# Patient Record
Sex: FEMALE | Race: WHITE | Employment: OTHER | URBAN - METROPOLITAN AREA
[De-identification: names, ages, dates, MRNs, and addresses within clinical notes are randomized per-mention and may not be internally consistent; named-entity substitution may affect disease eponyms.]

---

## 2017-01-16 ENCOUNTER — TELEPHONE (OUTPATIENT)
Dept: FAMILY MEDICINE CLINIC | Age: 67
End: 2017-01-16

## 2017-01-16 ENCOUNTER — OFFICE VISIT (OUTPATIENT)
Dept: FAMILY MEDICINE CLINIC | Age: 67
End: 2017-01-16

## 2017-01-16 ENCOUNTER — HOSPITAL ENCOUNTER (OUTPATIENT)
Dept: LAB | Age: 67
Discharge: HOME | End: 2017-01-16
Payer: MEDICARE

## 2017-01-16 VITALS
SYSTOLIC BLOOD PRESSURE: 126 MMHG | DIASTOLIC BLOOD PRESSURE: 88 MMHG | BODY MASS INDEX: 35.06 KG/M2 | TEMPERATURE: 98.6 F | HEART RATE: 110 BPM | RESPIRATION RATE: 18 BRPM | OXYGEN SATURATION: 98 % | HEIGHT: 65 IN | WEIGHT: 210.4 LBS

## 2017-01-16 DIAGNOSIS — F43.23 ADJUSTMENT DISORDER WITH MIXED ANXIETY AND DEPRESSED MOOD: ICD-10-CM

## 2017-01-16 DIAGNOSIS — K92.1 MELENA: Primary | ICD-10-CM

## 2017-01-16 DIAGNOSIS — R63.5 WEIGHT GAIN, ABNORMAL: ICD-10-CM

## 2017-01-16 DIAGNOSIS — M54.16 LUMBAR NERVE ROOT IMPINGEMENT: ICD-10-CM

## 2017-01-16 DIAGNOSIS — R53.82 CHRONIC FATIGUE: ICD-10-CM

## 2017-01-16 PROCEDURE — 85027 COMPLETE CBC AUTOMATED: CPT

## 2017-01-16 PROCEDURE — 80053 COMPREHEN METABOLIC PANEL: CPT

## 2017-01-16 PROCEDURE — 84439 ASSAY OF FREE THYROXINE: CPT

## 2017-01-16 PROCEDURE — 84443 ASSAY THYROID STIM HORMONE: CPT

## 2017-01-16 NOTE — PROGRESS NOTES
1. Have you been to the ER, urgent care clinic since your last visit? Hospitalized since your last visit? No    2. Have you seen or consulted any other health care providers outside of the [facility] since your last visit? Include any pap smears or colon screening.  No     Chief Complaint   Patient presents with    Stool Color Change     pt c/o black stool and stomach aching since wednesday       Learning Assessment 7/10/2013   PRIMARY LEARNER Patient   PRIMARY LANGUAGE ENGLISH   LEARNER PREFERENCE PRIMARY DEMONSTRATION   ANSWERED BY patient   RELATIONSHIP SELF

## 2017-01-16 NOTE — LETTER
1/24/2017 10:54 AM 
 
Ms. Rishi Bradley [facility] Dear Rishi Bradley: Please find your most recent results below. Resulted Orders CBC W/O DIFF Result Value Ref Range WBC 8.6 3.4 - 10.8 x10E3/uL  
 RBC 5.18 3.77 - 5.28 x10E6/uL HGB 16.1 (H) 11.1 - 15.9 g/dL HCT 47.8 (H) 34.0 - 46.6 % MCV 92 79 - 97 fL  
 MCH 31.1 26.6 - 33.0 pg  
 MCHC 33.7 31.5 - 35.7 g/dL  
 RDW 13.3 12.3 - 15.4 % PLATELET 254 802 - 269 x10E3/uL Narrative Performed at:  [address]  [id number] : Silvana Pritchard MD, Phone:  [phone] T4, FREE Result Value Ref Range T4, Free 1.23 0.82 - 1.77 ng/dL Narrative Performed at:  [address]  [id number] : Silvana Pritchard MD, Phone:  [phone] TSH 3RD GENERATION Result Value Ref Range TSH 0.783 0.450 - 4.500 uIU/mL Narrative Performed at:  [address]  [id number] : Silvana Pritchard MD, Phone:  [phone] METABOLIC PANEL, COMPREHENSIVE Result Value Ref Range Glucose 101 (H) 65 - 99 mg/dL BUN 15 8 - 27 mg/dL Creatinine 0.52 (L) 0.57 - 1.00 mg/dL GFR est non-AA 98 >59 mL/min/1.73 GFR est  >59 mL/min/1.73  
 BUN/Creatinine ratio 29 (H) 11 - 26 Sodium 142 134 - 144 mmol/L Potassium 4.0 3.5 - 5.2 mmol/L Chloride 104 96 - 106 mmol/L  
 CO2 20 18 - 29 mmol/L Calcium 9.2 8.7 - 10.3 mg/dL Protein, total 6.9 6.0 - 8.5 g/dL Albumin 3.9 3.6 - 4.8 g/dL GLOBULIN, TOTAL 3.0 1.5 - 4.5 g/dL A-G Ratio 1.3 1.1 - 2.5 Bilirubin, total 0.3 0.0 - 1.2 mg/dL Alk. phosphatase 109 39 - 117 IU/L  
 AST 8 0 - 40 IU/L  
 ALT 17 0 - 32 IU/L Narrative Performed at:  [address]  [id number] : Silvana Pritchard MD, Phone:  [phone] AMB POC FECAL BLOOD, OCCULT, QL 3 CARDS Result Value Ref Range VALID INTERNAL CONTROL POC Yes Hemoccult (POC) Negative Negative Occult Blood-2 (POC) Negative Occult blood-3 (POC) RECOMMENDATIONS: 
All the tests are normal; no signs of anemia or low thyroid; normal potassium and sodium. As you return to normal GI tract , you can resume the low carb diet. Return the stool check to look for signs of active blood in the GI tract. Please call me if you have any questions: [phone] Sincerely, 
 
 
Kenia Bustamante MD

## 2017-01-16 NOTE — MR AVS SNAPSHOT
Visit Information Date & Time Provider Department Dept. Phone Encounter #  
 1/16/2017  1:45 PM Lottie Baptiste  [place] [phone] [id number] Upcoming Health Maintenance Date Due FOBT Q 1 YEAR AGE 50-75 1/5/2016 Pneumococcal 65+ Low/Medium Risk (2 of 2 - PPSV23) 8/18/2017 MEDICARE YEARLY EXAM 8/18/2017 BREAST CANCER SCRN MAMMOGRAM 10/1/2017 GLAUCOMA SCREENING Q2Y 8/17/2018 DTaP/Tdap/Td series (2 - Td) 8/3/2026 Allergies as of 1/16/2017  Review Complete On: 4/27/7443 By: Gogo Lamar LPN Severity Noted Reaction Type Reactions Codeine High 03/25/2010   Systemic Rash Keflex [Cephalexin]  03/25/2010    Nausea and Vomiting Tramadol  03/25/2010    Other (comments) Drowsy Wellbutrin [Bupropion Hcl]  03/25/2010    Other (comments)  
 fatigue Current Immunizations  Never Reviewed Name Date Td, Adsorbed PF 4/24/2013  
 dT Vaccine 1/5/1999 Not reviewed this visit You Were Diagnosed With   
  
 Codes Comments Melena    -  Primary ICD-10-CM: K92.1 ICD-9-CM: 578.1 Weight gain, abnormal     ICD-10-CM: R63.5 ICD-9-CM: 783.1 Vitals BP Pulse Temp Resp Height(growth percentile) Weight(growth percentile) 126/88 (BP 1 Location: Left arm, BP Patient Position: Sitting) (!) 110 98.6 °F (37 °C) (Oral) 18 5' 5\" (1.651 m) 210 lb 6.4 oz (95.4 kg) SpO2 BMI OB Status Smoking Status 98% 35.01 kg/m2 Hysterectomy Never Smoker Vitals History BMI and BSA Data Body Mass Index Body Surface Area 35.01 kg/m 2 2.09 m 2 Preferred Pharmacy Pharmacy Name Phone [facility] [address]. [address] [phone] Your Updated Medication List  
  
   
This list is accurate as of: 1/16/17  2:52 PM.  Always use your most recent med list.  
  
  
  
  
 amoxicillin 500 mg Tab Take  by mouth. Indications: prn for procedures  
  
 citalopram 20 mg tablet Commonly known as:  CELEXA  
TAKE ONE TABLET BY MOUTH DAILY  
  
 dextroamphetamine-amphetamine 10 mg tablet Commonly known as:  ADDERALL Take 1 Tab by mouth daily. Max Daily Amount: 10 mg.  
  
 furosemide 20 mg tablet Commonly known as:  LASIX TAKE ONE TABLET BY MOUTH TWICE A DAY We Performed the Following CBC W/O DIFF [80693 CPT(R)] METABOLIC PANEL, COMPREHENSIVE [00343 CPT(R)] T4, FREE [id number] CPT(R)] TSH 3RD GENERATION [90179 CPT(R)] Introducing [facility] & [facility] SERVICES! Dear Jeremiah Mclean: 
Thank you for requesting a [employer] account. Our records indicate that you have previously registered for a [employer] account but its currently inactive. Please call our [employer] support line at [phone]. Additional Information If you have questions, please visit the Frequently Asked Questions section of the [employer] website at [email or web address] Remember, [employer] is NOT to be used for urgent needs. For medical emergencies, dial 911. Now available from your iPhone and Android! Please provide this summary of care documentation to your next provider. Your primary care clinician is listed as [place] [address], [facility]/s . If you have any questions after today's visit, please call [phone].

## 2017-01-16 NOTE — PROGRESS NOTES
HISTORY OF PRESENT ILLNESS  HPI  Jovana Woo is a 74 y.o. Female with a history of hyperlipidemia and adjustment disorder with mixed anxiety and depressed mood who presents to the office today for black stool. Pt notes she has been nauseas with abdominal pain for the past 6 days and then had a BM that was black 3 days ago. She treated this with Pepto Bismol. Pt is concerned about weight gain since her knee surgery 1 year ago. Past Medical History   Diagnosis Date    Actinic keratosis      Left arm    Adjustment disorder with mixed anxiety and depressed mood     Adverse effect of anesthesia      HARD TO WAKE UP SLOW BREATHING     Kidney stones 95,96,98     left    Left knee DJD     Lumbar nerve root impingement      RLQ pain    Other and unspecified hyperlipidemia     Postmenopausal HRT (hormone replacement therapy)      Past Surgical History   Procedure Laterality Date    Hx meniscectomy       left knee    Hx appendectomy      Hx tubal ligation       BSPO adhesion conization abn pap    Hx  section       X2    Hx hysterectomy      Hx orthopaedic  12     left shoulder repair; 14 screws and 2 plates    Hx orthopaedic       BROKEN ARM SURGERY X2    Hx orthopaedic Bilateral      SCOPE of knnees bilateral    Hx orthopaedic        REPAIR OF Left MENISCUS    Colonoscopy N/A 2016     COLONOSCOPY performed by Marce Sifuentes MD at [facility] ENDOSCOPY     Current Outpatient Prescriptions on File Prior to Visit   Medication Sig Dispense Refill    furosemide (LASIX) 20 mg tablet TAKE ONE TABLET BY MOUTH TWICE A DAY 60 Tab 2    citalopram (CELEXA) 20 mg tablet TAKE ONE TABLET BY MOUTH DAILY 30 Tab 11     No current facility-administered medications on file prior to visit.       Allergies   Allergen Reactions    Codeine Rash    Keflex [Cephalexin] Nausea and Vomiting    Tramadol Other (comments)     Drowsy      Wellbutrin [Bupropion Hcl] Other (comments)     fatigue Family History   Problem Relation Age of Onset    Cancer Mother      colon    Arthritis-osteo Mother     Cancer Maternal Grandmother      stomach    Heart Disease Father      Social History     Social History    Marital status:      Spouse name: N/A    Number of children: N/A    Years of education: N/A     Social History Main Topics    Smoking status: Never Smoker    Smokeless tobacco: Never Used    Alcohol use 0.5 oz/week     1 Standard drinks or equivalent per week      Comment: RARELY    Drug use: No    Sexual activity: Not Asked     Other Topics Concern    None     Social History Narrative             Review of Systems   Constitutional: Negative for chills, diaphoresis, fever, malaise/fatigue and weight loss. Eyes: Negative for blurred vision, double vision, pain and redness. Respiratory: Negative for cough, shortness of breath and wheezing. Cardiovascular: Negative for chest pain, palpitations, orthopnea, claudication, leg swelling and PND. Gastrointestinal: Positive for abdominal pain and nausea. Black stool   Skin: Negative for itching and rash. Neurological: Negative for dizziness, tingling, tremors, sensory change, speech change, focal weakness, seizures, loss of consciousness, weakness and headaches. Results for orders placed or performed in visit on 71/25/20   METABOLIC PANEL, COMPREHENSIVE   Result Value Ref Range    Glucose 82 65 - 99 mg/dL    BUN 18 8 - 27 mg/dL    Creatinine 0.61 0.57 - 1.00 mg/dL    GFR est non-AA 93 >59 mL/min/1.73    GFR est  >59 mL/min/1.73    BUN/Creatinine ratio 30 (H) 11 - 26    Sodium 141 134 - 144 mmol/L    Potassium 4.6 3.5 - 5.2 mmol/L    Chloride 97 97 - 108 mmol/L    CO2 24 18 - 29 mmol/L    Calcium 9.6 8.7 - 10.3 mg/dL    Protein, total 7.6 6.0 - 8.5 g/dL    Albumin 4.5 3.6 - 4.8 g/dL    GLOBULIN, TOTAL 3.1 1.5 - 4.5 g/dL    A-G Ratio 1.5 1.1 - 2.5    Bilirubin, total 0.5 0.0 - 1.2 mg/dL    Alk.  phosphatase 95 39 - 117 IU/L    AST 8 0 - 40 IU/L    ALT 20 0 - 32 IU/L   T4, FREE   Result Value Ref Range    T4, Free 1.15 0.82 - 1.77 ng/dL   TSH 3RD GENERATION   Result Value Ref Range    TSH 1.430 0.450 - 4.500 uIU/mL   CBC W/O DIFF   Result Value Ref Range    WBC 9.2 3.4 - 10.8 x10E3/uL    RBC 5.32 (H) 3.77 - 5.28 x10E6/uL    HGB 16.6 (H) 11.1 - 15.9 g/dL    HCT 49.4 (H) 34.0 - 46.6 %    MCV 93 79 - 97 fL    MCH 31.2 26.6 - 33.0 pg    MCHC 33.6 31.5 - 35.7 g/dL    RDW 13.8 12.3 - 15.4 %    PLATELET 540 242 - 096 x10E3/uL           Physical Exam  Visit Vitals    /88 (BP 1 Location: Left arm, BP Patient Position: Sitting)    Pulse (!) 110    Temp 98.6 °F (37 °C) (Oral)    Resp 18    Ht 5' 5\" (1.651 m)    Wt 210 lb 6.4 oz (95.4 kg)    SpO2 98%    BMI 35.01 kg/m2       Head: Normocephalic, without obvious abnormality, atraumatic  Eyes: conjunctivae/corneas clear. PERRL, EOM's intact. Neck: supple, symmetrical, trachea midline, no adenopathy, thyroid: not enlarged, symmetric, no tenderness/mass/nodules, no carotid bruit and no JVD  Lungs: clear to auscultation bilaterally  Heart: regular rate and rhythm, S1, S2 normal, no murmur, click, rub or gallop  Abdomen: soft, non-tender. Bowel sounds normal. No masses,  no organomegaly  Extremities: extremities normal, atraumatic, no cyanosis or edema  Pulses: 2+ and symmetric  Lymph nodes: Cervical, supraclavicular, and axillary nodes normal.  Neurologic: Grossly normal           ASSESSMENT and PLAN    ICD-10-CM ICD-9-CM    1. Melena K92.1 578.1 CBC W/O DIFF      METABOLIC PANEL, COMPREHENSIVE   2. Weight gain, abnormal R63.5 783.1 T4, FREE      TSH 3RD GENERATION      METABOLIC PANEL, COMPREHENSIVE   3. Chronic fatigue R53.82 780.79    4. Adjustment disorder with mixed anxiety and depressed mood F43.23 309.28    5. lumbar nerve root impingement-2010- RLQ/ inguinal pain M54.16 724.4      Tiffanie Abebe was seen today for stool color change.     Diagnoses and all orders for this visit:    Melena  -     CBC W/O DIFF  -     METABOLIC PANEL, COMPREHENSIVE    Weight gain, abnormal  -     T4, FREE  -     TSH 3RD GENERATION  -     METABOLIC PANEL, COMPREHENSIVE    Chronic fatigue    Adjustment disorder with mixed anxiety and depressed mood    lumbar nerve root impingement-2010- RLQ/ inguinal pain      Follow-up Disposition:  Return if symptoms worsen or fail to improve.     lab results and schedule of future lab studies reviewed with patient  reviewed diet, exercise and weight control  cardiovascular risk and specific lipid/LDL goals reviewed  reviewed medications and side effects in detail  Please call my office if there are any questions- [phone]. I will arrange for follow up after the lab tests done from today return  Recommended a weekly \"heart check. \" I went into detail how to do this. Call for refills on medications as needed. Discussed expected course/resolution/complications of diagnosis in detail with patient. Medication risks/benefits/costs/interactions/alternatives discussed with patient. Pt was given an after visit summary which includes diagnoses, current medications & vitals. Pt expressed understanding with the diagnosis and plan. Total 25 minutes,60 % counseling re:   Because she is not losing weight, we will check her thyroid. She was on a high protein, low carb diet, but has been eating saltines and soda due to her stomach upset and this likely caused increased fluid weight. IG that is the case, then her weight should drop quickly once she is back on her diet. Heme test stool to be done, one now and one in 4-5 days to make sure she isn't having any active bleeding. Most likely, her symptoms came from a virus and her dark stool came from Pepto-Bismol since her stools have been infrequent since then.      Also, discussed symptoms of concern that were noted today in the note above, treatment options( including doing nothing), when to follow up before recommended time frame. Also, answered all questions. Reviewed symptoms, or lack thereof, of elevated cholesterol. This document was written by Annia Zamora, as dictated by Camden Larson MD.   I have reviewed and agree with the above note and have made corrections where appropriate Jim Lee M.D.

## 2017-01-16 NOTE — LETTER
1/18/2017 9:47 AM 
 
Ms. Nick Serrato [address] [place] [address] Dear Nick Serrato: Please find your most recent results below. Resulted Orders CBC W/O DIFF Result Value Ref Range WBC 8.6 3.4 - 10.8 x10E3/uL  
 RBC 5.18 3.77 - 5.28 x10E6/uL HGB 16.1 (H) 11.1 - 15.9 g/dL HCT 47.8 (H) 34.0 - 46.6 % MCV 92 79 - 97 fL  
 MCH 31.1 26.6 - 33.0 pg  
 MCHC 33.7 31.5 - 35.7 g/dL  
 RDW 13.3 12.3 - 15.4 % PLATELET 910 005 - 900 x10E3/uL Narrative Performed at:  [address]  [id number] : Karma Linn MD, Phone:  [phone] T4, FREE Result Value Ref Range T4, Free 1.23 0.82 - 1.77 ng/dL Narrative Performed at:  [address]  [id number] : Karma Linn MD, Phone:  [phone] TSH 3RD GENERATION Result Value Ref Range TSH 0.783 0.450 - 4.500 uIU/mL Narrative Performed at:  [address]  [id number] : Karma Linn MD, Phone:  [phone] METABOLIC PANEL, COMPREHENSIVE Result Value Ref Range Glucose 101 (H) 65 - 99 mg/dL BUN 15 8 - 27 mg/dL Creatinine 0.52 (L) 0.57 - 1.00 mg/dL GFR est non-AA 98 >59 mL/min/1.73 GFR est  >59 mL/min/1.73  
 BUN/Creatinine ratio 29 (H) 11 - 26 Sodium 142 134 - 144 mmol/L Potassium 4.0 3.5 - 5.2 mmol/L Chloride 104 96 - 106 mmol/L  
 CO2 20 18 - 29 mmol/L Calcium 9.2 8.7 - 10.3 mg/dL Protein, total 6.9 6.0 - 8.5 g/dL Albumin 3.9 3.6 - 4.8 g/dL GLOBULIN, TOTAL 3.0 1.5 - 4.5 g/dL A-G Ratio 1.3 1.1 - 2.5 Bilirubin, total 0.3 0.0 - 1.2 mg/dL Alk. phosphatase 109 39 - 117 IU/L  
 AST 8 0 - 40 IU/L  
 ALT 17 0 - 32 IU/L Narrative Performed at:  [address]  [id number] : Karma Linn MD, Phone:  [phone] RECOMMENDATIONS: 
All the tests are normal; no signs of anemia or low thyroid; normal potassium and sodium. As you return to normal GI tract , you can resume the low carb diet. Return the stool check to look for signs of active blood in the GI tract. Please call me if you have any questions: [phone] Sincerely, 
 
 
Linda Velazquez MD

## 2017-01-16 NOTE — TELEPHONE ENCOUNTER
Kayla Him  [phone]  [phone] , [place]    Patient has been sick since last Wednesday -  Over the weekend she noticed that her  bowel movements were black. She is asking for a work in appointment with Dr. Rose Mattson.

## 2017-01-17 LAB
ALBUMIN SERPL-MCNC: 3.9 G/DL (ref 3.6–4.8)
ALBUMIN/GLOB SERPL: 1.3 {RATIO} (ref 1.1–2.5)
ALP SERPL-CCNC: 109 IU/L (ref 39–117)
ALT SERPL-CCNC: 17 IU/L (ref 0–32)
AST SERPL-CCNC: 8 IU/L (ref 0–40)
BILIRUB SERPL-MCNC: 0.3 MG/DL (ref 0–1.2)
BUN SERPL-MCNC: 15 MG/DL (ref 8–27)
BUN/CREAT SERPL: 29 (ref 11–26)
CALCIUM SERPL-MCNC: 9.2 MG/DL (ref 8.7–10.3)
CHLORIDE SERPL-SCNC: 104 MMOL/L (ref 96–106)
CO2 SERPL-SCNC: 20 MMOL/L (ref 18–29)
CREAT SERPL-MCNC: 0.52 MG/DL (ref 0.57–1)
ERYTHROCYTE [DISTWIDTH] IN BLOOD BY AUTOMATED COUNT: 13.3 % (ref 12.3–15.4)
GLOBULIN SER CALC-MCNC: 3 G/DL (ref 1.5–4.5)
GLUCOSE SERPL-MCNC: 101 MG/DL (ref 65–99)
HCT VFR BLD AUTO: 47.8 % (ref 34–46.6)
HGB BLD-MCNC: 16.1 G/DL (ref 11.1–15.9)
MCH RBC QN AUTO: 31.1 PG (ref 26.6–33)
MCHC RBC AUTO-ENTMCNC: 33.7 G/DL (ref 31.5–35.7)
MCV RBC AUTO: 92 FL (ref 79–97)
PLATELET # BLD AUTO: 365 X10E3/UL (ref 150–379)
POTASSIUM SERPL-SCNC: 4 MMOL/L (ref 3.5–5.2)
PROT SERPL-MCNC: 6.9 G/DL (ref 6–8.5)
RBC # BLD AUTO: 5.18 X10E6/UL (ref 3.77–5.28)
SODIUM SERPL-SCNC: 142 MMOL/L (ref 134–144)
T4 FREE SERPL-MCNC: 1.23 NG/DL (ref 0.82–1.77)
TSH SERPL DL<=0.005 MIU/L-ACNC: 0.78 UIU/ML (ref 0.45–4.5)
WBC # BLD AUTO: 8.6 X10E3/UL (ref 3.4–10.8)

## 2017-01-18 NOTE — PROGRESS NOTES
All the tests are normal; no signs of anemia or low thyroid; normal potassium and sodium. As you return to normal GI tract , you can resume the low carb diet. Return the stool check to look for signs of active blood in the GI tract.

## 2017-01-19 LAB
HEMOCCULT STL QL: NEGATIVE
HEMOCCULT STL QL: NEGATIVE
HEMOCCULT STL QL: NORMAL
VALID INTERNAL CONTROL?: YES

## 2017-01-23 RX ORDER — FUROSEMIDE 20 MG/1
TABLET ORAL
Qty: 60 TAB | Refills: 5 | Status: SHIPPED | OUTPATIENT
Start: 2017-01-23 | End: 2017-07-19 | Stop reason: SDUPTHER

## 2017-01-23 RX ORDER — CITALOPRAM 20 MG/1
TABLET, FILM COATED ORAL
Qty: 30 TAB | Refills: 10 | Status: SHIPPED | OUTPATIENT
Start: 2017-01-23 | End: 2017-09-15 | Stop reason: SDUPTHER

## 2017-03-07 ENCOUNTER — OFFICE VISIT (OUTPATIENT)
Dept: FAMILY MEDICINE CLINIC | Age: 67
End: 2017-03-07

## 2017-03-07 VITALS
RESPIRATION RATE: 18 BRPM | SYSTOLIC BLOOD PRESSURE: 118 MMHG | BODY MASS INDEX: 35.32 KG/M2 | DIASTOLIC BLOOD PRESSURE: 70 MMHG | TEMPERATURE: 98.2 F | HEART RATE: 96 BPM | OXYGEN SATURATION: 98 % | WEIGHT: 212 LBS | HEIGHT: 65 IN

## 2017-03-07 DIAGNOSIS — Z79.890 POSTMENOPAUSAL HRT (HORMONE REPLACEMENT THERAPY): ICD-10-CM

## 2017-03-07 DIAGNOSIS — R00.0 TACHYCARDIA: ICD-10-CM

## 2017-03-07 DIAGNOSIS — M54.16 LUMBAR NERVE ROOT IMPINGEMENT: ICD-10-CM

## 2017-03-07 DIAGNOSIS — F43.23 ADJUSTMENT DISORDER WITH MIXED ANXIETY AND DEPRESSED MOOD: ICD-10-CM

## 2017-03-07 DIAGNOSIS — R00.2 INTERMITTENT PALPITATIONS: Primary | ICD-10-CM

## 2017-03-07 RX ORDER — ESTRADIOL 0.1 MG/D
1 PATCH TRANSDERMAL
COMMUNITY
End: 2018-04-09 | Stop reason: ALTCHOICE

## 2017-03-07 NOTE — PROGRESS NOTES
Patient presents in office today with c/o tightness in chest tightness and elevated heart rate x 1.5 month  Patient c/o insomnia x 1 year    Chief Complaint   Patient presents with    Irregular Heart Beat     noticed elevated heart rate x 1.5 months    Insomnia     x 1 year     1. Have you been to the ER, urgent care clinic since your last visit? Hospitalized since your last visit? No    2. Have you seen or consulted any other health care providers outside of the [facility] since your last visit? Include any pap smears or colon screening. No     Fall Risk Assessment, last 12 mths 3/7/2017   Able to walk? Yes   Fall in past 12 months?  No

## 2017-03-07 NOTE — MR AVS SNAPSHOT
Visit Information Date & Time Provider Department Dept. Phone Encounter #  
 3/7/2017  3:30 PM Cresencio Foster  [facility] [phone] [id number] Upcoming Health Maintenance Date Due Pneumococcal 65+ Low/Medium Risk (2 of 2 - PPSV23) 8/18/2017 MEDICARE YEARLY EXAM 8/18/2017 BREAST CANCER SCRN MAMMOGRAM 10/1/2017 FOBT Q 1 YEAR AGE 50-75 1/19/2018 GLAUCOMA SCREENING Q2Y 8/17/2018 DTaP/Tdap/Td series (2 - Td) 8/3/2026 Allergies as of 3/7/2017  Review Complete On: 3/7/2017 By: Tolu Angelo LPN Severity Noted Reaction Type Reactions Codeine High 03/25/2010   Systemic Rash Keflex [Cephalexin]  03/25/2010    Nausea and Vomiting Tramadol  03/25/2010    Other (comments) Drowsy Wellbutrin [Bupropion Hcl]  03/25/2010    Other (comments)  
 fatigue Current Immunizations  Never Reviewed Name Date Td, Adsorbed PF 4/24/2013  
 dT Vaccine 1/5/1999 Not reviewed this visit You Were Diagnosed With   
  
 Codes Comments Tachycardia    -  Primary ICD-10-CM: R00.0 ICD-9-CM: 454. 0 Vitals BP Pulse Temp Resp Height(growth percentile) Weight(growth percentile) 118/70 (BP 1 Location: Left arm, BP Patient Position: Sitting) 96 98.2 °F (36.8 °C) (Oral) 18 5' 5\" (1.651 m) 212 lb (96.2 kg) SpO2 BMI OB Status Smoking Status 98% 35.28 kg/m2 Hysterectomy Never Smoker Vitals History BMI and BSA Data Body Mass Index Body Surface Area  
 35.28 kg/m 2 2.1 m 2 Preferred Pharmacy Pharmacy Name Phone [facility] Godoy [address] [facility], . [address] [facility] [phone] Your Updated Medication List  
  
   
This list is accurate as of: 3/7/17  4:56 PM.  Always use your most recent med list.  
  
  
  
  
 citalopram 20 mg tablet Commonly known as:  CELEXA  
TAKE ONE TABLET BY MOUTH DAILY CLIMARA 0.1 mg/24 hr  
Generic drug:  estradiol 1 Patch by TransDERmal route Every Saturday. furosemide 20 mg tablet Commonly known as:  LASIX TAKE ONE TABLET BY MOUTH TWICE A DAY We Performed the Following AMB POC EKG ROUTINE W/ 12 LEADS, INTER & REP [23121 CPT(R)] Introducing [facility] & [facility] SERVICES! Dear Monica Delgado: 
Thank you for requesting a [employer] account. Our records indicate that you have previously registered for a [employer] account but its currently inactive. Please call our [employer] support line at [phone]. Additional Information If you have questions, please visit the Frequently Asked Questions section of the [employer] website at [email or web address] Remember, [employer] is NOT to be used for urgent needs. For medical emergencies, dial 911. Now available from your iPhone and Android! Please provide this summary of care documentation to your next provider. Your primary care clinician is listed as [place] [address], [facility]/s . If you have any questions after today's visit, please call [phone].

## 2017-03-07 NOTE — PROGRESS NOTES
HISTORY OF PRESENT ILLNESS  HPI  Michaela Gonsalez is a 69 y.o. Female with history of hyperlipidemia and adjustment disorder with anxiety and depressed mood who presents to office today with her  for irregular heart beats. Pt complains of tachycardia for the past 1.5 months, noting an extra beat after every fourth beat; pt notes that her heart rate has been spiking after eating, and that sometimes it has reached as high as 194 bpm. Pt's EKG today revealed PVCs. Pt complains of problems staying asleep but denies any problem falling asleep. She has taken Tylenol PM without relief. Pt states that she sleeps from around 11 PM to 2 - 2:30 AM, when she wakes up for about 30 minutes before falling back asleep. Pt notes that she uses her tablet before bed and when she is awake right after 2 - 2:30 AM. Pt denies personal history of loud snoring and family history of sleep apnea.               Past Medical History:   Diagnosis Date    Actinic keratosis     Left arm    Adjustment disorder with mixed anxiety and depressed mood     Adverse effect of anesthesia     HARD TO WAKE UP SLOW BREATHING     Kidney stones 95,96,98    left    Left knee DJD     Lumbar nerve root impingement     RLQ pain    Other and unspecified hyperlipidemia     Postmenopausal HRT (hormone replacement therapy)      Past Surgical History:   Procedure Laterality Date    COLONOSCOPY N/A 2016    COLONOSCOPY performed by Clarice Barnes MD at [address] HX APPENDECTOMY      HX  SECTION      X2    HX HYSTERECTOMY       [address]    left knee    HX ORTHOPAEDIC  12    left shoulder repair; 14 screws and 2 plates    HX ORTHOPAEDIC      BROKEN ARM SURGERY X2    HX ORTHOPAEDIC Bilateral     SCOPE of knnees bilateral    HX ORTHOPAEDIC       REPAIR OF Left MENISCUS    HX TUBAL LIGATION      BSPO adhesion conization abn pap     Current Outpatient Prescriptions on File Prior to Visit   Medication Sig Dispense Refill    citalopram (CELEXA) 20 mg tablet TAKE ONE TABLET BY MOUTH DAILY 30 Tab 10    furosemide (LASIX) 20 mg tablet TAKE ONE TABLET BY MOUTH TWICE A DAY 60 Tab 5     No current facility-administered medications on file prior to visit. Allergies   Allergen Reactions    Codeine Rash    Keflex [Cephalexin] Nausea and Vomiting    Tramadol Other (comments)     Drowsy      Wellbutrin [Bupropion Hcl] Other (comments)     fatigue     Family History   Problem Relation Age of Onset    Cancer Mother      colon    Arthritis-osteo Mother     Cancer Maternal Grandmother      stomach    Heart Disease Father      Social History     Social History    Marital status:      Spouse name: N/A    Number of children: N/A    Years of education: N/A     Social History Main Topics    Smoking status: Never Smoker    Smokeless tobacco: Never Used    Alcohol use 0.5 oz/week     1 Standard drinks or equivalent per week      Comment: RARELY    Drug use: No    Sexual activity: Not Asked     Other Topics Concern    None     Social History Narrative             Review of Systems   Constitutional: Negative for chills, diaphoresis, fever, malaise/fatigue and weight loss. Eyes: Negative for blurred vision, double vision, pain and redness. Respiratory: Negative for cough, shortness of breath and wheezing. Cardiovascular: Negative for chest pain, palpitations, orthopnea, claudication, leg swelling and PND. Tachycardia, PVCs   Skin: Negative for itching and rash. Neurological: Negative for dizziness, tingling, tremors, sensory change, speech change, focal weakness, seizures, loss of consciousness, weakness and headaches. Psychiatric/Behavioral: The patient has insomnia (staying asleep).       Results for orders placed or performed in visit on 01/16/17   CBC W/O DIFF   Result Value Ref Range    WBC 8.6 3.4 - 10.8 x10E3/uL    RBC 5.18 3.77 - 5.28 x10E6/uL    HGB 16.1 (H) 11.1 - 15.9 g/dL    HCT 47.8 (H) 34.0 - 46.6 %    MCV 92 79 - 97 fL    MCH 31.1 26.6 - 33.0 pg    MCHC 33.7 31.5 - 35.7 g/dL    RDW 13.3 12.3 - 15.4 %    PLATELET 718 315 - 753 x10E3/uL   T4, FREE   Result Value Ref Range    T4, Free 1.23 0.82 - 1.77 ng/dL   TSH 3RD GENERATION   Result Value Ref Range    TSH 0.783 0.450 - 1.114 uIU/mL   METABOLIC PANEL, COMPREHENSIVE   Result Value Ref Range    Glucose 101 (H) 65 - 99 mg/dL    BUN 15 8 - 27 mg/dL    Creatinine 0.52 (L) 0.57 - 1.00 mg/dL    GFR est non-AA 98 >59 mL/min/1.73    GFR est  >59 mL/min/1.73    BUN/Creatinine ratio 29 (H) 11 - 26    Sodium 142 134 - 144 mmol/L    Potassium 4.0 3.5 - 5.2 mmol/L    Chloride 104 96 - 106 mmol/L    CO2 20 18 - 29 mmol/L    Calcium 9.2 8.7 - 10.3 mg/dL    Protein, total 6.9 6.0 - 8.5 g/dL    Albumin 3.9 3.6 - 4.8 g/dL    GLOBULIN, TOTAL 3.0 1.5 - 4.5 g/dL    A-G Ratio 1.3 1.1 - 2.5    Bilirubin, total 0.3 0.0 - 1.2 mg/dL    Alk. phosphatase 109 39 - 117 IU/L    AST (SGOT) 8 0 - 40 IU/L    ALT (SGPT) 17 0 - 32 IU/L   AMB POC FECAL BLOOD, OCCULT, QL 3 CARDS   Result Value Ref Range    VALID INTERNAL CONTROL POC Yes     Hemoccult (POC) Negative Negative    Occult Blood-2 (POC) Negative     Occult blood-3 (POC)               Physical Exam  Visit Vitals    /70 (BP 1 Location: Left arm, BP Patient Position: Sitting)    Pulse 96    Temp 98.2 °F (36.8 °C) (Oral)    Resp 18    Ht 5' 5\" (1.651 m)    Wt 212 lb (96.2 kg)    SpO2 98%    BMI 35.28 kg/m2       Head: Normocephalic, without obvious abnormality, atraumatic  Eyes: conjunctivae/corneas clear. PERRL, EOM's intact. Neck: supple, symmetrical, trachea midline, no adenopathy, thyroid: not enlarged, symmetric, no tenderness/mass/nodules, no carotid bruit and no JVD  Lungs: clear to auscultation bilaterally  Heart: S1, S2 normal, no murmur, click, rub or gallop.  Occasional ectopic beat every 5-15 beats  Extremities: extremities normal, atraumatic, no cyanosis or edema  Pulses: 2+ and symmetric  Lymph nodes: Cervical, supraclavicular, and axillary nodes normal.  Neurologic: Grossly normal          ASSESSMENT and PLAN    ICD-10-CM ICD-9-CM    1. Intermittent palpitations R00.2 785.1 AMB POC EKG ROUTINE W/ 12 LEADS, INTER & REP   2. Tachycardia R00.0 785.0 estradiol (CLIMARA) 0.1 mg/24 hr      AMB POC EKG ROUTINE W/ 12 LEADS, INTER & REP   3. Adjustment disorder with mixed anxiety and depressed mood F43.23 309.28    4. Postmenopausal HRT (hormone replacement therapy) Z79.890 V07.4 estradiol (CLIMARA) 0.1 mg/24 hr   5. lumbar nerve root impingement-2010- RLQ/ inguinal pain M54.16 724.4      Darlene Mcdonald was seen today for irregular heart beat and insomnia. Diagnoses and all orders for this visit:    Intermittent palpitations  -     AMB POC EKG ROUTINE W/ 12 LEADS, INTER & REP    Tachycardia  -     AMB POC EKG ROUTINE W/ 12 LEADS, INTER & REP    Adjustment disorder with mixed anxiety and depressed mood    Postmenopausal HRT (hormone replacement therapy)    lumbar nerve root impingement-2010- RLQ/ inguinal pain      Follow-up Disposition:  Return in about 1 year (around 3/7/2018), or if palpitations/symptoms worsen or fail to improve, for physical.     reviewed medications and side effects in detail  Please call my office if there are any questions- [phone]. Discussed expected course/resolution/complications of diagnosis in detail with patient. Medication risks/benefits/costs/interactions/alternatives discussed with patient. Pt was given an after visit summary which includes diagnoses, current medications & vitals. Pt expressed understanding with the diagnosis and plan. Patient to call if no better in 3 -4 days and prn new problems. Total 25 minutes,60 % counseling re: I talked about having PVCs and that they are usually harmless; she will let me know if they become worse as far as frequency and if they come on more during exercise.  We talked about her heart in general and how to detect heart problems with the \"heart check\", and I discussed how to do that. For her insomnia, I suggested she try some blue blocker sunglasses when she is using a computer, tablet, etc. in the evening. Because she is so tired in the afternoon, I suggested she try taking a nap less than an hour in length and try to set the same time to go to sleep, and that if this is unsuccessful in getting her sleep pattern back to normal, she should call in a few weeks and we'll get her on medication for that. Also, discussed symptoms of concern that were noted today in the note above, treatment options( including doing nothing), when to follow up before recommended time frame. Also, answered all questions. This document was written by Jessica Oates, as dictated by Cammie Gonzalez MD.  I have reviewed and agree with the above note and have made corrections where appropriate Jim Marsh M.D.

## 2017-04-14 ENCOUNTER — OFFICE VISIT (OUTPATIENT)
Dept: FAMILY MEDICINE CLINIC | Age: 67
End: 2017-04-14

## 2017-04-14 VITALS
BODY MASS INDEX: 34.49 KG/M2 | RESPIRATION RATE: 18 BRPM | TEMPERATURE: 98.4 F | HEART RATE: 72 BPM | WEIGHT: 207 LBS | DIASTOLIC BLOOD PRESSURE: 74 MMHG | OXYGEN SATURATION: 98 % | SYSTOLIC BLOOD PRESSURE: 116 MMHG | HEIGHT: 65 IN

## 2017-04-14 DIAGNOSIS — G47.00 INSOMNIA, UNSPECIFIED TYPE: ICD-10-CM

## 2017-04-14 DIAGNOSIS — M54.50 CHRONIC MIDLINE LOW BACK PAIN WITHOUT SCIATICA: Primary | ICD-10-CM

## 2017-04-14 DIAGNOSIS — N15.9 KIDNEY INFECTION: ICD-10-CM

## 2017-04-14 DIAGNOSIS — G89.29 CHRONIC MIDLINE LOW BACK PAIN WITHOUT SCIATICA: Primary | ICD-10-CM

## 2017-04-14 LAB
BILIRUB UR QL STRIP: NEGATIVE
GLUCOSE UR-MCNC: NEGATIVE MG/DL
KETONES P FAST UR STRIP-MCNC: NEGATIVE MG/DL
PH UR STRIP: 5.5 [PH] (ref 4.6–8)
PROT UR QL STRIP: NEGATIVE MG/DL
SP GR UR STRIP: 1.03 (ref 1–1.03)
UA UROBILINOGEN AMB POC: NORMAL (ref 0.2–1)
URINALYSIS CLARITY POC: CLEAR
URINALYSIS COLOR POC: YELLOW
URINE BLOOD POC: NORMAL
URINE LEUKOCYTES POC: NEGATIVE
URINE NITRITES POC: NEGATIVE

## 2017-04-14 NOTE — PROGRESS NOTES
Chief Complaint   Patient presents with    LOW BACK PAIN     Patient states she is prone to kidney stones, and thinks she may have a kidney infection. No urinary frequency or urge to go.  Sleep Problem     Having a hard time sleeping. Will fall asleep but wake up in the middle of the night and cant go back to sleep. 1. Have you been to the ER, urgent care clinic since your last visit? Hospitalized since your last visit? No    2. Have you seen or consulted any other health care providers outside of the [address] [place] [address] since your last visit? Include any pap smears or colon screening.  No

## 2017-04-14 NOTE — PATIENT INSTRUCTIONS

## 2017-04-14 NOTE — PROGRESS NOTES
HISTORY OF PRESENT ILLNESS  Renetta Seip is a 69 y.o. female. HPI Patient presents to office today for mid back pain. She states this how her body reacts to an pending kidney infection. She denies dysuria, hematuria, increased frequency, nocturia or odor. She has not attempted any OTC agents. No fever or chills. ROS   see above     Visit Vitals    /74 (BP 1 Location: Right arm, BP Patient Position: Sitting)    Pulse 72    Temp 98.4 °F (36.9 °C) (Oral)    Resp 18    Ht 5' 5\" (1.651 m)    Wt 207 lb (93.9 kg)    SpO2 98%    BMI 34.45 kg/m2     Current Outpatient Prescriptions on File Prior to Visit   Medication Sig Dispense Refill    estradiol (CLIMARA) 0.1 mg/24 hr 1 Patch by TransDERmal route Every Saturday.  citalopram (CELEXA) 20 mg tablet TAKE ONE TABLET BY MOUTH DAILY 30 Tab 10    furosemide (LASIX) 20 mg tablet TAKE ONE TABLET BY MOUTH TWICE A DAY 60 Tab 5     No current facility-administered medications on file prior to visit. Past Medical History:   Diagnosis Date    Actinic keratosis     Left arm    Adjustment disorder with mixed anxiety and depressed mood     Adverse effect of anesthesia     HARD TO WAKE UP SLOW BREATHING     Kidney stones 95,96,98    left    Left knee DJD     Lumbar nerve root impingement     RLQ pain    Other and unspecified hyperlipidemia     Postmenopausal HRT (hormone replacement therapy)        Physical Exam   Constitutional: She is oriented to person, place, and time. She appears well-developed and well-nourished. No distress. Eyes: Conjunctivae are normal. No scleral icterus. Neck: Normal range of motion. Neck supple. Cardiovascular: Normal rate, regular rhythm and normal heart sounds. No murmur heard. Pulmonary/Chest: Effort normal and breath sounds normal. She has no wheezes. She has no rales. Abdominal: Soft. Bowel sounds are normal. There is no hepatosplenomegaly. There is no tenderness.    Musculoskeletal: Normal range of motion. She exhibits no edema. Arms:  Neurological: She is alert and oriented to person, place, and time. Skin: Skin is warm and dry. Psychiatric: She has a normal mood and affect. Her behavior is normal.     Recent Results (from the past 12 hour(s))   AMB POC URINALYSIS DIP STICK AUTO W/O MICRO    Collection Time: 04/14/17  3:20 PM   Result Value Ref Range    Color (UA POC) Yellow     Clarity (UA POC) Clear     Glucose (UA POC) Negative Negative    Bilirubin (UA POC) Negative Negative    Ketones (UA POC) Negative Negative    Specific gravity (UA POC) 1.030 1.001 - 1.035    Blood (UA POC) Trace Negative    pH (UA POC) 5.5 4.6 - 8.0    Protein (UA POC) Negative Negative mg/dL    Urobilinogen (UA POC) 0.2 mg/dL 0.2 - 1    Nitrites (UA POC) Negative Negative    Leukocyte esterase (UA POC) Negative Negative       ASSESSMENT and PLAN  Ekta Rodriguez was seen today for low back pain and sleep problem.     Diagnoses and all orders for this visit:    Chronic midline transverse mid back pain without sciatica- musculoskeletal - tylenol / ibuprofen for pain management     Kidney infection  -     AMB POC URINALYSIS DIP STICK AUTO W/O MICRO   (neg urine)    Insomnia, unspecified type- melatonin 3 mg qHS     Discussed expected course/resolution/complications of diagnosis in detail with patient.    Medication risks/benefits/interacticons/alternatives discussed with patient.    Pt was given an after visit summary which includes diagnoses, current medications & vitals.    Pt expressed understanding with the diagnosis and plan    IZA Hogan-BC  Electronic Signature

## 2017-05-04 ENCOUNTER — PATIENT OUTREACH (OUTPATIENT)
Dept: FAMILY MEDICINE CLINIC | Age: 67
End: 2017-05-04

## 2017-05-04 NOTE — PROGRESS NOTES
[facility]-ED    Patient listed on discharge ([facility]) report dated 5/4/17. Patient discharged from [facility] for foot injury/fracture. Attempted to contact patient to perform post ED/UC discharge assessment. Unable to reach patient. This writer left message on voicemail with direct contact information. Will attempt to contact again. Need to complete post-discharge assessment. Patient has the following appointment(s) scheduled. No future appointments.

## 2017-05-05 ENCOUNTER — PATIENT OUTREACH (OUTPATIENT)
Dept: FAMILY MEDICINE CLINIC | Age: 67
End: 2017-05-05

## 2017-05-05 NOTE — PROGRESS NOTES
[facility]-ED    Patient returned call to this writer on 5/4 and left voicemail message. This writer attempted to contact patient to perform post ED/UC discharge assessment. Unable to reach patient. This writer left message /on voicemail with direct contact information. Will attempt to contact again. Need to complete post-discharge assessment. Patient has the following appointment(s) scheduled. No future appointments. If no return call or appointment made by 6/5/17, this writer will resolve episode.

## 2017-05-31 ENCOUNTER — HOSPITAL ENCOUNTER (OUTPATIENT)
Dept: MAMMOGRAPHY | Age: 67
Discharge: HOME | End: 2017-05-31
Attending: FAMILY MEDICINE
Payer: MEDICARE

## 2017-05-31 DIAGNOSIS — Z12.31 VISIT FOR SCREENING MAMMOGRAM: ICD-10-CM

## 2017-05-31 PROCEDURE — 77067 SCR MAMMO BI INCL CAD: CPT

## 2017-06-06 ENCOUNTER — PATIENT OUTREACH (OUTPATIENT)
Dept: FAMILY MEDICINE CLINIC | Age: 67
End: 2017-06-06

## 2017-06-06 NOTE — PROGRESS NOTES
Resolving current episode. Transitions of care complete. Per EMR review, there have been no further ED/UC or hospital admissions within 30 days post discharge. Patient has not attended or scheduled any follow-up appointments as directed. There has been no outreach from patient to this writer.

## 2017-07-19 RX ORDER — FUROSEMIDE 20 MG/1
TABLET ORAL
Qty: 60 TAB | Refills: 4 | Status: SHIPPED | OUTPATIENT
Start: 2017-07-19 | End: 2017-09-15 | Stop reason: SDUPTHER

## 2017-09-13 ENCOUNTER — OFFICE VISIT (OUTPATIENT)
Dept: FAMILY MEDICINE CLINIC | Age: 67
End: 2017-09-13

## 2017-09-13 VITALS
OXYGEN SATURATION: 96 % | SYSTOLIC BLOOD PRESSURE: 137 MMHG | HEIGHT: 65 IN | HEART RATE: 84 BPM | BODY MASS INDEX: 33.55 KG/M2 | TEMPERATURE: 98.1 F | WEIGHT: 201.4 LBS | DIASTOLIC BLOOD PRESSURE: 90 MMHG | RESPIRATION RATE: 18 BRPM

## 2017-09-13 DIAGNOSIS — Z87.442 HISTORY OF KIDNEY STONES: ICD-10-CM

## 2017-09-13 DIAGNOSIS — Z79.890 POSTMENOPAUSAL HRT (HORMONE REPLACEMENT THERAPY): ICD-10-CM

## 2017-09-13 DIAGNOSIS — F40.9 INSOMNIA DUE TO ANXIETY AND FEAR: Primary | ICD-10-CM

## 2017-09-13 DIAGNOSIS — F51.05 INSOMNIA DUE TO ANXIETY AND FEAR: Primary | ICD-10-CM

## 2017-09-13 DIAGNOSIS — F43.23 ADJUSTMENT DISORDER WITH MIXED ANXIETY AND DEPRESSED MOOD: ICD-10-CM

## 2017-09-13 DIAGNOSIS — M54.16 LUMBAR NERVE ROOT IMPINGEMENT: ICD-10-CM

## 2017-09-13 DIAGNOSIS — Z71.89 ACP (ADVANCE CARE PLANNING): ICD-10-CM

## 2017-09-13 RX ORDER — ZOLPIDEM TARTRATE 5 MG/1
5 TABLET ORAL
Qty: 15 TAB | Refills: 0 | Status: SHIPPED | OUTPATIENT
Start: 2017-09-13 | End: 2018-01-22 | Stop reason: SDUPTHER

## 2017-09-13 NOTE — PATIENT INSTRUCTIONS
Learning About Sleeping Well  What does sleeping well mean? Sleeping well means getting enough sleep. How much sleep is enough varies among people. The number of hours you sleep is not as important as how you feel when you wake up. If you do not feel refreshed, you probably need more sleep. Another sign of not getting enough sleep is feeling tired during the day. The average total nightly sleep time is 7½ to 8 hours. Healthy adults may need a little more or a little less than this. Why is getting enough sleep important? Getting enough quality sleep is a basic part of good health. When your sleep suffers, your mood and your thoughts can suffer too. You may find yourself feeling more grumpy or stressed. Not getting enough sleep also can lead to serious problems, including injury, accidents, anxiety, and depression. What might cause poor sleeping? Many things can cause sleep problems, including:  · Stress. Stress can be caused by fear about a single event, such as giving a speech. Or you may have ongoing stress, such as worry about work or school. · Depression, anxiety, and other mental or emotional conditions. · Changes in your sleep habits or surroundings. This includes changes that happen where you sleep, such as noise, light, or sleeping in a different bed. It also includes changes in your sleep pattern, such as having jet lag or working a late shift. · Health problems, such as pain, breathing problems, and restless legs syndrome. · Lack of regular exercise. How can you help yourself? Here are some tips that may help you sleep more soundly and wake up feeling more refreshed. Your sleeping area  · Use your bedroom only for sleeping and sex. A bit of light reading may help you fall asleep. But if it doesn't, do your reading elsewhere in the house. Don't watch TV in bed. · Be sure your bed is big enough to stretch out comfortably, especially if you have a sleep partner.   · Keep your bedroom quiet, dark, and cool. Use curtains, blinds, or a sleep mask to block out light. To block out noise, use earplugs, soothing music, or a \"white noise\" machine. Your evening and bedtime routine  · Create a relaxing bedtime routine. You might want to take a warm shower or bath, listen to soothing music, or drink a cup of noncaffeinated tea. · Go to bed at the same time every night. And get up at the same time every morning, even if you feel tired. What to avoid  · Limit caffeine (coffee, tea, caffeinated sodas) during the day, and don't have any for at least 4 to 6 hours before bedtime. · Don't drink alcohol before bedtime. Alcohol can cause you to wake up more often during the night. · Don't smoke or use tobacco, especially in the evening. Nicotine can keep you awake. · Don't take naps during the day, especially close to bedtime. · Don't lie in bed awake for too long. If you can't fall asleep, or if you wake up in the middle of the night and can't get back to sleep within 15 minutes or so, get out of bed and go to another room until you feel sleepy. · Don't take medicine right before bed that may keep you awake or make you feel hyper or energized. Your doctor can tell you if your medicine may do this and if you can take it earlier in the day. If you can't sleep  · Imagine yourself in a peaceful, pleasant scene. Focus on the details and feelings of being in a place that is relaxing. · Get up and do a quiet or boring activity until you feel sleepy. · Don't drink any liquids after 6 p.m. if you wake up often because you have to go to the bathroom. Where can you learn more? Go to http://mary-dion.info/. Enter B370 in the search box to learn more about \"Learning About Sleeping Well. \"  Current as of: July 26, 2016  Content Version: 11.3  © 9185-0438 [employer].  Care instructions adapted under license by [employer] (which disclaims liability or warranty for this information). If you have questions about a medical condition or this instruction, always ask your healthcare professional. [address] any warranty or liability for your use of this information. Sleep Studies: About This Test  What is it? Sleep studies are tests that watch what happens to your body during sleep. These studies usually are done in a sleep lab. Sleep labs are often located in hospitals. But sleep studies also can be done with portable equipment that you use at home. Why is this test done? Sleep studies are done to find sleep problems, including:  · Sleep apnea or excessive snoring. · Narcolepsy. · Nocturnal seizures. · REM behavior disorder (RBD). · Repeated muscle twitching of the feet, arms, or legs while you sleep. How can you prepare for the test?  · You may be asked to keep a sleep diary for 1 to 2 weeks before your sleep study. · Don't take any naps for 2 to 3 days before your test.  · You may be asked to avoid food or drinks with caffeine for a day or two before your test.  · Take a shower or bath before your test, but don't use sprays, oils, or gels on your hair. Don't wear makeup, fingernail polish, or fake nails. · Pack and take along a small overnight bag with personal items, such as a toothbrush, a comb, favorite pillows or blankets, and a book. You can wear your own nightclothes. What happens during the test?  · In the sleep lab, you will be in a private room, much like a hotel room. · Small pads or patches called electrodes will be placed on your head and body with a small amount of glue and tape. These will record things like brain activity, eye movement, oxygen levels, and snoring. · Soft elastic belts will be placed around your chest and belly to measure your breathing. · Your blood oxygen levels will be checked by a small clip (oximeter) placed either on the tip of your index finger or on your earlobe.   · If you have sleep apnea, you may wear a mask that is connected to a continuous positive airway pressure (CPAP) machine. · Depending on the type of test, you will be allowed to sleep through the night or you will be awakened periodically and asked to stay awake for a while. What else should you know about the test?  · It may feel odd to be hooked to the sleep study equipment. The sleep lab technician understands that your sleep may not be the same as it is at home because of the equipment. Try to relax and make yourself as comfortable as possible. · After your sleep problem has been identified, you may need a second study if your doctor orders treatment such as CPAP. · Portable sleep study equipment is available for a person to do sleep studies at home. This may be a choice for people who have problems sleeping in a sleep lab. But home sleep studies may not give the same results as a sleep lab. How long does the test take? · You will stay in the sleep lab overnight. For some tests, you will also stay part of the next day. What happens after the test?  · You will be able to go home right away. · You can go back to your usual activities right away. Follow-up care is a key part of your treatment and safety. Be sure to make and go to all appointments, and call your doctor if you are having problems. It's also a good idea to keep a list of the medicines you take. Ask your doctor when you can expect to have your test results. Where can you learn more? Go to http://mary-dion.info/. Enter G224 in the search box to learn more about \"Sleep Studies: About This Test.\"  Current as of: March 25, 2017  Content Version: 11.3  © 4655-6976 [employer]. Care instructions adapted under license by [employer] (which disclaims liability or warranty for this information).  If you have questions about a medical condition or this instruction, always ask your healthcare professional. [address]  any warranty or liability for your use of this information.

## 2017-09-13 NOTE — PROGRESS NOTES
HISTORY OF PRESENT ILLNESS  HPI  Antonio Jim is a 69 y.o. Female with history of hyperlipidemia and adjustment disorder with anxiety and depressed mood who presents to office today for insomnia. Pt complains of difficulty staying asleep since her knee replacement around one year ago. She was given oxycodone after her surgery, which she took once around every 4 hours for one month and got into the habit of waking up to take the medication; though she no longer takes the medication, she states she is still waking up every 2-4 hours. She has tried melatonin and Tylenol PM with minimal relief. She has also tried  with relief but with side effects of daytime drowsiness. She does note infrequent snoring but denies a family history of sleep apnea. Pt states that she often coughs when laying down at night, noting that sitting up relieves the cough. She denies heartburn and burping.       Past Medical History:   Diagnosis Date    Actinic keratosis     Left arm    Adjustment disorder with mixed anxiety and depressed mood     Adverse effect of anesthesia     HARD TO WAKE UP SLOW BREATHING     Kidney stones 95,96,98    left    Left knee DJD     Lumbar nerve root impingement     RLQ pain    Menopause     LMP-53years old    Other and unspecified hyperlipidemia     Postmenopausal HRT (hormone replacement therapy)      Past Surgical History:   Procedure Laterality Date    COLONOSCOPY N/A 2016    COLONOSCOPY performed by Jacki [facility] at [facility] ENDOSCOPY    HX APPENDECTOMY      HX  SECTION      X2    HX HYSTERECTOMY      LMP-53years old   Laure Nelson  [address]    left knee    HX ORTHOPAEDIC  12    left shoulder repair; 14 screws and 2 plates    HX ORTHOPAEDIC      BROKEN ARM SURGERY X2    HX ORTHOPAEDIC Bilateral     SCOPE of diana bilateral    HX ORTHOPAEDIC       REPAIR OF Left MENISCUS    HX TUBAL LIGATION      BSPO adhesion conization abn pap     Current Outpatient Prescriptions on File Prior to Visit   Medication Sig Dispense Refill    furosemide (LASIX) 20 mg tablet TAKE ONE TABLET BY MOUTH TWICE A DAY 60 Tab 4    estradiol (CLIMARA) 0.1 mg/24 hr 1 Patch by TransDERmal route Every Saturday.  citalopram (CELEXA) 20 mg tablet TAKE ONE TABLET BY MOUTH DAILY 30 Tab 10     No current facility-administered medications on file prior to visit. Allergies   Allergen Reactions    Codeine Rash    Keflex [Cephalexin] Nausea and Vomiting    Tramadol Other (comments)     Drowsy      Wellbutrin [Bupropion Hcl] Other (comments)     fatigue     Family History   Problem Relation Age of Onset    Cancer Mother      colon    Arthritis-osteo Mother     Cancer Maternal Grandmother      stomach    Heart Disease Father      Social History     Social History    Marital status:      Spouse name: N/A    Number of children: N/A    Years of education: N/A     Social History Main Topics    Smoking status: Never Smoker    Smokeless tobacco: Never Used    Alcohol use 0.5 oz/week     1 Standard drinks or equivalent per week      Comment: RARELY    Drug use: No    Sexual activity: Not Asked     Other Topics Concern    None     Social History Narrative               Review of Systems   Constitutional: Negative for chills, diaphoresis, fever, malaise/fatigue and weight loss. Eyes: Negative for blurred vision, double vision, pain and redness. Respiratory: Negative for cough, shortness of breath and wheezing. Cardiovascular: Negative for chest pain, palpitations, orthopnea, claudication, leg swelling and PND. Skin: Negative for itching and rash. Neurological: Negative for dizziness, tingling, tremors, sensory change, speech change, focal weakness, seizures, loss of consciousness, weakness and headaches. Psychiatric/Behavioral: The patient has insomnia (staying asleep).       Results for orders placed or performed in visit on 04/14/17   AMB POC URINALYSIS DIP STICK AUTO W/O MICRO   Result Value Ref Range    Color (UA POC) Yellow     Clarity (UA POC) Clear     Glucose (UA POC) Negative Negative    Bilirubin (UA POC) Negative Negative    Ketones (UA POC) Negative Negative    Specific gravity (UA POC) 1.030 1.001 - 1.035    Blood (UA POC) Trace Negative    pH (UA POC) 5.5 4.6 - 8.0    Protein (UA POC) Negative Negative mg/dL    Urobilinogen (UA POC) 0.2 mg/dL 0.2 - 1    Nitrites (UA POC) Negative Negative    Leukocyte esterase (UA POC) Negative Negative             Physical Exam  Visit Vitals    /90 (BP 1 Location: Right arm, BP Patient Position: Sitting)    Pulse 84    Temp 98.1 °F (36.7 °C) (Oral)    Resp 18    Ht 5' 5\" (1.651 m)    Wt 201 lb 6.4 oz (91.4 kg)    LMP  (Approximate)  Comment: LMP-53years old    SpO2 96%    BMI 33.51 kg/m2     Lungs: clear to auscultation bilaterally  Heart: regular rate and rhythm, S1, S2 normal, no murmur, click, rub or gallop  Neurologic: Grossly normal          ASSESSMENT and PLAN    ICD-10-CM ICD-9-CM    1. Insomnia due to anxiety and fear F51.05 300.20 ACETAMINOPHEN/DIPHENHYDRAMINE (TYLENOL PM PO)    F40.9 327.02 zolpidem (AMBIEN) 5 mg tablet   2. Adjustment disorder with mixed anxiety and depressed mood F43.23 309.28    3. lumbar nerve root impingement-2010- RLQ/ inguinal pain M54.16 724.4    4. Postmenopausal HRT (hormone replacement therapy) Z79.890 V07.4    5. ACP (advance care planning) Z71.89 V65.49    6. History of kidney stones Z87.442 V13.01      Diagnoses and all orders for this visit:    1. Insomnia due to anxiety and fear  -     zolpidem (AMBIEN) 5 mg tablet; Take 1 Tab by mouth nightly as needed for Sleep. Max Daily Amount: 5 mg.    2. Adjustment disorder with mixed anxiety and depressed mood    3. lumbar nerve root impingement-2010- RLQ/ inguinal pain    4. Postmenopausal HRT (hormone replacement therapy)    5. ACP (advance care planning)    6.  History of kidney stones      Follow-up Disposition:  Return in about 6 months (around 3/13/2018), or if symptoms worsen or fail to improve, for F/U cholesterol. reviewed medications and side effects in detail  Please call my office if there are any questions- [phone]. Discussed expected course/resolution/complications of diagnosis in detail with patient. Medication risks/benefits/costs/interactions/alternatives discussed with patient. Pt was given an after visit summary which includes diagnoses, current medications & vitals. Pt expressed understanding with the diagnosis and plan. Total 25 minutes,60 % counseling re:   Patient to call if no better in 3 -4 days and prn new problems. I suggested that she start Pepcid 20mg at suppertime and see if that helps her nighttime cough. Another option for her nighttime cough is to elevate the head of her bed 6-12 inches. She may try other measures to reduce reflux, as far as changing the type of food and when she eats her last meal before bedtime. For the sleep, we decided to try Ambien 5mg, a half tablet; if it doesn't work, she can gradually increase to a maximum of 10mg. She'll call back and let us know which dose works for her. The benefits and risks of the medicine were discussed. I talked about sleep-walking and to be aware that, though that's a rare side effect, it does happen. I talked about other medications, and if that doesn't work we will probably not use Sonata, as she had problems with AM drowsiness from that. Also, discussed symptoms of concern that were noted today in the note above, treatment options( including doing nothing), when to follow up before recommended time frame. Also, answered all questions. NURSE NAVIATOR CONTACT #S GIVEN; Advanced directive info given  This document was written by Ralph Staton, as dictated by Arely Terrazas MD.  I have reviewed and agree with the above note and have made corrections where appropriate Jim Shay M.D.

## 2017-09-13 NOTE — MR AVS SNAPSHOT
Visit Information Date & Time Provider Department Dept. Phone Encounter #  
 9/13/2017  8:00 AM Donna Burr MD [address] [place] [address] Avenue [phone] [id number] Upcoming Health Maintenance Date Due INFLUENZA AGE 9 TO ADULT 8/1/2017 Pneumococcal 65+ Low/Medium Risk (2 of 2 - PPSV23) 8/18/2017 MEDICARE YEARLY EXAM 8/18/2017 FOBT Q 1 YEAR AGE 50-75 1/19/2018 GLAUCOMA SCREENING Q2Y 8/17/2018 BREAST CANCER SCRN MAMMOGRAM 5/31/2019 DTaP/Tdap/Td series (2 - Td) 8/3/2026 Allergies as of 9/13/2017  Review Complete On: 9/13/2017 By: Noemi Camacho LPN Severity Noted Reaction Type Reactions Codeine High 03/25/2010   Systemic Rash Keflex [Cephalexin]  03/25/2010    Nausea and Vomiting Tramadol  03/25/2010    Other (comments) Drowsy Wellbutrin [Bupropion Hcl]  03/25/2010    Other (comments)  
 fatigue Current Immunizations  Never Reviewed Name Date Td, Adsorbed PF 4/24/2013  
 dT Vaccine 1/5/1999 Not reviewed this visit Vitals BP Pulse Temp Resp Height(growth percentile) Weight(growth percentile) 137/90 (BP 1 Location: Right arm, BP Patient Position: Sitting) 84 98.1 °F (36.7 °C) (Oral) 18 5' 5\" (1.651 m) 201 lb 6.4 oz (91.4 kg) LMP SpO2 BMI OB Status Smoking Status (Approximate) 96% 33.51 kg/m2 Hysterectomy Never Smoker Vitals History BMI and BSA Data Body Mass Index Body Surface Area  
 33.51 kg/m 2 2.05 m 2 Preferred Pharmacy Pharmacy Name Phone [facility] [address] [phone] Your Updated Medication List  
  
   
This list is accurate as of: 9/13/17  8:47 AM.  Always use your most recent med list.  
  
  
  
  
 citalopram 20 mg tablet Commonly known as:  CELEXA  
TAKE ONE TABLET BY MOUTH DAILY CLIMARA 0.1 mg/24 hr  
Generic drug:  estradiol 1 Patch by TransDERmal route Every Saturday. furosemide 20 mg tablet Commonly known as:  LASIX TAKE ONE TABLET BY MOUTH TWICE A DAY TYLENOL PM PO Take  by mouth.  
  
 zolpidem 5 mg tablet Commonly known as:  AMBIEN Take 1 Tab by mouth nightly as needed for Sleep. Max Daily Amount: 5 mg. Prescriptions Printed Refills  
 zolpidem (AMBIEN) 5 mg tablet 0 Sig: Take 1 Tab by mouth nightly as needed for Sleep. Max Daily Amount: 5 mg. Class: Print Route: Oral  
  
Patient Instructions Learning About Sleeping Well What does sleeping well mean? Sleeping well means getting enough sleep. How much sleep is enough varies among people. The number of hours you sleep is not as important as how you feel when you wake up. If you do not feel refreshed, you probably need more sleep. Another sign of not getting enough sleep is feeling tired during the day. The average total nightly sleep time is 7½ to 8 hours. Healthy adults may need a little more or a little less than this. Why is getting enough sleep important? Getting enough quality sleep is a basic part of good health. When your sleep suffers, your mood and your thoughts can suffer too. You may find yourself feeling more grumpy or stressed. Not getting enough sleep also can lead to serious problems, including injury, accidents, anxiety, and depression. What might cause poor sleeping? Many things can cause sleep problems, including: · Stress. Stress can be caused by fear about a single event, such as giving a speech. Or you may have ongoing stress, such as worry about work or school. · Depression, anxiety, and other mental or emotional conditions. · Changes in your sleep habits or surroundings. This includes changes that happen where you sleep, such as noise, light, or sleeping in a different bed. It also includes changes in your sleep pattern, such as having jet lag or working a late shift. · Health problems, such as pain, breathing problems, and restless legs syndrome. · Lack of regular exercise. How can you help yourself? Here are some tips that may help you sleep more soundly and wake up feeling more refreshed. Your sleeping area · Use your bedroom only for sleeping and sex. A bit of light reading may help you fall asleep. But if it doesn't, do your reading elsewhere in the house. Don't watch TV in bed. · Be sure your bed is big enough to stretch out comfortably, especially if you have a sleep partner. · Keep your bedroom quiet, dark, and cool. Use curtains, blinds, or a sleep mask to block out light. To block out noise, use earplugs, soothing music, or a \"white noise\" machine. Your evening and bedtime routine · Create a relaxing bedtime routine. You might want to take a warm shower or bath, listen to soothing music, or drink a cup of noncaffeinated tea. · Go to bed at the same time every night. And get up at the same time every morning, even if you feel tired. What to avoid · Limit caffeine (coffee, tea, caffeinated sodas) during the day, and don't have any for at least 4 to 6 hours before bedtime. · Don't drink alcohol before bedtime. Alcohol can cause you to wake up more often during the night. · Don't smoke or use tobacco, especially in the evening. Nicotine can keep you awake. · Don't take naps during the day, especially close to bedtime. · Don't lie in bed awake for too long. If you can't fall asleep, or if you wake up in the middle of the night and can't get back to sleep within 15 minutes or so, get out of bed and go to another room until you feel sleepy. · Don't take medicine right before bed that may keep you awake or make you feel hyper or energized. Your doctor can tell you if your medicine may do this and if you can take it earlier in the day. If you can't sleep · Imagine yourself in a peaceful, pleasant scene. Focus on the details and feelings of being in a place that is relaxing. · Get up and do a quiet or boring activity until you feel sleepy. · Don't drink any liquids after 6 p.m. if you wake up often because you have to go to the bathroom. Where can you learn more? Go to http://mary-dion.info/. Enter C629 in the search box to learn more about \"Learning About Sleeping Well. \" Current as of: July 26, 2016 Content Version: 11.3 © 2146-7037 [employer]. Care instructions adapted under license by [employer] (which disclaims liability or warranty for this information). If you have questions about a medical condition or this instruction, always ask your healthcare professional. [place] [address] any warranty or liability for your use of this information. Sleep Studies: About This Test 
What is it? Sleep studies are tests that watch what happens to your body during sleep. These studies usually are done in a sleep lab. Sleep labs are often located in hospitals. But sleep studies also can be done with portable equipment that you use at home. Why is this test done? Sleep studies are done to find sleep problems, including: · Sleep apnea or excessive snoring. · Narcolepsy. · Nocturnal seizures. · REM behavior disorder (RBD). · Repeated muscle twitching of the feet, arms, or legs while you sleep. How can you prepare for the test? 
· You may be asked to keep a sleep diary for 1 to 2 weeks before your sleep study. · Don't take any naps for 2 to 3 days before your test. 
· You may be asked to avoid food or drinks with caffeine for a day or two before your test. 
· Take a shower or bath before your test, but don't use sprays, oils, or gels on your hair. Don't wear makeup, fingernail polish, or fake nails. · Pack and take along a small overnight bag with personal items, such as a toothbrush, a comb, favorite pillows or blankets, and a book. You can wear your own nightclothes. What happens during the test? 
· In the sleep lab, you will be in a private room, much like a hotel room. · Small pads or patches called electrodes will be placed on your head and body with a small amount of glue and tape. These will record things like brain activity, eye movement, oxygen levels, and snoring. · Soft elastic belts will be placed around your chest and belly to measure your breathing. · Your blood oxygen levels will be checked by a small clip (oximeter) placed either on the tip of your index finger or on your earlobe. · If you have sleep apnea, you may wear a mask that is connected to a continuous positive airway pressure (CPAP) machine. · Depending on the type of test, you will be allowed to sleep through the night or you will be awakened periodically and asked to stay awake for a while. What else should you know about the test? 
· It may feel odd to be hooked to the sleep study equipment. The sleep lab technician understands that your sleep may not be the same as it is at home because of the equipment. Try to relax and make yourself as comfortable as possible. · After your sleep problem has been identified, you may need a second study if your doctor orders treatment such as CPAP. · Portable sleep study equipment is available for a person to do sleep studies at home. This may be a choice for people who have problems sleeping in a sleep lab. But home sleep studies may not give the same results as a sleep lab. How long does the test take? · You will stay in the sleep lab overnight. For some tests, you will also stay part of the next day. What happens after the test? 
· You will be able to go home right away. · You can go back to your usual activities right away. Follow-up care is a key part of your treatment and safety. Be sure to make and go to all appointments, and call your doctor if you are having problems. It's also a good idea to keep a list of the medicines you take. Ask your doctor when you can expect to have your test results. Where can you learn more? Go to http://mary-dion.info/. Enter Q589 in the search box to learn more about \"Sleep Studies: About This Test.\" Current as of: March 25, 2017 Content Version: 11.3 © 5653-0260 [employer]. Care instructions adapted under license by [employer] (which disclaims liability or warranty for this information). If you have questions about a medical condition or this instruction, always ask your healthcare professional. [address] any warranty or liability for your use of this information. Introducing [facility]! Leeanne Ramos introduces [employer] patient portal. Now you can access parts of your medical record, email your doctor's office, and request medication refills online. 1. In your internet browser, go to [email or web address] 2. Click on the First Time User? Click Here link in the Sign In box. You will see the New Member Sign Up page. 3. Enter your [employer] Access Code exactly as it appears below. You will not need to use this code after youve completed the sign-up process. If you do not sign up before the expiration date, you must request a new code. · [employer] Access Code: [email or web address] Expires: 12/12/2017  8:47 AM 
 
4. Enter the last four digits of your Social Security Number (xxxx) and Date of Birth (mm/dd/yyyy) as indicated and click Submit. You will be taken to the next sign-up page. 5. Create a [employer] ID. This will be your [employer] login ID and cannot be changed, so think of one that is secure and easy to remember. 6. Create a [employer] password. You can change your password at any time. 7. Enter your Password Reset Question and Answer. This can be used at a later time if you forget your password. 8. Enter your e-mail address. You will receive e-mail notification when new information is available in [address] 9. Click Sign Up. You can now view and download portions of your medical record. 10. Click the Download Summary menu link to download a portable copy of your medical information. If you have questions, please visit the Frequently Asked Questions section of the [employer] website. Remember, [employer] is NOT to be used for urgent needs. For medical emergencies, dial 911. Now available from your iPhone and Android! Please provide this summary of care documentation to your next provider. Your primary care clinician is listed as [place] [address], [facility]/s . If you have any questions after today's visit, please call [phone].

## 2017-09-14 NOTE — PROGRESS NOTES
Olman Galarza is a 69 y.o. female and presents for annual Medicare Wellness Visit. Problem List: Reviewed with patient and discussed risk factors.     Patient Active Problem List   Diagnosis Code    lumbar nerve root impingement-2010- RLQ/ inguinal pain M54.16    Left knee DJD M17.12    Adjustment disorder with mixed anxiety and depressed mood F43.23    Hyperlipidemia LDL goal < 130 E78.5    Postmenopausal HRT (hormone replacement therapy) Z79.890    History of complications due to general anesthesia- slow to wake up from anesthesia Z91.89    History of kidney stones Z87.442    ACP (advance care planning) Z71.89       Current medical providers:  Patient Care Team:  Barry Hernandez MD as PCP - General    PSH: Reviewed with patient  Past Surgical History:   Procedure Laterality Date    COLONOSCOPY N/A 9/8/2016    COLONOSCOPY performed by Gustavo Xiong MD at [address] HX APPENDECTOMY       [facility]      X2    HX [address]      LMP-53years old   [facility] [address] [place]    left knee    HX ORTHOPAEDIC  12/24/12    left shoulder repair; 14 screws and 2 plates    HX ORTHOPAEDIC      BROKEN ARM SURGERY X2    HX ORTHOPAEDIC Bilateral     SCOPE of knnees bilateral    HX ORTHOPAEDIC       REPAIR OF Left MENISCUS    HX TUBAL LIGATION      BSPO adhesion conization abn pap        SH: Reviewed with patient  Social History   Substance Use Topics    Smoking status: Never Smoker    Smokeless tobacco: Never Used    Alcohol use 0.5 oz/week     1 Standard drinks or equivalent per week      Comment: RARELY       FH: Reviewed with patient  Family History   Problem Relation Age of Onset    Cancer Mother      colon    Arthritis-osteo Mother     Cancer Maternal Grandmother      stomach    Heart Disease Father        Medications/Allergies: Reviewed with patient  Current Outpatient Prescriptions on File Prior to Visit   Medication Sig Dispense Refill    furosemide (LASIX) 20 mg tablet TAKE ONE TABLET BY MOUTH TWICE A DAY 60 Tab 4    estradiol (CLIMARA) 0.1 mg/24 hr 1 Patch by TransDERmal route Every Saturday.  citalopram (CELEXA) 20 mg tablet TAKE ONE TABLET BY MOUTH DAILY 30 Tab 10     No current facility-administered medications on file prior to visit. Allergies   Allergen Reactions    Codeine Rash    Keflex [Cephalexin] Nausea and Vomiting    Tramadol Other (comments)     Drowsy      Wellbutrin [Bupropion Hcl] Other (comments)     fatigue       Objective:  Visit Vitals    /90 (BP 1 Location: Right arm, BP Patient Position: Sitting)    Pulse 84    Temp 98.1 °F (36.7 °C) (Oral)    Resp 18    Ht 5' 5\" (1.651 m)    Wt 201 lb 6.4 oz (91.4 kg)    LMP  (Approximate)  Comment: LMP-53years old    SpO2 96%    BMI 33.51 kg/m2    Body mass index is 33.51 kg/(m^2). Assessment of cognitive impairment: Alert and oriented x 3    Depression Screen:   PHQ over the last two weeks 3/7/2017   Little interest or pleasure in doing things Several days   Feeling down, depressed or hopeless Several days   Total Score PHQ 2 2       Fall Risk Assessment:    Fall Risk Assessment, last 12 mths 3/7/2017   Able to walk? Yes   Fall in past 12 months? No       Functional Ability:   Does the patient exhibit a steady gait? yes   How long did it take the patient to get up and walk from a sitting position? 5-6 seconds   Is the patient self reliant?  (ie can do own laundry, meals, household chores)  yes     Does the patient handle his/her own medications? yes     Does the patient handle his/her own money? yes     Is the patients home safe (ie good lighting, handrails on stairs and bath, etc.)? yes     Did you notice or did patient express any hearing difficulties? no     Did you notice or did patient express any vision difficulties?   no     Were distance and reading eye charts used? Yes.  Patient's  full eye exam by an ophthalmologist every 2 years is unremarkable: no glaucoma or macular degeneration. Advance Care Planning:   Patient was offered the opportunity to discuss advance care planning:  yes     Does patient have an Advance Directive:  yes   If no, did you provide information on Caring Connections? yes       Plan:    Advance Care Planning (ACP) Provider Note - Comprehensive     Date of ACP Conversation: 09/14/17  Persons included in Conversation:  patient  Length of ACP Conversation in minutes:  <16 minutes (Non-Billable)    Authorized Decision Maker (if patient is incapable of making informed decisions): This person is:  Healthcare Agent/Medical Power of  under Advance Directive          General ACP for ALL Patients with Decision Making Capacity:   Importance of advance care planning, including choosing a healthcare agent to communicate patient's healthcare decisions if patient lost the ability to make decisions, such as after a sudden illness or accident  Understanding of the healthcare agent role was assessed and information provided    Review of Existing Advance Directive:       For Serious or Chronic Illness:  No known illness    Interventions Provided:  Recommended completion of Advance Directive form after review of ACP materials and conversation with prospective healthcare agent   Recommended communicating the plan and making copies for the healthcare agent, personal physician, and others as appropriate (e.g., health system)  Recommended review of completed ACP document annually or upon change in health status    Orders Placed This Encounter    ACETAMINOPHEN/DIPHENHYDRAMINE (TYLENOL PM PO)    zolpidem (AMBIEN) 5 mg tablet       Health Maintenance   Topic Date Due    INFLUENZA AGE 9 TO ADULT  08/01/2017    MEDICARE YEARLY EXAM  08/18/2017    FOBT Q 1 YEAR AGE 50-75  01/19/2018    GLAUCOMA SCREENING Q2Y  08/17/2018    BREAST CANCER SCRN MAMMOGRAM  05/31/2019    DTaP/Tdap/Td series (2 - Td) 08/03/2026    Hepatitis C Screening  Addressed    OSTEOPOROSIS SCREENING (DEXA)  Completed    ZOSTER VACCINE AGE 60>  Addressed    Pneumococcal 65+ Low/Medium Risk  Addressed       *Patient verbalized understanding and agreement with the plan. A copy of the After Visit Summary with personalized health plan was given to the patient today.

## 2017-09-15 RX ORDER — FUROSEMIDE 20 MG/1
TABLET ORAL
Qty: 180 TAB | Refills: 1 | Status: SHIPPED | OUTPATIENT
Start: 2017-09-15 | End: 2018-12-17 | Stop reason: SDUPTHER

## 2017-09-15 RX ORDER — CITALOPRAM 20 MG/1
TABLET, FILM COATED ORAL
Qty: 30 TAB | Refills: 10 | Status: SHIPPED | OUTPATIENT
Start: 2017-09-15 | End: 2017-09-15 | Stop reason: SDUPTHER

## 2017-09-15 RX ORDER — CITALOPRAM 20 MG/1
TABLET, FILM COATED ORAL
Qty: 90 TAB | Refills: 1 | OUTPATIENT
Start: 2017-09-15

## 2017-09-15 RX ORDER — FUROSEMIDE 20 MG/1
TABLET ORAL
Qty: 60 TAB | Refills: 5 | Status: SHIPPED | OUTPATIENT
Start: 2017-09-15 | End: 2017-09-15 | Stop reason: SDUPTHER

## 2017-09-15 RX ORDER — CITALOPRAM 20 MG/1
TABLET, FILM COATED ORAL
Qty: 90 TAB | Refills: 1 | Status: SHIPPED | OUTPATIENT
Start: 2017-09-15 | End: 2018-01-01 | Stop reason: ALTCHOICE

## 2017-09-15 RX ORDER — FUROSEMIDE 20 MG/1
TABLET ORAL
Qty: 180 TAB | Refills: 1 | OUTPATIENT
Start: 2017-09-15

## 2017-09-15 NOTE — TELEPHONE ENCOUNTER
Phone#[phone]    Pharmacy on file is correct    Requesting a 90 day supply.     citalopram (CELEXA) 20 mg tablet  TAKE ONE TABLET BY MOUTH DAILY, Normal, Disp-30 Tab, R-10    furosemide (LASIX) 20 mg tablet  TAKE ONE TABLET BY MOUTH TWICE A DAY, Normal, Disp-60 Tab, R-5

## 2017-09-15 NOTE — TELEPHONE ENCOUNTER
Received faxed refill request for this medication from the pharmacy that is on file. Requesting a 90 day supply.     citalopram (CELEXA) 20 mg tablet  Normal, Disp-30 Tab, R-10    furosemide (LASIX) 20 mg tablet  Normal, Disp-60 Tab, R-4

## 2017-09-18 ENCOUNTER — TELEPHONE (OUTPATIENT)
Dept: FAMILY MEDICINE CLINIC | Age: 67
End: 2017-09-18

## 2018-01-22 DIAGNOSIS — F51.05 INSOMNIA DUE TO ANXIETY AND FEAR: ICD-10-CM

## 2018-01-22 DIAGNOSIS — F40.9 INSOMNIA DUE TO ANXIETY AND FEAR: ICD-10-CM

## 2018-01-22 RX ORDER — ZOLPIDEM TARTRATE 5 MG/1
TABLET ORAL
Qty: 15 TAB | Refills: 0 | Status: SHIPPED | OUTPATIENT
Start: 2018-01-22 | End: 2018-02-02

## 2018-02-02 ENCOUNTER — OFFICE VISIT (OUTPATIENT)
Dept: FAMILY MEDICINE CLINIC | Age: 68
End: 2018-02-02

## 2018-02-02 VITALS
TEMPERATURE: 98 F | BODY MASS INDEX: 33.32 KG/M2 | RESPIRATION RATE: 18 BRPM | WEIGHT: 200 LBS | HEART RATE: 94 BPM | HEIGHT: 65 IN | OXYGEN SATURATION: 99 % | DIASTOLIC BLOOD PRESSURE: 85 MMHG | SYSTOLIC BLOOD PRESSURE: 120 MMHG

## 2018-02-02 DIAGNOSIS — R31.9 HEMATURIA, UNSPECIFIED TYPE: Primary | ICD-10-CM

## 2018-02-02 DIAGNOSIS — Z87.442 HISTORY OF KIDNEY STONES: ICD-10-CM

## 2018-02-02 LAB
BILIRUB UR QL STRIP: NEGATIVE
GLUCOSE UR-MCNC: NEGATIVE MG/DL
KETONES P FAST UR STRIP-MCNC: NEGATIVE MG/DL
PH UR STRIP: 5 [PH] (ref 4.6–8)
PROT UR QL STRIP: NEGATIVE
SP GR UR STRIP: 1.01 (ref 1–1.03)
UA UROBILINOGEN AMB POC: NORMAL (ref 0.2–1)
URINALYSIS CLARITY POC: NORMAL
URINALYSIS COLOR POC: YELLOW
URINE BLOOD POC: NORMAL
URINE LEUKOCYTES POC: NEGATIVE
URINE NITRITES POC: NEGATIVE

## 2018-02-02 RX ORDER — CIPROFLOXACIN 250 MG/1
250 TABLET, FILM COATED ORAL EVERY 12 HOURS
Qty: 14 TAB | Refills: 0 | Status: SHIPPED | OUTPATIENT
Start: 2018-02-02 | End: 2018-02-09

## 2018-02-02 NOTE — PROGRESS NOTES
HISTORY OF PRESENT ILLNESS  Cosmo Infante is a 77 y.o. female. HPI: Patient reports bilateral flank pain with frequency for 2-3 days. She has history of kidney stones and has been drinking plenty of water. She is taking tylenol with some relief. Past Medical History:   Diagnosis Date    Actinic keratosis     Left arm    Adjustment disorder with mixed anxiety and depressed mood     Adverse effect of anesthesia     HARD TO WAKE UP SLOW BREATHING     Kidney stones 95,96,98    left    Left knee DJD     Lumbar nerve root impingement     RLQ pain    Menopause     LMP-53years old    Other and unspecified hyperlipidemia     Postmenopausal HRT (hormone replacement therapy)      Past Surgical History:   Procedure Laterality Date    COLONOSCOPY N/A 2016    COLONOSCOPY performed by Nick Henao MD at [address] [place] [address] ENDOSCOPY    HX APPENDECTOMY      HX  SECTION      X2    HX HYSTERECTOMY      LMP-53years old   [facility]  [address]    left knee    HX ORTHOPAEDIC  12    left shoulder repair; 14 screws and 2 plates    HX ORTHOPAEDIC      BROKEN ARM SURGERY X2    HX ORTHOPAEDIC Bilateral     SCOPE of knnees bilateral    HX ORTHOPAEDIC       REPAIR OF Left MENISCUS    HX TUBAL LIGATION      BSPO adhesion conization abn pap     Allergies   Allergen Reactions    Codeine Rash    Keflex [Cephalexin] Nausea and Vomiting    Tramadol Other (comments)     Drowsy      Wellbutrin [Bupropion Hcl] Other (comments)     fatigue     Current Outpatient Prescriptions:     ciprofloxacin HCl (CIPRO) 250 mg tablet, Take 1 Tab by mouth every twelve (12) hours for 7 days. , Disp: 14 Tab, Rfl: 0    furosemide (LASIX) 20 mg tablet, TAKE ONE TABLET BY MOUTH TWICE A DAY, Disp: 180 Tab, Rfl: 1    citalopram (CELEXA) 20 mg tablet, TAKE ONE TABLET BY MOUTH DAILY, Disp: 90 Tab, Rfl: 1    ACETAMINOPHEN/DIPHENHYDRAMINE (TYLENOL PM PO), Take  by mouth., Disp: , Rfl:     estradiol (CLIMARA) 0.1 mg/24 hr, 1 Patch by TransDERmal route Every Saturday. , Disp: , Rfl:   Review of Systems   Constitutional: Negative. Respiratory: Negative. Cardiovascular: Negative. Gastrointestinal: Negative. Genitourinary: Positive for flank pain, frequency and hematuria. Negative for dysuria and urgency. Blood pressure 120/85, pulse 94, temperature 98 °F (36.7 °C), temperature source Oral, resp. rate 18, height 5' 5\" (1.651 m), weight 200 lb (90.7 kg), SpO2 99 %. Physical Exam   Constitutional: No distress. HENT:   Mouth/Throat: Oropharynx is clear and moist.   Neck: Normal range of motion. Neck supple. Cardiovascular: Normal rate and regular rhythm. No murmur heard. Pulmonary/Chest: Effort normal and breath sounds normal.   Abdominal: Soft. Bowel sounds are normal.   Genitourinary: No vaginal discharge found. Genitourinary Comments: UA is positive for blood   Musculoskeletal: She exhibits no edema, tenderness or deformity. Nursing note and vitals reviewed. ASSESSMENT and PLAN  Diagnoses and all orders for this visit:    1. Hematuria, unspecified type  -     ciprofloxacin HCl (CIPRO) 250 mg tablet; Take 1 Tab by mouth every twelve (12) hours for 7 days.     2. History of kidney stones  -     AMB POC URINALYSIS DIP STICK AUTO W/O MICRO  Follow up with urologist if symptoms persists  Pt was given an after visit summary which includes diagnosis, current medicines and vital and voiced understanding of treatment plan

## 2018-02-02 NOTE — PROGRESS NOTES
Chief Complaint   Patient presents with    Kidney Stone     xfew months    Thoracic Back Pain     xfew months       1. Have you been to the ER, urgent care clinic since your last visit? Hospitalized since your last visit? No    2. Have you seen or consulted any other health care providers outside of the [address] [place] [address] since your last visit? Include any pap smears or colon screening.  No

## 2018-02-13 DIAGNOSIS — F51.01 PRIMARY INSOMNIA: Primary | ICD-10-CM

## 2018-02-13 RX ORDER — ZOLPIDEM TARTRATE 5 MG/1
TABLET ORAL
Qty: 15 TAB | Refills: 0 | Status: SHIPPED | OUTPATIENT
Start: 2018-02-13 | End: 2018-03-12 | Stop reason: SDUPTHER

## 2018-02-26 ENCOUNTER — OFFICE VISIT (OUTPATIENT)
Dept: FAMILY MEDICINE CLINIC | Age: 68
End: 2018-02-26

## 2018-02-26 ENCOUNTER — HOSPITAL ENCOUNTER (OUTPATIENT)
Dept: LAB | Age: 68
Discharge: HOME | End: 2018-02-26
Payer: MEDICARE

## 2018-02-26 VITALS
DIASTOLIC BLOOD PRESSURE: 74 MMHG | HEIGHT: 65 IN | WEIGHT: 197 LBS | SYSTOLIC BLOOD PRESSURE: 135 MMHG | HEART RATE: 92 BPM | TEMPERATURE: 99.1 F | BODY MASS INDEX: 32.82 KG/M2 | RESPIRATION RATE: 16 BRPM | OXYGEN SATURATION: 95 %

## 2018-02-26 DIAGNOSIS — R31.9 HEMATURIA, UNSPECIFIED TYPE: ICD-10-CM

## 2018-02-26 DIAGNOSIS — Z79.890 POSTMENOPAUSAL HRT (HORMONE REPLACEMENT THERAPY): ICD-10-CM

## 2018-02-26 DIAGNOSIS — M54.9 MID BACK PAIN: ICD-10-CM

## 2018-02-26 DIAGNOSIS — J02.9 ACUTE PHARYNGITIS, UNSPECIFIED ETIOLOGY: ICD-10-CM

## 2018-02-26 DIAGNOSIS — R60.0 PEDAL EDEMA: ICD-10-CM

## 2018-02-26 DIAGNOSIS — M17.12 PRIMARY OSTEOARTHRITIS OF LEFT KNEE: Chronic | ICD-10-CM

## 2018-02-26 DIAGNOSIS — Z00.00 MEDICARE ANNUAL WELLNESS VISIT, SUBSEQUENT: ICD-10-CM

## 2018-02-26 DIAGNOSIS — Z12.11 COLON CANCER SCREENING: ICD-10-CM

## 2018-02-26 DIAGNOSIS — Z86.39 HISTORY OF ELEVATED GLUCOSE: ICD-10-CM

## 2018-02-26 DIAGNOSIS — E78.5 HYPERLIPIDEMIA WITH TARGET LOW DENSITY LIPOPROTEIN (LDL) CHOLESTEROL LESS THAN 130 MG/DL: ICD-10-CM

## 2018-02-26 DIAGNOSIS — F43.23 ADJUSTMENT DISORDER WITH MIXED ANXIETY AND DEPRESSED MOOD: ICD-10-CM

## 2018-02-26 DIAGNOSIS — M41.25 OTHER IDIOPATHIC SCOLIOSIS, THORACOLUMBAR REGION: Primary | Chronic | ICD-10-CM

## 2018-02-26 PROCEDURE — 81003 URINALYSIS AUTO W/O SCOPE: CPT

## 2018-02-26 NOTE — MR AVS SNAPSHOT
[address] [facility] [address] 
 
 
 [address] [place] [address] 
[phone] Patient: Darling Thayer MRN: [id number] DDB:[id number] Visit Information Date & Time Provider Department Dept. Phone Encounter #  
 2/26/2018 12:00 PM Brandie Holly  [facility] [phone] [id number] Your Appointments 8/10/2018  9:00 AM  
Complete Physical with Brandie Holly MD  
[facility]) Appt Note: annual physical 0cp 0pb mbg 2/2/18  
 [address] [address]  
[phone]  
  
   
 [address] [address] Upcoming Health Maintenance Date Due  
 MEDICARE YEARLY EXAM 8/18/2017 FOBT Q 1 YEAR AGE 50-75 1/19/2018 GLAUCOMA SCREENING Q2Y 8/17/2018 BREAST CANCER SCRN MAMMOGRAM 5/31/2019 DTaP/Tdap/Td series (2 - Td) 8/3/2026 Allergies as of 2/26/2018  Review Complete On: 2/26/2018 By: Laurent Simon LPN Severity Noted Reaction Type Reactions Codeine High 03/25/2010   Systemic Rash Keflex [Cephalexin]  03/25/2010    Nausea and Vomiting Tramadol  03/25/2010    Other (comments) Drowsy Wellbutrin [Bupropion Hcl]  03/25/2010    Other (comments)  
 fatigue Current Immunizations  Never Reviewed Name Date Influenza Vaccine 11/15/2017 Td, Adsorbed PF 4/24/2013  
 dT Vaccine 1/5/1999 Not reviewed this visit You Were Diagnosed With   
  
 Codes Comments Hyperlipidemia with target low density lipoprotein (LDL) cholesterol less than 130 mg/dL    -  Primary ICD-10-CM: E78.5 ICD-9-CM: 272.4 Colon cancer screening     ICD-10-CM: Z12.11 ICD-9-CM: V76.51 Medicare annual wellness visit, subsequent     ICD-10-CM: Z00.00 ICD-9-CM: V70.0 Adjustment disorder with mixed anxiety and depressed mood     ICD-10-CM: F43.23 
ICD-9-CM: 309.28  Primary osteoarthritis of left knee     ICD-10-CM: M17.12 
ICD-9-CM: 715.16   
 Hematuria, unspecified type     ICD-10-CM: R31.9 ICD-9-CM: 599.70 Vitals BP Pulse Temp Resp Height(growth percentile) Weight(growth percentile) 135/74 (BP 1 Location: Left arm, BP Patient Position: Sitting) 92 99.1 °F (37.3 °C) (Oral) 16 5' 5\" (1.651 m) 197 lb (89.4 kg) SpO2 BMI OB Status Smoking Status 95% 32.78 kg/m2 Hysterectomy Never Smoker Vitals History BMI and BSA Data Body Mass Index Body Surface Area 32.78 kg/m 2 2.02 m 2 Preferred Pharmacy Pharmacy Name Phone [facility] [address] [facility] [phone] Your Updated Medication List  
  
   
This list is accurate as of 2/26/18  2:57 PM.  Always use your most recent med list.  
  
  
  
  
 citalopram 20 mg tablet Commonly known as:  CELEXA  
TAKE ONE TABLET BY MOUTH DAILY CLIMARA 0.1 mg/24 hr  
Generic drug:  estradiol 1 Patch by TransDERmal route Every Saturday. furosemide 20 mg tablet Commonly known as:  LASIX TAKE ONE TABLET BY MOUTH TWICE A DAY TYLENOL PM PO Take  by mouth.  
  
 zolpidem 5 mg tablet Commonly known as:  AMBIEN  
TAKE ONE TABLET BY MOUTH AT BEDTIME AS NEEDED FOR INSOMNIA We Performed the Following URINALYSIS W/ RFLX MICROSCOPIC [62900 CPT(R)] Introducing [facility]! [facility] introduces [email or web address] patient portal. Now you can access parts of your medical record, email your doctor's office, and request medication refills online. 1. In your internet browser, go to [email or web address] 2. Click on the First Time User? Click Here link in the Sign In box. You will see the New Member Sign Up page. 3. Enter your [email or web address] Access Code exactly as it appears below. You will not need to use this code after youve completed the sign-up process. If you do not sign up before the expiration date, you must request a new code. · [email or web address] Access Code: [email or web address] Expires: 5/27/2018  2:57 PM 
 
4. Enter the last four digits of your Social Security Number (xxxx) and Date of Birth (mm/dd/yyyy) as indicated and click Submit. You will be taken to the next sign-up page. 5. Create a [employer] ID. This will be your [employer] login ID and cannot be changed, so think of one that is secure and easy to remember. 6. Create a [employer] password. You can change your password at any time. 7. Enter your Password Reset Question and Answer. This can be used at a later time if you forget your password. 8. Enter your e-mail address. You will receive e-mail notification when new information is available in [address] 9. Click Sign Up. You can now view and download portions of your medical record. 10. Click the Download Summary menu link to download a portable copy of your medical information. If you have questions, please visit the Frequently Asked Questions section of the [employer] website. Remember, [employer] is NOT to be used for urgent needs. For medical emergencies, dial 911. Now available from your iPhone and Android! Please provide this summary of care documentation to your next provider. Your primary care clinician is listed as [address] [address], [facility]/s . If you have any questions after today's visit, please call [phone].

## 2018-02-26 NOTE — PROGRESS NOTES
HISTORY OF PRESENT ILLNESS  HPI  Elizabeth Vidal is a 77 y.o. female with history of DJD, hyperlipidemia, and adjustment disorder with anxiety and depressed mood who presents to office today for follow-up. Pt complains of bilateral mid-back pain, left worse than right, x2 months. She came into office on , where a urine test showed hematuria. She followed up with urologist Dr. Kaitlynn Ambrosio, and a urine test then was negative for hematuria. CT showed arthritis and a kidney stone, both unchanged since her last CT around one year ago. She is not currently taking any medication for her pain. She denies aggravation of the pain by anything, except she notes intermittent aggravation by consuming certain foods, such as raw vegetables. Pt notes a low-grade fever, fatigue, sore throat, and ear discomfort since around . She states her symptoms have improved slightly since then. She denies cough.             Past Medical History:   Diagnosis Date    Actinic keratosis     Left arm    Adjustment disorder with mixed anxiety and depressed mood     Adverse effect of anesthesia     HARD TO WAKE UP SLOW BREATHING     Kidney stones 95,96,98    left    Left knee DJD     Lumbar nerve root impingement     RLQ pain    Menopause     LMP-53years old    Other and unspecified hyperlipidemia     Other idiopathic scoliosis, thoracolumbar region 2018    Postmenopausal HRT (hormone replacement therapy)      Past Surgical History:   Procedure Laterality Date    COLONOSCOPY N/A 2016    COLONOSCOPY performed by Jasmyn Rubalcava [facility] at [facility] ENDOSCOPY    HX APPENDECTOMY      HX  SECTION      X2    HX HYSTERECTOMY      LMP-53years old   [facility] [address]    left knee    HX ORTHOPAEDIC  12    left shoulder repair; 14 screws and 2 plates    HX ORTHOPAEDIC      BROKEN ARM SURGERY X2    HX ORTHOPAEDIC Bilateral     SCOPE of diana bilateral    HX ORTHOPAEDIC       REPAIR OF Left MENISCUS    HX TUBAL LIGATION      BSPO adhesion conization abn pap     Current Outpatient Prescriptions on File Prior to Visit   Medication Sig Dispense Refill    zolpidem (AMBIEN) 5 mg tablet TAKE ONE TABLET BY MOUTH AT BEDTIME AS NEEDED FOR INSOMNIA 15 Tab 0    furosemide (LASIX) 20 mg tablet TAKE ONE TABLET BY MOUTH TWICE A  Tab 1    citalopram (CELEXA) 20 mg tablet TAKE ONE TABLET BY MOUTH DAILY 90 Tab 1    ACETAMINOPHEN/DIPHENHYDRAMINE (TYLENOL PM PO) Take  by mouth.  estradiol (CLIMARA) 0.1 mg/24 hr 1 Patch by TransDERmal route Every Saturday. No current facility-administered medications on file prior to visit. Allergies   Allergen Reactions    Codeine Rash    Keflex [Cephalexin] Nausea and Vomiting    Tramadol Other (comments)     Drowsy      Wellbutrin [Bupropion Hcl] Other (comments)     fatigue     Family History   Problem Relation Age of Onset    Cancer Mother      colon    Arthritis-osteo Mother     Cancer Maternal Grandmother      stomach    Heart Disease Father 80    Heart Attack Maternal Grandfather [de-identified]    Heart Attack Paternal Grandmother 80     Social History     Social History    Marital status:      Spouse name: N/A    Number of children: N/A    Years of education: N/A     Social History Main Topics    Smoking status: Never Smoker    Smokeless tobacco: Never Used    Alcohol use 0.5 oz/week     1 Standard drinks or equivalent per week      Comment: RARELY    Drug use: No    Sexual activity: Not Asked     Other Topics Concern    None     Social History Narrative               Review of Systems   Constitutional: Positive for fever (low-grade, intermittent) and malaise/fatigue. Negative for chills, diaphoresis and weight loss. HENT: Positive for sore throat. Ear discomfort   Eyes: Negative for blurred vision, double vision, pain and redness. Respiratory: Negative for cough, shortness of breath and wheezing.     Cardiovascular: Negative for chest pain, palpitations, orthopnea, claudication, leg swelling and PND. Musculoskeletal: Positive for back pain (bilateral mid L>R). Skin: Negative for itching and rash. Neurological: Negative for dizziness, tingling, tremors, sensory change, speech change, focal weakness, seizures, loss of consciousness, weakness and headaches. Results for orders placed or performed in visit on 02/26/18   URINALYSIS W/ RFLX MICROSCOPIC   Result Value Ref Range    Specific Gravity 1.021 1.005 - 1.030    pH (UA) 5.0 5.0 - 7.5    Color Yellow Yellow    Appearance Clear Clear    Leukocyte Esterase Negative Negative    Protein Negative Negative/Trace    Glucose Negative Negative    Ketone 1+ (A) Negative    Blood Negative Negative    Bilirubin Negative Negative    Urobilinogen 0.2 0.2 - 1.0 mg/dL    Nitrites Negative Negative    Microscopic Examination Comment                  Physical Exam  Visit Vitals    /74 (BP 1 Location: Left arm, BP Patient Position: Sitting)    Pulse 92    Temp 99.1 °F (37.3 °C) (Oral)    Resp 16    Ht 5' 5\" (1.651 m)    Wt 197 lb (89.4 kg)    SpO2 95%    BMI 32.78 kg/m2     Throat: mild erythema of the posterior pharynx and tonsillar pillars but no exudate  Neck: supple, symmetrical, trachea midline, no lymphadenopathy, thyroid: not enlarged, symmetric, no tenderness/mass/nodules, no carotid bruit and no JVD. no tenderness to palpation  Back: No CVA tenderness.  she has obvious scoliosis on exam with the left side of her mid-back being more prominent than the right, she's slightly tender to palpation in the left paravertebral muscles at the T9 level, no erythema, no warmth, no rash; she has mild discomfort with extremes of rotation of her spine, after we finished the exam with twisting and so forth she did notice some increased discomfort over the next few minutes  Lungs: clear to auscultation bilaterally  Heart: regular rate and rhythm, S1, S2 normal, no murmur, click, rub or [facility]                      ASSESSMENT and PLAN    ICD-10-CM ICD-9-CM    1. Other idiopathic scoliosis, thoracolumbar region M41.25 737.30    2. Colon cancer screening Z12.11 V76.51 CANCELED: OCCULT BLOOD, IMMUNOASSAY (FIT)   3. Hematuria, unspecified type R31.9 599.70 URINALYSIS W/ RFLX MICROSCOPIC   4. Medicare annual wellness visit, subsequent Z00.00 V70.0    5. Acute pharyngitis, unspecified etiology J02.9 462    6. Hyperlipidemia with target low density lipoprotein (LDL) cholesterol less than 130 mg/dL E78.5 272.4    7. Adjustment disorder with mixed anxiety and depressed mood F43.23 309.28    8. Primary osteoarthritis of left knee M17.12 715.16    9. Mid back pain M54.9 724.5     aggravated by movement   10. Postmenopausal HRT (hormone replacement therapy) Z79.890 V07.4    11. Pedal edema D19.9 778.1 METABOLIC PANEL, COMPREHENSIVE    uses lasix daily   12. History of elevated glucose Y09.40 T06.29 METABOLIC PANEL, COMPREHENSIVE    149 in 2016     Diagnoses and all orders for this visit:    1. Other idiopathic scoliosis, thoracolumbar region    2. Colon cancer screening    3. Hematuria, unspecified type  -     URINALYSIS W/ RFLX MICROSCOPIC    4. Medicare annual wellness visit, subsequent    5. Acute pharyngitis, unspecified etiology    6. Hyperlipidemia with target low density lipoprotein (LDL) cholesterol less than 130 mg/dL    7. Adjustment disorder with mixed anxiety and depressed mood    8. Primary osteoarthritis of left knee    9. Mid back pain  Comments:  aggravated by movement    10. Postmenopausal HRT (hormone replacement therapy)    11. Pedal edema  Comments:  uses lasix daily  Orders:  -     METABOLIC PANEL, COMPREHENSIVE    12. History of elevated glucose  Comments:  149 in 2016  Orders:  -     METABOLIC PANEL, COMPREHENSIVE      Follow-up Disposition:  Return in about 6 months (around 8/26/2018), or if symptoms worsen or fail to improve, for physical has scheduled.      reviewed medications and side effects in detail  Please call my office if there are any questions- [phone]. Discussed expected course/resolution/complications of diagnosis in detail with patient. Medication risks/benefits/costs/interactions/alternatives discussed with patient. Pt was given an after visit summary which includes diagnoses, current medications & vitals. Pt expressed understanding with the diagnosis and plan. Patient to call if no better in 3 -4 days and prn new problems. Total 25 minutes,60 % counseling re: BMI is significantly elevated- in the obese range. I reviewed diet, exercise and weight control. Discussed weight control in detail, the importance of mainly decreased carbs, and for weight maintenance, exercise; discussed different diets and that it isn't as important to watch the type of foods as it is to decrease calorie intake no matter what type of diet you do, etc.      Because she had blood in her urine earlier this month, I checked a urine test again. The one at Dr. Devon Oneil office was negative. If this one is negative, then she doesn't need to worry about other causes of the hematuria. I recommended that she do lower back and mid-back exercises. I suggested that she avoid high impact activities, and in addition to the exercise sheet given I recommended a few stretching exercises of the back while she's standing. She's in the habit of walking almost daily for 30-45 minutes and it doesn't seem to bother her back, so she should continue that. She should avoid heavy lifting and rapid ROM of her back. If she wants something for pain then she can use extra strength tylenol, or she could add 1-2 Aleve BID. Warm salt water gargling or chloraseptic recommended for her sore throat, and she should follow up either problem if it's not improving each week.       No lipid profile for 2 yrs; no electrolytes for 1 year( on Lasix daily)  Also, discussed symptoms of concern that were noted today in the note above, treatment options( including doing nothing), when to follow up before recommended time frame. Also, answered all questions. Will have er return for electrolytes and due to elevated glucose in 2016 of 149 will do 2 hr PP. This document was written by Bala Aggarwal, as dictated by Nieves Clark MD.  I have reviewed and agree with the above note and have made corrections where appropriate Jim Brink M.D.

## 2018-02-26 NOTE — LETTER
4/20/2018 9:38 AM 
 
Ms. Raysa Isaac [address] Dear Raysa Isaac: Please find your most recent results below. Resulted Orders URINALYSIS W/ RFLX MICROSCOPIC Result Value Ref Range Specific Gravity 1.021 1.005 - 1.030  
 pH (UA) 5.0 5.0 - 7.5 Color Yellow Yellow Appearance Clear Clear Leukocyte Esterase Negative Negative Protein Negative Negative/Trace Glucose Negative Negative Ketone 1+ (A) Negative Blood Negative Negative Bilirubin Negative Negative Urobilinogen 0.2 0.2 - 1.0 mg/dL Nitrites Negative Negative Microscopic Examination Comment Comment:  
   Microscopic not indicated and not performed. Narrative Performed at:  [address]  [id number] : Nadia Iglesias MD, Phone:  [phone] METABOLIC PANEL, COMPREHENSIVE Result Value Ref Range Glucose 95 65 - 99 mg/dL BUN 16 8 - 27 mg/dL Creatinine 0.41 (L) 0.57 - 1.00 mg/dL GFR est non- >59 mL/min/1.73 GFR est  >59 mL/min/1.73  
 BUN/Creatinine ratio 39 (H) 12 - 28 Sodium 142 134 - 144 mmol/L Potassium 4.2 3.5 - 5.2 mmol/L Chloride 101 96 - 106 mmol/L  
 CO2 23 18 - 29 mmol/L Calcium 9.2 8.7 - 10.3 mg/dL Protein, total 6.9 6.0 - 8.5 g/dL Albumin 4.0 3.5 - 4.8 g/dL GLOBULIN, TOTAL 2.9 1.5 - 4.5 g/dL A-G Ratio 1.4 1.2 - 2.2 Bilirubin, total 0.4 0.0 - 1.2 mg/dL Alk. phosphatase 100 39 - 117 IU/L  
 AST (SGOT) 9 0 - 40 IU/L  
 ALT (SGPT) 16 0 - 32 IU/L Narrative Performed at:  [address]  [id number] : Nadia Iglesias MD, Phone:  [phone] RECOMMENDATIONS: 
  GREAT NEWS!!  All of your recent lab tests are normal or at goal.  No diabetes, normal liver and kidney tests.  I would continue everything the same and schedule your next fasting office visit in 6 months and we can check the cholesterol then also. . In addition, a physical is recommended every year after the age of 61. Please call me if you have any questions: [phone] Sincerely, 
 
 
Doroteo David MD

## 2018-02-26 NOTE — PROGRESS NOTES
Chief Complaint   Patient presents with   [facility] Annual Wellness Visit         Insomnia    Anxiety     1. Have you been to the ER, urgent care clinic since your last visit? Hospitalized since your last visit? No    2. Have you seen or consulted any other health care providers outside of the [address] [place] [address] since your last visit? Include any pap smears or colon screening. Urologist, 2 weeks ago, Dr. Juan Pablo Sher, for kidney stones     Patient states that she would like a colonoscopy rather than the FIT test due to her mother having colon cancer.

## 2018-02-27 ENCOUNTER — TELEPHONE (OUTPATIENT)
Dept: FAMILY MEDICINE CLINIC | Age: 68
End: 2018-02-27

## 2018-02-27 PROBLEM — M41.25 OTHER IDIOPATHIC SCOLIOSIS, THORACOLUMBAR REGION: Chronic | Status: ACTIVE | Noted: 2018-02-27

## 2018-02-27 LAB
APPEARANCE UR: CLEAR
BILIRUB UR QL STRIP: NEGATIVE
COLOR UR: YELLOW
GLUCOSE UR QL: NEGATIVE
HGB UR QL STRIP: NEGATIVE
KETONES UR QL STRIP: ABNORMAL
LEUKOCYTE ESTERASE UR QL STRIP: NEGATIVE
MICRO URNS: ABNORMAL
NITRITE UR QL STRIP: NEGATIVE
PH UR STRIP: 5 [PH] (ref 5–7.5)
PROT UR QL STRIP: NEGATIVE
SP GR UR: 1.02 (ref 1–1.03)
UROBILINOGEN UR STRIP-MCNC: 0.2 MG/DL (ref 0.2–1)

## 2018-03-12 DIAGNOSIS — F51.01 PRIMARY INSOMNIA: ICD-10-CM

## 2018-03-12 RX ORDER — ZOLPIDEM TARTRATE 5 MG/1
TABLET ORAL
Qty: 30 TAB | Refills: 0 | OUTPATIENT
Start: 2018-03-12 | End: 2018-04-11 | Stop reason: SDUPTHER

## 2018-03-12 NOTE — TELEPHONE ENCOUNTER
Patient called to say that medication Zolpidem is working well. She is requesting a refill.   Pharmacy on file verified

## 2018-03-26 ENCOUNTER — TELEPHONE (OUTPATIENT)
Dept: FAMILY MEDICINE CLINIC | Age: 68
End: 2018-03-26

## 2018-03-26 NOTE — TELEPHONE ENCOUNTER
----- Message from Blank Bey sent at 3/26/2018 10:13 AM EDT -----  Regarding: Dr. Crystal Lopes  The patient is requesting a call back from the doctor or nurse in regards to still experiencing a sore throat and right ear pain.  (c)[phone]

## 2018-03-26 NOTE — TELEPHONE ENCOUNTER
Right ear to jaw has tender area. Talked to you about this at last visit.  Wants to know what is next step

## 2018-03-27 NOTE — TELEPHONE ENCOUNTER
Mortimer Beal, MD [place], POLA        Caller: Unspecified (Yesterday, 11:08 AM)                     It has been 1 month- she was seen for multiple things last visit- let's have her come back for this only if it is still bothering her ( let her know we are working her in for this only, other concerns to be done at another visit)       appt scheduled

## 2018-03-28 ENCOUNTER — HOSPITAL ENCOUNTER (OUTPATIENT)
Dept: LAB | Age: 68
Discharge: HOME | End: 2018-03-28
Payer: MEDICARE

## 2018-03-28 PROCEDURE — 80053 COMPREHEN METABOLIC PANEL: CPT

## 2018-03-29 LAB
ALBUMIN SERPL-MCNC: 4 G/DL (ref 3.5–4.8)
ALBUMIN/GLOB SERPL: 1.4 {RATIO} (ref 1.2–2.2)
ALP SERPL-CCNC: 100 IU/L (ref 39–117)
ALT SERPL-CCNC: 16 IU/L (ref 0–32)
AST SERPL-CCNC: 9 IU/L (ref 0–40)
BILIRUB SERPL-MCNC: 0.4 MG/DL (ref 0–1.2)
BUN SERPL-MCNC: 16 MG/DL (ref 8–27)
BUN/CREAT SERPL: 39 (ref 12–28)
CALCIUM SERPL-MCNC: 9.2 MG/DL (ref 8.7–10.3)
CHLORIDE SERPL-SCNC: 101 MMOL/L (ref 96–106)
CO2 SERPL-SCNC: 23 MMOL/L (ref 18–29)
CREAT SERPL-MCNC: 0.41 MG/DL (ref 0.57–1)
GFR SERPLBLD CREATININE-BSD FMLA CKD-EPI: 105 ML/MIN/1.73
GFR SERPLBLD CREATININE-BSD FMLA CKD-EPI: 121 ML/MIN/1.73
GLOBULIN SER CALC-MCNC: 2.9 G/DL (ref 1.5–4.5)
GLUCOSE SERPL-MCNC: 95 MG/DL (ref 65–99)
POTASSIUM SERPL-SCNC: 4.2 MMOL/L (ref 3.5–5.2)
PROT SERPL-MCNC: 6.9 G/DL (ref 6–8.5)
SODIUM SERPL-SCNC: 142 MMOL/L (ref 134–144)

## 2018-04-09 ENCOUNTER — OFFICE VISIT (OUTPATIENT)
Dept: FAMILY MEDICINE CLINIC | Age: 68
End: 2018-04-09

## 2018-04-09 VITALS
HEIGHT: 65 IN | OXYGEN SATURATION: 94 % | BODY MASS INDEX: 32.82 KG/M2 | RESPIRATION RATE: 16 BRPM | DIASTOLIC BLOOD PRESSURE: 83 MMHG | SYSTOLIC BLOOD PRESSURE: 128 MMHG | TEMPERATURE: 98.6 F | HEART RATE: 94 BPM | WEIGHT: 197 LBS

## 2018-04-09 DIAGNOSIS — R09.89 TENDER LYMPH NODE: Primary | ICD-10-CM

## 2018-04-09 RX ORDER — CEPHALEXIN 500 MG/1
500 CAPSULE ORAL 3 TIMES DAILY
Qty: 30 CAP | Refills: 0 | Status: SHIPPED | OUTPATIENT
Start: 2018-04-09 | End: 2018-04-19

## 2018-04-09 NOTE — MR AVS SNAPSHOT
[address] [facility] [address] 
 
 
 [address] 
[phone] Patient: Micheal Yoon MRN: [id number] Visit Information Date & Time Provider Department Dept. Phone Encounter #  
 4/9/2018  4:00 PM Candance Scotland,  [facility] [phone] [id number] Your Appointments 8/10/2018  9:00 AM  
Complete Physical with Candance Scotland, MD  
[facility]) Appt Note: annual physical 0cp 0pb mbg 2/2/18  
 [address] [address]  
[phone]  
  
   
 [address] [address] Upcoming Health Maintenance Date Due FOBT Q 1 YEAR AGE 50-75 1/19/2018 MEDICARE YEARLY EXAM 3/14/2018 GLAUCOMA SCREENING Q2Y 8/17/2018 BREAST CANCER SCRN MAMMOGRAM 5/31/2019 DTaP/Tdap/Td series (2 - Td) 8/3/2026 Allergies as of 4/9/2018  Review Complete On: 4/9/2018 By: Cecy Magana LPN Severity Noted Reaction Type Reactions Codeine High 03/25/2010   Systemic Rash Keflex [Cephalexin]  03/25/2010    Nausea and Vomiting Tramadol  03/25/2010    Other (comments) Drowsy Wellbutrin [Bupropion Hcl]  03/25/2010    Other (comments)  
 fatigue Current Immunizations  Never Reviewed Name Date Influenza Vaccine 11/15/2017 Td, Adsorbed PF 4/24/2013  
 dT Vaccine 1/5/1999 Not reviewed this visit You Were Diagnosed With   
  
 Codes Comments Cervical lymphadenopathy    -  Primary ICD-10-CM: R59.0 ICD-9-CM: 509. 6 Vitals BP Pulse Temp Resp Height(growth percentile) Weight(growth percentile) 128/83 (BP 1 Location: Left arm, BP Patient Position: Sitting) 94 98.6 °F (37 °C) (Oral) 16 5' 5\" (1.651 m) 197 lb (89.4 kg) SpO2 BMI OB Status Smoking Status 94% 32.78 kg/m2 Hysterectomy Never Smoker Vitals History BMI and BSA Data Body Mass Index Body Surface Area 32.78 kg/m 2 2.02 m 2 Preferred Pharmacy Pharmacy Name Phone [facility] [address] [phone] Your Updated Medication List  
  
   
This list is accurate as of 4/9/18  4:39 PM.  Always use your most recent med list.  
  
  
  
  
 cephALEXin 500 mg capsule Commonly known as:  Jorgito Kleine Take 1 Cap by mouth three (3) times daily for 10 days. citalopram 20 mg tablet Commonly known as:  CELEXA  
TAKE ONE TABLET BY MOUTH DAILY CLIMARA 0.1 mg/24 hr  
Generic drug:  estradiol 1 Patch by TransDERmal route Every Saturday. furosemide 20 mg tablet Commonly known as:  LASIX TAKE ONE TABLET BY MOUTH TWICE A DAY TYLENOL PM PO Take  by mouth.  
  
 zolpidem 5 mg tablet Commonly known as:  AMBIEN  
TAKE ONE TABLET BY MOUTH AT BEDTIME AS NEEDED FOR INSOMNIA Prescriptions Sent to Pharmacy Refills  
 cephALEXin (KEFLEX) 500 mg capsule 0 Sig: Take 1 Cap by mouth three (3) times daily for 10 days. Class: Normal  
 Pharmacy: [facility] [address]  #: [phone] Route: Oral  
  
Introducing [facility]! Mika Zelaya introduces eÃ‡ift patient portal. Now you can access parts of your medical record, email your doctor's office, and request medication refills online. 1. In your internet browser, go to [email or web address] 2. Click on the First Time User? Click Here link in the Sign In box. You will see the New Member Sign Up page. 3. Enter your eÃ‡ift Access Code exactly as it appears below. You will not need to use this code after youve completed the sign-up process. If you do not sign up before the expiration date, you must request a new code. · eÃ‡ift Access Code: [email or web address] Expires: 5/27/2018  3:57 PM 
 
4.  Enter the last four digits of your Social Security Number (xxxx) and Date of Birth (mm/dd/yyyy) as indicated and click Submit. You will be taken to the next sign-up page. 5. Create a Vook ID. This will be your Vook login ID and cannot be changed, so think of one that is secure and easy to remember. 6. Create a Vook password. You can change your password at any time. 7. Enter your Password Reset Question and Answer. This can be used at a later time if you forget your password. 8. Enter your e-mail address. You will receive e-mail notification when new information is available in [address] 9. Click Sign Up. You can now view and download portions of your medical record. 10. Click the Download Summary menu link to download a portable copy of your medical information. If you have questions, please visit the Frequently Asked Questions section of the Vook website. Remember, Vook is NOT to be used for urgent needs. For medical emergencies, dial 911. Now available from your iPhone and Android! Please provide this summary of care documentation to your next provider. Your primary care clinician is listed as [address] [address], [facility]/s . If you have any questions after today's visit, please call [phone].

## 2018-04-09 NOTE — PROGRESS NOTES
Chief Complaint   Patient presents with    Ear Pain     Right ear pain x 2 months      1. Have you been to the ER, urgent care clinic since your last visit? Hospitalized since your last visit? No    2. Have you seen or consulted any other health care providers outside of the [address] [place] [address] since your last visit? Include any pap smears or colon screening.  No

## 2018-04-09 NOTE — PROGRESS NOTES
HISTORY OF PRESENT ILLNESS  HPI  Rodrigo Peter is a 77 y.o. Female with a history of DJD, adjustment disorder with anxiety and depressed mood, hyperlipidemia with LDL goal <130 and lumbar nerve root impingement resulting in RLQ/inguinal pain, who presents at the office today for ear pain. Pt is having pain in her right ear down to her jaw. Pt states that the pain is daily mostly in the afternoon when she is tired. Pt states that there is no pain upon waking.  Pt has no pain in left ear and notes no pain when           Past Medical History:   Diagnosis Date    Actinic keratosis     Left arm    Adjustment disorder with mixed anxiety and depressed mood     Adverse effect of anesthesia     HARD TO WAKE UP SLOW BREATHING     Kidney stones 95,96,98    left    Left knee DJD     Lumbar nerve root impingement     RLQ pain    Menopause     LMP-53years old    Other and unspecified hyperlipidemia     Other idiopathic scoliosis, thoracolumbar region 2018    Postmenopausal HRT (hormone replacement therapy)      Past Surgical History:   Procedure Laterality Date    COLONOSCOPY N/A 2016    COLONOSCOPY performed by [facility]ui [facility]ag[facility] at [facility] ENDOSCOPY    HX APPENDECTOMY      HX  SECTION      X2    HX HYSTERECTOMY      LMP-53years old   [address]    left knee    HX ORTHOPAEDIC  10    left shoulder repair; 14 screws and 2 plates    HX ORTHOPAEDIC      BROKEN ARM SURGERY X2    HX ORTHOPAEDIC Bilateral     SCOPE of knnees bilateral    HX ORTHOPAEDIC       REPAIR OF Left MENISCUS    HX TUBAL LIGATION      BSPO adhesion conization abn pap     Current Outpatient Prescriptions on File Prior to Visit   Medication Sig Dispense Refill    zolpidem (AMBIEN) 5 mg tablet TAKE ONE TABLET BY MOUTH AT BEDTIME AS NEEDED FOR INSOMNIA 30 Tab 0    furosemide (LASIX) 20 mg tablet TAKE ONE TABLET BY MOUTH TWICE A  Tab 1    citalopram (CELEXA) 20 mg tablet TAKE ONE TABLET BY MOUTH DAILY 90 Tab 1    ACETAMINOPHEN/DIPHENHYDRAMINE (TYLENOL PM PO) Take  by mouth. No current facility-administered medications on file prior to visit. Allergies   Allergen Reactions    Codeine Rash    Keflex [Cephalexin] Nausea and Vomiting    Tramadol Other (comments)     Drowsy      Wellbutrin [Bupropion Hcl] Other (comments)     fatigue     Family History   Problem Relation Age of Onset    Cancer Mother      colon    Arthritis-osteo Mother     Cancer Maternal Grandmother      stomach    Heart Disease Father 80    Heart Attack Maternal Grandfather [de-identified]    Heart Attack Paternal Grandmother 80     Social History     Social History    Marital status:      Spouse name: N/A    Number of children: N/A    Years of education: N/A     Social History Main Topics    Smoking status: Never Smoker    Smokeless tobacco: Never Used    Alcohol use 0.5 oz/week     1 Standard drinks or equivalent per week      Comment: RARELY    Drug use: No    Sexual activity: Not Asked     Other Topics Concern    None     Social History Narrative             Review of Systems   Constitutional: Negative for chills, diaphoresis, fever, malaise/fatigue and weight loss. HENT: Positive for ear pain (right ear). Pain from right ear to throat below jaw   Eyes: Negative for blurred vision, double vision, pain and redness. Respiratory: Negative for cough, shortness of breath and wheezing. Cardiovascular: Negative for chest pain, palpitations, orthopnea, claudication, leg swelling and PND. Skin: Negative for itching and rash. Neurological: Negative for dizziness, tingling, tremors, sensory change, speech change, focal weakness, seizures, loss of consciousness, weakness and headaches.      Results for orders placed or performed in visit on 02/26/18   URINALYSIS W/ RFLX MICROSCOPIC   Result Value Ref Range    Specific Gravity 1.021 1.005 - 1.030    pH (UA) 5.0 5.0 - 7.5    Color Yellow Yellow    Appearance Clear Clear    Leukocyte Esterase Negative Negative    Protein Negative Negative/Trace    Glucose Negative Negative    Ketone 1+ (A) Negative    Blood Negative Negative    Bilirubin Negative Negative    Urobilinogen 0.2 0.2 - 1.0 mg/dL    Nitrites Negative Negative    Microscopic Examination Comment    METABOLIC PANEL, COMPREHENSIVE   Result Value Ref Range    Glucose 95 65 - 99 mg/dL    BUN 16 8 - 27 mg/dL    Creatinine 0.41 (L) 0.57 - 1.00 mg/dL    GFR est non- >59 mL/min/1.73    GFR est  >59 mL/min/1.73    BUN/Creatinine ratio 39 (H) 12 - 28    Sodium 142 134 - 144 mmol/L    Potassium 4.2 3.5 - 5.2 mmol/L    Chloride 101 96 - 106 mmol/L    CO2 23 18 - 29 mmol/L    Calcium 9.2 8.7 - 10.3 mg/dL    Protein, total 6.9 6.0 - 8.5 g/dL    Albumin 4.0 3.5 - 4.8 g/dL    GLOBULIN, TOTAL 2.9 1.5 - 4.5 g/dL    A-G Ratio 1.4 1.2 - 2.2    Bilirubin, total 0.4 0.0 - 1.2 mg/dL    Alk. phosphatase 100 39 - 117 IU/L    AST (SGOT) 9 0 - 40 IU/L    ALT (SGPT) 16 0 - 32 IU/L           Physical Exam  Visit Vitals    /83 (BP 1 Location: Left arm, BP Patient Position: Sitting)    Pulse 94    Temp 98.6 °F (37 °C) (Oral)    Resp 16    Ht 5' 5\" (1.651 m)    Wt 197 lb (89.4 kg)    SpO2 94%    BMI 32.78 kg/m2     Ears: normal TM's and external ear canals AU  Throat: Lips, mucosa, and tongue normal. Teeth and gums normal. Slightly tender 1-1.5 cm node in right lateral portion of neck about 3-4 cm below the mandible; not hard or irregular and is freely movable. Neck: supple, symmetrical, trachea midline, no adenopathy, thyroid: not enlarged, symmetric, no tenderness/mass/nodules, no carotid bruit and no JVD        ASSESSMENT and PLAN    ICD-10-CM ICD-9-CM    1. Tender lymph node R59.1 785.6 cephALEXin (KEFLEX) 500 mg capsule    right cervical area     Diagnoses and all orders for this visit:    1. Tender lymph node  Comments:  right cervical area  Orders:  -     cephALEXin (KEFLEX) 500 mg capsule;  Take 1 Cap by mouth three (3) times daily for 10 days. Follow-up Disposition:  Return if symptoms worsen or fail to improve. reviewed medications and side effects in detail  Please call my office if there are any questions- [phone]. Discussed expected course/resolution/complications of diagnosis in detail with patient. Medication risks/benefits/costs/interactions/alternatives discussed with patient. Pt was given an after visit summary which includes diagnoses, current medications & vitals. Pt expressed understanding with the diagnosis and plan. Patient to call if no better in 3 -4 days and prn new problems. Told her that her lymph node is causing her discomfort and I will treat with Keflex and if does not resolve will have her see an ENT specialist who can biopsy the lymph node if necessary. This document was written by Slick Jacome, as dictated by Prachi Shaw MD.  I have reviewed and agree with the above note and have made corrections where appropriate Jim Sandy M.D.

## 2018-04-11 DIAGNOSIS — F51.01 PRIMARY INSOMNIA: ICD-10-CM

## 2018-04-11 RX ORDER — ZOLPIDEM TARTRATE 5 MG/1
TABLET ORAL
Qty: 30 TAB | Refills: 5 | Status: SHIPPED | OUTPATIENT
Start: 2018-04-11 | End: 2018-09-20 | Stop reason: SDUPTHER

## 2018-04-20 NOTE — PROGRESS NOTES
GREAT NEWS!! All of your recent lab tests are normal or at goal.  No diabetes, normal liver and kidney tests. I would continue everything the same and schedule your next fasting office visit in 6 months and we can check the cholesterol then also. . In addition, a physical is recommended every year after the age of 61.

## 2018-04-23 ENCOUNTER — TELEPHONE (OUTPATIENT)
Dept: FAMILY MEDICINE CLINIC | Age: 68
End: 2018-04-23

## 2018-04-23 NOTE — TELEPHONE ENCOUNTER
Patient is calling, she states at her most recent appointment she was prescribed an antibiotics for her symptoms she was having. She took the medication and advises she is not feeling any better (SX: ear and throat pain), she would like a call back on a recommendation of a ENT.     Best call back # for patient: [phone]

## 2018-04-23 NOTE — TELEPHONE ENCOUNTER
MD Keven Lawson, POLA        Caller: Unspecified (Today,  9:57 AM)                     Dr. Iyer Precise       Given name and number to call

## 2018-04-30 ENCOUNTER — HOSPITAL ENCOUNTER (OUTPATIENT)
Dept: ULTRASOUND IMAGING | Age: 68
Discharge: HOME | End: 2018-04-30
Attending: OTOLARYNGOLOGY
Payer: MEDICARE

## 2018-04-30 DIAGNOSIS — R22.1 NECK MASS: ICD-10-CM

## 2018-04-30 DIAGNOSIS — H92.09 OTALGIA: ICD-10-CM

## 2018-04-30 PROCEDURE — 76536 US EXAM OF HEAD AND NECK: CPT

## 2018-05-29 ENCOUNTER — HOSPITAL ENCOUNTER (OUTPATIENT)
Dept: ULTRASOUND IMAGING | Age: 68
Discharge: HOME | End: 2018-05-29
Attending: OTOLARYNGOLOGY
Payer: MEDICARE

## 2018-05-29 DIAGNOSIS — E04.1 THYROID NODULE: ICD-10-CM

## 2018-05-29 DIAGNOSIS — R59.1 LYMPHADENOPATHY: ICD-10-CM

## 2018-05-29 PROCEDURE — 10022 US GUIDE FINE NDL ASP W IMAGE: CPT

## 2018-05-29 PROCEDURE — 74011000250 HC RX REV CODE- 250: Performed by: RADIOLOGY

## 2018-05-29 PROCEDURE — 88185 FLOWCYTOMETRY/TC ADD-ON: CPT | Performed by: OTOLARYNGOLOGY

## 2018-05-29 PROCEDURE — 88173 CYTOPATH EVAL FNA REPORT: CPT | Performed by: OTOLARYNGOLOGY

## 2018-05-29 PROCEDURE — 88184 FLOWCYTOMETRY/ TC 1 MARKER: CPT | Performed by: OTOLARYNGOLOGY

## 2018-05-29 PROCEDURE — 88172 CYTP DX EVAL FNA 1ST EA SITE: CPT | Performed by: OTOLARYNGOLOGY

## 2018-05-29 RX ORDER — LIDOCAINE HYDROCHLORIDE 20 MG/ML
10 INJECTION, SOLUTION EPIDURAL; INFILTRATION; INTRACAUDAL; PERINEURAL
Status: COMPLETED | OUTPATIENT
Start: 2018-05-29 | End: 2018-05-29

## 2018-05-29 RX ADMIN — LIDOCAINE HYDROCHLORIDE 5 ML: 20 INJECTION, SOLUTION EPIDURAL; INFILTRATION; INTRACAUDAL; PERINEURAL at 09:00

## 2018-07-09 ENCOUNTER — HOSPITAL ENCOUNTER (OUTPATIENT)
Dept: MAMMOGRAPHY | Age: 68
Discharge: HOME | End: 2018-07-09
Attending: OBSTETRICS & GYNECOLOGY
Payer: MEDICARE

## 2018-07-09 DIAGNOSIS — Z12.31 VISIT FOR SCREENING MAMMOGRAM: ICD-10-CM

## 2018-07-09 PROCEDURE — 77063 BREAST TOMOSYNTHESIS BI: CPT

## 2018-08-10 ENCOUNTER — HOSPITAL ENCOUNTER (OUTPATIENT)
Dept: LAB | Age: 68
Discharge: HOME | End: 2018-08-10
Payer: MEDICARE

## 2018-08-10 ENCOUNTER — OFFICE VISIT (OUTPATIENT)
Dept: FAMILY MEDICINE CLINIC | Age: 68
End: 2018-08-10

## 2018-08-10 VITALS
DIASTOLIC BLOOD PRESSURE: 82 MMHG | TEMPERATURE: 97.8 F | HEIGHT: 65 IN | RESPIRATION RATE: 16 BRPM | WEIGHT: 190 LBS | BODY MASS INDEX: 31.65 KG/M2 | SYSTOLIC BLOOD PRESSURE: 110 MMHG | HEART RATE: 98 BPM | OXYGEN SATURATION: 98 %

## 2018-08-10 DIAGNOSIS — Z79.890 POSTMENOPAUSAL HRT (HORMONE REPLACEMENT THERAPY): ICD-10-CM

## 2018-08-10 DIAGNOSIS — M17.12 PRIMARY OSTEOARTHRITIS OF LEFT KNEE: Chronic | ICD-10-CM

## 2018-08-10 DIAGNOSIS — Z00.00 PHYSICAL EXAM, ROUTINE: ICD-10-CM

## 2018-08-10 DIAGNOSIS — Z11.59 NEED FOR HEPATITIS C SCREENING TEST: ICD-10-CM

## 2018-08-10 DIAGNOSIS — Z71.89 ACP (ADVANCE CARE PLANNING): ICD-10-CM

## 2018-08-10 DIAGNOSIS — Z98.890 HISTORY OF COLONOSCOPY: ICD-10-CM

## 2018-08-10 DIAGNOSIS — E78.5 HYPERLIPIDEMIA WITH TARGET LOW DENSITY LIPOPROTEIN (LDL) CHOLESTEROL LESS THAN 130 MG/DL: Primary | ICD-10-CM

## 2018-08-10 DIAGNOSIS — M54.16 LUMBAR NERVE ROOT IMPINGEMENT: ICD-10-CM

## 2018-08-10 DIAGNOSIS — M41.25 OTHER IDIOPATHIC SCOLIOSIS, THORACOLUMBAR REGION: Chronic | ICD-10-CM

## 2018-08-10 DIAGNOSIS — Z00.00 MEDICARE ANNUAL WELLNESS VISIT, SUBSEQUENT: ICD-10-CM

## 2018-08-10 PROCEDURE — 85027 COMPLETE CBC AUTOMATED: CPT

## 2018-08-10 PROCEDURE — 81003 URINALYSIS AUTO W/O SCOPE: CPT

## 2018-08-10 PROCEDURE — 80053 COMPREHEN METABOLIC PANEL: CPT

## 2018-08-10 PROCEDURE — 80061 LIPID PANEL: CPT

## 2018-08-10 PROCEDURE — 36415 COLL VENOUS BLD VENIPUNCTURE: CPT

## 2018-08-10 PROCEDURE — 86803 HEPATITIS C AB TEST: CPT

## 2018-08-10 RX ORDER — ESTRADIOL 1 MG/1
1 TABLET ORAL DAILY
COMMUNITY
End: 2019-06-18 | Stop reason: SDUPTHER

## 2018-08-10 NOTE — PROGRESS NOTES
Chief Complaint   Patient presents with    Physical     fasting visit     \"REVIEWED RECORD IN PREPARATION FOR VISIT AND HAVE OBTAINED THE NECESSARY DOCUMENTATION\"  1. Have you been to the ER, urgent care clinic since your last visit? Hospitalized since your last visit? No    2. Have you seen or consulted any other health care providers outside of the [address] [place] [address] since your last visit? Include any pap smears or colon screening. No  UNABLE TO COMPLETE EKG. Problems with both machines today. Dr. Yaniv Braxton aware.

## 2018-08-10 NOTE — ACP (ADVANCE CARE PLANNING)
Advance Care Planning (ACP) Provider Note - Comprehensive     Date of ACP Conversation: 08/10/18  Persons included in Conversation:  patient  Length of ACP Conversation in minutes:  <16 minutes (Non-Billable)    Authorized Decision Maker (if patient is incapable of making informed decisions): This person is:  Healthcare Agent/Medical Power of  under Advance Directive          General ACP for ALL Patients with Decision Making Capacity:   Importance of advance care planning, including choosing a healthcare agent to communicate patient's healthcare decisions if patient lost the ability to make decisions, such as after a sudden illness or accident  Understanding of the healthcare agent role was assessed and information provided    Review of Existing Advance Directive:       For Serious or Chronic Illness:  No known illness    Interventions Provided:  Recommended completion of Advance Directive form after review of ACP materials and conversation with prospective healthcare agent   Recommended communicating the plan and making copies for the healthcare agent, personal physician, and others as appropriate (e.g., health system)  Recommended review of completed ACP document annually or upon change in health status

## 2018-08-10 NOTE — MR AVS SNAPSHOT
[facility] 
 
 
 [address] 
[phone] Patient: Yasmin Thompson MRN: [id number]:[id number] Visit Information Date & Time Provider Department Dept. Phone Encounter #  
 8/10/2018  9:00 AM Aniceto Habermann, MD [address] [place] [address] [phone] [id number] Follow-up Instructions Return in about 6 months (around 2/10/2019), or exertional SOB or CP, for F/U cholesterol, edema, , F/U of obesity. Follow-up and Disposition History Upcoming Health Maintenance Date Due FOBT Q 1 YEAR AGE 50-75 1/19/2018 MEDICARE YEARLY EXAM 3/14/2018 GLAUCOMA SCREENING Q2Y 8/17/2018 Influenza Age 5 to Adult 9/28/2018* BREAST CANCER SCRN MAMMOGRAM 7/9/2020 DTaP/Tdap/Td series (2 - Td) 8/3/2026 *Topic was postponed. The date shown is not the original due date. Allergies as of 8/10/2018  Review Complete On: 8/10/2018 By: Aniceto Habermann, MD  
  
 Severity Noted Reaction Type Reactions Codeine High 03/25/2010   Systemic Rash Keflex [Cephalexin]  03/25/2010    Nausea and Vomiting Tramadol  03/25/2010    Other (comments) Drowsy Wellbutrin [Bupropion Hcl]  03/25/2010    Other (comments)  
 fatigue Current Immunizations  Never Reviewed Name Date Influenza Vaccine 11/15/2017 Td, Adsorbed PF 4/24/2013  
 dT Vaccine 1/5/1999 Not reviewed this visit You Were Diagnosed With   
  
 Codes Comments Hyperlipidemia with target low density lipoprotein (LDL) cholesterol less than 130 mg/dL    -  Primary ICD-10-CM: E78.5 ICD-9-CM: 272.4 Physical exam, routine     ICD-10-CM: Z00.00 ICD-9-CM: V70.0 Need for hepatitis C screening test     ICD-10-CM: Z11.59 
ICD-9-CM: V73.89   
 ACP (advance care planning)     ICD-10-CM: Z71.89 ICD-9-CM: V65.49 History of colonoscopy     ICD-10-CM: Z98.890 ICD-9-CM: V45.89 9/8/2016- Dr. Brandyn Mack; done every 5 yrs due to + Fam Hx Medicare annual wellness visit, subsequent     ICD-10-CM: Z00.00 ICD-9-CM: V70.0 Postmenopausal HRT (hormone replacement therapy)     ICD-10-CM: C29.218 ICD-9-CM: V07.4 Lumbar nerve root impingement     ICD-10-CM: M54.16 
ICD-9-CM: 724.4 Other idiopathic scoliosis, thoracolumbar region     ICD-10-CM: M41.25 ICD-9-CM: 737.30 Primary osteoarthritis of left knee     ICD-10-CM: M17.12 
ICD-9-CM: 715.16 Vitals BP Pulse Temp Resp Height(growth percentile) Weight(growth percentile) 110/82 (BP 1 Location: Left arm, BP Patient Position: Sitting) 98 97.8 °F (36.6 °C) (Oral) 16 5' 5\" (1.651 m) 190 lb (86.2 kg) SpO2 BMI OB Status Smoking Status 98% 31.62 kg/m2 Hysterectomy Never Smoker BMI and BSA Data Body Mass Index Body Surface Area  
 31.62 kg/m 2 1.99 m 2 Preferred Pharmacy Pharmacy Name Phone [facility] [address] [facility] [phone] Your Updated Medication List  
  
   
This list is accurate as of 8/10/18 11:42 AM.  Always use your most recent med list.  
  
  
  
  
 estradiol 1 mg tablet Commonly known as:  ESTRACE Take 1 mg by mouth daily. furosemide 20 mg tablet Commonly known as:  LASIX TAKE ONE TABLET BY MOUTH TWICE A DAY TYLENOL PM PO Take  by mouth.  
  
 zolpidem 5 mg tablet Commonly known as:  AMBIEN  
TAKE ONE TABLET BY MOUTH AT BEDTIME AS NEEDED FOR INSOMNIA We Performed the Following AMB POC EKG ROUTINE W/ 12 LEADS, INTER & REP [54363 CPT(R)] CBC W/O DIFF [31717 CPT(R)] HEPATITIS C AB [15824 CPT(R)] LIPID PANEL [94238 CPT(R)] METABOLIC PANEL, COMPREHENSIVE [70511 CPT(R)] URINALYSIS W/ RFLX MICROSCOPIC [91381 CPT(R)] Follow-up Instructions Return in about 6 months (around 2/10/2019), or exertional SOB or CP, for F/U cholesterol, edema, , F/U of obesity.   
  
To-Do List   
 11/12/2018 9:00 AM  
 Appointment with [address] at [facility] ([phone]) GENERAL INSTRUCTIONS  1. Bring outside films ([employer]) pertaining to the affected area with you on the day of appointment. 2. A written order with a valid diagnosis and Physicians signature is required for all scheduled tests. 3. Check in at registration 30 minutes before your appointment time unless you were instructed to do otherwise. Introducing \[facility] [place] [facility]\"" & HEALTH SERVICES! Dear Monica Delgado: 
Thank you for requesting a [employer] account. Our records indicate that you already have an active [employer] account. You can access your account anytime at [email or web address] Did you know that you can access your hospital and ER discharge instructions at any time in [employer]? You can also review all of your test results from your hospital stay or ER visit. Additional Information If you have questions, please visit the Frequently Asked Questions section of the [employer] website at [email or web address] Remember, [employer] is NOT to be used for urgent needs. For medical emergencies, dial 911. Now available from your iPhone and Android! Please provide this summary of care documentation to your next provider. Your primary care clinician is listed as [address] [address], [facility]/s . If you have any questions after today's visit, please call [phone].

## 2018-08-10 NOTE — PROGRESS NOTES
HISTORY OF PRESENT ILLNESS  HPI  Chani Hernandez is a 77 y.o. Female with a history of adjustment disorder with anxiety and depressed mood, DJD and hyperlipidemia with LDL goal <130, who presents at the office today for a complete physical. Pt gets a regular colonoscopy every 5 years and believes that her last one was 3 years ago. 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 Pt has a follow up with Dr. Fauzia Landis in 6 months. Pt has a spot around her left eye that she wants examined.           Past Medical History:   Diagnosis Date    Actinic keratosis     Left arm    Adjustment disorder with mixed anxiety and depressed mood     Adverse effect of anesthesia     HARD TO WAKE UP SLOW BREATHING     Kidney stones 95,96,98    left    Left knee DJD     Lumbar nerve root impingement     RLQ pain    Menopause     LMP-53years old    Other and unspecified hyperlipidemia     Other idiopathic scoliosis, thoracolumbar region 2018    Postmenopausal HRT (hormone replacement therapy)      Past Surgical History:   Procedure Laterality Date    COLONOSCOPY N/A 2016    [facility] ENDOSCOPY    HX APPENDECTOMY      HX  SECTION      X2    HX HYSTERECTOMY      LMP-53years old   [address]  [facility]    left knee    HX ORTHOPAEDIC  12    left shoulder repair; 14 screws and 2 plates    HX ORTHOPAEDIC      BROKEN ARM SURGERY X2    HX ORTHOPAEDIC Bilateral     SCOPE of knnees bilateral    HX ORTHOPAEDIC       REPAIR OF Left MENISCUS    HX TUBAL LIGATION      BSPO adhesion conization abn pap     Current Outpatient Prescriptions on File Prior to Visit   Medication Sig Dispense Refill    zolpidem (AMBIEN) 5 mg tablet TAKE ONE TABLET BY MOUTH AT BEDTIME AS NEEDED FOR INSOMNIA 30 Tab 5    furosemide (LASIX) 20 mg tablet TAKE ONE TABLET BY MOUTH TWICE A  Tab 1    ACETAMINOPHEN/DIPHENHYDRAMINE (TYLENOL PM PO) Take  by mouth. No current facility-administered medications on file prior to visit. Allergies   Allergen Reactions    Codeine Rash    Keflex [Cephalexin] Nausea and Vomiting    Tramadol Other (comments)     Drowsy      Wellbutrin [Bupropion Hcl] Other (comments)     fatigue     Family History   Problem Relation Age of Onset    Cancer Mother      colon    Arthritis-osteo Mother     Cancer Maternal Grandmother      stomach    Heart Disease Father 80    Heart Attack Maternal Grandfather [de-identified]    Heart Attack Paternal Grandmother 80     Social History     Social History    Marital status:      Spouse name: N/A    Number of children: N/A    Years of education: N/A     Social History Main Topics    Smoking status: Never Smoker    Smokeless tobacco: Never Used    Alcohol use 0.5 oz/week     1 Standard drinks or equivalent per week      Comment: RARELY    Drug use: No    Sexual activity: Not Asked     Other Topics Concern    None     Social History Narrative             Review of Systems   Constitutional: Negative for chills, diaphoresis, fever, malaise/fatigue and weight loss. HENT: Negative for congestion, ear discharge, ear pain, hearing loss, nosebleeds, sore throat and tinnitus. Eyes: Negative for blurred vision, double vision, photophobia, pain, discharge and redness. Respiratory: Negative for cough, hemoptysis, sputum production, shortness of breath, wheezing and stridor. Cardiovascular: Negative for chest pain, palpitations, orthopnea, claudication, leg swelling and PND. Gastrointestinal: Negative for abdominal pain, blood in stool, constipation, diarrhea, heartburn, melena, nausea and vomiting. Genitourinary: Negative for dysuria, flank pain, frequency, hematuria and urgency. Musculoskeletal: Negative for back pain, falls, joint pain, myalgias and neck pain. Skin: Negative for itching and rash.         Spot around left eye   Neurological: Negative for dizziness, tingling, tremors, sensory change, speech change, focal weakness, seizures, loss of consciousness, weakness and headaches. Endo/Heme/Allergies: Negative for environmental allergies and polydipsia. Does not bruise/bleed easily. Psychiatric/Behavioral: Negative for depression, hallucinations, memory loss, substance abuse and suicidal ideas. The patient is not nervous/anxious and does not have insomnia. Results for orders placed or performed in visit on 02/26/18   URINALYSIS W/ RFLX MICROSCOPIC   Result Value Ref Range    Specific Gravity 1.021 1.005 - 1.030    pH (UA) 5.0 5.0 - 7.5    Color Yellow Yellow    Appearance Clear Clear    Leukocyte Esterase Negative Negative    Protein Negative Negative/Trace    Glucose Negative Negative    Ketone 1+ (A) Negative    Blood Negative Negative    Bilirubin Negative Negative    Urobilinogen 0.2 0.2 - 1.0 mg/dL    Nitrites Negative Negative    Microscopic Examination Comment    METABOLIC PANEL, COMPREHENSIVE   Result Value Ref Range    Glucose 95 65 - 99 mg/dL    BUN 16 8 - 27 mg/dL    Creatinine 0.41 (L) 0.57 - 1.00 mg/dL    GFR est non- >59 mL/min/1.73    GFR est  >59 mL/min/1.73    BUN/Creatinine ratio 39 (H) 12 - 28    Sodium 142 134 - 144 mmol/L    Potassium 4.2 3.5 - 5.2 mmol/L    Chloride 101 96 - 106 mmol/L    CO2 23 18 - 29 mmol/L    Calcium 9.2 8.7 - 10.3 mg/dL    Protein, total 6.9 6.0 - 8.5 g/dL    Albumin 4.0 3.5 - 4.8 g/dL    GLOBULIN, TOTAL 2.9 1.5 - 4.5 g/dL    A-G Ratio 1.4 1.2 - 2.2    Bilirubin, total 0.4 0.0 - 1.2 mg/dL    Alk. phosphatase 100 39 - 117 IU/L    AST (SGOT) 9 0 - 40 IU/L    ALT (SGPT) 16 0 - 32 IU/L         Physical Exam  Visit Vitals    /82 (BP 1 Location: Left arm, BP Patient Position: Sitting)    Pulse 98    Temp 97.8 °F (36.6 °C) (Oral)    Resp 16    Ht 5' 5\" (1.651 m)    Wt 190 lb (86.2 kg)    SpO2 98%    BMI 31.62 kg/m2     General:  Alert, cooperative, no distress, appears stated age. Head:  Normocephalic, without obvious abnormality, atraumatic. Eyes:  Conjunctivae/corneas clear. PERRL, EOMs intact. Fundi benign. Her left lower lid has a less than 1 mm yellow papule toward the inner aspect of the lid. There is no erythema around this. Ears:  Normal TMs and external ear canals both ears. Nose: Nares normal. Septum midline. Mucosa normal. No drainage or sinus tenderness. Throat: Lips, mucosa, and tongue normal. Teeth and gums normal.   Neck: Supple, symmetrical, trachea midline, no adenopathy, thyroid: no enlargement/tenderness/nodules, no carotid bruit and no JVD. Back:   Symmetric, no curvature. ROM normal. No CVA tenderness. Lungs:   Clear to auscultation bilaterally. Chest wall:  No tenderness or deformity. Heart:  Regular rate and rhythm, S1, S2 normal, no murmur, click, rub or gallop. Abdomen:   Soft, non-tender. Bowel sounds normal. No masses,  No organomegaly. Extremities: Extremities normal, atraumatic, no cyanosis or edema. Pulses: 2+ and symmetric all extremities. Skin: Skin color, texture, turgor normal. No rashes or lesions. Lymph nodes: Cervical, supraclavicular, and axillary nodes normal.   Neurologic: CNII-XII intact. Normal strength, sensation and reflexes throughout. ASSESSMENT and PLAN    ICD-10-CM ICD-9-CM    1. Physical exam, routine Z00.00 V70.0 AMB POC EKG ROUTINE W/ 12 LEADS, INTER & REP      LIPID PANEL      METABOLIC PANEL, COMPREHENSIVE      CBC W/O DIFF      URINALYSIS W/ RFLX MICROSCOPIC      HEPATITIS C AB   2. Need for hepatitis C screening test Z11.59 V73.89 HEPATITIS C AB   3. ACP (advance care planning) Z71.89 V65.49    4. History of colonoscopy Z98.890 V45.89     9/8/2016- Dr. Brandyn Mack; done every 5 yrs due to + Fam Hx     Diagnoses and all orders for this visit:    1.  Physical exam, routine  -     AMB POC EKG ROUTINE W/ 12 LEADS, INTER & REP  -     LIPID PANEL  -     METABOLIC PANEL, COMPREHENSIVE  -     CBC W/O DIFF  - URINALYSIS W/ RFLX MICROSCOPIC  -     HEPATITIS C AB    2. Need for hepatitis C screening test  -     HEPATITIS C AB    3. ACP (advance care planning)    4. History of colonoscopy  Comments:  9/8/2016- Dr. Alba Lowery; done every 5 yrs due to + Fam Hx      Follow-up Disposition:  Return in about 6 months (around 2/10/2019), or exertional SOB or CP, for F/U cholesterol, edema, , F/U of obesity. lab results and schedule of future lab studies reviewed with patient  reviewed diet, exercise and weight control  cardiovascular risk and specific lipid/LDL goals reviewed  reviewed medications and side effects in detail  Please call my office if there are any questions- [phone]. I will arrange for follow up after the lab tests done from today return  Recommended a weekly \"heart check. \" I went into detail how to do this. Call for refills on medications as needed. Discussed expected course/resolution/complications of diagnosis in detail with patient. Medication risks/benefits/costs/interactions/alternatives discussed with patient. Pt was given an after visit summary which includes diagnoses, current medications & vitals. Pt expressed understanding with the diagnosis and plan. Total 25 minutes,60 % counseling re:  Regular exercise is very important to your health; it helps mentally, physically, socially; it prevents injuries if done properly. Exercise, even as simple as walking 20-30 minutes daily has major benefits to your health even though your \"numbers\" are the same in the lab. See if you can add this into your daily regimen and after a few months it will become a regular habit-\"just something you do,\" like brushing your teeth.      A combination of aerobic exercise and strengthening and stretching is felt to be the best for you, so this should be your ultimate goal.   This can be done in the privacy of your home or in a group setting as at the gym  Some prefer having a , others prefer to do exercise in groups or individually. Do what \"works\" for you. You need to make it simple and \"fun,\" or you most likely you will not continue it. BMI is significantly elevated- in the obese range. I reviewed diet, exercise and weight control. Discussed weight control in detail, the importance of mainly decreased carbs, and for weight maintenance, exercise; discussed different diets and that it isn't as important to watch the type of foods as it is to decrease calorie intake no matter what type of diet you do, etc.       Reviewed symptoms, or lack thereof, of elevated cholesterol. Congratulated her on her weight loss. This is her lowest weight in 10 years. She is also exercising more frequently. I encouraged her to continue doing a \"heart check\" each week. Advanced Directive information discussed and NN contact information given. Her colonoscopy was done by Dr. Sam José on 9/8/2016, to be repeated in 2021. For her eye, suggested no treatment and to leave the area alone. Encouraged her to talk to her eye doctor the next time she sees him. This will likely not resolve on its own. Also, discussed symptoms of concern that were noted today in the note above, treatment options( including doing nothing), when to follow up before recommended time frame. Also, answered all questions. This document was written by Urban Gama, as dictated by Flex Gilliam MD.  I have reviewed and agree with the above note and have made corrections where appropriate Jim Nguyen M.D.

## 2018-08-10 NOTE — PROGRESS NOTES
Dulce Fernandes is a 77 y.o. female and presents for annual Medicare Wellness Visit. Problem List: Reviewed with patient and discussed risk factors.     Patient Active Problem List   Diagnosis Code    lumbar nerve root impingement-2010- RLQ/ inguinal pain M54.16    Left knee DJD M17.12    Adjustment disorder with mixed anxiety and depressed mood F43.23    Hyperlipidemia with target low density lipoprotein (LDL) cholesterol less than 130 mg/dL E78.5    Postmenopausal HRT (hormone replacement therapy) Z79.890    History of complications due to general anesthesia- slow to wake up from anesthesia Z91.89    History of kidney stones Z87.442    ACP (advance care planning) Z71.89    Other idiopathic scoliosis, thoracolumbar region M41.25       Current medical providers:  Patient Care Team:  Adis Clements MD as PCP - General    PSH: Reviewed with patient  Past Surgical History:   Procedure Laterality Date    COLONOSCOPY N/A 9/8/2016    COLONOS[facility] performed by Jeaneth Randle [facility] at [facility] ENDOSCOPY    HX APPENDECTOMY       [facility]      X2    HX HYSTERECTOMY      LMP-53years old   [address]    left knee    HX ORTHOPAEDIC  12/24/12    left shoulder repair; 14 screws and 2 plates    HX ORTHOPAEDIC      BROKEN ARM SURGERY X2    HX ORTHOPAEDIC Bilateral     SCOPE of knnees bilateral    HX ORTHOPAEDIC       REPAIR OF Left MENISCUS    HX TUBAL LIGATION      BSPO adhesion conization abn pap        SH: Reviewed with patient  Social History   Substance Use Topics    Smoking status: Never Smoker    Smokeless tobacco: Never Used    Alcohol use 0.5 oz/week     1 Standard drinks or equivalent per week      Comment: RARELY       FH: Reviewed with patient  Family History   Problem Relation Age of Onset    Cancer Mother      colon    Arthritis-osteo Mother     Cancer Maternal Grandmother      stomach    Heart Disease Father 80    Heart Attack Maternal Grandfather [de-identified]    Heart Attack Paternal Grandmother 80       Medications/Allergies: Reviewed with patient  Current Outpatient Prescriptions on File Prior to Visit   Medication Sig Dispense Refill    zolpidem (AMBIEN) 5 mg tablet TAKE ONE TABLET BY MOUTH AT BEDTIME AS NEEDED FOR INSOMNIA 30 Tab 5    furosemide (LASIX) 20 mg tablet TAKE ONE TABLET BY MOUTH TWICE A  Tab 1    ACETAMINOPHEN/DIPHENHYDRAMINE (TYLENOL PM PO) Take  by mouth. No current facility-administered medications on file prior to visit. Allergies   Allergen Reactions    Codeine Rash    Keflex [Cephalexin] Nausea and Vomiting    Tramadol Other (comments)     Drowsy      Wellbutrin [Bupropion Hcl] Other (comments)     fatigue       Objective:  Visit Vitals    /82 (BP 1 Location: Left arm, BP Patient Position: Sitting)    Pulse 98    Temp 97.8 °F (36.6 °C) (Oral)    Resp 16    Ht 5' 5\" (1.651 m)    Wt 190 lb (86.2 kg)    SpO2 98%    BMI 31.62 kg/m2    Body mass index is 31.62 kg/(m^2). Assessment of cognitive impairment: Alert and oriented x 3    Depression Screen:   PHQ over the last two weeks 3/7/2017   Little interest or pleasure in doing things Several days   Feeling down, depressed, irritable, or hopeless Several days   Total Score PHQ 2 2       Fall Risk Assessment:    Fall Risk Assessment, last 12 mths 2/2/2018   Able to walk? Yes   Fall in past 12 months? No       Functional Ability:   Does the patient exhibit a steady gait? yes   How long did it take the patient to get up and walk from a sitting position? 4-5 seconds     Is the patient self reliant?  (ie can do own laundry, meals, household chores)  yes     Does the patient handle his/her own medications? yes     Does the patient handle his/her own money? yes     Is the patients home safe (ie good lighting, handrails on stairs and bath, etc.)? yes     Did you notice or did patient express any hearing difficulties?    no     Did you notice or did patient express any vision difficulties?   no     Were distance and reading eye charts used? Yes. Your testosterone level was normal; I would recommend a trial of an erectile dysfunction medication ; please check your formulary to see if any of these are covered and bring the formulary to the office visit so we can chose one that is less costly; also, I sometimes have discount cards or samples that you can try before getting a Rx. Advance Care Planning:   Patient was offered the opportunity to discuss advance care planning:  yes     Does patient have an Advance Directive:  no   If no, did you provide information on [employer]? Yes. I reviewed advanced directive information and written information given to patient; patient to make follow up appointment with a NN to review choices for health care, agent, and anatomical gifts. Plan:      Orders Placed This Encounter    LIPID PANEL    METABOLIC PANEL, COMPREHENSIVE    CBC W/O DIFF    URINALYSIS W/ RFLX MICROSCOPIC    HEPATITIS C AB    AMB POC EKG ROUTINE W/ 12 LEADS, INTER & REP    estradiol (ESTRACE) 1 mg tablet       Health Maintenance   Topic Date Due    FOBT Q 1 YEAR AGE 50-75  01/19/2018    MEDICARE YEARLY EXAM  03/14/2018    GLAUCOMA SCREENING Q2Y  08/17/2018    Influenza Age 5 to Adult  09/28/2018 (Originally 8/1/2018)    BREAST CANCER SCRN MAMMOGRAM  07/09/2020    DTaP/Tdap/Td series (2 - Td) 08/03/2026    Hepatitis C Screening  Addressed    Bone Densitometry (Dexa) Screening  Completed    ZOSTER VACCINE AGE 60>  Addressed    Pneumococcal 65+ Low/Medium Risk  Addressed       *Patient verbalized understanding and agreement with the plan. A copy of the After Visit Summary with personalized health plan was given to the patient today.

## 2018-08-11 LAB
ALBUMIN SERPL-MCNC: 4.4 G/DL (ref 3.5–4.8)
ALBUMIN/GLOB SERPL: 1.5 {RATIO} (ref 1.2–2.2)
ALP SERPL-CCNC: 86 IU/L (ref 39–117)
ALT SERPL-CCNC: 17 IU/L (ref 0–32)
APPEARANCE UR: CLEAR
AST SERPL-CCNC: 10 IU/L (ref 0–40)
BILIRUB SERPL-MCNC: 0.7 MG/DL (ref 0–1.2)
BILIRUB UR QL STRIP: NEGATIVE
BUN SERPL-MCNC: 17 MG/DL (ref 8–27)
BUN/CREAT SERPL: 27 (ref 12–28)
CALCIUM SERPL-MCNC: 9.5 MG/DL (ref 8.7–10.3)
CHLORIDE SERPL-SCNC: 100 MMOL/L (ref 96–106)
CHOLEST SERPL-MCNC: 216 MG/DL (ref 100–199)
CO2 SERPL-SCNC: 25 MMOL/L (ref 20–29)
COLOR UR: YELLOW
CREAT SERPL-MCNC: 0.64 MG/DL (ref 0.57–1)
ERYTHROCYTE [DISTWIDTH] IN BLOOD BY AUTOMATED COUNT: 14 % (ref 12.3–15.4)
GLOBULIN SER CALC-MCNC: 2.9 G/DL (ref 1.5–4.5)
GLUCOSE SERPL-MCNC: 100 MG/DL (ref 65–99)
GLUCOSE UR QL: NEGATIVE
HCT VFR BLD AUTO: 45.8 % (ref 34–46.6)
HCV AB S/CO SERPL IA: <0.1 S/CO RATIO (ref 0–0.9)
HDLC SERPL-MCNC: 71 MG/DL
HGB BLD-MCNC: 15.4 G/DL (ref 11.1–15.9)
HGB UR QL STRIP: NEGATIVE
INTERPRETATION, 910389: NORMAL
KETONES UR QL STRIP: NEGATIVE
LDLC SERPL CALC-MCNC: 128 MG/DL (ref 0–99)
LEUKOCYTE ESTERASE UR QL STRIP: NEGATIVE
MCH RBC QN AUTO: 31 PG (ref 26.6–33)
MCHC RBC AUTO-ENTMCNC: 33.6 G/DL (ref 31.5–35.7)
MCV RBC AUTO: 92 FL (ref 79–97)
MICRO URNS: NORMAL
NITRITE UR QL STRIP: NEGATIVE
PH UR STRIP: 7 [PH] (ref 5–7.5)
PLATELET # BLD AUTO: 377 X10E3/UL (ref 150–379)
POTASSIUM SERPL-SCNC: 4 MMOL/L (ref 3.5–5.2)
PROT SERPL-MCNC: 7.3 G/DL (ref 6–8.5)
PROT UR QL STRIP: NEGATIVE
RBC # BLD AUTO: 4.97 X10E6/UL (ref 3.77–5.28)
SODIUM SERPL-SCNC: 141 MMOL/L (ref 134–144)
SP GR UR: 1.01 (ref 1–1.03)
TRIGL SERPL-MCNC: 87 MG/DL (ref 0–149)
UROBILINOGEN UR STRIP-MCNC: 0.2 MG/DL (ref 0.2–1)
VLDLC SERPL CALC-MCNC: 17 MG/DL (ref 5–40)
WBC # BLD AUTO: 8.4 X10E3/UL (ref 3.4–10.8)

## 2018-08-27 RX ORDER — FUROSEMIDE 20 MG/1
TABLET ORAL
Qty: 60 TAB | Refills: 4 | Status: SHIPPED | OUTPATIENT
Start: 2018-08-27 | End: 2019-02-04 | Stop reason: SDUPTHER

## 2018-09-20 ENCOUNTER — TELEPHONE (OUTPATIENT)
Dept: FAMILY MEDICINE CLINIC | Age: 68
End: 2018-09-20

## 2018-09-20 DIAGNOSIS — F51.01 PRIMARY INSOMNIA: ICD-10-CM

## 2018-09-20 RX ORDER — ZOLPIDEM TARTRATE 5 MG/1
TABLET ORAL
Qty: 30 TAB | Refills: 4 | Status: SHIPPED | OUTPATIENT
Start: 2018-09-20 | End: 2018-09-21 | Stop reason: SDUPTHER

## 2018-09-20 NOTE — TELEPHONE ENCOUNTER
Sean Faulkner is calling on behalf of pt form [facility] in regards to being able to receive a verbal order to refill patients medication zolpidem (AMBIEN) 5 mg tablet. Medication as noted was signed today by Dr. Garcia Hinton and they are wanting a verbal ok to refill early. She states patient is to leave town early tomorrow morning and notes that patient is wanting to come in an  medication later today.      Best call back [phone]

## 2018-09-20 NOTE — TELEPHONE ENCOUNTER
LVM on pharmacy line (for Pito) that it is OK to fill Ambien early as written for today anyway per Dr. Devin Avila written approval.

## 2018-09-21 RX ORDER — ZOLPIDEM TARTRATE 5 MG/1
TABLET ORAL
Qty: 30 TAB | Refills: 4 | Status: SHIPPED | OUTPATIENT
Start: 2018-09-21 | End: 2019-04-12 | Stop reason: SDUPTHER

## 2018-09-21 NOTE — TELEPHONE ENCOUNTER
Patient called this morning requesting a hard script for zolpidem refill request that was done on yesterday. Medication was called in to pharmacy on 09/20/2018. Pharmacy was called this morning, and pharmacist verified that patient has not picked up the medication from the pharmacy. Writer notified pharmacy to cancel refill and hard script will be printed instead for patient to  at the office.

## 2018-09-21 NOTE — TELEPHONE ENCOUNTER
Patient is requesting a paper script for the medication, as insurance is not going to pay for the early refill. She states she will take the script with her to [facility]. zolpidem (AMBIEN) 5 mg tablet.   She will like a call back before 3 pm to  the scripts  Best call back : [phone]

## 2018-10-09 ENCOUNTER — TELEPHONE (OUTPATIENT)
Dept: FAMILY MEDICINE CLINIC | Age: 68
End: 2018-10-09

## 2018-10-09 NOTE — TELEPHONE ENCOUNTER
----- Message from Aimee Vizcaino sent at 10/9/2018  4:28 PM EDT -----  Regarding: Dr. Genet Sexton / Rx refill  Contact: [phone]  Pt requested a Rx refill on Ambien, dosage on file sent to [facility] in [place] @ [phone]. Pt stated she is in [place] and did not  the Rx from the office.

## 2018-10-11 NOTE — TELEPHONE ENCOUNTER
----- Message from Jr Carson sent at 10/11/2018 12:01 PM EDT -----  Regarding: Dr. Devon Whittaker: [phone]  Pt called back in regards to refill request done on 10/09. Pt stated she is out of it and really wants to make sure it gets there. I did inform pt of the usual turnaround time. Previous message below sent 10/09/18    Pt requested a Rx refill on Ambien, dosage on file sent to [facility] in [place] @ [phone]. Pt stated she is in [place] and did not  the Rx from the office.

## 2018-10-12 NOTE — TELEPHONE ENCOUNTER
----- Message from Bell Hernandez sent at 10/12/2018  9:58 AM EDT -----  Regarding: Efrain Ascencio MD/ refill  Pt is in [place] and would need her prescription for Ambien. Pt is using [facility] [employer] [phone]. Pt contact  [phone].

## 2018-11-12 ENCOUNTER — HOSPITAL ENCOUNTER (OUTPATIENT)
Dept: ULTRASOUND IMAGING | Age: 68
Discharge: HOME | End: 2018-11-12
Attending: OTOLARYNGOLOGY
Payer: MEDICARE

## 2018-11-12 DIAGNOSIS — Z78.9 NON-SMOKER: ICD-10-CM

## 2018-11-12 DIAGNOSIS — R22.1 NECK MASS: ICD-10-CM

## 2018-11-12 DIAGNOSIS — E04.1 THYROID NODULE: ICD-10-CM

## 2018-11-12 PROCEDURE — 76536 US EXAM OF HEAD AND NECK: CPT

## 2018-12-17 ENCOUNTER — OFFICE VISIT (OUTPATIENT)
Dept: FAMILY MEDICINE CLINIC | Age: 68
End: 2018-12-17

## 2018-12-17 VITALS
HEIGHT: 65 IN | BODY MASS INDEX: 32.96 KG/M2 | DIASTOLIC BLOOD PRESSURE: 90 MMHG | WEIGHT: 197.8 LBS | HEART RATE: 90 BPM | SYSTOLIC BLOOD PRESSURE: 124 MMHG | TEMPERATURE: 98.6 F | RESPIRATION RATE: 15 BRPM | OXYGEN SATURATION: 98 %

## 2018-12-17 DIAGNOSIS — Z23 ENCOUNTER FOR IMMUNIZATION: ICD-10-CM

## 2018-12-17 DIAGNOSIS — M54.16 LUMBAR NERVE ROOT IMPINGEMENT: ICD-10-CM

## 2018-12-17 DIAGNOSIS — M41.25 OTHER IDIOPATHIC SCOLIOSIS, THORACOLUMBAR REGION: Chronic | ICD-10-CM

## 2018-12-17 DIAGNOSIS — M17.12 PRIMARY OSTEOARTHRITIS OF LEFT KNEE: Chronic | ICD-10-CM

## 2018-12-17 DIAGNOSIS — Z79.890 POSTMENOPAUSAL HRT (HORMONE REPLACEMENT THERAPY): ICD-10-CM

## 2018-12-17 DIAGNOSIS — E78.5 HYPERLIPIDEMIA WITH TARGET LOW DENSITY LIPOPROTEIN (LDL) CHOLESTEROL LESS THAN 130 MG/DL: Primary | ICD-10-CM

## 2018-12-17 DIAGNOSIS — M79.641 RIGHT HAND PAIN: ICD-10-CM

## 2018-12-17 DIAGNOSIS — Z87.442 HISTORY OF KIDNEY STONES: ICD-10-CM

## 2018-12-17 DIAGNOSIS — F43.23 ADJUSTMENT DISORDER WITH MIXED ANXIETY AND DEPRESSED MOOD: ICD-10-CM

## 2018-12-17 NOTE — PROGRESS NOTES
HISTORY OF PRESENT ILLNESS  HPI  Geovanni Valdez is a 77 y.o. Female with a history of adjustment disorder with anxiety and depression, lumbar nerve root impingement resulting in RLQ and inguinal pain, left knee DJD and hyperlipidemia with LDL goal <130, who presents at the office today for a pre-op exam. Pt has left cataract surgery scheduled for 2019 with Dr. Baldo Menezes. This will be the pt's first cataract surgery. Her right cataract surgery will be on . Pt has pain in her right 4th-5th fingers. Pt indicates that the pain is aggravated by holding her hand in a certain position.            Past Medical History:   Diagnosis Date    Actinic keratosis     Left arm    Adjustment disorder with mixed anxiety and depressed mood     Adverse effect of anesthesia     HARD TO WAKE UP SLOW BREATHING     Kidney stones 95,96,98    left    Left knee DJD     Lumbar nerve root impingement     RLQ pain    Menopause     LMP-53years old    Other and unspecified hyperlipidemia     Other idiopathic scoliosis, thoracolumbar region 2018    Postmenopausal HRT (hormone replacement therapy)      Past Surgical History:   Procedure Laterality Date    COLONOSCOPY N/A 2016    COLONOSCOPY performed by Madison Ochoa MD at [facility] ENDOSCOPY    HX APPENDECTOMY      HX  SECTION      X2    HX HYSTERECTOMY      LMP-53years old   [address]    left knee    HX ORTHOPAEDIC  10    left shoulder repair; 14 screws and 2 plates    HX ORTHOPAEDIC      BROKEN ARM SURGERY X2    HX ORTHOPAEDIC Bilateral     SCOPE of diana bilateral    HX ORTHOPAEDIC       REPAIR OF Left MENISCUS    HX TUBAL LIGATION      BSPO adhesion conization abn pap     Current Outpatient Medications on File Prior to Visit   Medication Sig Dispense Refill    zolpidem (AMBIEN) 5 mg tablet TAKE ONE TABLET BY MOUTH AT BEDTIME AS NEEDED FOR INSOMNIA 30 Tab 4    furosemide (LASIX) 20 mg tablet TAKE ONE TABLET BY MOUTH TWICE A DAY 60 Tab 4    estradiol (ESTRACE) 1 mg tablet Take 1 mg by mouth daily. No current facility-administered medications on file prior to visit. Allergies   Allergen Reactions    Codeine Rash    Keflex [Cephalexin] Nausea and Vomiting    Tramadol Other (comments)     Drowsy      Wellbutrin [Bupropion Hcl] Other (comments)     fatigue     Family History   Problem Relation Age of Onset    Cancer Mother         colon    Arthritis-osteo Mother     Cancer Maternal Grandmother         stomach    Heart Disease Father 80    Heart Attack Maternal Grandfather [de-identified]    Heart Attack Paternal Grandmother 80     Social History     Socioeconomic History    Marital status:      Spouse name: Not on file    Number of children: Not on file    Years of education: Not on file    Highest education level: Not on file   Tobacco Use    Smoking status: Never Smoker    Smokeless tobacco: Never Used   Substance and Sexual Activity    Alcohol use: Yes     Alcohol/week: 0.5 oz     Types: 1 Standard drinks or equivalent per week     Comment: RARELY    Drug use: No           Review of Systems   Constitutional: Negative for chills, diaphoresis, fever, malaise/fatigue and weight loss. HENT: Negative for congestion, ear discharge, ear pain, hearing loss, nosebleeds, sinus pain, sore throat and tinnitus. Eyes: Negative for blurred vision, double vision, photophobia, pain, discharge and redness. Respiratory: Negative for cough, hemoptysis, sputum production, shortness of breath, wheezing and stridor. Cardiovascular: Negative for chest pain, palpitations, orthopnea, claudication, leg swelling and PND. Gastrointestinal: Negative for abdominal pain, blood in stool, constipation, diarrhea, heartburn, melena, nausea and vomiting. Genitourinary: Negative for dysuria, flank pain, frequency, hematuria and urgency. Musculoskeletal: Negative for back pain, falls, joint pain, myalgias and neck pain.         Pain in right 4th-5th fingers   Skin: Negative for itching and rash. Neurological: Negative for dizziness, tingling, tremors, sensory change, speech change, focal weakness, seizures, loss of consciousness, weakness and headaches. Endo/Heme/Allergies: Negative for environmental allergies and polydipsia. Does not bruise/bleed easily. Psychiatric/Behavioral: Negative for depression, hallucinations, memory loss, substance abuse and suicidal ideas. The patient is not nervous/anxious and does not have insomnia. Results for orders placed or performed in visit on 08/10/18   LIPID PANEL   Result Value Ref Range    Cholesterol, total 216 (H) 100 - 199 mg/dL    Triglyceride 87 0 - 149 mg/dL    HDL Cholesterol 71 >39 mg/dL    VLDL, calculated 17 5 - 40 mg/dL    LDL, calculated 128 (H) 0 - 99 mg/dL   METABOLIC PANEL, COMPREHENSIVE   Result Value Ref Range    Glucose 100 (H) 65 - 99 mg/dL    BUN 17 8 - 27 mg/dL    Creatinine 0.64 0.57 - 1.00 mg/dL    GFR est non-AA 91 >59 mL/min/1.73    GFR est  >59 mL/min/1.73    BUN/Creatinine ratio 27 12 - 28    Sodium 141 134 - 144 mmol/L    Potassium 4.0 3.5 - 5.2 mmol/L    Chloride 100 96 - 106 mmol/L    CO2 25 20 - 29 mmol/L    Calcium 9.5 8.7 - 10.3 mg/dL    Protein, total 7.3 6.0 - 8.5 g/dL    Albumin 4.4 3.5 - 4.8 g/dL    GLOBULIN, TOTAL 2.9 1.5 - 4.5 g/dL    A-G Ratio 1.5 1.2 - 2.2    Bilirubin, total 0.7 0.0 - 1.2 mg/dL    Alk.  phosphatase 86 39 - 117 IU/L    AST (SGOT) 10 0 - 40 IU/L    ALT (SGPT) 17 0 - 32 IU/L   CBC W/O DIFF   Result Value Ref Range    WBC 8.4 3.4 - 10.8 x10E3/uL    RBC 4.97 3.77 - 5.28 x10E6/uL    HGB 15.4 11.1 - 15.9 g/dL    HCT 45.8 34.0 - 46.6 %    MCV 92 79 - 97 fL    MCH 31.0 26.6 - 33.0 pg    MCHC 33.6 31.5 - 35.7 g/dL    RDW 14.0 12.3 - 15.4 %    PLATELET 443 738 - 603 x10E3/uL   URINALYSIS W/ RFLX MICROSCOPIC   Result Value Ref Range    Specific Gravity 1.011 1.005 - 1.030    pH (UA) 7.0 5.0 - 7.5    Color Yellow Yellow    Appearance Clear Clear Leukocyte Esterase Negative Negative    Protein Negative Negative/Trace    Glucose Negative Negative    Ketone Negative Negative    Blood Negative Negative    Bilirubin Negative Negative    Urobilinogen 0.2 0.2 - 1.0 mg/dL    Nitrites Negative Negative    Microscopic Examination Comment    HEPATITIS C AB   Result Value Ref Range    Hep C Virus Ab <0.1 0.0 - 0.9 s/co ratio   CVD REPORT   Result Value Ref Range    INTERPRETATION Note          Physical Exam  Visit Vitals  /90 (BP 1 Location: Right arm, BP Patient Position: Sitting)   Pulse 90   Temp 98.6 °F (37 °C) (Oral)   Resp 15   Ht 5' 5\" (1.651 m)   Wt 197 lb 12.8 oz (89.7 kg)   SpO2 98%   BMI 32.92 kg/m²     General:  Alert, cooperative, no distress, appears stated age. Head:  Normocephalic, without obvious abnormality, atraumatic. Eyes:  Conjunctivae clear. PERRL, EOMs intact. Bilateral moderately severe cataracts, with inability to see the fundus clearly. Significant lid lag, with both upper lids encroaching on the pupil opening. Ears:  Normal TMs and external ear canals both ears. Nose: Nares normal. Septum midline. Mucosa normal. No drainage or sinus tenderness. Throat: Lips, mucosa, and tongue normal. Teeth and gums normal.   Neck: Supple, symmetrical, trachea midline, no adenopathy, thyroid: no enlargement/tenderness/nodules, no carotid bruit and no JVD. Back:   Symmetric, no curvature. ROM normal. No CVA tenderness. Lungs:   Clear to auscultation bilaterally. Chest wall:  No tenderness or deformity. Heart:  Regular rate and rhythm, S1, S2 normal, no murmur, click, rub or gallop. Abdomen:   Soft, non-tender. Bowel sounds normal. No masses,  No organomegaly. Extremities: Extremities normal, atraumatic, no cyanosis or edema. Pulses: 2+ and symmetric all extremities. Skin: Skin color, texture, turgor normal. No rashes or lesions.    Lymph nodes: Cervical, supraclavicular, and axillary nodes normal.   Neurologic: CNII-XII intact. Normal strength, sensation and reflexes throughout. ASSESSMENT and PLAN    ICD-10-CM ICD-9-CM    1. Hyperlipidemia with target low density lipoprotein (LDL) cholesterol less than 130 mg/dL E78.5 272.4    2. Encounter for immunization Z23 V03.89 INFLUENZA VACCINE INACTIVATED (IIV), SUBUNIT, ADJUVANTED, IM      ADMIN INFLUENZA VIRUS VAC   3. History of kidney stones Z87.442 V13.01    4. Postmenopausal HRT (hormone replacement therapy) Z79.890 V07.4    5. Other idiopathic scoliosis, thoracolumbar region M41.25 737.30    6. lumbar nerve root impingement-2010- RLQ/ inguinal pain M54.16 724.4    7. Primary osteoarthritis of left knee M17.12 715.16    8. Adjustment disorder with mixed anxiety and depressed mood F43.23 309.28    9. Right hand pain M79.641 729.5     \"trigger finger, mainly the 4 th finger     Diagnoses and all orders for this visit:    1. Hyperlipidemia with target low density lipoprotein (LDL) cholesterol less than 130 mg/dL    2. Encounter for immunization  -     INFLUENZA VACCINE INACTIVATED (IIV), SUBUNIT, ADJUVANTED, IM  -     ADMIN INFLUENZA VIRUS VAC    3. History of kidney stones    4. Postmenopausal HRT (hormone replacement therapy)    5. Other idiopathic scoliosis, thoracolumbar region    6. lumbar nerve root impingement-2010- RLQ/ inguinal pain    7. Primary osteoarthritis of left knee    8. Adjustment disorder with mixed anxiety and depressed mood    9. Right hand pain  Comments:  \"trigger finger, mainly the 4 th finger      Follow-up Disposition:  Return in about 6 months (around 6/17/2019), or new problems, for F/U ambien for insomnia, F/U cholesterol. reviewed medications and side effects in detail  Please call my office if there are any questions- [phone]. Discussed expected course/resolution/complications of diagnosis in detail with patient. Medication risks/benefits/costs/interactions/alternatives discussed with patient.    Pt was given an after visit summary which includes diagnoses, current medications & vitals. Pt expressed understanding with the diagnosis and plan. Patient to call if no better in 3 -4 days and prn new problems. Early knee arthritis- need to cycle regularly( indoor exercise bike or outside biking are both effective), working your way up to 30 minutes 3 times a week and continue lifelong. It is very important to put the seat up high enough that your leg is almost straight at the bottom of each stroke of the pedal.      Pt is cleared for left cataract surgery on 1/2/2019, as well as right cataract surgery on 1/23/3019, both with Dr. Javon Lizama. This document was written by Teddy Su, as dictated by Brigido Ordonez MD.  I have reviewed and agree with the above note and have made corrections where appropriate Jim Rodriguez M.D.

## 2018-12-17 NOTE — PATIENT INSTRUCTIONS
Vaccine Information Statement    Influenza (Flu) Vaccine (Inactivated or Recombinant): What you need to know    Many Vaccine Information Statements are available in Syriac and other languages. See www.immunize.org/vis  Hojas de Información Sobre Vacunas están disponibles en Español y en muchos otros idiomas. Visite www.immunize.org/vis    1. Why get vaccinated? Influenza (flu) is a contagious disease that spreads around the [place] every year, usually between October and May. Flu is caused by influenza viruses, and is spread mainly by coughing, sneezing, and close contact. Anyone can get flu. Flu strikes suddenly and can last several days. Symptoms vary by age, but can include:   fever/chills   sore throat   muscle aches   fatigue   cough   headache    runny or stuffy nose    Flu can also lead to pneumonia and blood infections, and cause diarrhea and seizures in children. If you have a medical condition, such as heart or lung disease, flu can make it worse. Flu is more dangerous for some people. Infants and young children, people 72years of age and older, pregnant women, and people with certain health conditions or a weakened immune system are at greatest risk. Each year thousands of people in the [facility] die from flu, and many more are hospitalized. Flu vaccine can:   keep you from getting flu,   make flu less severe if you do get it, and   keep you from spreading flu to your family and other people. 2. Inactivated and recombinant flu vaccines    A dose of flu vaccine is recommended every flu season. Children 6 months through 6years of age may need two doses during the same flu season. Everyone else needs only one dose each flu season.        Some inactivated flu vaccines contain a very small amount of a mercury-based preservative called thimerosal. Studies have not shown thimerosal in vaccines to be harmful, but flu vaccines that do not contain thimerosal are available. There is no live flu virus in flu shots. They cannot cause the flu. There are many flu viruses, and they are always changing. Each year a new flu vaccine is made to protect against three or four viruses that are likely to cause disease in the upcoming flu season. But even when the vaccine doesnt exactly match these viruses, it may still provide some protection    Flu vaccine cannot prevent:   flu that is caused by a virus not covered by the vaccine, or   illnesses that look like flu but are not. It takes about 2 weeks for protection to develop after vaccination, and protection lasts through the flu season. 3. Some people should not get this vaccine    Tell the person who is giving you the vaccine:     If you have any severe, life-threatening allergies. If you ever had a life-threatening allergic reaction after a dose of flu vaccine, or have a severe allergy to any part of this vaccine, you may be advised not to get vaccinated. Most, but not all, types of flu vaccine contain a small amount of egg protein.  If you ever had Guillain-Barré Syndrome (also called GBS). Some people with a history of GBS should not get this vaccine. This should be discussed with your doctor.  If you are not feeling well. It is usually okay to get flu vaccine when you have a mild illness, but you might be asked to come back when you feel better. 4. Risks of a vaccine reaction    With any medicine, including vaccines, there is a chance of reactions. These are usually mild and go away on their own, but serious reactions are also possible. Most people who get a flu shot do not have any problems with it.      Minor problems following a flu shot include:    soreness, redness, or swelling where the shot was given     hoarseness   sore, red or itchy eyes   cough   fever   aches   headache   itching   fatigue  If these problems occur, they usually begin soon after the shot and last 1 or 2 days. More serious problems following a flu shot can include the following:     There may be a small increased risk of Guillain-Barré Syndrome (GBS) after inactivated flu vaccine. This risk has been estimated at 1 or 2 additional cases per million people vaccinated. This is much lower than the risk of severe complications from flu, which can be prevented by flu vaccine.  Young children who get the flu shot along with pneumococcal vaccine (PCV13) and/or DTaP vaccine at the same time might be slightly more likely to have a seizure caused by fever. Ask your doctor for more information. Tell your doctor if a child who is getting flu vaccine has ever had a seizure. Problems that could happen after any injected vaccine:      People sometimes faint after a medical procedure, including vaccination. Sitting or lying down for about 15 minutes can help prevent fainting, and injuries caused by a fall. Tell your doctor if you feel dizzy, or have vision changes or ringing in the ears.  Some people get severe pain in the shoulder and have difficulty moving the arm where a shot was given. This happens very rarely.  Any medication can cause a severe allergic reaction. Such reactions from a vaccine are very rare, estimated at about 1 in a million doses, and would happen within a few minutes to a few hours after the vaccination. As with any medicine, there is a very remote chance of a vaccine causing a serious injury or death. The safety of vaccines is always being monitored. For more information, visit: www.cdc.gov/vaccinesafety/    5. What if there is a serious reaction? What should I look for?  Look for anything that concerns you, such as signs of a severe allergic reaction, very high fever, or unusual behavior.     Signs of a severe allergic reaction can include hives, swelling of the face and throat, difficulty breathing, a fast heartbeat, dizziness, and weakness - usually within a few minutes to a few hours after the vaccination. What should I do?  If you think it is a severe allergic reaction or other emergency that cant wait, call 9-1-1 and get the person to the nearest hospital. Otherwise, call your doctor.  Reactions should be reported to the Vaccine Adverse Event Reporting System (VAERS). Your doctor should file this report, or you can do it yourself through  the VAERS web site at www.vaers. [facility].gov, or by calling 7-982.629.7894. VAERS does not give medical advice. 6. The National Vaccine Injury Compensation Program    The [facility] Vaccine Injury Compensation Program (VICP) is a federal program that was created to compensate people who may have been injured by certain vaccines. Persons who believe they may have been injured by a vaccine can learn about the program and about filing a claim by calling 3-584.499.8754 or visiting the [employer] website at www.[facility].gov/vaccinecompensation. There is a time limit to file a claim for compensation. 7. How can I learn more?  Ask your healthcare provider. He or she can give you the vaccine package insert or suggest other sources of information.  Call your local or state health department.  Contact the Centers for Disease Control and Prevention (CDC):  - Call 0-737.996.6954 (1-800-CDC-INFO) or  - Visit CDCs website at www.cdc.gov/flu    Vaccine Information Statement   Inactivated Influenza Vaccine   8/7/2015  42 ALEXUS Bu[address]ro [id number]    Department of Health and Human Services  Centers for Disease Control and Prevention    Office Use Only

## 2018-12-17 NOTE — PROGRESS NOTES
1. Have you been to the ER, urgent care clinic since your last visit? Hospitalized since your last visit? No    2. Have you seen or consulted any other health care providers outside of the [address] [place] [address] since your last visit? Include any pap smears or colon screening. Orthopedist- [facility], had cortisone injection to the right hand- 11/2018. Chief Complaint   Patient presents with    Pre-op Exam     pre-op exam for left eye cataract surgery on 1/2/19, Dr. Lavaughn Schirmer; will bring forms to office on Wednesday. Not fasting    Shingrix: refused. Flu vaccine: patient would like a flu shot today. Eye exam: done 09/2018. Patient presents for routine immunizations. Patient denies any symptoms , reactions or allergies that would exclude them from being immunized today. After obtaining written consent, and per verbal orders of Dr. Yissel Griggs, injection of Fluad ordered, signed, and given on left deltoid. Risks and adverse reactions were discussed and the VIS was given to them. All questions were addressed. Patient was observed for 15 minutes post injection. There were no reactions observed at this time.  Advised patient to call with any concerns or signs and symptoms of adverse reaction.      Eual Nageotte, LPN

## 2018-12-27 ENCOUNTER — TELEPHONE (OUTPATIENT)
Dept: FAMILY MEDICINE CLINIC | Age: 68
End: 2018-12-27

## 2018-12-27 NOTE — TELEPHONE ENCOUNTER
Wanda Bains is calling on behalf of Pt from [facility] at [facility]. She notes that Pt is scheduled to have surgery on 1/2/19. She is requesting for a copy of Pt LOV notes as well as exam notes to be faxed. She notes they have received but one page is missing along with an electronic sig from provider.      LOV  Monday, December 17, 2018 12:00 PM    Best call back number  [phone]  Fax:  [phone] (Attention Wanda Bains)

## 2019-02-04 RX ORDER — FUROSEMIDE 20 MG/1
TABLET ORAL
Qty: 60 TAB | Refills: 3 | Status: SHIPPED | OUTPATIENT
Start: 2019-02-04 | End: 2019-06-02 | Stop reason: SDUPTHER

## 2019-03-01 ENCOUNTER — HOSPITAL ENCOUNTER (OUTPATIENT)
Dept: MRI IMAGING | Age: 69
Discharge: HOME | End: 2019-03-01
Attending: SPECIALIST
Payer: MEDICARE

## 2019-03-01 VITALS — WEIGHT: 194 LBS | BODY MASS INDEX: 32.28 KG/M2

## 2019-03-01 DIAGNOSIS — M51.37 DISC DISEASE, DEGENERATIVE, LUMBAR OR LUMBOSACRAL: ICD-10-CM

## 2019-03-01 PROCEDURE — 74011250636 HC RX REV CODE- 250/636: Performed by: SPECIALIST

## 2019-03-01 PROCEDURE — A9575 INJ GADOTERATE MEGLUMI 0.1ML: HCPCS | Performed by: SPECIALIST

## 2019-03-01 PROCEDURE — 72158 MRI LUMBAR SPINE W/O & W/DYE: CPT

## 2019-03-01 RX ORDER — GADOTERATE MEGLUMINE 376.9 MG/ML
20 INJECTION INTRAVENOUS ONCE
Status: COMPLETED | OUTPATIENT
Start: 2019-03-01 | End: 2019-03-01

## 2019-03-01 RX ADMIN — GADOTERATE MEGLUMINE 20 ML: 376.9 INJECTION INTRAVENOUS at 15:03

## 2019-04-12 DIAGNOSIS — F51.01 PRIMARY INSOMNIA: ICD-10-CM

## 2019-04-15 RX ORDER — ZOLPIDEM TARTRATE 5 MG/1
TABLET ORAL
Qty: 30 TAB | Refills: 3 | Status: SHIPPED | OUTPATIENT
Start: 2019-04-15 | End: 2019-08-16 | Stop reason: SDUPTHER

## 2019-05-08 ENCOUNTER — TELEPHONE (OUTPATIENT)
Dept: FAMILY MEDICINE CLINIC | Age: 69
End: 2019-05-08

## 2019-05-08 NOTE — TELEPHONE ENCOUNTER
Thomas Hemphill is calling stating that the pt has lower abdominal pain and it has been going on for 5 day. [place] requested fir the pt to see Dr. Mtz Rm know Dr. Rose Mattson is booked for today, and the pt could see someone else. Philip stated that the pt only wants to see Dr. Rose Mattson. Philip would like a call back from [place].         Best callback:  [phone]      LOV: [phone]

## 2019-05-09 ENCOUNTER — OFFICE VISIT (OUTPATIENT)
Dept: FAMILY MEDICINE CLINIC | Age: 69
End: 2019-05-09

## 2019-05-09 VITALS
DIASTOLIC BLOOD PRESSURE: 71 MMHG | HEIGHT: 65 IN | WEIGHT: 201 LBS | RESPIRATION RATE: 18 BRPM | SYSTOLIC BLOOD PRESSURE: 110 MMHG | TEMPERATURE: 98.9 F | HEART RATE: 100 BPM | BODY MASS INDEX: 33.49 KG/M2 | OXYGEN SATURATION: 94 %

## 2019-05-09 DIAGNOSIS — R30.9 PAINFUL URINATION: ICD-10-CM

## 2019-05-09 DIAGNOSIS — R10.30 LOWER ABDOMINAL PAIN: Primary | ICD-10-CM

## 2019-05-09 LAB
BILIRUB UR QL STRIP: NORMAL
GLUCOSE UR-MCNC: NEGATIVE MG/DL
KETONES P FAST UR STRIP-MCNC: NEGATIVE MG/DL
PH UR STRIP: 5.5 [PH] (ref 4.6–8)
PROT UR QL STRIP: NORMAL
SP GR UR STRIP: 1.02 (ref 1–1.03)
UA UROBILINOGEN AMB POC: NORMAL (ref 0.2–1)
URINALYSIS CLARITY POC: NORMAL
URINALYSIS COLOR POC: NORMAL
URINE BLOOD POC: NORMAL
URINE LEUKOCYTES POC: NEGATIVE
URINE NITRITES POC: NEGATIVE

## 2019-05-09 RX ORDER — LEVOFLOXACIN 500 MG/1
500 TABLET, FILM COATED ORAL DAILY
Qty: 10 TAB | Refills: 0 | Status: SHIPPED | OUTPATIENT
Start: 2019-05-09 | End: 2019-06-18 | Stop reason: SDUPTHER

## 2019-05-09 NOTE — PROGRESS NOTES
HISTORY OF PRESENT ILLNESS  HPI  Joon Concepcion is a 68 y.o. Female with a history of lumbar nerve root impingement, left knee DJD, thoracolumbar scoliosis, adjustment disorder with anxiety and depression, kidney stones and hyperlipidemia with LDL goal <130, who presents at the office today with her  for abdominal pain. Pt states that she has bloating and pain in her lower abdomen, which has been present for the past 3 days. Pt states that the pain seems to fluctuate from 3/10-10/10. Pt denies any similar pain or sensation, and notes that she has had kidney stones. Pt reports that she has also had a low-grade fever (), and has not had a BM in the past 3 days. Pt denies diarrhea. Pt has already had her appendix removed. Pt denies unusual SOB, chest pain, and any recent ER visits or hospitalizations.            Past Medical History:   Diagnosis Date    Actinic keratosis     Left arm    Adjustment disorder with mixed anxiety and depressed mood     Adverse effect of anesthesia     HARD TO WAKE UP SLOW BREATHING     Kidney stones 95,96,98    left    Left knee DJD     Lumbar nerve root impingement     RLQ pain    Menopause     LMP-53years old    Other and unspecified hyperlipidemia     Other idiopathic scoliosis, thoracolumbar region 2018    Postmenopausal HRT (hormone replacement therapy)      Past Surgical History:   Procedure Laterality Date    COLONOSCOPY N/A 2016    COL[facility] performed by Ra [facility]man [facility] ENDOSCOPY    HX APPENDECTOMY      HX  SECTION      X2    HX HYSTERECTOMY      LMP-53years old   [address]    left knee    HX ORTHOPAEDIC  12    left shoulder repair; 14 screws and 2 plates    HX ORTHOPAEDIC      BROKEN ARM SURGERY X2    HX ORTHOPAEDIC Bilateral     SCOPE of diana bilateral    HX ORTHOPAEDIC       REPAIR OF Left MENISCUS    HX TUBAL LIGATION      BSPO adhesion conization abn pap     Current Outpatient Medications on File Prior to Visit   Medication Sig Dispense Refill    zolpidem (AMBIEN) 5 mg tablet TAKE ONE TABLET BY MOUTH EVERY NIGHT AT BEDTIME AS NEEDED 30 Tab 3    furosemide (LASIX) 20 mg tablet TAKE ONE TABLET BY MOUTH TWICE A DAY 60 Tab 3    estradiol (ESTRACE) 1 mg tablet Take 1 mg by mouth daily. No current facility-administered medications on file prior to visit. Allergies   Allergen Reactions    Codeine Rash    Keflex [Cephalexin] Nausea and Vomiting    Tramadol Other (comments)     Drowsy      Wellbutrin [Bupropion Hcl] Other (comments)     fatigue     Family History   Problem Relation Age of Onset    Cancer Mother         colon    Arthritis-osteo Mother     Cancer Maternal Grandmother         stomach    Heart Disease Father 80    Heart Attack Maternal Grandfather [de-identified]    Heart Attack Paternal Grandmother 80     Social History     Socioeconomic History    Marital status:      Spouse name: Not on file    Number of children: Not on file    Years of education: Not on file    Highest education level: Not on file   Tobacco Use    Smoking status: Never Smoker    Smokeless tobacco: Never Used   Substance and Sexual Activity    Alcohol use: Yes     Alcohol/week: 0.5 oz     Types: 1 Standard drinks or equivalent per week     Comment: RARELY    Drug use: No           Review of Systems   Constitutional: Negative for chills, diaphoresis, fever, malaise/fatigue and weight loss. Eyes: Negative for blurred vision, double vision, pain and redness. Respiratory: Negative for cough, shortness of breath and wheezing. Cardiovascular: Negative for chest pain, palpitations, orthopnea, claudication, leg swelling and PND. Gastrointestinal: Positive for abdominal pain (lower abdomen). Negative for diarrhea. Skin: Negative for itching and rash.    Neurological: Negative for dizziness, tingling, tremors, sensory change, speech change, focal weakness, seizures, loss of consciousness, weakness and headaches. Results for orders placed or performed in visit on 05/09/19   AMB POC URINALYSIS DIP STICK MANUAL W/O MICRO   Result Value Ref Range    Color (UA POC) Dark Merly     Clarity (UA POC) Cloudy     Glucose (UA POC) Negative Negative    Bilirubin (UA POC) 1+ Negative    Ketones (UA POC) Negative Negative    Specific gravity (UA POC) 1.025 1.001 - 1.035    Blood (UA POC) Trace Negative    pH (UA POC) 5.5 4.6 - 8.0    Protein (UA POC) 1+ Negative    Urobilinogen (UA POC) 1 mg/dL 0.2 - 1    Nitrites (UA POC) Negative Negative    Leukocyte esterase (UA POC) Negative Negative         Physical Exam  Visit Vitals  /71 (BP 1 Location: Right arm, BP Patient Position: Sitting)   Pulse 100   Temp 98.9 °F (37.2 °C) (Oral)   Resp 18   Ht 5' 5\" (1.651 m)   Wt 201 lb (91.2 kg)   SpO2 94%   BMI 33.45 kg/m²        Lungs: clear to auscultation bilaterally  Heart: regular rate and rhythm, S1, S2 normal, no murmur, click, rub or gallop  Abdomen: soft, non-tender in the upper abdomen. Increasing discomfort upon approaching RLQ, with similar sensation on left side. It is most intense along the midline of the lower abdomen. Bowel sounds normal. No masses, no organomegaly. There is guarding and mild rebound. ASSESSMENT and PLAN    ICD-10-CM ICD-9-CM    1. Lower abdominal pain R10.30 789.09     probable diverticulitis. 2. Painful urination R30.9 788.1 AMB POC URINALYSIS DIP STICK MANUAL W/O MICRO     Diagnoses and all orders for this visit:    1. Lower abdominal pain  Comments:  probable diverticulitis. 2. Painful urination  -     AMB POC URINALYSIS DIP STICK MANUAL W/O MICRO    Other orders  -     levoFLOXacin (LEVAQUIN) 500 mg tablet; Take 1 Tab by mouth daily for 10 days. Follow-up and Dispositions    · Return in about 6 months (around 11/9/2019), or if symptoms worsen or fail to improve, for 3 month F/U chronic controlled substance use- ambien.          reviewed medications and side effects in detail  Please call my office if there are any questions- [phone]. Discussed expected course/resolution/complications of diagnosis in detail with patient. Medication risks/benefits/costs/interactions/alternatives discussed with patient. Pt was given an after visit summary which includes diagnoses, current medications & vitals. Pt expressed understanding with the diagnosis and plan. Patient to call if no better in 3 -4 days and prn new problems. Informed pt that this is most likely diverticulitis. She is aware that there is a potential for this to require surgery, if it does not resolve with antibiotics. Gave her Levaquin, and because of her \"touchy stomach\", did not give her Flagyl. Showed her how to check for worsening rebound, and instructed her to go to the ER if she notices this. Discussed the cause of diverticulitis, and the importance of catching it early. I informed her that if she has similar pain in this area that lasts longer than 12-24 hours, she should consider this diverticulitis, and should come into this office ASAP. Recommended that she eat simple carbohydrates for now, until her symptoms improve. Discussed the controversy, but apparent importance for many patients to stay to avoid seeds and nuts. Regular exercise is very important to your health; it helps mentally, physically, socially; it prevents injuries if done properly. Exercise, even as simple as walking 20-30 minutes daily has major benefits to your health even though your \"numbers\" are the same in the lab. See if you can add this into your daily regimen and after a few months it will become a regular habit-\"just something you do,\" like brushing your teeth.      A combination of aerobic exercise and strengthening and stretching is felt to be the best for you, so this should be your ultimate goal.   This can be done in the privacy of your home or in a group setting as at the gym  Some prefer having a , others prefer to do exercise in groups or individually. Do what \"works\" for you. You need to make it simple and \"fun,\" or you most likely you will not continue it. Recommended a weekly \"heart check. \" I went into detail how to do this. This document was written by Elisa Can, as dictated by Zenia Vyas MD.  I have reviewed and agree with the above note and have made corrections where appropriate Jim Stafford M.D.

## 2019-05-09 NOTE — PROGRESS NOTES
Chief Complaint   Patient presents with    Abdominal Pain     belly button to groin, no BM for x 3days, gas pains, fever x 3 days     Dysuria     x 3 days      1. Have you been to the ER, urgent care clinic since your last visit? Hospitalized since your last visit? No    2. Have you seen or consulted any other health care providers outside of the [address] [place] [address] since your last visit? Include any pap smears or colon screening.  No

## 2019-06-03 NOTE — TELEPHONE ENCOUNTER
Patient will stop in for labs on wed   Patient states abd pain resolved with levaquin but pain is returning. What is next step?

## 2019-06-04 ENCOUNTER — LAB ONLY (OUTPATIENT)
Dept: FAMILY MEDICINE CLINIC | Age: 69
End: 2019-06-04

## 2019-06-04 ENCOUNTER — HOSPITAL ENCOUNTER (OUTPATIENT)
Dept: LAB | Age: 69
Discharge: HOME | End: 2019-06-04
Payer: MEDICARE

## 2019-06-04 DIAGNOSIS — E78.5 HYPERLIPIDEMIA WITH TARGET LOW DENSITY LIPOPROTEIN (LDL) CHOLESTEROL LESS THAN 130 MG/DL: Primary | ICD-10-CM

## 2019-06-04 PROCEDURE — 80053 COMPREHEN METABOLIC PANEL: CPT

## 2019-06-04 PROCEDURE — 36415 COLL VENOUS BLD VENIPUNCTURE: CPT

## 2019-06-04 PROCEDURE — 80061 LIPID PANEL: CPT

## 2019-06-04 RX ORDER — FUROSEMIDE 20 MG/1
TABLET ORAL
Qty: 60 TAB | Refills: 2 | Status: SHIPPED | OUTPATIENT
Start: 2019-06-04 | End: 2019-09-12 | Stop reason: SDUPTHER

## 2019-06-04 RX ORDER — LEVOFLOXACIN 500 MG/1
500 TABLET, FILM COATED ORAL DAILY
Qty: 10 TAB | Refills: 0 | Status: SHIPPED | OUTPATIENT
Start: 2019-06-04 | End: 2019-06-14

## 2019-06-04 RX ORDER — METRONIDAZOLE 500 MG/1
500 TABLET ORAL 3 TIMES DAILY
Qty: 30 TAB | Refills: 0 | Status: SHIPPED | OUTPATIENT
Start: 2019-06-04 | End: 2019-06-14

## 2019-06-04 NOTE — TELEPHONE ENCOUNTER
Levaquin 500 mg every day x 10 days plus Flagyl 500 mg TID x 10 days and follow up appt in 2 weeks. (NO ALCOHOL).

## 2019-06-05 LAB
ALBUMIN SERPL-MCNC: 3.9 G/DL (ref 3.5–4.8)
ALBUMIN/GLOB SERPL: 1.5 {RATIO} (ref 1.2–2.2)
ALP SERPL-CCNC: 73 IU/L (ref 39–117)
ALT SERPL-CCNC: 14 IU/L (ref 0–32)
AST SERPL-CCNC: 7 IU/L (ref 0–40)
BILIRUB SERPL-MCNC: 0.4 MG/DL (ref 0–1.2)
BUN SERPL-MCNC: 15 MG/DL (ref 8–27)
BUN/CREAT SERPL: 27 (ref 12–28)
CALCIUM SERPL-MCNC: 8.8 MG/DL (ref 8.7–10.3)
CHLORIDE SERPL-SCNC: 106 MMOL/L (ref 96–106)
CHOLEST SERPL-MCNC: 219 MG/DL (ref 100–199)
CO2 SERPL-SCNC: 22 MMOL/L (ref 20–29)
CREAT SERPL-MCNC: 0.56 MG/DL (ref 0.57–1)
GLOBULIN SER CALC-MCNC: 2.6 G/DL (ref 1.5–4.5)
GLUCOSE SERPL-MCNC: 89 MG/DL (ref 65–99)
HDLC SERPL-MCNC: 64 MG/DL
INTERPRETATION, 910389: NORMAL
LDLC SERPL CALC-MCNC: 138 MG/DL (ref 0–99)
POTASSIUM SERPL-SCNC: 4.4 MMOL/L (ref 3.5–5.2)
PROT SERPL-MCNC: 6.5 G/DL (ref 6–8.5)
SODIUM SERPL-SCNC: 140 MMOL/L (ref 134–144)
TRIGL SERPL-MCNC: 86 MG/DL (ref 0–149)
VLDLC SERPL CALC-MCNC: 17 MG/DL (ref 5–40)

## 2019-06-18 ENCOUNTER — OFFICE VISIT (OUTPATIENT)
Dept: FAMILY MEDICINE CLINIC | Age: 69
End: 2019-06-18

## 2019-06-18 VITALS
TEMPERATURE: 97.8 F | DIASTOLIC BLOOD PRESSURE: 80 MMHG | WEIGHT: 198 LBS | HEIGHT: 65 IN | SYSTOLIC BLOOD PRESSURE: 122 MMHG | OXYGEN SATURATION: 95 % | RESPIRATION RATE: 18 BRPM | BODY MASS INDEX: 32.99 KG/M2 | HEART RATE: 91 BPM

## 2019-06-18 DIAGNOSIS — R10.84 GENERALIZED ABDOMINAL PAIN: Primary | ICD-10-CM

## 2019-06-18 DIAGNOSIS — R10.31 RLQ ABDOMINAL PAIN: ICD-10-CM

## 2019-06-18 DIAGNOSIS — Z79.890 POSTMENOPAUSAL HRT (HORMONE REPLACEMENT THERAPY): ICD-10-CM

## 2019-06-18 RX ORDER — LEVOFLOXACIN 500 MG/1
500 TABLET, FILM COATED ORAL DAILY
Qty: 10 TAB | Refills: 0 | Status: SHIPPED | OUTPATIENT
Start: 2019-06-18 | End: 2019-06-28

## 2019-06-18 RX ORDER — ESTRADIOL 1 MG/1
1 TABLET ORAL DAILY
Qty: 90 TAB | Refills: 3 | Status: SHIPPED | OUTPATIENT
Start: 2019-06-18 | End: 2019-07-17

## 2019-06-18 NOTE — PROGRESS NOTES
HISTORY OF PRESENT ILLNESS  HPI  Shilpa Rascon is a 68 y.o. Female with a history of RLQ/inguinal pain due to lumbar nerve root impingement, appendectomy, left knee DJD, scoliosis, adjustment disorder with anxiety and depression, postmenopausal HRT, kidney stones and hyperlipidemia with LDL goal <130, who presents to the office today for a follow up of diverticulitis. Pt saw Dr. Kale Still for symptoms of diverticulitis on , where she was prescribed Levaquin. Pt says treatment helped after this visit and symptoms went away after a few days but have recently come back 2 weeks ago. Pt retried the Levaquin and Flagyl but states the medications did not help this time. Pt notes the pain feels like a \"stabbing\" in her RLQ area. It was 8-9 out of 10 1st episode, but 3-4 now , both before the last 2 antibiotics , through 10 day course and now 4 days off the medication, the pain has not changed at all. Her pain was in both lower quadrants but was most intense in the midline when seen , her first episode of diverticulitis. Has had colonoscopies every 5 yrs due to her mom with hx of colon cancer  Pt denies unusual SOB, chest pain, and any recent ER visits or hospitalizations.            Past Medical History:   Diagnosis Date    Actinic keratosis     Left arm    Adjustment disorder with mixed anxiety and depressed mood     Adverse effect of anesthesia     HARD TO WAKE UP SLOW BREATHING     Kidney stones 95,96,98    left    Left knee DJD     Lumbar nerve root impingement     RLQ pain    Menopause     LMP-53years old    Other and unspecified hyperlipidemia     Other idiopathic scoliosis, thoracolumbar region 2018    Postmenopausal HRT (hormone replacement therapy)      Past Surgical History:   Procedure Laterality Date    COLONOSCOPY N/A 2016    COL[facility] at [facility] ENDOSCOPY    HX APPENDECTOMY      HX  SECTION      X2    HX HYSTERECTOMY      LMP-44 years old   [address]    left knee    HX ORTHOPAEDIC  12/24/12    left shoulder repair; 14 screws and 2 plates    HX ORTHOPAEDIC      BROKEN ARM SURGERY X2    HX ORTHOPAEDIC Bilateral     SCOPE of knnees bilateral    HX ORTHOPAEDIC       REPAIR OF Left MENISCUS    HX TUBAL LIGATION      BSPO adhesion conization abn pap     Current Outpatient Medications on File Prior to Visit   Medication Sig Dispense Refill    furosemide (LASIX) 20 mg tablet TAKE ONE TABLET BY MOUTH TWICE A DAY 60 Tab 2    zolpidem (AMBIEN) 5 mg tablet TAKE ONE TABLET BY MOUTH EVERY NIGHT AT BEDTIME AS NEEDED 30 Tab 3     No current facility-administered medications on file prior to visit. Allergies   Allergen Reactions    Codeine Rash    Keflex [Cephalexin] Nausea and Vomiting    Tramadol Other (comments)     Drowsy      Wellbutrin [Bupropion Hcl] Other (comments)     fatigue     Family History   Problem Relation Age of Onset    Cancer Mother         colon    Arthritis-osteo Mother     Cancer Maternal Grandmother         stomach    Heart Disease Father 80    Heart Attack Maternal Grandfather [de-identified]    Heart Attack Paternal Grandmother 80     Social History     Socioeconomic History    Marital status:      Spouse name: Not on file    Number of children: Not on file    Years of education: Not on file    Highest education level: Not on file   Tobacco Use    Smoking status: Never Smoker    Smokeless tobacco: Never Used   Substance and Sexual Activity    Alcohol use: Yes     Alcohol/week: 0.5 oz     Types: 1 Standard drinks or equivalent per week     Comment: RARELY    Drug use: No           Review of Systems   Constitutional: Negative for chills, diaphoresis, fever, malaise/fatigue and weight loss. Eyes: Negative for blurred vision, double vision, pain and redness. Respiratory: Negative for cough, shortness of breath and wheezing.     Cardiovascular: Negative for chest pain, palpitations, orthopnea, claudication, leg swelling and PND. Gastrointestinal: Positive for abdominal pain (RLQ). Skin: Negative for itching and rash. Neurological: Negative for dizziness, tingling, tremors, sensory change, speech change, focal weakness, seizures, loss of consciousness, weakness and headaches. Results for orders placed or performed in visit on 06/04/19   LIPID PANEL   Result Value Ref Range    Cholesterol, total 219 (H) 100 - 199 mg/dL    Triglyceride 86 0 - 149 mg/dL    HDL Cholesterol 64 >39 mg/dL    VLDL, calculated 17 5 - 40 mg/dL    LDL, calculated 138 (H) 0 - 99 mg/dL   METABOLIC PANEL, COMPREHENSIVE   Result Value Ref Range    Glucose 89 65 - 99 mg/dL    BUN 15 8 - 27 mg/dL    Creatinine 0.56 (L) 0.57 - 1.00 mg/dL    GFR est non-AA 94 >59 mL/min/1.73    GFR est  >59 mL/min/1.73    BUN/Creatinine ratio 27 12 - 28    Sodium 140 134 - 144 mmol/L    Potassium 4.4 3.5 - 5.2 mmol/L    Chloride 106 96 - 106 mmol/L    CO2 22 20 - 29 mmol/L    Calcium 8.8 8.7 - 10.3 mg/dL    Protein, total 6.5 6.0 - 8.5 g/dL    Albumin 3.9 3.5 - 4.8 g/dL    GLOBULIN, TOTAL 2.6 1.5 - 4.5 g/dL    A-G Ratio 1.5 1.2 - 2.2    Bilirubin, total 0.4 0.0 - 1.2 mg/dL    Alk. phosphatase 73 39 - 117 IU/L    AST (SGOT) 7 0 - 40 IU/L    ALT (SGPT) 14 0 - 32 IU/L   CVD REPORT   Result Value Ref Range    INTERPRETATION Note          Physical Exam  Visit Vitals  /80 (BP 1 Location: Left arm, BP Patient Position: Sitting)   Pulse 91   Temp 97.8 °F (36.6 °C) (Oral)   Resp 18   Ht 5' 5\" (1.651 m)   Wt 198 lb (89.8 kg)   SpO2 95%   BMI 32.95 kg/m²     Head: Normocephalic, without obvious abnormality, atraumatic  Eyes: conjunctivae/corneas clear. PERRL, EOM's intact.    Neck: supple, symmetrical, trachea midline, no adenopathy, thyroid: not enlarged, symmetric, no tenderness/mass/nodules, no carotid bruit and no JVD  Lungs: clear to auscultation bilaterally  Heart: regular rate and rhythm, S1, S2 normal, no murmur, click, rub or gallop  Extremities: extremities normal, atraumatic, no cyanosis or edema  Pulses: 2+ and symmetric  Lymph nodes: Cervical, supraclavicular, and axillary nodes normal.  Neurologic: Grossly normal  Abdomen: soft, non-tender except for tenderness in RLQ that is mild and is not associated with any guarding or rebound. Bowel sounds normal. No masses,  no organomegaly. ASSESSMENT and PLAN    ICD-10-CM ICD-9-CM    1. Generalized abdominal pain R10.84 789.07 CT ABD WO CONT   2. Postmenopausal HRT (hormone replacement therapy) Z79.890 V07.4 estradiol (ESTRACE) 1 mg tablet   3. RLQ abdominal pain R10.31 789.03 CT ABD WO CONT     Diagnoses and all orders for this visit:    1. Generalized abdominal pain  -     CT ABD WO CONT; Future    2. Postmenopausal HRT (hormone replacement therapy)  -     estradiol (ESTRACE) 1 mg tablet; Take 1 Tab by mouth daily. 3. RLQ abdominal pain  -     CT ABD WO CONT; Future    Other orders  -     levoFLOXacin (LEVAQUIN) 500 mg tablet; Take 1 Tab by mouth daily for 10 days. Follow-up and Dispositions    · Return if RLQ abd pain or extreme fatigue symptoms worsen or fail to improve. reviewed medications and side effects in detail  Please call my office if there are any questions- [phone]. Discussed expected course/resolution/complications of diagnosis in detail with patient. Medication risks/benefits/costs/interactions/alternatives discussed with patient. Pt was given an after visit summary which includes diagnoses, current medications & vitals. Pt expressed understanding with the diagnosis and plan. Total 25 minutes,60 % counseling re: Informed pt that since her pain is the same prior to, during, and after antibiotics, this makes diverticulitis less likely. This could be scar tissue due to previous diverticulitis infection.  We have scheduled a CT scan of the area and in the meantime put her on Levaquin in case there is a smoldering infection; she was encouraged to go to the ER if her pain worsens like it did with her 1st episodeof diverticulitis. Talked about the importance of diet restrictions from now through the time until we figure out what is causing her pain. Long discuccion about the etiology , prevention and treatment of diverticulitis. Also, discussed symptoms of concern that were noted today in the note above, treatment options( including doing nothing), when to follow up before recommended time frame. Also, answered all questions. This document was written by Riya Mohamud, as dictated by Matt Valdes MD.  I have reviewed and agree with the above note and have made corrections where appropriate Jim Braxton M.D.

## 2019-06-18 NOTE — PROGRESS NOTES
Chief Complaint   Patient presents with    Diverticulitis     LOV f/u     1. Have you been to the ER, urgent care clinic since your last visit? Hospitalized since your last visit? No    2. Have you seen or consulted any other health care providers outside of the [address] [place] [address] since your last visit? Include any pap smears or colon screening.  No       Pt has finished her round of antibiotics but she states that her symptoms are worse (pain wise)     Pt would also like to discuss the Ambien that she is on

## 2019-06-24 ENCOUNTER — TELEPHONE (OUTPATIENT)
Dept: FAMILY MEDICINE CLINIC | Age: 69
End: 2019-06-24

## 2019-06-26 ENCOUNTER — HOSPITAL ENCOUNTER (OUTPATIENT)
Dept: CT IMAGING | Age: 69
Discharge: HOME | End: 2019-06-26
Attending: FAMILY MEDICINE
Payer: MEDICARE

## 2019-06-26 DIAGNOSIS — R10.31 RLQ ABDOMINAL PAIN: ICD-10-CM

## 2019-06-26 DIAGNOSIS — R10.84 GENERALIZED ABDOMINAL PAIN: ICD-10-CM

## 2019-06-26 LAB — CREAT BLD-MCNC: 0.6 MG/DL (ref 0.6–1.3)

## 2019-06-26 PROCEDURE — 74177 CT ABD & PELVIS W/CONTRAST: CPT

## 2019-06-26 PROCEDURE — 74011250636 HC RX REV CODE- 250/636: Performed by: FAMILY MEDICINE

## 2019-06-26 PROCEDURE — 82565 ASSAY OF CREATININE: CPT

## 2019-06-26 PROCEDURE — 74011636320 HC RX REV CODE- 636/320: Performed by: FAMILY MEDICINE

## 2019-06-26 PROCEDURE — 74011000255 HC RX REV CODE- 255: Performed by: FAMILY MEDICINE

## 2019-06-26 RX ORDER — BARIUM SULFATE 20 MG/ML
900 SUSPENSION ORAL ONCE
Status: COMPLETED | OUTPATIENT
Start: 2019-06-26 | End: 2019-06-26

## 2019-06-26 RX ORDER — SODIUM CHLORIDE 9 MG/ML
50 INJECTION, SOLUTION INTRAVENOUS ONCE
Status: COMPLETED | OUTPATIENT
Start: 2019-06-26 | End: 2019-06-26

## 2019-06-26 RX ORDER — SODIUM CHLORIDE 0.9 % (FLUSH) 0.9 %
10 SYRINGE (ML) INJECTION ONCE
Status: COMPLETED | OUTPATIENT
Start: 2019-06-26 | End: 2019-06-26

## 2019-06-26 RX ADMIN — IOPAMIDOL 100 ML: 755 INJECTION, SOLUTION INTRAVENOUS at 12:57

## 2019-06-26 RX ADMIN — SODIUM CHLORIDE 50 ML/HR: 900 INJECTION, SOLUTION INTRAVENOUS at 12:57

## 2019-06-26 RX ADMIN — Medication 10 ML: at 12:57

## 2019-06-26 RX ADMIN — BARIUM SULFATE 900 ML: 20 SUSPENSION ORAL at 12:57

## 2019-07-10 ENCOUNTER — HOSPITAL ENCOUNTER (OUTPATIENT)
Dept: MAMMOGRAPHY | Age: 69
Discharge: HOME | End: 2019-07-10
Attending: FAMILY MEDICINE
Payer: MEDICARE

## 2019-07-10 DIAGNOSIS — Z12.39 BREAST SCREENING, UNSPECIFIED: ICD-10-CM

## 2019-07-10 PROCEDURE — 77063 BREAST TOMOSYNTHESIS BI: CPT

## 2019-07-17 ENCOUNTER — HOSPITAL ENCOUNTER (OUTPATIENT)
Dept: PREADMISSION TESTING | Age: 69
Discharge: HOME | End: 2019-07-17
Payer: MEDICARE

## 2019-07-17 VITALS
TEMPERATURE: 98 F | DIASTOLIC BLOOD PRESSURE: 81 MMHG | RESPIRATION RATE: 18 BRPM | BODY MASS INDEX: 33.97 KG/M2 | WEIGHT: 199 LBS | HEART RATE: 75 BPM | HEIGHT: 64 IN | SYSTOLIC BLOOD PRESSURE: 114 MMHG

## 2019-07-17 LAB
ANION GAP SERPL CALC-SCNC: 5 MMOL/L (ref 5–15)
BASOPHILS # BLD: 0.1 K/UL (ref 0–0.1)
BASOPHILS NFR BLD: 1 % (ref 0–1)
BUN SERPL-MCNC: 24 MG/DL (ref 6–20)
BUN/CREAT SERPL: 40 (ref 12–20)
CALCIUM SERPL-MCNC: 9.1 MG/DL (ref 8.5–10.1)
CHLORIDE SERPL-SCNC: 106 MMOL/L (ref 97–108)
CO2 SERPL-SCNC: 29 MMOL/L (ref 21–32)
CREAT SERPL-MCNC: 0.6 MG/DL (ref 0.55–1.02)
DIFFERENTIAL METHOD BLD: ABNORMAL
EOSINOPHIL # BLD: 0.1 K/UL (ref 0–0.4)
EOSINOPHIL NFR BLD: 2 % (ref 0–7)
ERYTHROCYTE [DISTWIDTH] IN BLOOD BY AUTOMATED COUNT: 13.2 % (ref 11.5–14.5)
GLUCOSE SERPL-MCNC: 95 MG/DL (ref 65–100)
HCT VFR BLD AUTO: 50.7 % (ref 35–47)
HGB BLD-MCNC: 16.5 G/DL (ref 11.5–16)
IMM GRANULOCYTES # BLD AUTO: 0 K/UL (ref 0–0.04)
IMM GRANULOCYTES NFR BLD AUTO: 0 % (ref 0–0.5)
LYMPHOCYTES # BLD: 2 K/UL (ref 0.8–3.5)
LYMPHOCYTES NFR BLD: 26 % (ref 12–49)
MCH RBC QN AUTO: 30.2 PG (ref 26–34)
MCHC RBC AUTO-ENTMCNC: 32.5 G/DL (ref 30–36.5)
MCV RBC AUTO: 92.7 FL (ref 80–99)
MONOCYTES # BLD: 1 K/UL (ref 0–1)
MONOCYTES NFR BLD: 12 % (ref 5–13)
NEUTS SEG # BLD: 4.7 K/UL (ref 1.8–8)
NEUTS SEG NFR BLD: 59 % (ref 32–75)
NRBC # BLD: 0 K/UL (ref 0–0.01)
NRBC BLD-RTO: 0 PER 100 WBC
PLATELET # BLD AUTO: 316 K/UL (ref 150–400)
PMV BLD AUTO: 10 FL (ref 8.9–12.9)
POTASSIUM SERPL-SCNC: 3.9 MMOL/L (ref 3.5–5.1)
RBC # BLD AUTO: 5.47 M/UL (ref 3.8–5.2)
SODIUM SERPL-SCNC: 140 MMOL/L (ref 136–145)
WBC # BLD AUTO: 7.9 K/UL (ref 3.6–11)

## 2019-07-17 PROCEDURE — 85025 COMPLETE CBC W/AUTO DIFF WBC: CPT

## 2019-07-17 PROCEDURE — 80048 BASIC METABOLIC PNL TOTAL CA: CPT

## 2019-07-17 PROCEDURE — 36415 COLL VENOUS BLD VENIPUNCTURE: CPT

## 2019-07-17 RX ORDER — NAPROXEN SODIUM 220 MG
440 TABLET ORAL AS NEEDED
COMMUNITY
End: 2019-08-23

## 2019-07-17 NOTE — PERIOP NOTES
Patient given surgical site infection FAQs handout and hand hygiene tips sheet. Pre-operative instructions reviewed and patient verbalizes understanding of instructions. Patient has been given the opportunity to ask additional questions. Pt given 2 bottles of CHG soap and instructed in use.

## 2019-07-23 ENCOUNTER — TELEPHONE (OUTPATIENT)
Dept: FAMILY MEDICINE CLINIC | Age: 69
End: 2019-07-23

## 2019-07-23 NOTE — TELEPHONE ENCOUNTER
Pt. Is calling requesting to have one of her paper work for living well signed and ready for her by tomorrow. Pt. Is pre op appt. Is on 08/19 and she is ok getting the rest of her paperwork after she comes for her pre op appt.      Best call# [phone]

## 2019-07-24 NOTE — TELEPHONE ENCOUNTER
----- Message from Dolores Payne Page sent at 7/24/2019  9:35 AM EDT -----  Regarding: Dr. Louise Roman  Pt is returning call from [place], reason unknown.  Best contact number is [phone]

## 2019-07-25 ENCOUNTER — HOSPITAL ENCOUNTER (OUTPATIENT)
Age: 69
Setting detail: OUTPATIENT SURGERY
Discharge: HOME | End: 2019-07-25
Attending: OTOLARYNGOLOGY | Admitting: OTOLARYNGOLOGY
Payer: MEDICARE

## 2019-07-25 ENCOUNTER — ANESTHESIA EVENT (OUTPATIENT)
Dept: SURGERY | Age: 69
End: 2019-07-25
Payer: MEDICARE

## 2019-07-25 ENCOUNTER — ANESTHESIA (OUTPATIENT)
Dept: SURGERY | Age: 69
End: 2019-07-25
Payer: MEDICARE

## 2019-07-25 VITALS
TEMPERATURE: 98.6 F | OXYGEN SATURATION: 93 % | SYSTOLIC BLOOD PRESSURE: 116 MMHG | BODY MASS INDEX: 33.97 KG/M2 | HEART RATE: 87 BPM | WEIGHT: 199 LBS | DIASTOLIC BLOOD PRESSURE: 74 MMHG | HEIGHT: 64 IN | RESPIRATION RATE: 20 BRPM

## 2019-07-25 DIAGNOSIS — H02.403 PTOSIS OF BOTH EYELIDS: Primary | ICD-10-CM

## 2019-07-25 PROCEDURE — 77030013079 HC BLNKT BAIR HGGR 3M -A: Performed by: ANESTHESIOLOGY

## 2019-07-25 PROCEDURE — 77030011640 HC PAD GRND REM COVD -A: Performed by: OTOLARYNGOLOGY

## 2019-07-25 PROCEDURE — 77030019600 HC DRN EAR POPE MEDT -A: Performed by: OTOLARYNGOLOGY

## 2019-07-25 PROCEDURE — 77030002916 HC SUT ETHLN J&J -A: Performed by: OTOLARYNGOLOGY

## 2019-07-25 PROCEDURE — 74011250636 HC RX REV CODE- 250/636

## 2019-07-25 PROCEDURE — 77030013216 HC SUPP CHIN FASC BJI -B: Performed by: OTOLARYNGOLOGY

## 2019-07-25 PROCEDURE — 77030031139 HC SUT VCRL2 J&J -A: Performed by: OTOLARYNGOLOGY

## 2019-07-25 PROCEDURE — 77030013567 HC DRN WND RESERV BARD -A: Performed by: OTOLARYNGOLOGY

## 2019-07-25 PROCEDURE — 76010000131 HC OR TIME 2 TO 2.5 HR: Performed by: OTOLARYNGOLOGY

## 2019-07-25 PROCEDURE — 77030040356 HC CORD BPLR FRCP COVD -A: Performed by: OTOLARYNGOLOGY

## 2019-07-25 PROCEDURE — C1713 ANCHOR/SCREW BN/BN,TIS/BN: HCPCS | Performed by: OTOLARYNGOLOGY

## 2019-07-25 PROCEDURE — 77030040361 HC SLV COMPR DVT MDII -B: Performed by: OTOLARYNGOLOGY

## 2019-07-25 PROCEDURE — 77030002974 HC SUT PLN J&J -A: Performed by: OTOLARYNGOLOGY

## 2019-07-25 PROCEDURE — 76210000017 HC OR PH I REC 1.5 TO 2 HR: Performed by: OTOLARYNGOLOGY

## 2019-07-25 PROCEDURE — 76060000108 HC COSMETIC ANESTHESIA TIME CHARGE

## 2019-07-25 PROCEDURE — 76060000045 HC ANESTHESIA 7 TO 7.5 HR: Performed by: OTOLARYNGOLOGY

## 2019-07-25 PROCEDURE — 77030008684 HC TU ET CUF COVD -B: Performed by: ANESTHESIOLOGY

## 2019-07-25 PROCEDURE — 77030020269 HC MISC IMPL: Performed by: OTOLARYNGOLOGY

## 2019-07-25 PROCEDURE — 77030008467 HC STPLR SKN COVD -B: Performed by: OTOLARYNGOLOGY

## 2019-07-25 PROCEDURE — 77030018836 HC SOL IRR NACL ICUM -A: Performed by: OTOLARYNGOLOGY

## 2019-07-25 PROCEDURE — 77030020782 HC GWN BAIR PAWS FLX 3M -B

## 2019-07-25 PROCEDURE — 77030002925 HC SUT GORTX WLGO -C: Performed by: OTOLARYNGOLOGY

## 2019-07-25 PROCEDURE — 77030026438 HC STYL ET INTUB CARD -A: Performed by: ANESTHESIOLOGY

## 2019-07-25 PROCEDURE — 77030002966 HC SUT PDS J&J -A: Performed by: OTOLARYNGOLOGY

## 2019-07-25 PROCEDURE — 74011000250 HC RX REV CODE- 250: Performed by: OTOLARYNGOLOGY

## 2019-07-25 PROCEDURE — 76210000021 HC REC RM PH II 0.5 TO 1 HR: Performed by: OTOLARYNGOLOGY

## 2019-07-25 PROCEDURE — P9045 ALBUMIN (HUMAN), 5%, 250 ML: HCPCS

## 2019-07-25 PROCEDURE — 77030011233 HC DRN FACE WND AXIO -B: Performed by: OTOLARYNGOLOGY

## 2019-07-25 PROCEDURE — 76010010942 HC COSMETIC OR TIME CHARGE

## 2019-07-25 PROCEDURE — 76010000142 HC OR TIME 7 TO 7.5 HR: Performed by: OTOLARYNGOLOGY

## 2019-07-25 PROCEDURE — 74011000250 HC RX REV CODE- 250

## 2019-07-25 PROCEDURE — 74011250637 HC RX REV CODE- 250/637: Performed by: ANESTHESIOLOGY

## 2019-07-25 PROCEDURE — 76060000035 HC ANESTHESIA 2 TO 2.5 HR: Performed by: OTOLARYNGOLOGY

## 2019-07-25 PROCEDURE — 77030013908 HC PROTCT CRNL CRCH B&L -A: Performed by: OTOLARYNGOLOGY

## 2019-07-25 PROCEDURE — 74011250636 HC RX REV CODE- 250/636: Performed by: ANESTHESIOLOGY

## 2019-07-25 PROCEDURE — 77030018846 HC SOL IRR STRL H20 ICUM -A: Performed by: OTOLARYNGOLOGY

## 2019-07-25 RX ORDER — ROPIVACAINE HYDROCHLORIDE 5 MG/ML
30 INJECTION, SOLUTION EPIDURAL; INFILTRATION; PERINEURAL ONCE
Status: DISCONTINUED | OUTPATIENT
Start: 2019-07-25 | End: 2019-07-25 | Stop reason: HOSPADM

## 2019-07-25 RX ORDER — MORPHINE SULFATE 10 MG/ML
2 INJECTION, SOLUTION INTRAMUSCULAR; INTRAVENOUS
Status: DISCONTINUED | OUTPATIENT
Start: 2019-07-25 | End: 2019-07-25 | Stop reason: HOSPADM

## 2019-07-25 RX ORDER — TETRACAINE HYDROCHLORIDE 5 MG/ML
SOLUTION OPHTHALMIC AS NEEDED
Status: DISCONTINUED | OUTPATIENT
Start: 2019-07-25 | End: 2019-07-25 | Stop reason: HOSPADM

## 2019-07-25 RX ORDER — MIDAZOLAM HYDROCHLORIDE 1 MG/ML
1 INJECTION, SOLUTION INTRAMUSCULAR; INTRAVENOUS AS NEEDED
Status: DISCONTINUED | OUTPATIENT
Start: 2019-07-25 | End: 2019-07-25 | Stop reason: HOSPADM

## 2019-07-25 RX ORDER — PROPOFOL 10 MG/ML
INJECTION, EMULSION INTRAVENOUS
Status: DISCONTINUED | OUTPATIENT
Start: 2019-07-25 | End: 2019-07-25 | Stop reason: HOSPADM

## 2019-07-25 RX ORDER — PHENYLEPHRINE HCL IN 0.9% NACL 0.4MG/10ML
SYRINGE (ML) INTRAVENOUS AS NEEDED
Status: DISCONTINUED | OUTPATIENT
Start: 2019-07-25 | End: 2019-07-25 | Stop reason: HOSPADM

## 2019-07-25 RX ORDER — OXYCODONE AND ACETAMINOPHEN 5; 325 MG/1; MG/1
1 TABLET ORAL
Qty: 30 TAB | Refills: 0 | Status: SHIPPED | OUTPATIENT
Start: 2019-07-25 | End: 2019-08-01

## 2019-07-25 RX ORDER — SUCCINYLCHOLINE CHLORIDE 20 MG/ML
INJECTION INTRAMUSCULAR; INTRAVENOUS AS NEEDED
Status: DISCONTINUED | OUTPATIENT
Start: 2019-07-25 | End: 2019-07-25 | Stop reason: HOSPADM

## 2019-07-25 RX ORDER — SODIUM CHLORIDE 0.9 % (FLUSH) 0.9 %
5-40 SYRINGE (ML) INJECTION AS NEEDED
Status: CANCELLED | OUTPATIENT
Start: 2019-07-25

## 2019-07-25 RX ORDER — SODIUM CHLORIDE, SODIUM LACTATE, POTASSIUM CHLORIDE, CALCIUM CHLORIDE 600; 310; 30; 20 MG/100ML; MG/100ML; MG/100ML; MG/100ML
INJECTION, SOLUTION INTRAVENOUS
Status: DISCONTINUED | OUTPATIENT
Start: 2019-07-25 | End: 2019-07-25 | Stop reason: HOSPADM

## 2019-07-25 RX ORDER — MIDAZOLAM HYDROCHLORIDE 1 MG/ML
INJECTION, SOLUTION INTRAMUSCULAR; INTRAVENOUS AS NEEDED
Status: DISCONTINUED | OUTPATIENT
Start: 2019-07-25 | End: 2019-07-25 | Stop reason: HOSPADM

## 2019-07-25 RX ORDER — DEXAMETHASONE SODIUM PHOSPHATE 4 MG/ML
INJECTION, SOLUTION INTRA-ARTICULAR; INTRALESIONAL; INTRAMUSCULAR; INTRAVENOUS; SOFT TISSUE AS NEEDED
Status: DISCONTINUED | OUTPATIENT
Start: 2019-07-25 | End: 2019-07-25 | Stop reason: HOSPADM

## 2019-07-25 RX ORDER — LEVOFLOXACIN 250 MG/1
500 TABLET ORAL DAILY
Qty: 7 TAB | Refills: 0 | Status: SHIPPED | OUTPATIENT
Start: 2019-07-25 | End: 2019-08-16

## 2019-07-25 RX ORDER — ONDANSETRON 2 MG/ML
4 INJECTION INTRAMUSCULAR; INTRAVENOUS
Status: CANCELLED | OUTPATIENT
Start: 2019-07-25

## 2019-07-25 RX ORDER — MIDAZOLAM HYDROCHLORIDE 1 MG/ML
0.5 INJECTION, SOLUTION INTRAMUSCULAR; INTRAVENOUS
Status: DISCONTINUED | OUTPATIENT
Start: 2019-07-25 | End: 2019-07-25 | Stop reason: HOSPADM

## 2019-07-25 RX ORDER — VECURONIUM BROMIDE FOR INJECTION 1 MG/ML
INJECTION, POWDER, LYOPHILIZED, FOR SOLUTION INTRAVENOUS AS NEEDED
Status: DISCONTINUED | OUTPATIENT
Start: 2019-07-25 | End: 2019-07-25 | Stop reason: HOSPADM

## 2019-07-25 RX ORDER — OXYCODONE AND ACETAMINOPHEN 5; 325 MG/1; MG/1
1 TABLET ORAL
Status: CANCELLED | OUTPATIENT
Start: 2019-07-25

## 2019-07-25 RX ORDER — SODIUM CHLORIDE 0.9 % (FLUSH) 0.9 %
5-40 SYRINGE (ML) INJECTION EVERY 8 HOURS
Status: CANCELLED | OUTPATIENT
Start: 2019-07-25

## 2019-07-25 RX ORDER — ONDANSETRON 8 MG/1
8 TABLET, ORALLY DISINTEGRATING ORAL
Qty: 5 TAB | Refills: 1 | Status: SHIPPED | OUTPATIENT
Start: 2019-07-25 | End: 2019-08-16

## 2019-07-25 RX ORDER — ALBUMIN HUMAN 50 G/1000ML
SOLUTION INTRAVENOUS AS NEEDED
Status: DISCONTINUED | OUTPATIENT
Start: 2019-07-25 | End: 2019-07-25 | Stop reason: HOSPADM

## 2019-07-25 RX ORDER — PROPOFOL 10 MG/ML
INJECTION, EMULSION INTRAVENOUS AS NEEDED
Status: DISCONTINUED | OUTPATIENT
Start: 2019-07-25 | End: 2019-07-25 | Stop reason: HOSPADM

## 2019-07-25 RX ORDER — BACITRACIN 500 [USP'U]/G
OINTMENT TOPICAL AS NEEDED
Status: DISCONTINUED | OUTPATIENT
Start: 2019-07-25 | End: 2019-07-25 | Stop reason: HOSPADM

## 2019-07-25 RX ORDER — FENTANYL CITRATE 50 UG/ML
50 INJECTION, SOLUTION INTRAMUSCULAR; INTRAVENOUS AS NEEDED
Status: DISCONTINUED | OUTPATIENT
Start: 2019-07-25 | End: 2019-07-25 | Stop reason: HOSPADM

## 2019-07-25 RX ORDER — FENTANYL CITRATE 50 UG/ML
INJECTION, SOLUTION INTRAMUSCULAR; INTRAVENOUS AS NEEDED
Status: DISCONTINUED | OUTPATIENT
Start: 2019-07-25 | End: 2019-07-25 | Stop reason: HOSPADM

## 2019-07-25 RX ORDER — HYDROMORPHONE HYDROCHLORIDE 1 MG/ML
0.2 INJECTION, SOLUTION INTRAMUSCULAR; INTRAVENOUS; SUBCUTANEOUS
Status: DISCONTINUED | OUTPATIENT
Start: 2019-07-25 | End: 2019-07-25 | Stop reason: HOSPADM

## 2019-07-25 RX ORDER — MORPHINE SULFATE 2 MG/ML
2 INJECTION, SOLUTION INTRAMUSCULAR; INTRAVENOUS
Status: CANCELLED | OUTPATIENT
Start: 2019-07-25

## 2019-07-25 RX ORDER — ROCURONIUM BROMIDE 10 MG/ML
INJECTION, SOLUTION INTRAVENOUS AS NEEDED
Status: DISCONTINUED | OUTPATIENT
Start: 2019-07-25 | End: 2019-07-25 | Stop reason: HOSPADM

## 2019-07-25 RX ORDER — SODIUM CHLORIDE 0.9 % (FLUSH) 0.9 %
5-40 SYRINGE (ML) INJECTION EVERY 8 HOURS
Status: DISCONTINUED | OUTPATIENT
Start: 2019-07-25 | End: 2019-07-25 | Stop reason: HOSPADM

## 2019-07-25 RX ORDER — BACITRACIN ZINC 500 UNIT/G
OINTMENT (GRAM) TOPICAL 2 TIMES DAILY
Qty: 15 G | Refills: 0 | Status: SHIPPED | OUTPATIENT
Start: 2019-07-25 | End: 2019-08-16

## 2019-07-25 RX ORDER — SODIUM CHLORIDE, SODIUM LACTATE, POTASSIUM CHLORIDE, CALCIUM CHLORIDE 600; 310; 30; 20 MG/100ML; MG/100ML; MG/100ML; MG/100ML
75 INJECTION, SOLUTION INTRAVENOUS CONTINUOUS
Status: DISCONTINUED | OUTPATIENT
Start: 2019-07-25 | End: 2019-07-25 | Stop reason: HOSPADM

## 2019-07-25 RX ORDER — SODIUM CHLORIDE 9 MG/ML
25 INJECTION, SOLUTION INTRAVENOUS CONTINUOUS
Status: DISCONTINUED | OUTPATIENT
Start: 2019-07-25 | End: 2019-07-25 | Stop reason: HOSPADM

## 2019-07-25 RX ORDER — LIDOCAINE HYDROCHLORIDE 20 MG/ML
INJECTION, SOLUTION EPIDURAL; INFILTRATION; INTRACAUDAL; PERINEURAL AS NEEDED
Status: DISCONTINUED | OUTPATIENT
Start: 2019-07-25 | End: 2019-07-25 | Stop reason: HOSPADM

## 2019-07-25 RX ORDER — SODIUM CHLORIDE 0.9 % (FLUSH) 0.9 %
5-40 SYRINGE (ML) INJECTION AS NEEDED
Status: DISCONTINUED | OUTPATIENT
Start: 2019-07-25 | End: 2019-07-25 | Stop reason: HOSPADM

## 2019-07-25 RX ORDER — CEFAZOLIN SODIUM IN 0.9 % NACL 2 G/100 ML
PLASTIC BAG, INJECTION (ML) INTRAVENOUS AS NEEDED
Status: DISCONTINUED | OUTPATIENT
Start: 2019-07-25 | End: 2019-07-25 | Stop reason: HOSPADM

## 2019-07-25 RX ORDER — CEPHALEXIN 500 MG/1
500 CAPSULE ORAL 3 TIMES DAILY
Qty: 21 CAP | Refills: 0 | Status: SHIPPED | OUTPATIENT
Start: 2019-07-25 | End: 2019-08-01

## 2019-07-25 RX ORDER — LIDOCAINE HYDROCHLORIDE 10 MG/ML
0.1 INJECTION, SOLUTION EPIDURAL; INFILTRATION; INTRACAUDAL; PERINEURAL AS NEEDED
Status: DISCONTINUED | OUTPATIENT
Start: 2019-07-25 | End: 2019-07-25 | Stop reason: HOSPADM

## 2019-07-25 RX ORDER — SODIUM CHLORIDE, SODIUM LACTATE, POTASSIUM CHLORIDE, CALCIUM CHLORIDE 600; 310; 30; 20 MG/100ML; MG/100ML; MG/100ML; MG/100ML
125 INJECTION, SOLUTION INTRAVENOUS CONTINUOUS
Status: DISCONTINUED | OUTPATIENT
Start: 2019-07-25 | End: 2019-07-25 | Stop reason: HOSPADM

## 2019-07-25 RX ORDER — DIPHENHYDRAMINE HYDROCHLORIDE 50 MG/ML
12.5 INJECTION, SOLUTION INTRAMUSCULAR; INTRAVENOUS AS NEEDED
Status: DISCONTINUED | OUTPATIENT
Start: 2019-07-25 | End: 2019-07-25 | Stop reason: HOSPADM

## 2019-07-25 RX ORDER — ACETAMINOPHEN 325 MG/1
650 TABLET ORAL ONCE
Status: COMPLETED | OUTPATIENT
Start: 2019-07-25 | End: 2019-07-25

## 2019-07-25 RX ORDER — ONDANSETRON 2 MG/ML
4 INJECTION INTRAMUSCULAR; INTRAVENOUS AS NEEDED
Status: DISCONTINUED | OUTPATIENT
Start: 2019-07-25 | End: 2019-07-25 | Stop reason: HOSPADM

## 2019-07-25 RX ORDER — EPHEDRINE SULFATE/0.9% NACL/PF 50 MG/5 ML
SYRINGE (ML) INTRAVENOUS AS NEEDED
Status: DISCONTINUED | OUTPATIENT
Start: 2019-07-25 | End: 2019-07-25 | Stop reason: HOSPADM

## 2019-07-25 RX ORDER — FENTANYL CITRATE 50 UG/ML
25 INJECTION, SOLUTION INTRAMUSCULAR; INTRAVENOUS
Status: DISCONTINUED | OUTPATIENT
Start: 2019-07-25 | End: 2019-07-25 | Stop reason: HOSPADM

## 2019-07-25 RX ADMIN — Medication 10 MG: at 13:23

## 2019-07-25 RX ADMIN — MIDAZOLAM HYDROCHLORIDE 2 MG: 1 INJECTION, SOLUTION INTRAMUSCULAR; INTRAVENOUS at 10:57

## 2019-07-25 RX ADMIN — MIDAZOLAM 0.5 MG: 1 INJECTION INTRAMUSCULAR; INTRAVENOUS at 18:40

## 2019-07-25 RX ADMIN — ROCURONIUM BROMIDE 45 MG: 10 INJECTION, SOLUTION INTRAVENOUS at 11:23

## 2019-07-25 RX ADMIN — SUCCINYLCHOLINE CHLORIDE 120 MG: 20 INJECTION INTRAMUSCULAR; INTRAVENOUS at 11:03

## 2019-07-25 RX ADMIN — SODIUM CHLORIDE, SODIUM LACTATE, POTASSIUM CHLORIDE, CALCIUM CHLORIDE: 600; 310; 30; 20 INJECTION, SOLUTION INTRAVENOUS at 14:05

## 2019-07-25 RX ADMIN — VECURONIUM BROMIDE FOR INJECTION 2 MG: 1 INJECTION, POWDER, LYOPHILIZED, FOR SOLUTION INTRAVENOUS at 12:19

## 2019-07-25 RX ADMIN — MIDAZOLAM 0.5 MG: 1 INJECTION INTRAMUSCULAR; INTRAVENOUS at 18:35

## 2019-07-25 RX ADMIN — PROPOFOL 100 MCG/KG/MIN: 10 INJECTION, EMULSION INTRAVENOUS at 11:12

## 2019-07-25 RX ADMIN — Medication 80 MCG: at 12:55

## 2019-07-25 RX ADMIN — PROPOFOL 70 MCG/KG/MIN: 10 INJECTION, EMULSION INTRAVENOUS at 12:55

## 2019-07-25 RX ADMIN — Medication 120 MCG: at 11:35

## 2019-07-25 RX ADMIN — Medication 2 G: at 11:30

## 2019-07-25 RX ADMIN — SODIUM CHLORIDE, SODIUM LACTATE, POTASSIUM CHLORIDE, CALCIUM CHLORIDE: 600; 310; 30; 20 INJECTION, SOLUTION INTRAVENOUS at 10:45

## 2019-07-25 RX ADMIN — LIDOCAINE HYDROCHLORIDE 80 MG: 20 INJECTION, SOLUTION EPIDURAL; INFILTRATION; INTRACAUDAL; PERINEURAL at 11:03

## 2019-07-25 RX ADMIN — ACETAMINOPHEN 650 MG: 325 TABLET ORAL at 09:57

## 2019-07-25 RX ADMIN — FENTANYL CITRATE 50 MCG: 50 INJECTION, SOLUTION INTRAMUSCULAR; INTRAVENOUS at 11:57

## 2019-07-25 RX ADMIN — DEXAMETHASONE SODIUM PHOSPHATE 8 MG: 4 INJECTION, SOLUTION INTRA-ARTICULAR; INTRALESIONAL; INTRAMUSCULAR; INTRAVENOUS; SOFT TISSUE at 11:34

## 2019-07-25 RX ADMIN — ROCURONIUM BROMIDE 5 MG: 10 INJECTION, SOLUTION INTRAVENOUS at 11:03

## 2019-07-25 RX ADMIN — ALBUMIN HUMAN 12.5 G: 50 SOLUTION INTRAVENOUS at 13:33

## 2019-07-25 RX ADMIN — Medication 80 MCG: at 11:28

## 2019-07-25 RX ADMIN — SODIUM CHLORIDE, SODIUM LACTATE, POTASSIUM CHLORIDE, AND CALCIUM CHLORIDE 125 ML/HR: 600; 310; 30; 20 INJECTION, SOLUTION INTRAVENOUS at 10:06

## 2019-07-25 RX ADMIN — PROPOFOL 110 MG: 10 INJECTION, EMULSION INTRAVENOUS at 11:03

## 2019-07-25 RX ADMIN — VECURONIUM BROMIDE FOR INJECTION 2 MG: 1 INJECTION, POWDER, LYOPHILIZED, FOR SOLUTION INTRAVENOUS at 13:10

## 2019-07-25 RX ADMIN — Medication 2 G: at 16:57

## 2019-07-25 RX ADMIN — FENTANYL CITRATE 50 MCG: 50 INJECTION, SOLUTION INTRAMUSCULAR; INTRAVENOUS at 11:53

## 2019-07-25 RX ADMIN — Medication 80 MCG: at 13:30

## 2019-07-25 RX ADMIN — ONDANSETRON 4 MG: 2 INJECTION INTRAMUSCULAR; INTRAVENOUS at 18:41

## 2019-07-25 RX ADMIN — FENTANYL CITRATE 50 MCG: 50 INJECTION, SOLUTION INTRAMUSCULAR; INTRAVENOUS at 11:03

## 2019-07-25 RX ADMIN — Medication 10 MG: at 13:10

## 2019-07-25 NOTE — PERIOP NOTES
VS stable. Awake and alert. Resting comfortably. Patient denies nausea. Pain improved. No signs of excessive bleeding. Tolerating ice chips without difficulty. Ready to transfer to phase 2.

## 2019-07-25 NOTE — ANESTHESIA PREPROCEDURE EVALUATION
Relevant Problems   No relevant active problems       Anesthetic History   No history of anesthetic complications            Review of Systems / Medical History  Patient summary reviewed, nursing notes reviewed and pertinent labs reviewed    Pulmonary  Within defined limits                 Neuro/Psych   Within defined limits           Cardiovascular  Within defined limits                     GI/Hepatic/Renal  Within defined limits              Endo/Other  Within defined limits      Obesity     Other Findings            Physical Exam    Airway  Mallampati: II  TM Distance: > 6 cm  Neck ROM: normal range of motion   Mouth opening: Normal     Cardiovascular  Regular rate and rhythm,  S1 and S2 normal,  no murmur, click, rub, or gallop             Dental  No notable dental hx       Pulmonary  Breath sounds clear to auscultation               Abdominal  GI exam deferred       Other Findings            Anesthetic Plan    ASA: 2  Anesthesia type: general          Induction: Intravenous  Anesthetic plan and risks discussed with: Patient

## 2019-07-25 NOTE — PROGRESS NOTES
07/25/19 1157   Family Communication   Family Update Message Procedure started   Delivery Origin Nurse  Marychuy Reynoso)    Relationship to Patient Spouse  (\"Palaciso\")    Phone Number [phone]   Family/Significant Other Update Other (Comment)  (left message, stated routine update on his wife's surgery)

## 2019-07-25 NOTE — PERIOP NOTES
1350:  Patient's family () called to provide status update. 65:  Patient's family () called to provide status update.

## 2019-07-25 NOTE — ANESTHESIA POSTPROCEDURE EVALUATION
Procedure(s):  BILATERAL REPAIR PTOSIS BROW, BLEPHAROPLASTY BILATERAL UPPER EYELIDS, COSMETIC FACELIFT, BROWLIFT USING FLAP. general    Anesthesia Post Evaluation      Multimodal analgesia: multimodal analgesia used between 6 hours prior to anesthesia start to PACU discharge  Patient location during evaluation: bedside  Patient participation: waiting for patient participation  Level of consciousness: awake  Pain management: adequate  Airway patency: patent  Anesthetic complications: no  Cardiovascular status: acceptable  Respiratory status: unassisted  Hydration status: acceptable  Comments: Post-Anesthesia Evaluation and Assessment    I have evaluated the patient and they are ready for PACU discharge. Patient: Marylen Amato MRN: [id number]  SSN: [id number]   YOB: 1950  Age: 68 y.o. Sex: female      Cardiovascular Function/Vital Signs  /61   Pulse 97   Temp 36.9 °C (98.4 °F)   Resp 27   Ht 5' 3.5\" (1.613 m)   Wt 90.3 kg (199 lb)   SpO2 (!) 89%   BMI 34.70 kg/m²     Patient is status post General anesthesia for Procedure(s):  BILATERAL REPAIR PTOSIS BROW, BLEPHAROPLASTY BILATERAL UPPER EYELIDS, COSMETIC FACELIFT, BROWLIFT USING FLAP. Nausea/Vomiting: None    Postoperative hydration reviewed and adequate. Pain:  Pain Scale 1: Numeric (0 - 10) (07/25/19 0936)  Pain Intensity 1: 0 (07/25/19 0936)   Managed    Neurological Status:   Neuro (WDL): Within Defined Limits (07/25/19 0951)   At baseline    Mental Status, Level of Consciousness: Alert and  oriented to person, place, and time    Pulmonary Status:   O2 Device: Blow by oxygen (07/25/19 1817)   Adequate oxygenation and airway patent    Complications related to anesthesia: None    Post-anesthesia assessment completed.  No concerns    Signed By: Harry Mae MD    July 25, 2019                   Vitals Value Taken Time   /61 7/25/2019  6:20 PM   Temp     Pulse 97 7/25/2019  6:23 PM   Resp 25 7/25/2019  6:23 PM SpO2 88 % 7/25/2019  6:23 PM   Vitals shown include unvalidated device data.

## 2019-07-25 NOTE — PERIOP NOTES
Patient: Ayesha Diaz MRN: [id number]  SSN: [id number]   YOB: 1950  Age: 68 y.o. Sex: female     Patient is status post Procedure(s):  BILATERAL REPAIR PTOSIS BROW, BLEPHAROPLASTY BILATERAL UPPER EYELIDS, COSMETIC FACELIFT, BROWLIFT USING FLAP. Surgeon(s) and Role:     * Karoline Rosales MD - Primary    Local/Dose/Irrigation:                    Peripheral IV 07/25/19 Left Hand (Active)   Site Assessment Clean, dry, & intact 7/25/2019 10:05 AM   Phlebitis Assessment 0 7/25/2019 10:05 AM   Infiltration Assessment 0 7/25/2019 10:05 AM   Dressing Status Clean, dry, & intact; New 7/25/2019 10:05 AM   Dressing Type Tape;Transparent 7/25/2019 10:05 AM   Hub Color/Line Status Pink 7/25/2019 10:05 AM          Drain Facial silicone 20/08/95 Left Face (Active)       Drain Facial silicone 26/66/28 Right Face (Active)      Airway - Endotracheal Tube 07/25/19 Oral (Active)                   Dressing/Packing:  Wound Face 1 incision on neck, multiple incisions around bilateral ears, 1 incision on right eyelid, 1 incision on left eyelid, 2 incisions on head, -Dressing Type: Oil emulsion; Staples; Other (Comment)(Kerlix fluffs, Kerlix rolls, Coban) (07/25/19 1640)    Splint/Cast:  ]    Other:

## 2019-08-12 NOTE — OP NOTES
[address]   OPERATIVE REPORT    Name:  Sammy Mariscal  MR#:  [id number]  :  1950  ACCOUNT #:  [de-identified]  DATE OF SERVICE:  2019    PREOPERATIVE DIAGNOSIS:  Bilateral upper lid ptosis. POSTOPERATIVE DIAGNOSIS:  Bilateral upper lid ptosis. PROCEDURE PERFORMED:  Bilateral upper lid ptosis repair. SURGEON:  Titus Cassidy MD.    ASSISTANT:  No assistant. ANESTHESIA:  General endotracheal tube anesthesia. COMPLICATIONS:  No complication. SPECIMENS REMOVED:  No specimen. IMPLANTS:  No implant. ESTIMATED BLOOD LOSS:  2 mL. INDICATIONS AND FINDINGS:  The patient had a significant acquired ptosis, although the patient describing one lid falling sequentially in the different time frames from the other with an inability to completely open each eye resulting in visual field deficit. The upper lid margin falling over the upper rim of each pupil creating the visual field deficit. Intraoperatively, there was a complete stasis or separation of the levator aponeurosis from the upper margin of the tarsal plate requiring an end-to-end reconstruction and hence indications. DETAILS OF PROCEDURE:  In the operating room, the patient was induced under general endotracheal tube anesthesia. A 1% lidocaine with 1:100,000 part epinephrine was used for local infiltration. Supratarsal crease had previously been marked out incising the crease itself and elevating a subcutaneous skin flap. A margin of orbicularis was excised with Lizarraga tenotomy scissors to expose the tarsal plate and the mid lid anatomy, retracted with Desmarres and further dissected bluntly with Q-tip. The levator aponeurosis was avulsed,  from the superior tarsal plate. This was teased out with blunt dissection and with interrupted mattress sutures of 6-0 Vicryl reapproximated to the superior aspect of the tarsal plate using 4 separate anchoring sutures along its length.   This was done on both sides with similar findings. On completion, an upper lid blepharoplasty had been performed, following which the skin was closed with interrupted and running 6-0 plain gut sutures in the skin. Antibiotic ointment was applied and the patient handed over to Anesthesia for awakening and transfer to the recovery room.       MD WAYLON Waldron/[email or web address]/[email or web address]  D:  08/11/2019 20:16  T:  08/12/2019 0:01  JOB #:  [id number]  CC:  [id number]  [facility] [place]

## 2019-08-16 ENCOUNTER — HOSPITAL ENCOUNTER (OUTPATIENT)
Dept: PREADMISSION TESTING | Age: 69
Discharge: HOME | End: 2019-08-16
Payer: MEDICARE

## 2019-08-16 VITALS
HEIGHT: 64 IN | BODY MASS INDEX: 33.97 KG/M2 | SYSTOLIC BLOOD PRESSURE: 124 MMHG | TEMPERATURE: 97.6 F | DIASTOLIC BLOOD PRESSURE: 80 MMHG | HEART RATE: 88 BPM | WEIGHT: 199 LBS

## 2019-08-16 DIAGNOSIS — F51.01 PRIMARY INSOMNIA: ICD-10-CM

## 2019-08-16 LAB
ABO + RH BLD: NORMAL
ANION GAP SERPL CALC-SCNC: 9 MMOL/L (ref 5–15)
APPEARANCE UR: CLEAR
ATRIAL RATE: 74 BPM
BACTERIA URNS QL MICRO: NEGATIVE /HPF
BILIRUB UR QL: NEGATIVE
BLOOD GROUP ANTIBODIES SERPL: NORMAL
BUN SERPL-MCNC: 19 MG/DL (ref 6–20)
BUN/CREAT SERPL: 30 (ref 12–20)
CALCIUM SERPL-MCNC: 9.1 MG/DL (ref 8.5–10.1)
CALCULATED P AXIS, ECG09: -21 DEGREES
CALCULATED R AXIS, ECG10: -24 DEGREES
CALCULATED T AXIS, ECG11: 4 DEGREES
CHLORIDE SERPL-SCNC: 104 MMOL/L (ref 97–108)
CO2 SERPL-SCNC: 28 MMOL/L (ref 21–32)
COLOR UR: NORMAL
CREAT SERPL-MCNC: 0.63 MG/DL (ref 0.55–1.02)
DIAGNOSIS, 93000: NORMAL
EPITH CASTS URNS QL MICRO: NORMAL /LPF
ERYTHROCYTE [DISTWIDTH] IN BLOOD BY AUTOMATED COUNT: 13.5 % (ref 11.5–14.5)
EST. AVERAGE GLUCOSE BLD GHB EST-MCNC: 123 MG/DL
GLUCOSE SERPL-MCNC: 102 MG/DL (ref 65–100)
GLUCOSE UR STRIP.AUTO-MCNC: NEGATIVE MG/DL
HBA1C MFR BLD: 5.9 % (ref 4.2–6.3)
HCT VFR BLD AUTO: 46.5 % (ref 35–47)
HGB BLD-MCNC: 15 G/DL (ref 11.5–16)
HGB UR QL STRIP: NEGATIVE
HYALINE CASTS URNS QL MICRO: NORMAL /LPF (ref 0–5)
INR PPP: 1 (ref 0.9–1.1)
KETONES UR QL STRIP.AUTO: NEGATIVE MG/DL
LEUKOCYTE ESTERASE UR QL STRIP.AUTO: NEGATIVE
MCH RBC QN AUTO: 29.8 PG (ref 26–34)
MCHC RBC AUTO-ENTMCNC: 32.3 G/DL (ref 30–36.5)
MCV RBC AUTO: 92.4 FL (ref 80–99)
NITRITE UR QL STRIP.AUTO: NEGATIVE
NRBC # BLD: 0 K/UL (ref 0–0.01)
NRBC BLD-RTO: 0 PER 100 WBC
P-R INTERVAL, ECG05: 122 MS
PH UR STRIP: 5 [PH] (ref 5–8)
PLATELET # BLD AUTO: 342 K/UL (ref 150–400)
PMV BLD AUTO: 10 FL (ref 8.9–12.9)
POTASSIUM SERPL-SCNC: 3.8 MMOL/L (ref 3.5–5.1)
PROT UR STRIP-MCNC: NEGATIVE MG/DL
PROTHROMBIN TIME: 9.9 SEC (ref 9–11.1)
Q-T INTERVAL, ECG07: 392 MS
QRS DURATION, ECG06: 74 MS
QTC CALCULATION (BEZET), ECG08: 435 MS
RBC # BLD AUTO: 5.03 M/UL (ref 3.8–5.2)
RBC #/AREA URNS HPF: NORMAL /HPF (ref 0–5)
SODIUM SERPL-SCNC: 141 MMOL/L (ref 136–145)
SP GR UR REFRACTOMETRY: 1.01 (ref 1–1.03)
SPECIMEN EXP DATE BLD: NORMAL
UA: UC IF INDICATED,UAUC: NORMAL
UROBILINOGEN UR QL STRIP.AUTO: 0.2 EU/DL (ref 0.2–1)
VENTRICULAR RATE, ECG03: 74 BPM
WBC # BLD AUTO: 8.5 K/UL (ref 3.6–11)
WBC URNS QL MICRO: NORMAL /HPF (ref 0–4)

## 2019-08-16 PROCEDURE — 81001 URINALYSIS AUTO W/SCOPE: CPT

## 2019-08-16 PROCEDURE — 80048 BASIC METABOLIC PNL TOTAL CA: CPT

## 2019-08-16 PROCEDURE — 83036 HEMOGLOBIN GLYCOSYLATED A1C: CPT

## 2019-08-16 PROCEDURE — 85027 COMPLETE CBC AUTOMATED: CPT

## 2019-08-16 PROCEDURE — 93005 ELECTROCARDIOGRAM TRACING: CPT

## 2019-08-16 PROCEDURE — 36415 COLL VENOUS BLD VENIPUNCTURE: CPT

## 2019-08-16 PROCEDURE — 85610 PROTHROMBIN TIME: CPT

## 2019-08-16 PROCEDURE — 86900 BLOOD TYPING SEROLOGIC ABO: CPT

## 2019-08-16 RX ORDER — ZOLPIDEM TARTRATE 5 MG/1
TABLET ORAL
Qty: 30 TAB | Refills: 2 | Status: SHIPPED | OUTPATIENT
Start: 2019-08-16 | End: 2019-12-16 | Stop reason: SDUPTHER

## 2019-08-18 LAB
BACTERIA SPEC CULT: NORMAL
BACTERIA SPEC CULT: NORMAL
SERVICE CMNT-IMP: NORMAL

## 2019-08-19 ENCOUNTER — OFFICE VISIT (OUTPATIENT)
Dept: FAMILY MEDICINE CLINIC | Age: 69
End: 2019-08-19

## 2019-08-19 VITALS
SYSTOLIC BLOOD PRESSURE: 120 MMHG | DIASTOLIC BLOOD PRESSURE: 78 MMHG | RESPIRATION RATE: 18 BRPM | WEIGHT: 202 LBS | HEART RATE: 85 BPM | BODY MASS INDEX: 34.49 KG/M2 | TEMPERATURE: 98.4 F | HEIGHT: 64 IN | OXYGEN SATURATION: 96 %

## 2019-08-19 DIAGNOSIS — M17.12 PRIMARY OSTEOARTHRITIS OF LEFT KNEE: Primary | ICD-10-CM

## 2019-08-19 DIAGNOSIS — F51.05 INSOMNIA DUE TO ANXIETY AND FEAR: ICD-10-CM

## 2019-08-19 DIAGNOSIS — Z86.59 PERSONAL HISTORY OF ANXIETY DISORDER: ICD-10-CM

## 2019-08-19 DIAGNOSIS — E78.5 HYPERLIPIDEMIA WITH TARGET LOW DENSITY LIPOPROTEIN (LDL) CHOLESTEROL LESS THAN 130 MG/DL: ICD-10-CM

## 2019-08-19 DIAGNOSIS — Z87.442 HISTORY OF KIDNEY STONES: ICD-10-CM

## 2019-08-19 DIAGNOSIS — Z79.890 POSTMENOPAUSAL HRT (HORMONE REPLACEMENT THERAPY): ICD-10-CM

## 2019-08-19 DIAGNOSIS — Z91.89: ICD-10-CM

## 2019-08-19 DIAGNOSIS — M41.25 OTHER IDIOPATHIC SCOLIOSIS, THORACOLUMBAR REGION: ICD-10-CM

## 2019-08-19 DIAGNOSIS — Z86.59 HISTORY OF DEPRESSION: ICD-10-CM

## 2019-08-19 DIAGNOSIS — F40.9 INSOMNIA DUE TO ANXIETY AND FEAR: ICD-10-CM

## 2019-08-19 NOTE — PROGRESS NOTES
HISTORY OF PRESENT ILLNESS  HPI  Ethel Varner is a 68 y.o. Female with a history of left knee DJD, thoracolumbar idiopathic scoliosis, adjustment disorder with anxiety and depression, kidney stones and hyperlipidemia with LDL goal <130, who presents to the office today for a Pre-Op exam. Pt is scheduled to have right knee replacement surgery on  at [address] [place] [address] with Dr. Estelita Bloch. Pt notes having no trouble after getting her left knee replaced. Pt requests a refill of Ambien, which she takes one 5 mg tablet of nightly. Pt denies unusual SOB, chest pain, and any recent ER visits or hospitalizations.            Past Medical History:   Diagnosis Date    Actinic keratosis     Left arm    Adjustment disorder with mixed anxiety and depressed mood     Adverse effect of anesthesia     HARD TO WAKE UP SLOW BREATHING ,Pt stated doesn't take much anesthesia    Arthritis     OSTEO    Chronic pain     Diverticulitis     Kidney stones 95,96,98    left    Left knee DJD     Lumbar nerve root impingement     RLQ pain    Menopause     LMP-53years old    Nausea & vomiting     Other and unspecified hyperlipidemia     Other idiopathic scoliosis, thoracolumbar region 2018    Postmenopausal HRT (hormone replacement therapy)     Right knee DJD      Past Surgical History:   Procedure Laterality Date    COLONOSCOPY N/A 2016    COLONOSCOPY performed by Merly Godfrey MD at [address] [place] [address] ENDOSCOPY    [address] HX [place] [address] HX CATARACT REMOVAL Right 2019    HX CATARACT REMOVAL Left 2019    HX  SECTION  1766,4698    X2    HX GI      COLONOSCOPY    HX HYSTERECTOMY      LMP-53years old    HX KNEE ARTHROSCOPY Bilateral 1966    SCOPE of diana bilateral    HX LITHOTRIPSY      X 2    HX LUMBAR LAMINECTOMY  2000    X2    HX MENISCECTOMY      left knee    HX ORTHOPAEDIC  10    left shoulder repair; 14 screws and 2 plates    HX ORTHOPAEDIC Right     BROKEN ARM SURGERY X2    HX ORTHOPAEDIC Left      REPAIR OF Left MENISCUS    HX ORTHOPAEDIC Left     BROKEN ARM    HX POLYPECTOMY  2013    HX TONSILLECTOMY  1956    HX TUBAL LIGATION  1981    BSPO adhesion conization abn pap    TOTAL KNEE ARTHROPLASTY Left 2016     Current Outpatient Medications on File Prior to Visit   Medication Sig Dispense Refill    zolpidem (AMBIEN) 5 mg tablet TAKE ONE TABLET BY MOUTH AT BEDTIME AS NEEDED FOR INSOMNIA 30 Tab 2    naproxen sodium (ALEVE) 220 mg tablet Take 440 mg by mouth as needed.  furosemide (LASIX) 20 mg tablet TAKE ONE TABLET BY MOUTH TWICE A DAY 60 Tab 2     No current facility-administered medications on file prior to visit. Allergies   Allergen Reactions    Codeine Rash     Rash and swelling on arm    Adhesive Tape-Silicones Rash    Keflex [Cephalexin] Nausea and Vomiting    Tramadol Other (comments)     Drowsy      Wellbutrin [Bupropion Hcl] Other (comments)     fatigue     Family History   Problem Relation Age of Onset    Cancer Mother         colon    Arthritis-osteo Mother     Anesth Problems Mother         delayed awakening    Cancer Maternal Grandmother         stomach    Heart Disease Father 80    Heart Attack Maternal Grandfather [de-identified]    Heart Attack Paternal Grandmother 80    Diabetes Cousin         cervical cancer     Social History     Socioeconomic History    Marital status:      Spouse name: Not on file    Number of children: Not on file    Years of education: Not on file    Highest education level: Not on file   Tobacco Use    Smoking status: Never Smoker    Smokeless tobacco: Never Used   Substance and Sexual Activity    Alcohol use: Yes     Alcohol/week: 0.8 standard drinks     Types: 1 Standard drinks or equivalent per week     Comment: RARE    Drug use: No             Review of Systems   Constitutional: Negative for chills, diaphoresis, fever, malaise/fatigue and weight loss.    HENT: Negative for congestion, ear discharge, ear pain, hearing loss, nosebleeds, sore throat and tinnitus. Eyes: Negative for blurred vision, double vision, photophobia, pain, discharge and redness. Respiratory: Negative for cough, hemoptysis, sputum production, shortness of breath, wheezing and stridor. Cardiovascular: Negative for chest pain, palpitations, orthopnea, claudication, leg swelling and PND. Gastrointestinal: Negative for abdominal pain, blood in stool, constipation, diarrhea, heartburn, melena, nausea and vomiting. Genitourinary: Negative for dysuria, flank pain, frequency, hematuria and urgency. Musculoskeletal: Negative for back pain, falls, joint pain, myalgias and neck pain. Skin: Negative for itching and rash. Neurological: Negative for dizziness, tingling, tremors, sensory change, speech change, focal weakness, seizures, loss of consciousness, weakness and headaches. Endo/Heme/Allergies: Negative for environmental allergies and polydipsia. Does not bruise/bleed easily. Psychiatric/Behavioral: Negative for depression, hallucinations, memory loss, substance abuse and suicidal ideas. The patient is not nervous/anxious and does not have insomnia. Results for orders placed or performed during the hospital encounter of 08/16/19   CULTURE, MRSA   Result Value Ref Range    Special Requests: NO SPECIAL REQUESTS      Culture result: MRSA NOT PRESENT      Culture result:            Screening of patient nares for MRSA is for surveillance purposes and, if positive, to facilitate isolation considerations in high risk settings. It is not intended for automatic decolonization interventions per se as regimens are not sufficiently effective to warrant routine use.    METABOLIC PANEL, BASIC   Result Value Ref Range    Sodium 141 136 - 145 mmol/L    Potassium 3.8 3.5 - 5.1 mmol/L    Chloride 104 97 - 108 mmol/L    CO2 28 21 - 32 mmol/L    Anion gap 9 5 - 15 mmol/L    Glucose 102 (H) 65 - 100 mg/dL    BUN 19 6 - 20 MG/DL Creatinine 0.63 0.55 - 1.02 MG/DL    BUN/Creatinine ratio 30 (H) 12 - 20      GFR est AA >60 >60 ml/min/1.73m2    GFR est non-AA >60 >60 ml/min/1.73m2    Calcium 9.1 8.5 - 10.1 MG/DL   CBC W/O DIFF   Result Value Ref Range    WBC 8.5 3.6 - 11.0 K/uL    RBC 5.03 3.80 - 5.20 M/uL    HGB 15.0 11.5 - 16.0 g/dL    HCT 46.5 35.0 - 47.0 %    MCV 92.4 80.0 - 99.0 FL    MCH 29.8 26.0 - 34.0 PG    MCHC 32.3 30.0 - 36.5 g/dL    RDW 13.5 11.5 - 14.5 %    PLATELET 221 047 - 993 K/uL    MPV 10.0 8.9 - 12.9 FL    NRBC 0.0 0  WBC    ABSOLUTE NRBC 0.00 0.00 - 0.01 K/uL   PROTHROMBIN TIME + INR   Result Value Ref Range    INR 1.0 0.9 - 1.1      Prothrombin time 9.9 9.0 - 11.1 sec   URINALYSIS W/ REFLEX CULTURE   Result Value Ref Range    Color YELLOW/STRAW      Appearance CLEAR CLEAR      Specific gravity 1.010 1.003 - 1.030      pH (UA) 5.0 5.0 - 8.0      Protein NEGATIVE  NEG mg/dL    Glucose NEGATIVE  NEG mg/dL    Ketone NEGATIVE  NEG mg/dL    Bilirubin NEGATIVE  NEG      Blood NEGATIVE  NEG      Urobilinogen 0.2 0.2 - 1.0 EU/dL    Nitrites NEGATIVE  NEG      Leukocyte Esterase NEGATIVE  NEG      WBC 0-4 0 - 4 /hpf    RBC 0-5 0 - 5 /hpf    Epithelial cells FEW FEW /lpf    Bacteria NEGATIVE  NEG /hpf    UA:UC IF INDICATED CULTURE NOT INDICATED BY UA RESULT CNI      Hyaline cast 0-2 0 - 5 /lpf   HEMOGLOBIN A1C WITH EAG   Result Value Ref Range    Hemoglobin A1c 5.9 4.2 - 6.3 %    Est. average glucose 123 mg/dL   EKG, 12 LEAD, INITIAL   Result Value Ref Range    Ventricular Rate 74 BPM    Atrial Rate 74 BPM    P-R Interval 122 ms    QRS Duration 74 ms    Q-T Interval 392 ms    QTC Calculation (Bezet) 435 ms    Calculated P Axis -21 degrees    Calculated R Axis -24 degrees    Calculated T Axis 4 degrees    Diagnosis       Sinus rhythm with premature atrial complexes with aberrant conduction  Cannot rule out Anterior infarct , age undetermined  Abnormal ECG  When compared with ECG of 21-DEC-2015 14:00,  premature atrial complexes are now present  Confirmed by Marita Lira MD. ([id number]) on 8/16/2019 4:43:33 PM     TYPE & SCREEN   Result Value Ref Range    Crossmatch Expiration 08/29/2019     ABO/Rh(D) Shanique Bark POSITIVE     Antibody screen NEG          Physical Exam  Visit Vitals  /78 (BP 1 Location: Left arm, BP Patient Position: Sitting)   Pulse 85   Temp 98.4 °F (36.9 °C) (Oral)   Resp 18   Ht 5' 4\" (1.626 m)   Wt 202 lb (91.6 kg)   SpO2 96%   BMI 34.67 kg/m²       General:  Alert, cooperative, no distress, appears stated age. Head:  Normocephalic, without obvious abnormality, atraumatic. Eyes:  Conjunctivae/corneas clear. PERRL, EOMs intact. Fundi benign. Ears:  Normal TMs and external ear canals both ears. Nose: Nares normal. Septum midline. Mucosa normal. No drainage or sinus tenderness. Throat: Lips, mucosa, and tongue normal. Teeth and gums normal.   Neck: Supple, symmetrical, trachea midline, no adenopathy, thyroid: no enlargement/tenderness/nodules, no carotid bruit and no JVD. Back:   Symmetric, no curvature. ROM normal. No CVA tenderness. Lungs:   Clear to auscultation bilaterally. Chest wall:  No tenderness or deformity. Heart:  Regular rate and rhythm with frequent ectopy every 4th and 5th beat. S1, S2 normal, no murmur, click, rub or gallop. Abdomen:   Soft, non-tender. Bowel sounds normal. No masses,  No organomegaly. Extremities: Extremities normal, atraumatic, no cyanosis or edema. Left knee without erythema or warmth. Right knee has crepitation with flexing and extending the knee especially against resistance, no erythema or warmth. Pulses: 2+ and symmetric all extremities. Skin: Skin color, texture, turgor normal. No rashes or lesions. Lymph nodes: Cervical, supraclavicular, and axillary nodes normal.   Neurologic: CNII-XII intact. Normal strength, sensation and reflexes throughout. ASSESSMENT and PLAN    ICD-10-CM ICD-9-CM    1.  Primary osteoarthritis of left knee M17.12 715.16    2. History of complications due to general anesthesia- slow to wake up from anesthesia Z91.89 V15.89    3. Hyperlipidemia with target low density lipoprotein (LDL) cholesterol less than 130 mg/dL E78.5 272.4    4. Postmenopausal HRT (hormone replacement therapy) Z79.890 V07.4    5. Other idiopathic scoliosis, thoracolumbar region M41.25 737.30    6. Insomnia due to anxiety and fear F51.05 300.20     F40.9 327.02    7. Personal history of anxiety disorder Z86.59 V11.8    8. History of depression Z86.59 V11.8    9. History of kidney stones Z87.442 V13.01      Diagnoses and all orders for this visit:    1. Primary osteoarthritis of left knee    2. History of complications due to general anesthesia- slow to wake up from anesthesia    3. Hyperlipidemia with target low density lipoprotein (LDL) cholesterol less than 130 mg/dL    4. Postmenopausal HRT (hormone replacement therapy)    5. Other idiopathic scoliosis, thoracolumbar region    6. Insomnia due to anxiety and fear    7. Personal history of anxiety disorder    8. History of depression    9. History of kidney stones      Follow-up and Dispositions    · Return in about 1 year (around 8/19/2020), or new problems, for Annual Wellness Visit.       lab results and schedule of future lab studies reviewed with patient  reviewed diet, exercise and weight control  cardiovascular risk and specific lipid/LDL goals reviewed  reviewed medications and side effects in detail  Please call my office if there are any questions- [phone]. I will arrange for follow up after the lab tests done from today return  Recommended a weekly \"heart check. \" I went into detail how to do this. Call for refills on medications as needed. Discussed expected course/resolution/complications of diagnosis in detail with patient. Medication risks/benefits/costs/interactions/alternatives discussed with patient.    Pt was given an after visit summary which includes diagnoses, current medications & vitals. Pt expressed understanding with the diagnosis and plan. Recommended a weekly \"heart check. \" I went into detail how to do this. Regular exercise is very important to your health; it helps mentally, physically, socially; it prevents injuries if done properly. Exercise, even as simple as walking 20-30 minutes daily has major benefits to your health even though your \"numbers\" are the same in the lab. See if you can add this into your daily regimen and after a few months it will become a regular habit-\"just something you do,\" like brushing your teeth. A combination of aerobic exercise and strengthening and stretching is felt to be the best for you, so this should be your ultimate goal.   This can be done in the privacy of your home or in a group setting as at the gym  Some prefer having a , others prefer to do exercise in groups or individually. Do what \"works\" for you. You need to make it simple and \"fun,\" or you most likely you will not continue it. BMI is significantly elevated- in the obese range. I reviewed diet, exercise and weight control. Discussed weight control in detail, the importance of mainly decreased carbs, and for weight maintenance, exercise; discussed different diets and that it isn't as important to watch the type of foods as it is to decrease calorie intake no matter what type of diet you do, etc.     Pt had pre-op lab work done on 8/16 which is acceptable and she is cleared for her surgery. Her EKG does show some PAC's but no other significant findings. Poor R wave progression is seen in her EKG all the way back to 2015 but does seem to be more prominent now, and this may be explained by her weight gain. She is active to the point of getting winded and has not had any increase in her windedness with those activities. Encouraged pt to stay active between now and her surgery as far as low impact activity for her right knee.  She is aware of the importance of stopping NSAID's, so she will stop those today (actually only takes it once or twice a week prn). This document was written by Kaylene Wolf, as dictated by Carri Felder MD.  I have reviewed and agree with the above note and have made corrections where appropriate Jim Chery M.D.

## 2019-08-19 NOTE — PROGRESS NOTES
Chief Complaint   Patient presents with    Pre-op Exam     pt is having a right knee replacement      1. Have you been to the ER, urgent care clinic since your last visit? Hospitalized since your last visit? No    2. Have you seen or consulted any other health care providers outside of the [address] [place] [address] since your last visit? Include any pap smears or colon screening.  No

## 2019-08-23 ENCOUNTER — APPOINTMENT (OUTPATIENT)
Dept: GENERAL RADIOLOGY | Age: 69
DRG: 470 | End: 2019-08-23
Attending: STUDENT IN AN ORGANIZED HEALTH CARE EDUCATION/TRAINING PROGRAM
Payer: MEDICARE

## 2019-08-23 ENCOUNTER — HOSPITAL ENCOUNTER (INPATIENT)
Age: 69
LOS: 3 days | Discharge: HOME HEALTH | DRG: 470 | End: 2019-08-26
Attending: STUDENT IN AN ORGANIZED HEALTH CARE EDUCATION/TRAINING PROGRAM | Admitting: ORTHOPAEDIC SURGERY
Payer: MEDICARE

## 2019-08-23 DIAGNOSIS — S72.002A LEFT DISPLACED FEMORAL NECK FRACTURE (HCC): Primary | ICD-10-CM

## 2019-08-23 PROBLEM — S72.90XA FEMORAL FRACTURE (HCC): Status: ACTIVE | Noted: 2019-08-23

## 2019-08-23 LAB
ANION GAP SERPL CALC-SCNC: 7 MMOL/L (ref 5–15)
BASOPHILS # BLD: 0.2 K/UL (ref 0–0.1)
BASOPHILS NFR BLD: 2 % (ref 0–1)
BUN SERPL-MCNC: 16 MG/DL (ref 6–20)
BUN/CREAT SERPL: 24 (ref 12–20)
CALCIUM SERPL-MCNC: 9.1 MG/DL (ref 8.5–10.1)
CHLORIDE SERPL-SCNC: 106 MMOL/L (ref 97–108)
CO2 SERPL-SCNC: 27 MMOL/L (ref 21–32)
COMMENT, HOLDF: NORMAL
CREAT SERPL-MCNC: 0.68 MG/DL (ref 0.55–1.02)
DIFFERENTIAL METHOD BLD: ABNORMAL
EOSINOPHIL # BLD: 0.1 K/UL (ref 0–0.4)
EOSINOPHIL NFR BLD: 1 % (ref 0–7)
ERYTHROCYTE [DISTWIDTH] IN BLOOD BY AUTOMATED COUNT: 13.6 % (ref 11.5–14.5)
GLUCOSE SERPL-MCNC: 120 MG/DL (ref 65–100)
HCT VFR BLD AUTO: 46.8 % (ref 35–47)
HGB BLD-MCNC: 14.9 G/DL (ref 11.5–16)
IMM GRANULOCYTES # BLD AUTO: 0 K/UL
IMM GRANULOCYTES NFR BLD AUTO: 0 %
INR PPP: 1 (ref 0.9–1.1)
LYMPHOCYTES # BLD: 1.7 K/UL (ref 0.8–3.5)
LYMPHOCYTES NFR BLD: 18 % (ref 12–49)
MCH RBC QN AUTO: 29.7 PG (ref 26–34)
MCHC RBC AUTO-ENTMCNC: 31.8 G/DL (ref 30–36.5)
MCV RBC AUTO: 93.4 FL (ref 80–99)
MONOCYTES # BLD: 0.3 K/UL (ref 0–1)
MONOCYTES NFR BLD: 3 % (ref 5–13)
NEUTS BAND NFR BLD MANUAL: 3 % (ref 0–6)
NEUTS SEG # BLD: 7 K/UL (ref 1.8–8)
NEUTS SEG NFR BLD: 73 % (ref 32–75)
NRBC # BLD: 0 K/UL (ref 0–0.01)
NRBC BLD-RTO: 0 PER 100 WBC
PLATELET # BLD AUTO: 334 K/UL (ref 150–400)
PMV BLD AUTO: 9.6 FL (ref 8.9–12.9)
POTASSIUM SERPL-SCNC: 4.1 MMOL/L (ref 3.5–5.1)
PROTHROMBIN TIME: 9.8 SEC (ref 9–11.1)
RBC # BLD AUTO: 5.01 M/UL (ref 3.8–5.2)
RBC MORPH BLD: ABNORMAL
SAMPLES BEING HELD,HOLD: NORMAL
SODIUM SERPL-SCNC: 140 MMOL/L (ref 136–145)
WBC # BLD AUTO: 9.3 K/UL (ref 3.6–11)

## 2019-08-23 PROCEDURE — 74011250637 HC RX REV CODE- 250/637: Performed by: PHYSICIAN ASSISTANT

## 2019-08-23 PROCEDURE — 65270000029 HC RM PRIVATE

## 2019-08-23 PROCEDURE — 73502 X-RAY EXAM HIP UNI 2-3 VIEWS: CPT

## 2019-08-23 PROCEDURE — 94761 N-INVAS EAR/PLS OXIMETRY MLT: CPT

## 2019-08-23 PROCEDURE — 80048 BASIC METABOLIC PNL TOTAL CA: CPT

## 2019-08-23 PROCEDURE — 36415 COLL VENOUS BLD VENIPUNCTURE: CPT

## 2019-08-23 PROCEDURE — 85025 COMPLETE CBC W/AUTO DIFF WBC: CPT

## 2019-08-23 PROCEDURE — 74011250636 HC RX REV CODE- 250/636: Performed by: STUDENT IN AN ORGANIZED HEALTH CARE EDUCATION/TRAINING PROGRAM

## 2019-08-23 PROCEDURE — 74011250636 HC RX REV CODE- 250/636: Performed by: PHYSICIAN ASSISTANT

## 2019-08-23 PROCEDURE — 71045 X-RAY EXAM CHEST 1 VIEW: CPT

## 2019-08-23 PROCEDURE — 96374 THER/PROPH/DIAG INJ IV PUSH: CPT

## 2019-08-23 PROCEDURE — 99285 EMERGENCY DEPT VISIT HI MDM: CPT

## 2019-08-23 PROCEDURE — 85610 PROTHROMBIN TIME: CPT

## 2019-08-23 RX ORDER — ZOLPIDEM TARTRATE 5 MG/1
5 TABLET ORAL
Status: DISCONTINUED | OUTPATIENT
Start: 2019-08-23 | End: 2019-08-24

## 2019-08-23 RX ORDER — ACETAMINOPHEN 325 MG/1
650 TABLET ORAL EVERY 6 HOURS
Status: DISCONTINUED | OUTPATIENT
Start: 2019-08-23 | End: 2019-08-24

## 2019-08-23 RX ORDER — ONDANSETRON 4 MG/1
4 TABLET, ORALLY DISINTEGRATING ORAL
Status: DISCONTINUED | OUTPATIENT
Start: 2019-08-23 | End: 2019-08-24

## 2019-08-23 RX ORDER — DIPHENHYDRAMINE HCL 25 MG
25 CAPSULE ORAL
Status: DISCONTINUED | OUTPATIENT
Start: 2019-08-23 | End: 2019-08-24

## 2019-08-23 RX ORDER — MORPHINE SULFATE 2 MG/ML
1 INJECTION, SOLUTION INTRAMUSCULAR; INTRAVENOUS
Status: DISCONTINUED | OUTPATIENT
Start: 2019-08-23 | End: 2019-08-24

## 2019-08-23 RX ORDER — SODIUM CHLORIDE 9 MG/ML
125 INJECTION, SOLUTION INTRAVENOUS CONTINUOUS
Status: DISCONTINUED | OUTPATIENT
Start: 2019-08-23 | End: 2019-08-24

## 2019-08-23 RX ORDER — TRAMADOL HYDROCHLORIDE 50 MG/1
25 TABLET ORAL
Status: DISCONTINUED | OUTPATIENT
Start: 2019-08-23 | End: 2019-08-24

## 2019-08-23 RX ORDER — NALOXONE HYDROCHLORIDE 0.4 MG/ML
0.4 INJECTION, SOLUTION INTRAMUSCULAR; INTRAVENOUS; SUBCUTANEOUS AS NEEDED
Status: DISCONTINUED | OUTPATIENT
Start: 2019-08-23 | End: 2019-08-24

## 2019-08-23 RX ORDER — SODIUM CHLORIDE 0.9 % (FLUSH) 0.9 %
5-40 SYRINGE (ML) INJECTION EVERY 8 HOURS
Status: DISCONTINUED | OUTPATIENT
Start: 2019-08-23 | End: 2019-08-24

## 2019-08-23 RX ORDER — FENTANYL CITRATE 50 UG/ML
12.5 INJECTION, SOLUTION INTRAMUSCULAR; INTRAVENOUS
Status: COMPLETED | OUTPATIENT
Start: 2019-08-23 | End: 2019-08-23

## 2019-08-23 RX ORDER — SODIUM CHLORIDE 0.9 % (FLUSH) 0.9 %
5-40 SYRINGE (ML) INJECTION AS NEEDED
Status: DISCONTINUED | OUTPATIENT
Start: 2019-08-23 | End: 2019-08-24

## 2019-08-23 RX ORDER — DOCUSATE SODIUM 100 MG/1
100 CAPSULE, LIQUID FILLED ORAL 2 TIMES DAILY
Status: DISCONTINUED | OUTPATIENT
Start: 2019-08-23 | End: 2019-08-24

## 2019-08-23 RX ORDER — CEFAZOLIN SODIUM/WATER 2 G/20 ML
2 SYRINGE (ML) INTRAVENOUS
Status: COMPLETED | OUTPATIENT
Start: 2019-08-24 | End: 2019-08-24

## 2019-08-23 RX ORDER — TRAMADOL HYDROCHLORIDE 50 MG/1
50 TABLET ORAL
Status: DISCONTINUED | OUTPATIENT
Start: 2019-08-23 | End: 2019-08-23

## 2019-08-23 RX ADMIN — Medication 10 ML: at 15:58

## 2019-08-23 RX ADMIN — ZOLPIDEM TARTRATE 5 MG: 5 TABLET ORAL at 23:05

## 2019-08-23 RX ADMIN — DOCUSATE SODIUM 100 MG: 100 CAPSULE, LIQUID FILLED ORAL at 19:03

## 2019-08-23 RX ADMIN — SODIUM CHLORIDE 125 ML/HR: 900 INJECTION, SOLUTION INTRAVENOUS at 19:03

## 2019-08-23 RX ADMIN — ACETAMINOPHEN 650 MG: 325 TABLET, FILM COATED ORAL at 21:56

## 2019-08-23 RX ADMIN — FENTANYL CITRATE 12.5 MCG: 50 INJECTION, SOLUTION INTRAMUSCULAR; INTRAVENOUS at 12:35

## 2019-08-23 RX ADMIN — ACETAMINOPHEN 650 MG: 325 TABLET, FILM COATED ORAL at 15:40

## 2019-08-23 RX ADMIN — TRAMADOL HYDROCHLORIDE 25 MG: 50 TABLET, FILM COATED ORAL at 15:40

## 2019-08-23 NOTE — PROGRESS NOTES
Admission Medication Reconciliation:    Information obtained from:  Patient  RxQuery data available¹:  NO    Comments/Recommendations: Updated PTA meds/reviewed patient's allergies. 1)  Medication changes (since last review):    Removed  - Naproxen sodium 220mg 2tabs po prn     ¹RxQuery pharmacy benefit data reflects medications filled and processed through the patient's insurance, however   this data does NOT capture whether the medication was picked up or is currently being taken by the patient. Allergies:  Codeine; Adhesive tape-silicones; Keflex [cephalexin]; Tramadol; and Wellbutrin [bupropion hcl]    Significant PMH/Disease States:   Past Medical History:   Diagnosis Date    Actinic keratosis     Left arm    Adjustment disorder with mixed anxiety and depressed mood     Adverse effect of anesthesia     HARD TO WAKE UP SLOW BREATHING ,Pt stated doesn't take much anesthesia    Arthritis     OSTEO    Chronic pain     Diverticulitis     Kidney stones 95,96,98    left    Left knee DJD     Lumbar nerve root impingement     RLQ pain    Menopause     LMP-53years old    Nausea & vomiting     Other and unspecified hyperlipidemia     Other idiopathic scoliosis, thoracolumbar region 2/27/2018    Postmenopausal HRT (hormone replacement therapy)     Right knee DJD      Chief Complaint for this Admission:  No chief complaint on file. Prior to Admission Medications:   Prior to Admission Medications   Prescriptions Last Dose Informant Patient Reported?  Taking?   furosemide (LASIX) 20 mg tablet 8/23/2019 at Unknown time  No Yes   Sig: TAKE ONE TABLET BY MOUTH TWICE A DAY   zolpidem (AMBIEN) 5 mg tablet   No Yes   Sig: TAKE ONE TABLET BY MOUTH AT BEDTIME AS NEEDED FOR INSOMNIA      Facility-Administered Medications: None

## 2019-08-23 NOTE — ED TRIAGE NOTES
Pt was going into the salon and lost her balance falling on her left hip. Pt states that it feels dislocated, did not hit her head and did not want pain medicine en route.

## 2019-08-23 NOTE — CONSULTS
ORTHOPAEDIC CONSULT NOTE    Subjective:     Date of Consultation:  August 23, 2019  Referring Physician:  Demi Mayes is a 68 y.o. female with PMH significant for kidney stones and OA s/p left TKA and scheduled for right TKA Monday by Dr Rodriguez Rule is seen for left hip pain and inability to bear weight following a GLF today. States she was stepping up onto a curb when she lost her footing and fell onto her left side. Describes pain in the left lateral hip and groin with movement but fairly comfortable at rest. Denies new onset knee pain or back or ankle pain. Denies tingling or numbness. 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 She was brought to the ED and found to have a left hip fracture for which we have been asked to see the patient.     Patient Active Problem List    Diagnosis Date Noted    Femoral fracture ([facility] [address] 08/23/2019    Personal history of anxiety disorder 08/19/2019    History of depression 08/19/2019    Other idiopathic scoliosis, thoracolumbar region 02/27/2018    ACP (advance care planning) 08/18/2016    History of complications due to general anesthesia- slow to wake up from anesthesia 12/22/2015    History of kidney stones 12/22/2015    Left knee DJD     Adjustment disorder with mixed anxiety and depressed mood     Hyperlipidemia with target low density lipoprotein (LDL) cholesterol less than 130 mg/dL     Postmenopausal HRT (hormone replacement therapy)     lumbar nerve root impingement-2010- RLQ/ inguinal pain      Family History   Problem Relation Age of Onset    Cancer Mother         colon    Arthritis-osteo Mother     Anesth Problems Mother         delayed awakening    Cancer Maternal Grandmother         stomach    Heart Disease Father 80    Heart Attack Maternal Grandfather [de-identified]    Heart Attack Paternal Grandmother 80    Diabetes Cousin         cervical cancer      Social History     Tobacco Use    Smoking status: Never Smoker    Smokeless tobacco: Never Used   Substance Use Topics    Alcohol use: Yes     Alcohol/week: 0.8 standard drinks     Types: 1 Standard drinks or equivalent per week     Comment: RARE     Past Medical History:   Diagnosis Date    Actinic keratosis     Left arm    Adjustment disorder with mixed anxiety and depressed mood     Adverse effect of anesthesia     HARD TO WAKE UP SLOW BREATHING ,Pt stated doesn't take much anesthesia    Arthritis     OSTEO    Chronic pain     Diverticulitis     Kidney stones 95,96,98    left    Left knee DJD     Lumbar nerve root impingement     RLQ pain    Menopause     LMP-53years old    Nausea & vomiting     Other and unspecified hyperlipidemia     Other idiopathic scoliosis, thoracolumbar region 2018    Postmenopausal HRT (hormone replacement therapy)     Right knee DJD       Past Surgical History:   Procedure Laterality Date    COLONOSCOPY N/A 2016    COLONOSCOPY performed by Alisia Lobo MD at [address] HX CATARACT REMOVAL Right 2019    HX CATARACT REMOVAL Left 2019    HX  SECTION  0447,4072    X2    HX GI      COLONOSCOPY    HX HYSTERECTOMY  2009    LMP-53years old    HX KNEE ARTHROSCOPY Bilateral 1966    SCOPE of diana bilateral    HX LITHOTRIPSY      X 2    HX LUMBAR LAMINECTOMY  2000    X2    HX MENISCECTOMY      left knee    HX ORTHOPAEDIC  12    left shoulder repair; 14 screws and 2 plates    HX ORTHOPAEDIC Right     BROKEN ARM SURGERY X2    HX ORTHOPAEDIC Left      REPAIR OF Left MENISCUS    HX ORTHOPAEDIC Left     BROKEN ARM    HX POLYPECTOMY      HX TONSILLECTOMY  195    HX TUBAL LIGATION      BSPO adhesion conization abn pap    TOTAL KNEE ARTHROPLASTY Left 2016      Prior to Admission medications    Medication Sig Start Date End Date Taking?  Authorizing Provider   zolpidem (AMBIEN) 5 mg tablet TAKE ONE TABLET BY MOUTH AT BEDTIME AS NEEDED FOR INSOMNIA 19  Yes Mark Colon MD   furosemide (LASIX) 20 mg tablet TAKE ONE TABLET BY MOUTH TWICE A DAY 19  Yes Mark Colon MD     No current facility-administered medications for this encounter. Current Outpatient Medications   Medication Sig    zolpidem (AMBIEN) 5 mg tablet TAKE ONE TABLET BY MOUTH AT BEDTIME AS NEEDED FOR INSOMNIA    furosemide (LASIX) 20 mg tablet TAKE ONE TABLET BY MOUTH TWICE A DAY      Allergies   Allergen Reactions    Codeine Rash     Rash and swelling on arm    Adhesive Tape-Silicones Rash    Keflex [Cephalexin] Nausea and Vomiting    Tramadol Other (comments)     Drowsy      Wellbutrin [Bupropion Hcl] Other (comments)     fatigue        Review of Systems:  Pertinent items are noted in HPI. Mental Status: no dementia    Objective:     Patient Vitals for the past 8 hrs:   BP Temp Pulse Resp SpO2 Height Weight   19 1229 122/80 99.1 °F (37.3 °C) 63 16 97 % 5' 3.5\" (1.613 m) 88.5 kg (195 lb)     Temp (24hrs), Av.1 °F (37.3 °C), Min:99.1 °F (37.3 °C), Max:99.1 °F (37.3 °C)      EXAM: Awake and alert lying on stretcher; NAD; agreeable to exam  Patient lying in position of comfort in relative left lateral decub position  Positioning makes leg length determination somewhat difficult  Pain with log roll of left leg  5/5 strength for ankle DF/PF  Distal sensory function grossly intact  Distal pulses palpable    Imaging Review: FINDINGS: An AP view of the pelvis and a frogleg lateral view of the left hip  demonstrate a femoral neck fracture with impaction and mild cephalad migration  of the distal fracture fragment. No other fracture is identified. No  dislocation.     IMPRESSION  IMPRESSION: Acute left femoral neck fracture. .    Labs:   Recent Results (from the past 24 hour(s))   SAMPLES BEING HELD    Collection Time: 19 12:32 PM   Result Value Ref Range    SAMPLES BEING HELD 1RED     COMMENT        Add-on orders for these samples will be processed based on acceptable specimen integrity and analyte stability, which may vary by analyte. CBC WITH AUTOMATED DIFF    Collection Time: 08/23/19 12:32 PM   Result Value Ref Range    WBC 9.3 3.6 - 11.0 K/uL    RBC 5.01 3.80 - 5.20 M/uL    HGB 14.9 11.5 - 16.0 g/dL    HCT 46.8 35.0 - 47.0 %    MCV 93.4 80.0 - 99.0 FL    MCH 29.7 26.0 - 34.0 PG    MCHC 31.8 30.0 - 36.5 g/dL    RDW 13.6 11.5 - 14.5 %    PLATELET 182 720 - 568 K/uL    MPV 9.6 8.9 - 12.9 FL    NRBC 0.0 0  WBC    ABSOLUTE NRBC 0.00 0.00 - 0.01 K/uL    NEUTROPHILS 73 32 - 75 %    BAND NEUTROPHILS 3 0 - 6 %    LYMPHOCYTES 18 12 - 49 %    MONOCYTES 3 (L) 5 - 13 %    EOSINOPHILS 1 0 - 7 %    BASOPHILS 2 (H) 0 - 1 %    IMMATURE GRANULOCYTES 0 %    ABS. NEUTROPHILS 7.0 1.8 - 8.0 K/UL    ABS. LYMPHOCYTES 1.7 0.8 - 3.5 K/UL    ABS. MONOCYTES 0.3 0.0 - 1.0 K/UL    ABS. EOSINOPHILS 0.1 0.0 - 0.4 K/UL    ABS. BASOPHILS 0.2 (H) 0.0 - 0.1 K/UL    ABS. IMM.  GRANS. 0.0 K/UL    DF MANUAL      RBC COMMENTS OTILIA CELLS  FEW       PROTHROMBIN TIME + INR    Collection Time: 08/23/19 12:32 PM   Result Value Ref Range    INR 1.0 0.9 - 1.1      Prothrombin time 9.8 9.0 - 82.4 sec   METABOLIC PANEL, BASIC    Collection Time: 08/23/19 12:32 PM   Result Value Ref Range    Sodium 140 136 - 145 mmol/L    Potassium 4.1 3.5 - 5.1 mmol/L    Chloride 106 97 - 108 mmol/L    CO2 27 21 - 32 mmol/L    Anion gap 7 5 - 15 mmol/L    Glucose 120 (H) 65 - 100 mg/dL    BUN 16 6 - 20 MG/DL    Creatinine 0.68 0.55 - 1.02 MG/DL    BUN/Creatinine ratio 24 (H) 12 - 20      GFR est AA >60 >60 ml/min/1.73m2    GFR est non-AA >60 >60 ml/min/1.73m2    Calcium 9.1 8.5 - 10.1 MG/DL         Impression:     Active Problems:    Femoral fracture (HCC) (8/23/2019)        Plan:     Acute left femoral neck fracture - will require surgical fixation with total hip arthroplasty; risks and benefits of surgical intervention discussed with patient who agrees to proceed; patient requesting  Krishan if available  Admitting to Orthopedics as she has been medically cleared for an elective surgery on Monday;    Will consult Hospitalist for any medical issues that arise  Bed rest  NPO after midnight  Mechanical DVT prophylaxis for now  Pain control - patient reports being sensitive to most medications will start with Ultram for now  Consent for Left total hip arthroplasty  To OR Sat morning    Ryne Hall and Keaton David aware of patient and in agreement with current plan     Leon Kingsley PA-C  Orthopedic Trauma Service  [address] [facility]

## 2019-08-23 NOTE — ROUTINE PROCESS
TRANSFER - OUT REPORT:    Verbal report given to Jayda Saxena RN(name) on Jesus Champion  being transferred to 557(unit) for routine progression of care       Report consisted of patients Situation, Background, Assessment and   Recommendations(SBAR). Information from the following report(s) SBAR and Kardex was reviewed with the receiving nurse. Lines:       Opportunity for questions and clarification was provided.       Patient transported with:   [facility]

## 2019-08-23 NOTE — ED PROVIDER NOTES
Patient is a 64-year-old woman who slipped on wet pavement and fell directly on her left hip. Has pain only with range of motion of the left hip. Denies syncope proceeding. Denies any head injury, loss of consciousness. No numbness or tingling in the left lower extremity. Unable to bear weight.            Past Medical History:   Diagnosis Date    Actinic keratosis     Left arm    Adjustment disorder with mixed anxiety and depressed mood     Adverse effect of anesthesia     HARD TO WAKE UP SLOW BREATHING ,Pt stated doesn't take much anesthesia    Arthritis     OSTEO    Chronic pain     Diverticulitis     Kidney stones 95,96,98    left    Left knee DJD     Lumbar nerve root impingement     RLQ pain    Menopause     LMP-53years old    Nausea & vomiting     Other and unspecified hyperlipidemia     Other idiopathic scoliosis, thoracolumbar region 2018    Postmenopausal HRT (hormone replacement therapy)     Right knee DJD        Past Surgical History:   Procedure Laterality Date    COLONOSCOPY N/A 2016    COL[facility]OSCOPY performed by Samuel Johnston MD at [facility] ENDOSCOPY    [address] HX CATARACT REMOVAL Right 2019    HX CATARACT REMOVAL Left 2019    HX  SECTION  9778,9788    X2    HX GI      COLONOSCOPY    HX HYSTERECTOMY  2009    LMP-53years old    HX KNEE ARTHROSCOPY Bilateral 1966    SCOPE of diana bilateral    HX LITHOTRIPSY      X 2    HX LUMBAR LAMINECTOMY  2000    X2    HX MENISCECTOMY      left knee    HX ORTHOPAEDIC  12    left shoulder repair; 14 screws and 2 plates    HX ORTHOPAEDIC Right     BROKEN ARM SURGERY X2    HX ORTHOPAEDIC Left      REPAIR OF Left MENISCUS    HX ORTHOPAEDIC Left     BROKEN ARM    HX POLYPECTOMY  2013    HX TONSILLECTOMY  195    HX TUBAL LIGATION      BSPO adhesion conization abn pap    TOTAL KNEE ARTHROPLASTY Left 2016         Family History:   Problem Relation Age of Onset    Cancer Mother         colon    Arthritis-osteo Mother     Anesth Problems Mother         delayed awakening    Cancer Maternal Grandmother         stomach    Heart Disease Father 80    Heart Attack Maternal Grandfather [de-identified]    Heart Attack Paternal Grandmother 80    Diabetes Cousin         cervical cancer       Social History     Socioeconomic History    Marital status:      Spouse name: Not on file    Number of children: Not on file    Years of education: Not on file    Highest education level: Not on file   Occupational History    Not on file   Social Needs    Financial resource strain: Not on file    Food insecurity:     Worry: Not on file     Inability: Not on file    Transportation needs:     Medical: Not on file     Non-medical: Not on file   Tobacco Use    Smoking status: Never Smoker    Smokeless tobacco: Never Used   Substance and Sexual Activity    Alcohol use: Yes     Alcohol/week: 0.8 standard drinks     Types: 1 Standard drinks or equivalent per week     Comment: RARE    Drug use: No    Sexual activity: Not on file   Lifestyle    Physical activity:     Days per week: Not on file     Minutes per session: Not on file    Stress: Not on file   Relationships    Social connections:     Talks on phone: Not on file     Gets together: Not on file     Attends Protestant service: Not on file     Active member of club or organization: Not on file     Attends meetings of clubs or organizations: Not on file     Relationship status: Not on file    Intimate partner violence:     Fear of current or ex partner: Not on file     Emotionally abused: Not on file     Physically abused: Not on file     Forced sexual activity: Not on file   Other Topics Concern    Not on file   Social History Narrative    Not on file         ALLERGIES: Codeine; Adhesive tape-silicones; Keflex [cephalexin];  Tramadol; and Wellbutrin [bupropion hcl]    Review of Systems   Constitutional: Negative for chills and fever. Eyes: Negative for photophobia. Respiratory: Negative for shortness of breath. Cardiovascular: Negative for chest pain. Gastrointestinal: Negative for abdominal pain. Genitourinary: Negative for dysuria. Musculoskeletal: Positive for arthralgias. Negative for back pain. Neurological: Negative for headaches. Psychiatric/Behavioral: Negative for confusion. All other systems reviewed and are negative. Vitals:    08/23/19 1229   BP: 122/80   Pulse: 63   Resp: 16   Temp: 99.1 °F (37.3 °C)   SpO2: 97%   Weight: 88.5 kg (195 lb)   Height: 5' 3.5\" (1.613 m)            Physical Exam   Constitutional: She is oriented to person, place, and time. She appears well-developed. No distress. HENT:   Head: Atraumatic. Mouth/Throat: No oropharyngeal exudate. Eyes: Pupils are equal, round, and reactive to light. Cardiovascular: Normal rate and intact distal pulses. Pulmonary/Chest: Effort normal. She exhibits no tenderness. Abdominal: Soft. She exhibits no distension. There is no tenderness. Musculoskeletal: She exhibits no deformity. Entire spine palpated, no tenderness or deformity. Head is atraumatic    Left lower extremity is slightly shortened, severe pain with micromotion of the left hip joint. 2+ pedal pulses bilaterally. Normal strength left foot. Capillary refill less than 2. Neurological: She is alert and oriented to person, place, and time. Skin: Skin is warm and dry. Capillary refill takes less than 2 seconds. Psychiatric: She has a normal mood and affect. Nursing note and vitals reviewed. MDM  Number of Diagnoses or Management Options  Left displaced femoral neck fracture [facility]):   Diagnosis management comments: Nicole Nava is a 68 y.o. female presenting with L hip pain. Differential includes fracture, less likely dislcoation or contusion. Course: requesting low doses of pain medicine given sensitivity.   Did have some relief with 12.5 mcg of fentanyl. Dispo: Admission, consulted orthopedics, admit to hospitalist.. Hospitalist Juanito for Admission  2:23 PM    ED Room Number: ER29/29  Patient Name and age:  Leidy Garcia 68 y.o.  female  Working Diagnosis:   1.  Left displaced femoral neck fracture ([facility] [address]      Readmission: no  Isolation Requirements:  no  Recommended Level of Care:  med/surg  Code Status:  Full Code  Department:[facility] Adult ED - 21   Other:           Procedures

## 2019-08-24 ENCOUNTER — APPOINTMENT (OUTPATIENT)
Dept: GENERAL RADIOLOGY | Age: 69
DRG: 470 | End: 2019-08-24
Attending: PHYSICIAN ASSISTANT
Payer: MEDICARE

## 2019-08-24 ENCOUNTER — ANESTHESIA (OUTPATIENT)
Dept: SURGERY | Age: 69
DRG: 470 | End: 2019-08-24
Payer: MEDICARE

## 2019-08-24 ENCOUNTER — ANESTHESIA EVENT (OUTPATIENT)
Dept: SURGERY | Age: 69
DRG: 470 | End: 2019-08-24
Payer: MEDICARE

## 2019-08-24 ENCOUNTER — APPOINTMENT (OUTPATIENT)
Dept: GENERAL RADIOLOGY | Age: 69
DRG: 470 | End: 2019-08-24
Attending: ORTHOPAEDIC SURGERY
Payer: MEDICARE

## 2019-08-24 PROCEDURE — C1776 JOINT DEVICE (IMPLANTABLE): HCPCS | Performed by: ORTHOPAEDIC SURGERY

## 2019-08-24 PROCEDURE — 77030031139 HC SUT VCRL2 J&J -A: Performed by: ORTHOPAEDIC SURGERY

## 2019-08-24 PROCEDURE — 76010000171 HC OR TIME 2 TO 2.5 HR INTENSV-TIER 1: Performed by: ORTHOPAEDIC SURGERY

## 2019-08-24 PROCEDURE — 74011000258 HC RX REV CODE- 258: Performed by: NURSE ANESTHETIST, CERTIFIED REGISTERED

## 2019-08-24 PROCEDURE — 77030020800 HC BIT DRL QC ZIMM -B: Performed by: ORTHOPAEDIC SURGERY

## 2019-08-24 PROCEDURE — 77030008684 HC TU ET CUF COVD -B: Performed by: ANESTHESIOLOGY

## 2019-08-24 PROCEDURE — 74011250636 HC RX REV CODE- 250/636: Performed by: ANESTHESIOLOGY

## 2019-08-24 PROCEDURE — 74011000250 HC RX REV CODE- 250: Performed by: NURSE ANESTHETIST, CERTIFIED REGISTERED

## 2019-08-24 PROCEDURE — 77030006822 HC BLD SAW SAG BRSM -B: Performed by: ORTHOPAEDIC SURGERY

## 2019-08-24 PROCEDURE — 77030040361 HC SLV COMPR DVT MDII -B

## 2019-08-24 PROCEDURE — 77030039267 HC ADH SKN EXOFIN S2SG -B: Performed by: ORTHOPAEDIC SURGERY

## 2019-08-24 PROCEDURE — 77030026438 HC STYL ET INTUB CARD -A: Performed by: ANESTHESIOLOGY

## 2019-08-24 PROCEDURE — 77030002933 HC SUT MCRYL J&J -A: Performed by: ORTHOPAEDIC SURGERY

## 2019-08-24 PROCEDURE — 74011250636 HC RX REV CODE- 250/636: Performed by: NURSE ANESTHETIST, CERTIFIED REGISTERED

## 2019-08-24 PROCEDURE — P9045 ALBUMIN (HUMAN), 5%, 250 ML: HCPCS | Performed by: NURSE ANESTHETIST, CERTIFIED REGISTERED

## 2019-08-24 PROCEDURE — 65270000029 HC RM PRIVATE

## 2019-08-24 PROCEDURE — 74011250637 HC RX REV CODE- 250/637: Performed by: PHYSICIAN ASSISTANT

## 2019-08-24 PROCEDURE — 77030020788: Performed by: ORTHOPAEDIC SURGERY

## 2019-08-24 PROCEDURE — 77030018846 HC SOL IRR STRL H20 ICUM -A: Performed by: ORTHOPAEDIC SURGERY

## 2019-08-24 PROCEDURE — 0SRB03A REPLACEMENT OF LEFT HIP JOINT WITH CERAMIC SYNTHETIC SUBSTITUTE, UNCEMENTED, OPEN APPROACH: ICD-10-PCS | Performed by: ORTHOPAEDIC SURGERY

## 2019-08-24 PROCEDURE — 74011250636 HC RX REV CODE- 250/636

## 2019-08-24 PROCEDURE — 74011000250 HC RX REV CODE- 250: Performed by: ORTHOPAEDIC SURGERY

## 2019-08-24 PROCEDURE — 73501 X-RAY EXAM HIP UNI 1 VIEW: CPT

## 2019-08-24 PROCEDURE — 74011250636 HC RX REV CODE- 250/636: Performed by: PHYSICIAN ASSISTANT

## 2019-08-24 PROCEDURE — 72170 X-RAY EXAM OF PELVIS: CPT

## 2019-08-24 PROCEDURE — 76060000036 HC ANESTHESIA 2.5 TO 3 HR: Performed by: ORTHOPAEDIC SURGERY

## 2019-08-24 PROCEDURE — 77030020365 HC SOL INJ SOD CL 0.9% 50ML: Performed by: ORTHOPAEDIC SURGERY

## 2019-08-24 PROCEDURE — C9290 INJ, BUPIVACAINE LIPOSOME: HCPCS | Performed by: ORTHOPAEDIC SURGERY

## 2019-08-24 PROCEDURE — 77030018836 HC SOL IRR NACL ICUM -A: Performed by: ORTHOPAEDIC SURGERY

## 2019-08-24 PROCEDURE — 74011250636 HC RX REV CODE- 250/636: Performed by: ORTHOPAEDIC SURGERY

## 2019-08-24 PROCEDURE — 77030013079 HC BLNKT BAIR HGGR 3M -A: Performed by: ANESTHESIOLOGY

## 2019-08-24 PROCEDURE — 76210000016 HC OR PH I REC 1 TO 1.5 HR: Performed by: ORTHOPAEDIC SURGERY

## 2019-08-24 PROCEDURE — 77030011640 HC PAD GRND REM COVD -A: Performed by: ORTHOPAEDIC SURGERY

## 2019-08-24 PROCEDURE — 74011000258 HC RX REV CODE- 258: Performed by: ORTHOPAEDIC SURGERY

## 2019-08-24 DEVICE — IMPLANTABLE DEVICE
Type: IMPLANTABLE DEVICE | Site: HIP | Status: FUNCTIONAL
Brand: AVENIR COMPLETE™

## 2019-08-24 DEVICE — IMPLANTABLE DEVICE: Type: IMPLANTABLE DEVICE | Site: HIP | Status: FUNCTIONAL

## 2019-08-24 DEVICE — BIOLOX® DELTA, CERAMIC FEMORAL HEAD, M, Ø 32/0, TAPER 12/14
Type: IMPLANTABLE DEVICE | Site: HIP | Status: FUNCTIONAL
Brand: BIOLOX® DELTA

## 2019-08-24 DEVICE — IMPLANTABLE DEVICE
Type: IMPLANTABLE DEVICE | Site: HIP | Status: FUNCTIONAL
Brand: G7 ACETABULAR LINER

## 2019-08-24 RX ORDER — LIDOCAINE HYDROCHLORIDE 20 MG/ML
INJECTION, SOLUTION EPIDURAL; INFILTRATION; INTRACAUDAL; PERINEURAL
Status: DISCONTINUED | OUTPATIENT
Start: 2019-08-24 | End: 2019-08-24 | Stop reason: HOSPADM

## 2019-08-24 RX ORDER — NALOXONE HYDROCHLORIDE 0.4 MG/ML
0.4 INJECTION, SOLUTION INTRAMUSCULAR; INTRAVENOUS; SUBCUTANEOUS AS NEEDED
Status: DISCONTINUED | OUTPATIENT
Start: 2019-08-24 | End: 2019-08-26 | Stop reason: HOSPADM

## 2019-08-24 RX ORDER — ONDANSETRON 2 MG/ML
INJECTION INTRAMUSCULAR; INTRAVENOUS AS NEEDED
Status: DISCONTINUED | OUTPATIENT
Start: 2019-08-24 | End: 2019-08-24 | Stop reason: HOSPADM

## 2019-08-24 RX ORDER — MIDAZOLAM HYDROCHLORIDE 1 MG/ML
1 INJECTION, SOLUTION INTRAMUSCULAR; INTRAVENOUS AS NEEDED
Status: CANCELLED | OUTPATIENT
Start: 2019-08-24

## 2019-08-24 RX ORDER — SODIUM CHLORIDE 9 MG/ML
125 INJECTION, SOLUTION INTRAVENOUS CONTINUOUS
Status: DISPENSED | OUTPATIENT
Start: 2019-08-24 | End: 2019-08-25

## 2019-08-24 RX ORDER — CELECOXIB 200 MG/1
200 CAPSULE ORAL 2 TIMES DAILY
Status: DISCONTINUED | OUTPATIENT
Start: 2019-08-24 | End: 2019-08-26 | Stop reason: HOSPADM

## 2019-08-24 RX ORDER — MIDAZOLAM HYDROCHLORIDE 1 MG/ML
0.5 INJECTION, SOLUTION INTRAMUSCULAR; INTRAVENOUS
Status: DISCONTINUED | OUTPATIENT
Start: 2019-08-24 | End: 2019-08-24 | Stop reason: HOSPADM

## 2019-08-24 RX ORDER — TRAMADOL HYDROCHLORIDE 50 MG/1
25-50 TABLET ORAL
Status: DISCONTINUED | OUTPATIENT
Start: 2019-08-24 | End: 2019-08-24

## 2019-08-24 RX ORDER — FENTANYL CITRATE 50 UG/ML
50 INJECTION, SOLUTION INTRAMUSCULAR; INTRAVENOUS AS NEEDED
Status: CANCELLED | OUTPATIENT
Start: 2019-08-24

## 2019-08-24 RX ORDER — SODIUM CHLORIDE 0.9 % (FLUSH) 0.9 %
5-40 SYRINGE (ML) INJECTION AS NEEDED
Status: CANCELLED | OUTPATIENT
Start: 2019-08-24

## 2019-08-24 RX ORDER — SODIUM CHLORIDE, SODIUM LACTATE, POTASSIUM CHLORIDE, CALCIUM CHLORIDE 600; 310; 30; 20 MG/100ML; MG/100ML; MG/100ML; MG/100ML
100 INJECTION, SOLUTION INTRAVENOUS CONTINUOUS
Status: CANCELLED | OUTPATIENT
Start: 2019-08-24 | End: 2019-08-25

## 2019-08-24 RX ORDER — FACIAL-BODY WIPES
10 EACH TOPICAL DAILY PRN
Status: DISCONTINUED | OUTPATIENT
Start: 2019-08-26 | End: 2019-08-26 | Stop reason: HOSPADM

## 2019-08-24 RX ORDER — SODIUM CHLORIDE 0.9 % (FLUSH) 0.9 %
5-40 SYRINGE (ML) INJECTION EVERY 8 HOURS
Status: DISCONTINUED | OUTPATIENT
Start: 2019-08-24 | End: 2019-08-26 | Stop reason: HOSPADM

## 2019-08-24 RX ORDER — ONDANSETRON 2 MG/ML
INJECTION INTRAMUSCULAR; INTRAVENOUS
Status: COMPLETED
Start: 2019-08-24 | End: 2019-08-24

## 2019-08-24 RX ORDER — EPHEDRINE SULFATE/0.9% NACL/PF 50 MG/5 ML
SYRINGE (ML) INTRAVENOUS AS NEEDED
Status: DISCONTINUED | OUTPATIENT
Start: 2019-08-24 | End: 2019-08-24 | Stop reason: HOSPADM

## 2019-08-24 RX ORDER — ONDANSETRON 2 MG/ML
4 INJECTION INTRAMUSCULAR; INTRAVENOUS ONCE
Status: COMPLETED | OUTPATIENT
Start: 2019-08-24 | End: 2019-08-24

## 2019-08-24 RX ORDER — LIDOCAINE HYDROCHLORIDE 20 MG/ML
INJECTION, SOLUTION EPIDURAL; INFILTRATION; INTRACAUDAL; PERINEURAL AS NEEDED
Status: DISCONTINUED | OUTPATIENT
Start: 2019-08-24 | End: 2019-08-24 | Stop reason: HOSPADM

## 2019-08-24 RX ORDER — SUCCINYLCHOLINE CHLORIDE 20 MG/ML
INJECTION INTRAMUSCULAR; INTRAVENOUS AS NEEDED
Status: DISCONTINUED | OUTPATIENT
Start: 2019-08-24 | End: 2019-08-24 | Stop reason: HOSPADM

## 2019-08-24 RX ORDER — ROCURONIUM BROMIDE 10 MG/ML
INJECTION, SOLUTION INTRAVENOUS AS NEEDED
Status: DISCONTINUED | OUTPATIENT
Start: 2019-08-24 | End: 2019-08-24 | Stop reason: HOSPADM

## 2019-08-24 RX ORDER — GLYCOPYRROLATE 0.2 MG/ML
INJECTION INTRAMUSCULAR; INTRAVENOUS AS NEEDED
Status: DISCONTINUED | OUTPATIENT
Start: 2019-08-24 | End: 2019-08-24 | Stop reason: HOSPADM

## 2019-08-24 RX ORDER — POLYETHYLENE GLYCOL 3350 17 G/17G
17 POWDER, FOR SOLUTION ORAL DAILY
Status: DISCONTINUED | OUTPATIENT
Start: 2019-08-25 | End: 2019-08-26 | Stop reason: HOSPADM

## 2019-08-24 RX ORDER — DEXAMETHASONE SODIUM PHOSPHATE 4 MG/ML
INJECTION, SOLUTION INTRA-ARTICULAR; INTRALESIONAL; INTRAMUSCULAR; INTRAVENOUS; SOFT TISSUE AS NEEDED
Status: DISCONTINUED | OUTPATIENT
Start: 2019-08-24 | End: 2019-08-24 | Stop reason: HOSPADM

## 2019-08-24 RX ORDER — SODIUM CHLORIDE 0.9 % (FLUSH) 0.9 %
5-40 SYRINGE (ML) INJECTION EVERY 8 HOURS
Status: DISCONTINUED | OUTPATIENT
Start: 2019-08-24 | End: 2019-08-24 | Stop reason: HOSPADM

## 2019-08-24 RX ORDER — DIPHENHYDRAMINE HCL 12.5MG/5ML
12.5 ELIXIR ORAL
Status: DISPENSED | OUTPATIENT
Start: 2019-08-24 | End: 2019-08-25

## 2019-08-24 RX ORDER — ONDANSETRON 2 MG/ML
4 INJECTION INTRAMUSCULAR; INTRAVENOUS
Status: ACTIVE | OUTPATIENT
Start: 2019-08-24 | End: 2019-08-25

## 2019-08-24 RX ORDER — ZOLPIDEM TARTRATE 5 MG/1
5 TABLET ORAL
Status: DISCONTINUED | OUTPATIENT
Start: 2019-08-24 | End: 2019-08-26 | Stop reason: HOSPADM

## 2019-08-24 RX ORDER — MORPHINE SULFATE 2 MG/ML
1 INJECTION, SOLUTION INTRAMUSCULAR; INTRAVENOUS
Status: ACTIVE | OUTPATIENT
Start: 2019-08-24 | End: 2019-08-25

## 2019-08-24 RX ORDER — SODIUM CHLORIDE, SODIUM LACTATE, POTASSIUM CHLORIDE, CALCIUM CHLORIDE 600; 310; 30; 20 MG/100ML; MG/100ML; MG/100ML; MG/100ML
INJECTION, SOLUTION INTRAVENOUS
Status: DISCONTINUED | OUTPATIENT
Start: 2019-08-24 | End: 2019-08-24 | Stop reason: HOSPADM

## 2019-08-24 RX ORDER — ROPIVACAINE HYDROCHLORIDE 5 MG/ML
150 INJECTION, SOLUTION EPIDURAL; INFILTRATION; PERINEURAL AS NEEDED
Status: CANCELLED | OUTPATIENT
Start: 2019-08-24

## 2019-08-24 RX ORDER — ALBUMIN HUMAN 50 G/1000ML
SOLUTION INTRAVENOUS AS NEEDED
Status: DISCONTINUED | OUTPATIENT
Start: 2019-08-24 | End: 2019-08-24 | Stop reason: HOSPADM

## 2019-08-24 RX ORDER — SODIUM CHLORIDE, SODIUM LACTATE, POTASSIUM CHLORIDE, CALCIUM CHLORIDE 600; 310; 30; 20 MG/100ML; MG/100ML; MG/100ML; MG/100ML
100 INJECTION, SOLUTION INTRAVENOUS CONTINUOUS
Status: DISCONTINUED | OUTPATIENT
Start: 2019-08-24 | End: 2019-08-24 | Stop reason: HOSPADM

## 2019-08-24 RX ORDER — SODIUM CHLORIDE 0.9 % (FLUSH) 0.9 %
5-40 SYRINGE (ML) INJECTION EVERY 8 HOURS
Status: CANCELLED | OUTPATIENT
Start: 2019-08-24

## 2019-08-24 RX ORDER — ACETAMINOPHEN 500 MG
1000 TABLET ORAL EVERY 6 HOURS
Status: DISCONTINUED | OUTPATIENT
Start: 2019-08-24 | End: 2019-08-26 | Stop reason: HOSPADM

## 2019-08-24 RX ORDER — SODIUM CHLORIDE 0.9 % (FLUSH) 0.9 %
5-40 SYRINGE (ML) INJECTION AS NEEDED
Status: DISCONTINUED | OUTPATIENT
Start: 2019-08-24 | End: 2019-08-24 | Stop reason: HOSPADM

## 2019-08-24 RX ORDER — ASPIRIN 325 MG
325 TABLET, DELAYED RELEASE (ENTERIC COATED) ORAL 2 TIMES DAILY
Status: DISCONTINUED | OUTPATIENT
Start: 2019-08-24 | End: 2019-08-26 | Stop reason: HOSPADM

## 2019-08-24 RX ORDER — OXYCODONE HYDROCHLORIDE 5 MG/1
5 TABLET ORAL
Status: DISCONTINUED | OUTPATIENT
Start: 2019-08-24 | End: 2019-08-26 | Stop reason: HOSPADM

## 2019-08-24 RX ORDER — PROPOFOL 10 MG/ML
INJECTION, EMULSION INTRAVENOUS
Status: DISCONTINUED | OUTPATIENT
Start: 2019-08-24 | End: 2019-08-24 | Stop reason: HOSPADM

## 2019-08-24 RX ORDER — AMOXICILLIN 250 MG
1 CAPSULE ORAL 2 TIMES DAILY
Status: DISCONTINUED | OUTPATIENT
Start: 2019-08-24 | End: 2019-08-26 | Stop reason: HOSPADM

## 2019-08-24 RX ORDER — DIPHENHYDRAMINE HYDROCHLORIDE 50 MG/ML
12.5 INJECTION, SOLUTION INTRAMUSCULAR; INTRAVENOUS AS NEEDED
Status: DISCONTINUED | OUTPATIENT
Start: 2019-08-24 | End: 2019-08-24 | Stop reason: HOSPADM

## 2019-08-24 RX ORDER — HYDROMORPHONE HYDROCHLORIDE 2 MG/ML
INJECTION, SOLUTION INTRAMUSCULAR; INTRAVENOUS; SUBCUTANEOUS AS NEEDED
Status: DISCONTINUED | OUTPATIENT
Start: 2019-08-24 | End: 2019-08-24 | Stop reason: HOSPADM

## 2019-08-24 RX ORDER — CEFAZOLIN SODIUM/WATER 2 G/20 ML
2 SYRINGE (ML) INTRAVENOUS EVERY 8 HOURS
Status: DISPENSED | OUTPATIENT
Start: 2019-08-24 | End: 2019-08-25

## 2019-08-24 RX ORDER — LIDOCAINE HYDROCHLORIDE 10 MG/ML
0.1 INJECTION, SOLUTION EPIDURAL; INFILTRATION; INTRACAUDAL; PERINEURAL AS NEEDED
Status: CANCELLED | OUTPATIENT
Start: 2019-08-24

## 2019-08-24 RX ORDER — TRAMADOL HYDROCHLORIDE 50 MG/1
50 TABLET ORAL
Status: DISCONTINUED | OUTPATIENT
Start: 2019-08-24 | End: 2019-08-26 | Stop reason: HOSPADM

## 2019-08-24 RX ORDER — FENTANYL CITRATE 50 UG/ML
25 INJECTION, SOLUTION INTRAMUSCULAR; INTRAVENOUS
Status: DISCONTINUED | OUTPATIENT
Start: 2019-08-24 | End: 2019-08-24 | Stop reason: HOSPADM

## 2019-08-24 RX ORDER — SODIUM CHLORIDE 0.9 % (FLUSH) 0.9 %
5-40 SYRINGE (ML) INJECTION AS NEEDED
Status: DISCONTINUED | OUTPATIENT
Start: 2019-08-24 | End: 2019-08-26 | Stop reason: HOSPADM

## 2019-08-24 RX ORDER — PROPOFOL 10 MG/ML
INJECTION, EMULSION INTRAVENOUS AS NEEDED
Status: DISCONTINUED | OUTPATIENT
Start: 2019-08-24 | End: 2019-08-24 | Stop reason: HOSPADM

## 2019-08-24 RX ADMIN — ACETAMINOPHEN 650 MG: 325 TABLET, FILM COATED ORAL at 03:08

## 2019-08-24 RX ADMIN — GLYCOPYRROLATE 0.2 MG: 0.2 INJECTION, SOLUTION INTRAMUSCULAR; INTRAVENOUS at 10:53

## 2019-08-24 RX ADMIN — Medication 2 G: at 10:57

## 2019-08-24 RX ADMIN — ONDANSETRON 4 MG: 2 INJECTION INTRAMUSCULAR; INTRAVENOUS at 13:37

## 2019-08-24 RX ADMIN — GLYCOPYRROLATE 0.2 MG: 0.2 INJECTION, SOLUTION INTRAMUSCULAR; INTRAVENOUS at 10:57

## 2019-08-24 RX ADMIN — SENNOSIDES, DOCUSATE SODIUM 1 TABLET: 50; 8.6 TABLET, FILM COATED ORAL at 18:54

## 2019-08-24 RX ADMIN — TRAMADOL HYDROCHLORIDE 25 MG: 50 TABLET, FILM COATED ORAL at 03:08

## 2019-08-24 RX ADMIN — SODIUM CHLORIDE 0.15 MCG/KG/HR: 900 INJECTION, SOLUTION INTRAVENOUS at 10:49

## 2019-08-24 RX ADMIN — HYDROMORPHONE HYDROCHLORIDE 0.2 MG: 2 INJECTION, SOLUTION INTRAMUSCULAR; INTRAVENOUS; SUBCUTANEOUS at 12:36

## 2019-08-24 RX ADMIN — PROPOFOL 60 MCG/KG/MIN: 10 INJECTION, EMULSION INTRAVENOUS at 10:49

## 2019-08-24 RX ADMIN — Medication 10 MG: at 12:28

## 2019-08-24 RX ADMIN — Medication 5 MG: at 11:46

## 2019-08-24 RX ADMIN — Medication 2 G: at 19:29

## 2019-08-24 RX ADMIN — ACETAMINOPHEN 650 MG: 325 TABLET, FILM COATED ORAL at 10:34

## 2019-08-24 RX ADMIN — TRAMADOL HYDROCHLORIDE 50 MG: 50 TABLET, FILM COATED ORAL at 17:17

## 2019-08-24 RX ADMIN — FENTANYL CITRATE 25 MCG: 50 INJECTION INTRAMUSCULAR; INTRAVENOUS at 13:49

## 2019-08-24 RX ADMIN — TRAMADOL HYDROCHLORIDE 50 MG: 50 TABLET, FILM COATED ORAL at 23:13

## 2019-08-24 RX ADMIN — ALBUMIN (HUMAN) 250 ML: 12.5 INJECTION, SOLUTION INTRAVENOUS at 12:19

## 2019-08-24 RX ADMIN — Medication 5 MG: at 12:01

## 2019-08-24 RX ADMIN — REMIFENTANIL HYDROCHLORIDE 0.05 MCG/KG/MIN: 1 INJECTION, POWDER, LYOPHILIZED, FOR SOLUTION INTRAVENOUS at 10:49

## 2019-08-24 RX ADMIN — DEXAMETHASONE SODIUM PHOSPHATE 8 MG: 4 INJECTION, SOLUTION INTRAMUSCULAR; INTRAVENOUS at 11:09

## 2019-08-24 RX ADMIN — SUGAMMADEX 200 MG: 100 INJECTION, SOLUTION INTRAVENOUS at 12:42

## 2019-08-24 RX ADMIN — DIPHENHYDRAMINE HYDROCHLORIDE 12.5 MG: 12.5 SOLUTION ORAL at 21:09

## 2019-08-24 RX ADMIN — ONDANSETRON HYDROCHLORIDE 4 MG: 2 INJECTION, SOLUTION INTRAMUSCULAR; INTRAVENOUS at 12:19

## 2019-08-24 RX ADMIN — KETAMINE HYDROCHLORIDE 20 MG/HR: 10 INJECTION, SOLUTION INTRAMUSCULAR; INTRAVENOUS at 10:49

## 2019-08-24 RX ADMIN — SODIUM CHLORIDE, POTASSIUM CHLORIDE, SODIUM LACTATE AND CALCIUM CHLORIDE: 600; 310; 30; 20 INJECTION, SOLUTION INTRAVENOUS at 13:04

## 2019-08-24 RX ADMIN — PHENYLEPHRINE HYDROCHLORIDE 20 MCG/MIN: 10 INJECTION INTRAVENOUS at 10:49

## 2019-08-24 RX ADMIN — LIDOCAINE HYDROCHLORIDE 64 MG/HR: 20 INJECTION, SOLUTION EPIDURAL; INFILTRATION; INTRACAUDAL; PERINEURAL at 10:49

## 2019-08-24 RX ADMIN — PROPOFOL 150 MG: 10 INJECTION, EMULSION INTRAVENOUS at 10:49

## 2019-08-24 RX ADMIN — SUGAMMADEX 200 MG: 100 INJECTION, SOLUTION INTRAVENOUS at 12:55

## 2019-08-24 RX ADMIN — ACETAMINOPHEN 1000 MG: 500 TABLET ORAL at 18:36

## 2019-08-24 RX ADMIN — LIDOCAINE HYDROCHLORIDE 100 MG: 20 INJECTION, SOLUTION EPIDURAL; INFILTRATION; INTRACAUDAL; PERINEURAL at 10:49

## 2019-08-24 RX ADMIN — ROCURONIUM BROMIDE 10 MG: 10 SOLUTION INTRAVENOUS at 11:47

## 2019-08-24 RX ADMIN — ACETAMINOPHEN 1000 MG: 500 TABLET ORAL at 23:14

## 2019-08-24 RX ADMIN — SODIUM CHLORIDE 125 ML/HR: 900 INJECTION, SOLUTION INTRAVENOUS at 15:33

## 2019-08-24 RX ADMIN — ALBUMIN (HUMAN) 250 ML: 12.5 INJECTION, SOLUTION INTRAVENOUS at 10:49

## 2019-08-24 RX ADMIN — ROCURONIUM BROMIDE 10 MG: 10 SOLUTION INTRAVENOUS at 11:18

## 2019-08-24 RX ADMIN — SODIUM CHLORIDE, POTASSIUM CHLORIDE, SODIUM LACTATE AND CALCIUM CHLORIDE: 600; 310; 30; 20 INJECTION, SOLUTION INTRAVENOUS at 10:45

## 2019-08-24 RX ADMIN — ASPIRIN 325 MG: 325 TABLET, DELAYED RELEASE ORAL at 18:37

## 2019-08-24 RX ADMIN — CELECOXIB 200 MG: 200 CAPSULE ORAL at 18:36

## 2019-08-24 RX ADMIN — ROCURONIUM BROMIDE 10 MG: 10 SOLUTION INTRAVENOUS at 10:49

## 2019-08-24 RX ADMIN — SODIUM CHLORIDE 8 MCG: 900 INJECTION, SOLUTION INTRAVENOUS at 10:49

## 2019-08-24 RX ADMIN — ROCURONIUM BROMIDE 30 MG: 10 SOLUTION INTRAVENOUS at 10:54

## 2019-08-24 RX ADMIN — SUCCINYLCHOLINE CHLORIDE 120 MG: 20 INJECTION, SOLUTION INTRAMUSCULAR; INTRAVENOUS at 10:49

## 2019-08-24 RX ADMIN — TRANEXAMIC ACID 1 G: 100 INJECTION, SOLUTION INTRAVENOUS at 11:09

## 2019-08-24 NOTE — PROGRESS NOTES
Primary Nurse Alena Hall and Katia Pablo RN, RN performed a dual skin assessment on this patient No impairment noted  Jed score is 19. Bedside and Verbal shift change report given to [address] (oncoming nurse) by Mahin Ramos RN (offgoing nurse). Report included the following information SBAR, Kardex and MAR.

## 2019-08-24 NOTE — PROGRESS NOTES
[facility] FRACTURE PROGRESS NOTE    2019  Admit Date:   2019    Post Op day: Day of Surgery    Subjective:    Rubin Pitt states that she is comfortable at rest. Thinks the tramadol last night helped but not for long enough and would like to try 50mg. Awaiting NIECY for fracture fixation today. No new complaints  NPO at this time. Was cleared for elective surgery scheduled Monday and as such does not require additional clearance at this time.   Denies N/V/SOB or CP     PT/OT:   Gait:                    Vital Signs:    Patient Vitals for the past 8 hrs:   BP Temp Pulse Resp SpO2   19 0904 101/69 97.7 °F (36.5 °C) 67 15 94 %   19 0307 99/59 97.7 °F (36.5 °C) 66 14 91 %     Temp (24hrs), Av.2 °F (36.8 °C), Min:97.7 °F (36.5 °C), Max:99.1 °F (37.3 °C)      Pain Control:   Pain Assessment  Pain Scale 1: Numeric (0 - 10)  Pain Intensity 1: 4  Pain Location 1: Hip  Pain Orientation 1: Left  Pain Description 1: Aching  Pain Intervention(s) 1: Ice    Meds:    Current Facility-Administered Medications   Medication Dose Route Frequency    traMADol (ULTRAM) tablet 25-50 mg  25-50 mg Oral Q6H PRN    morphine injection 1 mg  1 mg IntraVENous Q3H PRN    acetaminophen (TYLENOL) tablet 650 mg  650 mg Oral Q6H    ceFAZolin (ANCEF) 2 g/20 mL in sterile water IV syringe  2 g IntraVENous ON CALL TO OR    sodium chloride (NS) flush 5-40 mL  5-40 mL IntraVENous Q8H    sodium chloride (NS) flush 5-40 mL  5-40 mL IntraVENous PRN    naloxone (NARCAN) injection 0.4 mg  0.4 mg IntraVENous PRN    diphenhydrAMINE (BENADRYL) capsule 25 mg  25 mg Oral Q4H PRN    ondansetron (ZOFRAN ODT) tablet 4 mg  4 mg Oral Q8H PRN    docusate sodium (COLACE) capsule 100 mg  100 mg Oral BID    0.9% sodium chloride infusion  125 mL/hr IntraVENous CONTINUOUS    zolpidem (AMBIEN) tablet 5 mg  5 mg Oral QHS PRN       LAB:    Recent Labs     19  1232   HCT 46.8   HGB 14.9   INR 1.0       Transfuse PRBC's: Assessment & Physician's Comment:  Neurovascular checks within normal limits  Orientation:  Oriented    Active Problems:    Femoral fracture (HCC) (8/23/2019)      Left displaced femoral neck fracture ([address] (8/23/2019)        Plan:    Bed rest  NPO  Tramadol 50mg PRN  Consent signed and on chart  To OR later this morning for NIECY with Dr Corie Falcon PA-C   Orthopedic Trauma Service  [address] [facility]

## 2019-08-24 NOTE — PERIOP NOTES
Patient eager for surgery to be completed. Discussed her preference for no sedation prior and as little pain med as possible for procedure. Patient  initiated that his brother, a retired OB GYN is coming from [place]. Patient stated I'll will see him after surgery and not before. Patient explained how she \"did not want him near her when she delivered her kids and didn't want him near her now. \" Assured her that  and staff are here to help her get ready. Dr Ocampo Due bedside. Patient eager to proceed.

## 2019-08-24 NOTE — ANESTHESIA PREPROCEDURE EVALUATION
Relevant Problems   No relevant active problems       Anesthetic History     PONV          Review of Systems / Medical History  Patient summary reviewed, nursing notes reviewed and pertinent labs reviewed    Pulmonary  Within defined limits                 Neuro/Psych   Within defined limits           Cardiovascular  Within defined limits                     GI/Hepatic/Renal  Within defined limits              Endo/Other        Arthritis     Other Findings              Physical Exam    Airway  Mallampati: II  TM Distance: > 6 cm  Neck ROM: normal range of motion   Mouth opening: Normal     Cardiovascular  Regular rate and rhythm,  S1 and S2 normal,  no murmur, click, rub, or gallop             Dental  No notable dental hx       Pulmonary  Breath sounds clear to auscultation               Abdominal  GI exam deferred       Other Findings            Anesthetic Plan    ASA: 2  Anesthesia type: general          Induction: Intravenous  Anesthetic plan and risks discussed with: Patient

## 2019-08-24 NOTE — PERIOP NOTES
's brother arrived from [place].  wanted to go get him and bring him to pacu. Patient declined. OR team ready. Held  back from going to get his brother and focus on patient and moving into OR.

## 2019-08-24 NOTE — PROGRESS NOTES
TRANSFER - IN REPORT:    Verbal report received from Fay(name) on [facility]  being received from PACU(unit) for routine post - op      Report consisted of patients Situation, Background, Assessment and   Recommendations(SBAR). Information from the following report(s) SBAR, Kardex, Procedure Summary, Intake/Output and MAR was reviewed with the receiving nurse. Opportunity for questions and clarification was provided. Assessment completed upon patients arrival to unit and care assumed. 100

## 2019-08-24 NOTE — OP NOTES
Name: Ethel Varner  MRN:  [id number]  : 1950  Age:  68 y.o. Surgery Date: 2019      OPERATIVE REPORT - LEFT TOTAL HIP ARTHROPLASTY -   ANTERIOR APPROACH    PREOPERATIVE DIAGNOSIS: Osteoarthritis, left hip. Acute femoral neck fracture. POSTOPERATIVE DIAGNOSIS: Osteoarthritis, left hip. Acute femoral neck fracture    PROCEDURE PERFORMED: Left total hip arthroplasty. SURGEON: Nicky Palencia MD    FIRST ASSISTANT: Sigifredo Moy PA-C    ANESTHESIA: General    PRE-OP ANTIBIOTIC: Ancef 2g    COMPLICATIONS: None. ESTIMATED BLOOD LOSS: 300 mL. SPECIMENS REMOVED: None. COMPONENTS IMPLANTED:   Implant Name Type Inv. Item Serial No.  Lot No. LRB No. Used Action   G7 Oolitic Ti Acetabular Shell 3 hole OsseoTI, Cementless   [employer] [id number] Left 1 Implanted   SCR BNE ST 6.5X40MM -- TRILOGY - SNA  SCR BNE ST 6.5X40MM -- TRILOGY NA [employer] [id number] Left 1 Implanted   G7 Acetabular liner neutral   [employer] [id number] Left 1 Implanted   bone screw 6.5 x 30mm   [employer] [id number] Left 1 Implanted   HEAD FEM DELT CERAMIC 32MM +0 -- BIOLOX - SNA  HEAD FEM DELT CERAMIC 32MM +0 -- BIOLOX NA [employer] [id number] Left 1 Implanted   Femerol stem cementless collarded standard offset 12/14 taper size 4   [employer] [id number] Left 1 Implanted       INDICATIONS: The patient is an 68 yrs female with progressive debilitating left hip and groin pain due to severe osteoarthritis. Unfortunately she fell yesterday and broke the hip. Risks, benefits, alternatives of the procedure were reviewed in detail and he desires to proceed. The patient understands the increased risk for perioperative medical complications due to medical comorbidities. DESCRIPTION OF PROCEDURE: Anesthetic was initiated. Preoperative dose of IV antibiotic was given. Foss catheter was not placed. The left side was confirmed as the operative side, prepped and draped in the usual sterile fashion.  Skin was covered with Ioban occlusive dressing. Direct anterior exposure was made to the patient's hip through the sartorius tensor interval. Anterior hip vasculature was cauterized. Retractors were taken out to observe for bleeding and there was none. The capsule was identified, opened and T'd distally. Gross hematoma was found in the hip joint. Also had end stage bone on bone DJD. The femoral neck was osteotomized. Femoral head was removed from the acetabulum, which was exposed and soft tissues were removed. The acetabulum was progressively  reamed to 47 mm and a 48 mm trial shell was impacted with good press-fit. This was removed and a 48 mm Depuy shell was impacted in the acetabulum in 40 degrees of abduction in an anatomic-type anteversion. Bone spurs were removed and 6.5 screws x2 were placed. The polyethylene liner was placed. Femur was positioned and elevated from the wound. The medullary canal was entered. Flexible reamers were not utilized as the patient did not have a narrowed femoral canal, broached to a size 4. Calcar planed and then trialed. A 32 mm , +0 hip ball was the most appropriate for leg length and tension with a standard offset stem. The hip was dislocated. The anterior greater trochanter was trimmed down to enhance flexion, rotation and stability. The trial was removed and the real stem was impacted. The real hip ball was placed. The hip was reduced. After copious irrigation, the capsule was closed with #2 Vicryl sutures. I irrigated the skin, subcutaneous and deep wound. I closed the fascia of the tensor fascia ranjit with #2 Vicryl sutures. Skin and subcutaneous were irrigated. Soft tissues were infiltrated with local anesthetic. Skin and subcutaneous were closed in a standard fashion. Sterile dressing was applied. There were no complications. No specimen was sent. The procedure was a LEFT TOTAL HIP REPLACEMENT using a Paul total hip construct.   The patient was transferred to the recovery room in stable condition. Clara Mcneal PA-C was critical throughout the case to assist with positioning, retraction and closure. There were no other available residents or surgical assistants to assist during this procedure.     Jesi Fernandez MD

## 2019-08-24 NOTE — PERIOP NOTES
TRANSFER - OUT REPORT:    Verbal report given to [employer]) on Lali Deleon  being transferred to (unit) for routine post - op       Report consisted of patients Situation, Background, Assessment and   Recommendations(SBAR). Time Pre op antibiotic given:1057  Anesthesia Stop time:1315    Information from the following report(s) SBAR, OR Summary, MAR and Med Rec Status was reviewed with the receiving nurse. Opportunity for questions and clarification was provided. Is the patient on 02? NO       L/Min        Other     Is the patient on a monitor? NO    Is the nurse transporting with the patient? NO    Surgical Waiting Area notified of patient's transfer from PACU?  YES, family sent to room      The following personal items collected during your admission accompanied patient upon transfer:   Dental Appliance: Dental Appliances: None  Vision: Visual Aid: None  Hearing Aid:    Jewelry:    Clothing:    Other Valuables:    Valuables sent to safe:

## 2019-08-24 NOTE — PERIOP NOTES
called. Updated on status.  outside of pacu doors on phone. Patient emerging from anesthesia. Will call if needed.

## 2019-08-24 NOTE — ANESTHESIA POSTPROCEDURE EVALUATION
Procedure(s):  LEFT HIP ARTHROPLASTY TOTAL ANTERIOR APPROACH. general    Anesthesia Post Evaluation      Multimodal analgesia: multimodal analgesia used between 6 hours prior to anesthesia start to PACU discharge  Patient location during evaluation: bedside  Patient participation: waiting for patient participation  Level of consciousness: awake  Pain management: adequate  Airway patency: patent  Anesthetic complications: no  Cardiovascular status: acceptable  Respiratory status: unassisted  Hydration status: acceptable  Comments: Post-Anesthesia Evaluation and Assessment    I have evaluated the patient and they are ready for PACU discharge. Patient: Herlinda Galvan MRN: [id number]  SSN: [id number]   YOB: 1950  Age: 68 y.o. Sex: female      Cardiovascular Function/Vital Signs  /55 (BP 1 Location: Left arm, BP Patient Position: At rest)   Pulse 77   Temp 36.6 °C (97.9 °F)   Resp 22   Ht 5' 3.5\" (1.613 m)   Wt 88.5 kg (195 lb)   SpO2 93%   BMI 34.00 kg/m²     Patient is status post General anesthesia for Procedure(s):  LEFT HIP ARTHROPLASTY TOTAL ANTERIOR APPROACH. Nausea/Vomiting: None    Postoperative hydration reviewed and adequate. Pain:  Pain Scale 1: Numeric (0 - 10) (08/24/19 1318)  Pain Intensity 1: 0 (08/24/19 1318)   Managed    Neurological Status:   Neuro (WDL): Exceptions to WDL (08/24/19 1318)  Neuro  Neurologic State: Drowsy (08/24/19 1318)  Orientation Level: Oriented X4 (08/23/19 2110)  Cognition: Appropriate decision making; Appropriate for age attention/concentration; Appropriate safety awareness; Follows commands (08/23/19 2110)  Speech: Clear (08/23/19 2110)  LUE Motor Response: Purposeful (08/24/19 1318)  LLE Motor Response: Purposeful (08/24/19 1318)  RUE Motor Response: Purposeful (08/24/19 1318)  RLE Motor Response: Purposeful (08/24/19 1318)   At baseline    Mental Status, Level of Consciousness: Alert and  oriented to person, place, and time    Pulmonary Status:   O2 Device: Nasal cannula (08/24/19 1325)   Adequate oxygenation and airway patent    Complications related to anesthesia: None    Post-anesthesia assessment completed. No concerns    Signed By: Ebony Clay MD    August 24, 2019                   Vitals Value Taken Time   /55 8/24/2019  1:25 PM   Temp     Pulse 71 8/24/2019  1:30 PM   Resp 17 8/24/2019  1:30 PM   SpO2 93 % 8/24/2019  1:30 PM   Vitals shown include unvalidated device data.

## 2019-08-24 NOTE — PROGRESS NOTES
Bedside and Verbal shift change report given to Rowan Germain RN (oncoming nurse) by Samuel Decker RN (offgoing nurse). Report included the following information SBAR, Kardex, Intake/Output, MAR and Recent Results.

## 2019-08-24 NOTE — PROGRESS NOTES
TRANSFER - IN REPORT:    Verbal report received from [facility] RN (name) on Kristal Skelton  being received from ED(unit) for routine progression of care      Report consisted of patients Situation, Background, Assessment and   Recommendations(SBAR). Information from the following report(s) SBAR, Kardex and MAR was reviewed with the receiving nurse. Opportunity for questions and clarification was provided. Assessment completed upon patients arrival to unit and care assumed.

## 2019-08-25 LAB
ANION GAP SERPL CALC-SCNC: 5 MMOL/L (ref 5–15)
BUN SERPL-MCNC: 11 MG/DL (ref 6–20)
BUN/CREAT SERPL: 20 (ref 12–20)
CALCIUM SERPL-MCNC: 8.4 MG/DL (ref 8.5–10.1)
CHLORIDE SERPL-SCNC: 115 MMOL/L (ref 97–108)
CO2 SERPL-SCNC: 22 MMOL/L (ref 21–32)
CREAT SERPL-MCNC: 0.55 MG/DL (ref 0.55–1.02)
GLUCOSE SERPL-MCNC: 141 MG/DL (ref 65–100)
HGB BLD-MCNC: 9.8 G/DL (ref 11.5–16)
POTASSIUM SERPL-SCNC: 4.1 MMOL/L (ref 3.5–5.1)
SODIUM SERPL-SCNC: 142 MMOL/L (ref 136–145)

## 2019-08-25 PROCEDURE — 97165 OT EVAL LOW COMPLEX 30 MIN: CPT

## 2019-08-25 PROCEDURE — 80048 BASIC METABOLIC PNL TOTAL CA: CPT

## 2019-08-25 PROCEDURE — 36415 COLL VENOUS BLD VENIPUNCTURE: CPT

## 2019-08-25 PROCEDURE — 74011250637 HC RX REV CODE- 250/637: Performed by: PHYSICIAN ASSISTANT

## 2019-08-25 PROCEDURE — 65270000029 HC RM PRIVATE

## 2019-08-25 PROCEDURE — 97530 THERAPEUTIC ACTIVITIES: CPT

## 2019-08-25 PROCEDURE — 85018 HEMOGLOBIN: CPT

## 2019-08-25 PROCEDURE — 97161 PT EVAL LOW COMPLEX 20 MIN: CPT

## 2019-08-25 PROCEDURE — 97116 GAIT TRAINING THERAPY: CPT

## 2019-08-25 RX ADMIN — TRAMADOL HYDROCHLORIDE 50 MG: 50 TABLET, FILM COATED ORAL at 22:51

## 2019-08-25 RX ADMIN — ACETAMINOPHEN 1000 MG: 500 TABLET ORAL at 06:57

## 2019-08-25 RX ADMIN — ACETAMINOPHEN 1000 MG: 500 TABLET ORAL at 13:14

## 2019-08-25 RX ADMIN — SENNOSIDES, DOCUSATE SODIUM 1 TABLET: 50; 8.6 TABLET, FILM COATED ORAL at 09:27

## 2019-08-25 RX ADMIN — TRAMADOL HYDROCHLORIDE 50 MG: 50 TABLET, FILM COATED ORAL at 09:27

## 2019-08-25 RX ADMIN — ACETAMINOPHEN 1000 MG: 500 TABLET ORAL at 19:03

## 2019-08-25 RX ADMIN — POLYETHYLENE GLYCOL 3350 17 G: 17 POWDER, FOR SOLUTION ORAL at 09:28

## 2019-08-25 RX ADMIN — ASPIRIN 325 MG: 325 TABLET, DELAYED RELEASE ORAL at 09:27

## 2019-08-25 RX ADMIN — SENNOSIDES, DOCUSATE SODIUM 1 TABLET: 50; 8.6 TABLET, FILM COATED ORAL at 17:20

## 2019-08-25 RX ADMIN — CELECOXIB 200 MG: 200 CAPSULE ORAL at 17:20

## 2019-08-25 RX ADMIN — Medication 10 ML: at 14:12

## 2019-08-25 RX ADMIN — TRAMADOL HYDROCHLORIDE 50 MG: 50 TABLET, FILM COATED ORAL at 16:13

## 2019-08-25 RX ADMIN — ASPIRIN 325 MG: 325 TABLET, DELAYED RELEASE ORAL at 17:20

## 2019-08-25 RX ADMIN — CELECOXIB 200 MG: 200 CAPSULE ORAL at 09:27

## 2019-08-25 NOTE — PROGRESS NOTES
Patient assessed for readiness to ambulate. Vital Signs  Level of Consciousness: Alert  Temp: 97.9 °F (36.6 °C)  Temp Source: Oral  Pulse (Heart Rate): 93  Heart Rate Source: Monitor  Cardiac Rhythm: Normal sinus rhythm  Resp Rate: 16  BP: 97/62  MAP (Monitor): 65  MAP (Calculated): 74  BP 1 Location: Left arm  BP 1 Method: Automatic  BP Patient Position: At rest  MEWS Score: 2. Patient ambulated with assistance of 1 nurses. Patient ambulated with gait belt and walker. Patient walked to sidestepped to Head of Bed. Patient returned safely to bed.

## 2019-08-25 NOTE — PROGRESS NOTES
Problem: Mobility Impaired (Adult and Pediatric)  Goal: *Acute Goals and Plan of Care (Insert Text)  Description  FUNCTIONAL STATUS PRIOR TO ADMISSION: Patient was independent and active without use of DME.    HOME SUPPORT PRIOR TO ADMISSION: The patient lived with  but did not require assist.    Physical Therapy Goals  Initiated 8/25/2019    1. Patient will move from supine to sit and sit to supine  and roll side to side in bed with modified independence within 4 days. 2. Patient will perform sit to stand with modified independence within 4 days. 3. Patient will ambulate with modified independence for 150 feet with the least restrictive device within 4 days. 4. Patient will ascend/descend 4 stairs with 1 handrail(s) with modified independence within 4 days. Outcome: Progressing Towards Goal   PHYSICAL THERAPY EVALUATION  Patient: Jesus Champion (64 y.o. female)  Date: 8/25/2019  Primary Diagnosis: Femoral fracture (HCC) [S72.90XA]  Left displaced femoral neck fracture ([address] [S72.002A]  Procedure(s) (LRB):  LEFT HIP ARTHROPLASTY TOTAL ANTERIOR APPROACH (Left) 1 Day Post-Op   Precautions:   Fall, WBAT      ASSESSMENT  Based on the objective data described below, the patient presents with decreased mobility after GLF with L hip fx and NIECY repair. She was able to mobilize well with the RW and had good overall pain control and VSS. She was initially scheduled for a R TKA tomorrow but will have to postpone that. She has support from  at home and has a walker already. Expect that she will progress well with her mobility and be able to return home as soon as medically ready. We will follow up for stair training this afternoon if she is able. Current Level of Function Impacting Discharge (mobility/balance): CGA for short distance ambulation    Functional Outcome Measure:   The patient scored 50/100 on the Barthel Index outcome measure which is indicative of 50% limitation in basic mobility and self care. Other factors to consider for discharge: none     Patient will benefit from skilled therapy intervention to address the above noted impairments. PLAN :  Recommendations and Planned Interventions: bed mobility training, transfer training, gait training, therapeutic exercises, patient and family training/education and therapeutic activities      Frequency/Duration: Patient will be followed by physical therapy:  twice daily to address goals. Recommendation for discharge: (in order for the patient to meet his/her long term goals)  Physical therapy at least 2 days/week in the home     This discharge recommendation:  A follow-up discussion with the attending provider and/or case management is planned    Equipment recommendations for successful discharge (if) home: none         SUBJECTIVE:   Patient stated I would like to go home tomorrow.     OBJECTIVE DATA SUMMARY:   HISTORY:    Past Medical History:   Diagnosis Date    Actinic keratosis     Left arm    Adjustment disorder with mixed anxiety and depressed mood     Adverse effect of anesthesia     HARD TO WAKE UP SLOW BREATHING ,Pt stated doesn't take much anesthesia    Arthritis     OSTEO    Chronic pain     Diverticulitis     Kidney stones 95,96,98    left    Left knee DJD     Lumbar nerve root impingement     RLQ pain    Menopause     LMP-53years old    Nausea & vomiting     Other and unspecified hyperlipidemia     Other idiopathic scoliosis, thoracolumbar region 2018    Postmenopausal HRT (hormone replacement therapy)     Right knee DJD      Past Surgical History:   Procedure Laterality Date    COLONOSCOPY N/A 2016    COLONOSCOPY performed by Joanie Jackson MD at [address] Right 2019    HX CATARACT REMOVAL Left 2019    HX  SECTION  9688,9209    X2    HX GI      COLONOSCOPY    HX HYSTERECTOMY      LMP-53years old    HX KNEE ARTHROSCOPY Bilateral 1966    SCOPE of knnees bilateral    HX LITHOTRIPSY      X 2    HX LUMBAR LAMINECTOMY  2000    X2    HX MENISCECTOMY  1990    left knee    HX ORTHOPAEDIC  12/24/12    left shoulder repair; 14 screws and 2 plates    HX ORTHOPAEDIC Right     BROKEN ARM SURGERY X2    HX ORTHOPAEDIC Left      REPAIR OF Left MENISCUS    HX ORTHOPAEDIC Left     BROKEN ARM    HX POLYPECTOMY  2013    HX TONSILLECTOMY  1956    HX TUBAL LIGATION  1981    BSPO adhesion conization abn pap    TOTAL KNEE ARTHROPLASTY Left 2016       Personal factors and/or comorbidities impacting plan of care: L hip fx, R knee pain/OA    Home Situation  Home Environment: Private residence  # Steps to Enter: 4  Rails to Enter: Yes  Hand Rails : Bilateral  One/Two Story Residence: Two story, live on 1st floor  Living Alone: No  Support Systems: Spouse/Significant Other/Partner  Patient Expects to be Discharged to[de-identified] Private residence  Current DME Used/Available at Home: Walker, rolling, [address] [place] [address], Grab bars, Shower chair(built in shower chair)  Tub or Shower Type: Shower    EXAMINATION/PRESENTATION/DECISION MAKING:   Critical Behavior:  Neurologic State: Alert  Orientation Level: Oriented X4  Cognition: Appropriate for age attention/concentration  Safety/Judgement: Insight into deficits  Hearing:   Auditory  Auditory Impairment: None  Skin:  post op bandage in place  Edema: note some mild edema overall, patient notes her hands and face feel \"puffy\"  Range Of Motion:  AROM: Within functional limits                       Strength:    Strength: Generally decreased, functional                    Tone & Sensation:   Tone: Normal              Sensation: Intact               Coordination:  Coordination: Within functional limits  Vision:   Corrective Lenses: Reading glasses  Functional Mobility:  Bed Mobility:              Transfers:  Sit to Stand: Contact guard assistance  Stand to Sit: Contact guard assistance        Bed to Chair: Contact guard assistance              Balance:   Sitting: Intact; Without support  Standing: Intact; With support(able to manage clothing in standing, hands on walker)  Standing - Static: Fair  Standing - Dynamic : Fair  Ambulation/Gait Training:  Distance (ft): 110 Feet (ft)  Assistive Device: Gait belt;Walker, rolling  Ambulation - Level of Assistance: Stand-by assistance; Adaptive equipment; Additional time        Gait Abnormalities: Antalgic;Decreased step clearance  Right Side Weight Bearing: Full  Left Side Weight Bearing: As tolerated  Base of Support: Widened  Stance: Left decreased  Speed/Deborah: Pace decreased (<100 feet/min)  Step Length: Left shortened;Right shortened  Swing Pattern: Left asymmetrical     Interventions: Safety awareness training; Tactile cues; Verbal cues; Visual/Demos            Stairs: Therapeutic Exercises:       Functional Measure:  Barthel Index:    Bathin  Bladder: 10  Bowels: 5  Groomin  Dressin  Feeding: 10  Mobility: 0  Stairs: 0  Toilet Use: 5  Transfer (Bed to Chair and Back): 10  Total: 50/100       The Barthel ADL Index: Guidelines  1. The index should be used as a record of what a patient does, not as a record of what a patient could do. 2. The main aim is to establish degree of independence from any help, physical or verbal, however minor and for whatever reason. 3. The need for supervision renders the patient not independent. 4. A patient's performance should be established using the best available evidence. Asking the patient, friends/relatives and nurses are the usual sources, but direct observation and common sense are also important. However direct testing is not needed. 5. Usually the patient's performance over the preceding 24-48 hours is important, but occasionally longer periods will be relevant. 6. Middle categories imply that the patient supplies over 50 per cent of the effort. 7. Use of aids to be independent is allowed.     Mady Manriquez., Barthel, XIN (1965). Functional evaluation: the Barthel Index. [address] [facility] (14)2. ANGEL Dougherty Marica Said., Dyann Blossom., Joya, [address] (1999). Measuring the change indisability after inpatient rehabilitation; comparison of the responsiveness of the Barthel Index and Functional McCaulley Measure. Journal of Neurology, Neurosurgery, and Psychiatry, 66(4), 299-777. JOAQUIN Foster, ESTEFANÍA Heard, & Dilip Ribera M.A. (2004.) Assessment of post-stroke quality of life in cost-effectiveness studies: The usefulness of the Barthel Index and the EuroQoL-5D. Quality of Life Research, 15, 137-43           Physical Therapy Evaluation Charge Determination   History Examination Presentation Decision-Making   MEDIUM  Complexity : 1-2 comorbidities / personal factors will impact the outcome/ POC  MEDIUM Complexity : 3 Standardized tests and measures addressing body structure, function, activity limitation and / or participation in recreation  LOW Complexity : Stable, uncomplicated  LOW Complexity : FOTO score of       Based on the above components, the patient evaluation is determined to be of the following complexity level: LOW     Pain Rating: Moderate pain with mobility, RN aware patient would like pain meds    Activity Tolerance:   Good  Please refer to the flowsheet for vital signs taken during this treatment. After treatment patient left in no apparent distress:   Sitting in chair and Call bell within reach    COMMUNICATION/EDUCATION:   The patients plan of care was discussed with: Occupational Therapist and Registered Nurse. Fall prevention education was provided and the patient/caregiver indicated understanding., Patient/family have participated as able in goal setting and plan of care. and Patient/family agree to work toward stated goals and plan of care.     Thank you for this referral.  Danilo Pinzon, PT   Time Calculation: 26 mins

## 2019-08-25 NOTE — PROGRESS NOTES
Problem: Mobility Impaired (Adult and Pediatric)  Goal: *Acute Goals and Plan of Care (Insert Text)  Description  FUNCTIONAL STATUS PRIOR TO ADMISSION: Patient was independent and active without use of DME.    HOME SUPPORT PRIOR TO ADMISSION: The patient lived with  but did not require assist.    Physical Therapy Goals  Initiated 8/25/2019    1. Patient will move from supine to sit and sit to supine  and roll side to side in bed with modified independence within 4 days. 2. Patient will perform sit to stand with modified independence within 4 days. 3. Patient will ambulate with modified independence for 150 feet with the least restrictive device within 4 days. 4. Patient will ascend/descend 4 stairs with 1 handrail(s) with modified independence within 4 days. 8/25/2019 1300 by Chang Gandara PT  Outcome: Progressing Towards Goal   PHYSICAL THERAPY TREATMENT  Patient: Madeleine Mittal (2 y.o. female)  Date: 8/25/2019  Diagnosis: Femoral fracture ([address] [S72.90XA]  Left displaced femoral neck fracture ([address] [S72.002A] <principal problem not specified>  Procedure(s) (LRB):  LEFT HIP ARTHROPLASTY TOTAL ANTERIOR APPROACH (Left) 1 Day Post-Op  Precautions: Fall, WBAT  Chart, physical therapy assessment, plan of care and goals were reviewed. ASSESSMENT  Based on the objective data described below, patient is making good progress with her mobility and pain control remains very good. She was able to mobilize OOB using the strap to assist the LLE and ambulated well with the RW. She was trained on stair management and was able to asc/desc 4 steps with supervision without issues. Her mobility is very slow compared to her baseline, but she demonstrates good balance with the RW. She did void during therapy session, but it was concentrated dark urine and only about 50cc. RN is aware. Patient is clear for D/C home from a PT standpoint once medically ready.     Current Level of Function Impacting Discharge (mobility/balance): supervision to modified independent with household distance ambulation    Other factors to consider for discharge: patient has not voided much yet         PLAN :  Patient continues to benefit from skilled intervention to address the above impairments. Continue treatment per established plan of care. to address goals. Recommendation for discharge: (in order for the patient to meet his/her long term goals)  Physical therapy at least 2 days/week in the home     This discharge recommendation:  Has been made in collaboration with the attending provider and/or case management    Equipment recommendations for successful discharge (if) home: none       SUBJECTIVE:   Patient stated I feel good, just a little stiffness in the quad.     OBJECTIVE DATA SUMMARY:   Critical Behavior:  Neurologic State: Alert  Orientation Level: Oriented X4  Cognition: Appropriate for age attention/concentration  Safety/Judgement: Insight into deficits  Functional Mobility Training:  Bed Mobility:     Supine to Sit: Modified independent; Adaptive equipment; Additional time(using strap to assist LLE)  Sit to Supine: Stand-by assistance; Adaptive equipment; Additional time(using strap to assist LLE)           Transfers:  Sit to Stand: Modified independent; Adaptive equipment; Additional time  Stand to Sit: Modified independent; Adaptive equipment; Additional time        Bed to Chair: Adaptive equipment; Additional time;Supervision(for safety)                    Balance:  Sitting: Intact; Without support  Standing: Intact; With support(able to manage clothing in standing, hands on walker)  Standing - Static: Fair  Standing - Dynamic : Fair  Ambulation/Gait Training:  Distance (ft): 110 Feet (ft)  Assistive Device: Gait belt;Walker, rolling  Ambulation - Level of Assistance: Modified independent; Adaptive equipment; Additional time        Gait Abnormalities: Antalgic;Decreased step clearance  Right Side Weight Bearing: Full  Left Side Weight Bearing: As tolerated  Base of Support: Widened  Stance: Left decreased  Speed/Deborah: Pace decreased (<100 feet/min)  Step Length: Left shortened;Right shortened  Swing Pattern: Left asymmetrical     Interventions: Safety awareness training; Tactile cues; Verbal cues; Visual/Demos           Stairs:  Number of Stairs Trained: 4  Stairs - Level of Assistance: Supervision; Additional time(for safety)   Rail Use: Both    Therapeutic Exercises: Ankle pumps, glut and quad sets, heel slides  Pain Rating:  No pain once returned to bed, reports ice is helpful for pain control    Activity Tolerance:   Good  Please refer to the flowsheet for vital signs taken during this treatment.     After treatment patient left in no apparent distress:   Supine in bed, Call bell within reach and Side rails x 3    COMMUNICATION/COLLABORATION:   The patients plan of care was discussed with: Occupational Therapist and Registered Nurse    Manuel Perea PT   Time Calculation: 30 mins

## 2019-08-25 NOTE — PROGRESS NOTES
Patient called to report feeling as if she was having a reaction to the 1900 Ancef that she was given IV. Upon assessment, patient was flushed in the face with mild facial edema. PRN benadryl given and [address] [facility] [address], [place] was notified. 2200: Patient reported feeling no relief with the benadryl, I provided patient with a wet cloth to soothe face and [address] [facility] [address], [place] was notified. Per Dr. Ruby Gore and [address] [place] [address] [place] it was okay to hold the 0300 dose of Ancef. 2330: Patient reported feeling much better and was able to rest comfortable, upon assessment patient's facial swelling and redness had improved.

## 2019-08-25 NOTE — PROGRESS NOTES
Care Management Interventions  PCP Verified by CM: Helga Meigs MD)  Transition of Care Consult (CM Consult): [address]: No  Reason Outside [place]: Patient already serviced by other home care/hospice agency  [place] Signup: No  Discharge Durable Medical Equipment: No  Physical Therapy Consult: Yes  Occupational Therapy Consult: Yes  Speech Therapy Consult: No  Current Support Network: Lives with Spouse  Confirm Follow Up Transport: Family  Plan discussed with Pt/Family/Caregiver: Yes  Freedom of Choice Offered: Yes  Discharge Location  Discharge Placement: Home with home health    Reason for Admission:   Left femoral fracture                                              S/P left total hip arthroplasty/anterilor approach                   RRAT Score:            10           Plan for utilizing home [facility]                    Current Advanced Directive/Advance Care Plan: On file                         Transition of Care Plan:                    CM met with patient. Patient lives with her . They have 2 adult sons in [place] [address]. Patient reports good family /social support. Patient was ambulatory prior to admission and expects return to independent functioning. Patient confirmed PCP, health insurance, and prescription coverage. Transport at discharge will be provided by family. CM received consult for home health services. CM offered patient choice of providers. Patient selected [facility] (previous positive experience) and signed Freedom of Choice form which CM placed on chart. CM sent referral via [employer] to [facility], and response is pending. CM follow up. CM received Yes response to referral to [facility].  CM upated AVS.

## 2019-08-25 NOTE — PROGRESS NOTES
Bedside shift change report given to OCTAVIA Benjamni (oncoming nurse) by Marisa Kerr RN (offgoing nurse). Report included the following information SBAR, Kardex and MAR.

## 2019-08-25 NOTE — PROGRESS NOTES
Problem: Self Care Deficits Care Plan (Adult)  Goal: *Acute Goals and Plan of Care (Insert Text)  Description  FUNCTIONAL STATUS PRIOR TO ADMISSION: Patient was independent and active without use of DME.    HOME SUPPORT: The patient lived with  but did not require assist.    Occupational Therapy Goals  Initiated 8/25/2019  1. Patient will perform lower body ADLs with AE supervision/set-up within 4 day(s). 2.  Patient will perform upper body ADLs standing 5 mins without fatigue or LOB with supervision/set-up within 4 day(s). 3.  Patient will perform toilet transfer with supervision/set-up within 4 day(s). 4.  Patient will perform all aspects of toileting with supervision/set-up within 4 day(s). 5.  Patient will participate in upper extremity therapeutic exercise/activities with supervision/set-up for 10 minutes within 4 day(s). 6.  Patient will utilize energy conservation techniques during functional activities without cues within 4 day(s). Outcome: Progressing Towards Goal   OCCUPATIONAL THERAPY EVALUATION  Patient: Sandra Coello (51 y.o. female)  Date: 8/25/2019  Primary Diagnosis: Femoral fracture (HCC) [S72.90XA]  Left displaced femoral neck fracture ([address] [S72.002A]  Procedure(s) (LRB):  LEFT HIP ARTHROPLASTY TOTAL ANTERIOR APPROACH (Left) 1 Day Post-Op   Precautions:WBAT, Fall    ASSESSMENT  Based on the objective data described below, the patient presents with generalized weakness, decreased ROM LLE, impaired standing balance and decreased endurance (O2 88%-96% on RA requiring vc for PLB) s/p L NIECY POD 1. Patient largely CGA for functional mobility at this time however will require education on compensatory strategies for LB ADLs due to decreased LLE ROM, functional reach, and dexterity. Patient reporting she has a supportive  who can assist her as needed post discharge.      Current Level of Function Impacting Discharge (ADLs/self-care): MOD A LB ADLs    Functional Outcome Measure: The patient scored 50/100 on the Barthel Index outcome measure which is indicative of 50% ADL impairment. Other factors to consider for discharge: patient stating she was scheduled for a knee sx prior to [facility] resulting in hip fx for this admission     Patient will benefit from skilled therapy intervention to address the above noted impairments. PLAN :  Recommendations and Planned Interventions: self care training, functional mobility training, therapeutic exercise, balance training, therapeutic activities, endurance activities, patient education, home safety training and family training/education    Frequency/Duration: Patient will be followed by occupational therapy 5 times a week to address goals. Recommendation for discharge: (in order for the patient to meet his/her long term goals)  Occupational therapy at least 2 days/week in the home     This discharge recommendation:  Has been made in collaboration with the attending provider and/or case management    Equipment recommendations for successful discharge (if) home: TBD        SUBJECTIVE:   Patient stated Elizabeth Jaquez was supposed to get my knee done Monday.     OBJECTIVE DATA SUMMARY:   HISTORY:   Past Medical History:   Diagnosis Date    Actinic keratosis     Left arm    Adjustment disorder with mixed anxiety and depressed mood     Adverse effect of anesthesia     HARD TO WAKE UP SLOW BREATHING ,Pt stated doesn't take much anesthesia    Arthritis     OSTEO    Chronic pain     Diverticulitis     Kidney stones 95,96,98    left    Left knee DJD     Lumbar nerve root impingement     RLQ pain    Menopause     LMP-53years old    Nausea & vomiting     Other and unspecified hyperlipidemia     Other idiopathic scoliosis, thoracolumbar region 2/27/2018    Postmenopausal HRT (hormone replacement therapy)     Right knee DJD      Past Surgical History:   Procedure Laterality Date    COLONOSCOPY N/A 9/8/2016    COLONOSCOPY performed by Marcos Greenberg MD at [facility] ENDOSCOPY    HX ADENOIDECTOMY  1956    HX APPENDECTOMY  1966    HX CATARACT REMOVAL Right 2019    HX CATARACT REMOVAL Left 2019    HX  SECTION  4480,4613    X2    HX GI      COLONOSCOPY    HX HYSTERECTOMY  2009    LMP-53years old    HX KNEE ARTHROSCOPY Bilateral 1966    SCOPE of knnees bilateral    HX LITHOTRIPSY      X 2    HX LUMBAR LAMINECTOMY  2000    X2    HX MENISCECTOMY      left knee    HX ORTHOPAEDIC  12    left shoulder repair; 14 screws and 2 plates    HX ORTHOPAEDIC Right     BROKEN ARM SURGERY X2    HX ORTHOPAEDIC Left      REPAIR OF Left MENISCUS    HX ORTHOPAEDIC Left     BROKEN ARM    HX POLYPECTOMY  2013    HX TONSILLECTOMY  1956    HX TUBAL LIGATION  1981    BSPO adhesion conization abn pap    TOTAL KNEE ARTHROPLASTY Left 2016       Expanded or extensive additional review of patient history:     Home Situation  Home Environment: Private residence  # Steps to Enter: 4  Rails to Enter: Yes  Hand Rails : Bilateral  One/Two Story Residence: Two story, live on 1st floor  Living Alone: No  Support Systems: Spouse/Significant Other/Partner  Patient Expects to be Discharged to[de-identified] Private residence  Current DME Used/Available at Home: taina Kang, [address], [employer] bars, Shower chair(built in shower chair)  Tub or Shower Type: Shower    EXAMINATION OF PERFORMANCE DEFICITS:  Cognitive/Behavioral Status:  Neurologic State: Alert  Orientation Level: Oriented X4  Cognition: Appropriate for age attention/concentration  Perception: Appears intact  Perseveration: No perseveration noted  Safety/Judgement: Insight into deficits    Skin: aquacel dressing on LLE intact    Edema: LLE (slightly)    Hearing:   Auditory  Auditory Impairment: None    Vision/Perceptual:                                Corrective Lenses: Reading glasses    Range of Motion:  BUE  AROM: Within functional limits                         Strength:  BUE  Strength: Generally decreased, functional Coordination:  Coordination: Within functional limits  Fine Motor Skills-Upper: Left Intact; Right Intact    Gross Motor Skills-Upper: Left Intact; Right Intact    Tone & Sensation:  BUE  Tone: Normal  Sensation: Intact                      Balance:  Sitting: Intact; Without support  Standing: Impaired; With support  Standing - Static: Fair  Standing - Dynamic : Fair    Functional Mobility and Transfers for ADLs:  Bed Mobility:       Transfers:  Sit to Stand: Contact guard assistance  Stand to Sit: Contact guard assistance  Bed to Chair: Contact guard assistance    ADL Assessment:  Patient recalled and demonstrated avoiding extreme planes of movement with Left LE during ADLs and functional mobility with verbal cues. Feeding: Independent    Oral Facial Hygiene/Grooming: Contact guard assistance(infer 2* standing balance)    Bathing: Moderate assistance(A for LLE)    Upper Body Dressing: Setup;Supervision(infer 2* BUE ROM)    Lower Body Dressing: Moderate assistance(A for LLE)    Toileting: Minimum assistance(infer A for clothing management)                ADL Intervention and task modifications:                             Lower Body Dressing Assistance  Socks: Moderate assistance; Compensatory technique training         Cognitive Retraining  Safety/Judgement: Insight into deficits    Bathing: Patient instructed when bathing to not submerge wound in water, stand to shower or sponge bathe, cover wound with plastic and tape to ensure no water reaches bandage/wound without cues. Patient indicated understanding. Dressing joint: Patient instructed and demonstrated to don/doff Left LE first/last with verbal cues. Patient needs AE training for LLE (sockaid and reacher). Home safety: Patient instructed on home modifications and safety (raise height of ADL objects, appropriate height of chair surfaces, recliner safety, change of floor surfaces, clear pathways) to increase independence and fall prevention.   Patient indicated understanding. Functional Measure:  Barthel Index:    Bathin  Bladder: 10  Bowels: 5  Groomin  Dressin  Feeding: 10  Mobility: 0  Stairs: 0  Toilet Use: 5  Transfer (Bed to Chair and Back): 10  Total: 50/100        Percentage of impairment   0%   1-19%   20-39%   40-59%   60-79%   80-99%   100%   Barthel Score 0-100 100 99-80 79-60 59-40 20-39 1-19   0     The Barthel ADL Index: Guidelines  1. The index should be used as a record of what a patient does, not as a record of what a patient could do. 2. The main aim is to establish degree of independence from any help, physical or verbal, however minor and for whatever reason. 3. The need for supervision renders the patient not independent. 4. A patient's performance should be established using the best available evidence. Asking the patient, friends/relatives and nurses are the usual sources, but direct observation and common sense are also important. However direct testing is not needed. 5. Usually the patient's performance over the preceding 24-48 hours is important, but occasionally longer periods will be relevant. 6. Middle categories imply that the patient supplies over 50 per cent of the effort. 7. Use of aids to be independent is allowed. Diana Oconnor., Barthel, D.W. (4159). Functional evaluation: the Barthel Index. [address] [facility] (14)2. [facility] ANGEL Flaherty, Joaquin Ruiz.Bk., [place], [address] [place] [address] (). Measuring the change indisability after inpatient rehabilitation; comparison of the responsiveness of the Barthel Index and Functional [place] Measure. Journal of Neurology, Neurosurgery, and Psychiatry, 66(4), 829-078. Madonna Lane, N.JORDYN.A, ESTEFANÍA Heard, & Guzman Harper M.A. (2004.) Assessment of post-stroke quality of life in cost-effectiveness studies: The usefulness of the Barthel Index and the EuroQoL-5D.  Quality of Life Research, 15, 182-81        Occupational Therapy Evaluation Charge Determination History Examination Decision-Making   LOW Complexity : Brief history review  MEDIUM Complexity : 3-5 performance deficits relating to physical, cognitive , or psychosocial skils that result in activity limitations and / or participation restrictions MEDIUM Complexity : Patient may present with comorbidities that affect occupational performnce. Miniml to moderate modification of tasks or assistance (eg, physical or verbal ) with assesment(s) is necessary to enable patient to complete evaluation       Based on the above components, the patient evaluation is determined to be of the following complexity level: LOW     Activity Tolerance:   Good and requires rest breaks  Please refer to the flowsheet for vital signs taken during this treatment. After treatment patient left in no apparent distress:    Sitting in chair and Call bell within reach    COMMUNICATION/EDUCATION:   The patients plan of care was discussed with: Physical Therapist and Registered Nurse. Home safety education was provided and the patient/caregiver indicated understanding., Patient/family have participated as able in goal setting and plan of care. and Patient/family agree to work toward stated goals and plan of care. This patients plan of care is appropriate for delegation to [facility].     Thank you for this referral.  Ruby Medley  Time Calculation: 17 mins

## 2019-08-25 NOTE — PROGRESS NOTES
Orthopaedics Daily Progress Note                            Date of Surgery:  8/24/2019      Patient: Cecilia Brioneselor   YOB: 1950  Age: 68 y.o. SUBJECTIVE:   1 Day Post-Op following LEFT HIP ARTHROPLASTY TOTAL ANTERIOR APPROACH. The patient's post operative pain is controlled. No CP/SOB. No N/V. The patient's mobility will be evaluated today during PT sessions. Is OOB already. Worked with PT but still has to navigate stairs. OBJECTIVE:     Vital Signs:      Visit Vitals  /69 (BP Patient Position: Post activity; Sitting)   Pulse 71   Temp 97.5 °F (36.4 °C)   Resp 16   Ht 5' 3.5\" (1.613 m)   Wt 88.5 kg (195 lb)   LMP 08/24/1995   SpO2 94%   BMI 34.00 kg/m²       Physical Exam:  General: A&Ox3. The patient is cooperative, and in no acute distress. Respiratory: Respirations are unlabored. Surgical site(s): dressing clean, dry  Musculoskeletal: Calves are soft, supple, and non-tender upon palpation. Motor 5/5. Neurological:  Neurovascularly intact with good dorsi and plantar flexion. Pulses symmetrical.    Laboratory Values:             Recent Results (from the past 12 hour(s))   METABOLIC PANEL, BASIC    Collection Time: 08/25/19  3:21 AM   Result Value Ref Range    Sodium 142 136 - 145 mmol/L    Potassium 4.1 3.5 - 5.1 mmol/L    Chloride 115 (H) 97 - 108 mmol/L    CO2 22 21 - 32 mmol/L    Anion gap 5 5 - 15 mmol/L    Glucose 141 (H) 65 - 100 mg/dL    BUN 11 6 - 20 MG/DL    Creatinine 0.55 0.55 - 1.02 MG/DL    BUN/Creatinine ratio 20 12 - 20      GFR est AA >60 >60 ml/min/1.73m2    GFR est non-AA >60 >60 ml/min/1.73m2    Calcium 8.4 (L) 8.5 - 10.1 MG/DL   HEMOGLOBIN    Collection Time: 08/25/19  3:21 AM   Result Value Ref Range    HGB 9.8 (L) 11.5 - 16.0 g/dL         PLAN:     S/P LEFT HIP ARTHROPLASTY TOTAL ANTERIOR APPROACH -Continue WBAT. -Mobilize and continue with PT/OT until discharged     Hemodynamics Hgb today is 9.8. Acute blood loss anemia as expected. Patient asymptomatic. Continue to monitor. Wound Monitor postop dressing; no postop dressing changes necessary. Reinforce PRN. Post Operative Pain Pain Control: stable, mild-to-moderate joint symptoms intermittently, reasonably well controlled by current meds. DVT Prophylaxis Continue with SCD'S, Ankle Pump Exercises. ASA 325mg BID     Discharge Disposition Discharge plan: Home tomorrow after AM PT session.        Signed By: Junior Coleman PA-C  August 25, 2019 10:05 AM

## 2019-08-26 VITALS
RESPIRATION RATE: 16 BRPM | TEMPERATURE: 98.2 F | HEIGHT: 64 IN | WEIGHT: 195 LBS | DIASTOLIC BLOOD PRESSURE: 56 MMHG | SYSTOLIC BLOOD PRESSURE: 104 MMHG | HEART RATE: 72 BPM | OXYGEN SATURATION: 92 % | BODY MASS INDEX: 33.29 KG/M2

## 2019-08-26 PROCEDURE — 74011250637 HC RX REV CODE- 250/637: Performed by: PHYSICIAN ASSISTANT

## 2019-08-26 PROCEDURE — 97535 SELF CARE MNGMENT TRAINING: CPT

## 2019-08-26 PROCEDURE — 97116 GAIT TRAINING THERAPY: CPT

## 2019-08-26 RX ORDER — ASPIRIN 325 MG
325 TABLET, DELAYED RELEASE (ENTERIC COATED) ORAL 2 TIMES DAILY
Qty: 60 TAB | Refills: 0 | Status: SHIPPED | OUTPATIENT
Start: 2019-08-26 | End: 2019-12-20

## 2019-08-26 RX ORDER — TRAMADOL HYDROCHLORIDE 50 MG/1
50 TABLET ORAL
Qty: 60 TAB | Refills: 0 | Status: SHIPPED | OUTPATIENT
Start: 2019-08-26 | End: 2019-09-25

## 2019-08-26 RX ADMIN — ACETAMINOPHEN 1000 MG: 500 TABLET ORAL at 03:36

## 2019-08-26 RX ADMIN — Medication 10 ML: at 05:00

## 2019-08-26 RX ADMIN — TRAMADOL HYDROCHLORIDE 50 MG: 50 TABLET, FILM COATED ORAL at 04:59

## 2019-08-26 RX ADMIN — ASPIRIN 325 MG: 325 TABLET, DELAYED RELEASE ORAL at 08:42

## 2019-08-26 RX ADMIN — TRAMADOL HYDROCHLORIDE 50 MG: 50 TABLET, FILM COATED ORAL at 11:29

## 2019-08-26 RX ADMIN — CELECOXIB 200 MG: 200 CAPSULE ORAL at 08:42

## 2019-08-26 RX ADMIN — SENNOSIDES, DOCUSATE SODIUM 1 TABLET: 50; 8.6 TABLET, FILM COATED ORAL at 08:42

## 2019-08-26 RX ADMIN — ACETAMINOPHEN 1000 MG: 500 TABLET ORAL at 08:42

## 2019-08-26 NOTE — PROGRESS NOTES
Bedside, Verbal and Written shift change report given to [facility] (oncoming nurse) by Nellie Sanford (offgoing nurse). Report included the following information SBAR, Kardex and MAR.

## 2019-08-26 NOTE — PROGRESS NOTES
Patient instructed in all discharge information, medications, anticoagulant precautions and home care is arranged. She has her rxs to be filled at her pharmacy and copies of all dc infomation.  Discharging her with all personal belongings

## 2019-08-26 NOTE — ROUTINE PROCESS
Bedside shift change report given to Tanika Avilez RN (oncoming nurse) by Kathleen Banegas RN(offgoing nurse). Report given with SBAR.

## 2019-08-26 NOTE — PROGRESS NOTES
Problem: Mobility Impaired (Adult and Pediatric)  Goal: *Acute Goals and Plan of Care (Insert Text)  Description  FUNCTIONAL STATUS PRIOR TO ADMISSION: Patient was independent and active without use of DME.    HOME SUPPORT PRIOR TO ADMISSION: The patient lived with  but did not require assist.    Physical Therapy Goals  Initiated 8/25/2019    1. Patient will move from supine to sit and sit to supine  and roll side to side in bed with modified independence within 4 days. 2. Patient will perform sit to stand with modified independence within 4 days. 3. Patient will ambulate with modified independence for 150 feet with the least restrictive device within 4 days. 4. Patient will ascend/descend 4 stairs with 1 handrail(s) with modified independence within 4 days. Outcome: Progressing Towards Goal   PHYSICAL THERAPY TREATMENT  Patient: Ned Hawley (49 y.o. female)  Date: 8/26/2019  Diagnosis: Femoral fracture ([facility] [address] [S72.90XA]  Left displaced femoral neck fracture ([address] [S72.002A] <principal problem not specified>  Procedure(s) (LRB):  LEFT HIP ARTHROPLASTY TOTAL ANTERIOR APPROACH (Left) 2 Days Post-Op  Precautions: Fall, WBAT  Chart, physical therapy assessment, plan of care and goals were reviewed. ASSESSMENT  Based on the objective data described below, patient continues to make progress with her mobility. Asked by nursing to follow up this morning. Patient was able to mobilize well with the RW and reports good pain control overall. She is clear for D/C from a PT standpoint and RN is aware. Current Level of Function Impacting Discharge (mobility/balance): modified independent for gait and transfers, CGA for sit to supine    Other factors to consider for discharge: none         PLAN :  Patient continues to benefit from skilled intervention to address the above impairments. Continue treatment per established plan of care. to address goals.     Recommendation for discharge: (in order for the patient to meet his/her long term goals)  Physical therapy at least 2 days/week in the home     This discharge recommendation:  Has been made in collaboration with the attending provider and/or case management    Equipment recommendations for successful discharge (if) home: none       SUBJECTIVE:   Patient stated I feel good, just waiting on my .     OBJECTIVE DATA SUMMARY:   Critical Behavior:  Neurologic State: Alert  Orientation Level: Oriented X4  Cognition: Appropriate decision making, Appropriate for age attention/concentration, Appropriate safety awareness, Follows commands  Safety/Judgement: Insight into deficits  Functional Mobility Training:  Bed Mobility:     Supine to Sit: (up in chair on arrival)  Sit to Supine: Contact guard assistance; Additional time(for LLE)           Transfers:  Sit to Stand: Modified independent; Adaptive equipment; Additional time  Stand to Sit: Modified independent; Adaptive equipment; Additional time                             Balance:  Sitting: Intact; Without support  Standing: Intact; With support  Ambulation/Gait Training:  Distance (ft): 120 Feet (ft)  Assistive Device: Gait belt;Walker, rolling  Ambulation - Level of Assistance: Modified independent; Adaptive equipment; Additional time        Gait Abnormalities: Antalgic;Decreased step clearance  Right Side Weight Bearing: Full  Left Side Weight Bearing: As tolerated  Base of Support: Widened  Stance: Left decreased  Speed/Deborah: Pace decreased (<100 feet/min)  Step Length: Left shortened;Right shortened  Swing Pattern: Left asymmetrical                 Stairs: Therapeutic Exercises:     Pain Rating:  Endorses stiffness in the L thigh only    Activity Tolerance:   Good  Please refer to the flowsheet for vital signs taken during this treatment.     After treatment patient left in no apparent distress:   Supine in bed, Call bell within reach, Side rails x 3 and ice in place     COMMUNICATION/COLLABORATION: The patients plan of care was discussed with: Occupational Therapist, Registered Nurse and     Marlin Lombardo, PT   Time Calculation: 14 mins

## 2019-08-26 NOTE — PROGRESS NOTES
Problem: Self Care Deficits Care Plan (Adult)  Goal: *Acute Goals and Plan of Care (Insert Text)  Description  FUNCTIONAL STATUS PRIOR TO ADMISSION: Patient was independent and active without use of DME.    HOME SUPPORT: The patient lived with  but did not require assist.    Occupational Therapy Goals  Initiated 8/25/2019  1. Patient will perform lower body ADLs with AE supervision/set-up within 4 day(s). 2.  Patient will perform upper body ADLs standing 5 mins without fatigue or LOB with supervision/set-up within 4 day(s). 3.  Patient will perform toilet transfer with supervision/set-up within 4 day(s). 4.  Patient will perform all aspects of toileting with supervision/set-up within 4 day(s). 5.  Patient will participate in upper extremity therapeutic exercise/activities with supervision/set-up for 10 minutes within 4 day(s). 6.  Patient will utilize energy conservation techniques during functional activities without cues within 4 day(s). Outcome: Progressing Towards Goal   OCCUPATIONAL THERAPY TREATMENT  Patient: Debria Nageotte (61 y.o. female)  Date: 8/26/2019  Diagnosis: Femoral fracture ([address] [S72.90XA]  Left displaced femoral neck fracture ([address] [S72.002A] <principal problem not specified>  Procedure(s) (LRB):  LEFT HIP ARTHROPLASTY TOTAL ANTERIOR APPROACH (Left) 2 Days Post-Op  Precautions: Fall, WBAT  Chart, occupational therapy assessment, plan of care, and goals were reviewed. ASSESSMENT  Based on the objective data described below, patient feels prepared for dc home with support from . Provided ed on LB dressing compensatory techniques and AD. Patient with good understanding for LB dressing from hx of TKA. Did not complete as clothes from  has not arrived. Reports good support from . May purchase a new reacher. Good understanding for home safety. Plans for dc home with . Patient denies need for toileting and grooming training at this time. Other factors to consider for discharge:  supportive and able to assist with ADL tasks         PLAN :  Patient continues to benefit from skilled intervention to address the above impairments. Continue treatment per established plan of care. to address goals. Recommend with staff: CGA-supervision with toileting at RW level, up in meals for chair    Recommendation for discharge: (in order for the patient to meet his/her long term goals)  Occupational therapy at least 2 days/week in the home AND ensure assist and/or supervision for safety with ADL tasks    This discharge recommendation:  Has been made in collaboration with the attending provider and/or case management    Equipment recommendations for successful discharge (if) home: reacher       SUBJECTIVE:   Patient stated My  has to help with the white stockings anyways.     OBJECTIVE DATA SUMMARY:   Cognitive/Behavioral Status:                      Functional Mobility and Transfers for ADLs:  Bed Mobility:  Supine to Sit: (up in chair on arrival)  Sit to Supine: Contact guard assistance; Additional time(for LLE)    Transfers:  Sit to Stand: Modified independent; Adaptive equipment; Additional time          Balance:  Sitting: Intact; Without support  Standing: Intact; With support    ADL Intervention:     Lower Body Dressing Assistance  Underpants: Compensatory technique training  Pants With Elastic Waist: Compensatory technique training  Socks: Compensatory technique training    Toileting  Clothing Management: Compensatory technique training(use of reacher)    Bathing: Patient instructed when bathing to not submerge wound in water, stand to shower or sponge bathe, cover wound with plastic and tape to ensure no water reaches bandage/wound without cues. Patient indicated understanding. Dressing joint: Patient instructed to don/doff Left LE first/last cues.   Patient instructed and demonstrated to don all clothing while sitting prior to standing, doff all clothing to knees while standing, then sit to doff clothing off from knees to feet in order to facilitate fall prevention, pain management, and energy conservation. Home safety: Patient instructed on home modifications and safety (raise height of ADL objects, appropriate height of chair surfaces, recliner safety, change of floor surfaces, clear pathways) to increase independence and fall prevention. Patient indicated understanding. Standing: Patient instructed and demonstrated to walk up to sink/counter top/surfaces, step into walker to increase safety of joint and fall prevention. Patient indicated understanding.     After treatment patient left in no apparent distress:   Supine in bed    COMMUNICATION/COLLABORATION:   The patients plan of care was discussed with: Physical Therapist and Registered Nurse    Selvin Fine OT  Time Calculation: 12 mins

## 2019-08-26 NOTE — DISCHARGE INSTRUCTIONS
Discharge Instructions Anterior Approach   Hip Replacement-Dr. Ranjit Beltran    Patient Name:Becka Watson  Date of procedure:8/24/2019    Procedure:Procedure(s):  LEFT HIP ARTHROPLASTY TOTAL ANTERIOR APPROACH  Surgeon:Surgeon(s) and Role:     * Donny Bowen MD - Primary   PCP: Minerva Barry MD  Date of discharge: No discharge date for patient encounter. Follow up appointments   Follow up with Dr. Ranjit Beltran in 3 weeks. Call [phone] to make an appointment.  If home health has been arranged for you the agency will contact you to arrange dates/times for visits. Please call them if you do not hear from them within 24 hours after you are discharged    When to call your Orthopaedic Surgeon: Call [phone]. If you need to reach us after 5pm or on a weekend; call [phone] and the on call physician will be contacted   Increased hip pain, unrelieved pain or if you have difficulty or are unable to walk   Signs of infection-if your incision is red; continues to have drainage; drainage has a foul odor or if you have a persistent fever over 101 degrees   Signs of a blood clot in your leg-calf pain, tenderness, redness, swelling of lower leg    When to call your Primary Care Physician:   Concerns about medical conditions such as diabetes, high blood pressure, asthma, congestive heart failure   Call if blood sugars are elevated, persistent headache or dizziness, coughing or congestion, constipation or diarrhea, burning with urination, abnormal heart rate    When to call [id number] go to the nearest emergency room   Sudden onset of chest pain, shortness of breath, difficulty breathing    Activity   Weight bearing as tolerated with walker or crutches.  Refer to your patient handbook for instructions and pictures   Complete your Home Exercise Program daily as instructed by your therapist.  Refer to your handbook for instructions and pictures   Get up every one hour and walk (except at night when sleeping)   AVOID sudden and extreme movement of your hip (surgical leg)   Do not drive or operate heavy machinery    Incision Care     The Aquacel (brown, waterproof) surgical dressing is to remain on your hip for 7 days. On the 7th day have someone gently peel the dressing off by carefully lifting the edge and stretching it slightly to break the adhesive seal and leave incision open to air. You may take a shower with the Aquacel dressing in place.  You do not have staples in your hip incision. If present they will be removed by the Home Health staff in 10-14 days and steri-strips will be applied. Leave the steri-steips on until they fall off.  Once the Aquacel is removed, you may get your incision wet but do not submerge your incision under water in a bath tub, hot tub or swimming pool for 6 weeks after surgery. Preventing blood clots   Take Aspirin as prescribed    Pain management   Keep ice wrap in place except when walking; changing gel packs every 4 hours   Lie down and elevate your leg on pillows for about 30 minutes after walking to decrease swelling and pain   Do ankle pumps (10 repetitions) every hour while awake and get up frequently to walk   Take narcotic pain pill as prescribed if needed. Take with food; avoid alcohol while taking pain medication. Decrease the amount of narcotic pain medication as your pain lessens   Do not take any over-the-counter medication except for Tylenol (acetaminophen). Please be aware that many medications contain Tylenol. We do not want you to take too much Tylenol so please use this as your guide:  o Do not take more than 3 Grams of Tylenol in a 24 hour period.  (There are 1000 milligrams in one Gram  o 325mg of Tylenol per tablet (do not take more than 9 tablets in 24 hours)  o 500mg of Tylenol per tablet (do not take more than 6 tablets in 24 hours)    Diet   Resume usual diet; drink plenty of fluids; eat foods high in fiber   Take over-the-counter stool softeners and laxative to prevent constipation

## 2019-08-26 NOTE — PROGRESS NOTES
Complaints: none  Events: none    GEN:  NAD. AOx3   ABD:  S/NT/ND   LLE:  Dressing C/D/I    5/5 motor    Calf nttp (Bilat)    Sensate all distribution to light touch    1+ dp/pt pulses, foot perfused      Lab Results   Component Value Date/Time    HGB 9.8 (L) 08/25/2019 03:21 AM    INR 1.0 08/23/2019 12:32 PM            POD #2 LEFT TOTAL HIP REPLACEMENT s/p Left femoral neck fx. Satisfactory progress. Patient is doing well. She feels well and pain is well-controlled with tramadol. PT goals have been met. Discharge to home with home health today. Follow up in office in 3 weeks, all questions were answered. DVT Prophylaxis: ASA  Weight Bearing: WBAT RLE   Pain Control: PRN oral narcotics, celebrex  Anticipated Discharge Date: today  Disposition: Home, HHPT.

## 2019-08-27 ENCOUNTER — PATIENT OUTREACH (OUTPATIENT)
Dept: FAMILY MEDICINE CLINIC | Age: 69
End: 2019-08-27

## 2019-08-27 NOTE — PROGRESS NOTES
.  .  Hospital Discharge Follow-Up      Date/Time:  2019 1:40 PM    Patient was admitted to [facility] on 19 and discharged on 19 for feft femoral fracture. The physician discharge summary was available at the time of outreach. Patient was contacted within 1 business days of discharge. Top Challenges reviewed with the provider   [facility] admit 19-19 for Left Hip Arthroplasty Total Anterior Approach  · Home with [facility] for PT/OT  · To follow up with Dr. Marilu Peng in 3 weeks-call [phone] for appointment  · Monitor HGB-trending down at discharge  Results for Iram Eastman (MRN [id number]) as of 2019 15:55    2019 12:07 2019 12:32 2019 03:21   HGB  15.0 14.9 9.8 (L)        Method of communication with provider :phone, staff message    Inpatient RRAT score: 8  Was this a readmission? no   Patient stated reason for the readmission: fell on sidewalk, unable to bear weight    Nurse Navigator (NN) contacted the caregiver (spouse) by telephone to perform post hospital discharge assessment. Verified name and  with caregiver as identifiers. Provided introduction to self, and explanation of the Nurse Navigator role. Reviewed discharge instructions and red flags with caregiver who verbalized understanding. Caregiver given an opportunity to ask questions and does not have any further questions or concerns at this time. The caregiver agrees to contact the PCP office for questions related to their healthcare. NN provided contact information for future reference. Disease Specific:   N/A    Summary of patient's top problems:  1. Fall/ Left Hip femoral neck fracture- admitted from [facility] a 47-year-old woman who slipped on wet pavement and fell directly on her left hip. Has pain only with range of motion of the left hip. Unable to bear weight.  Admitted and had Left Hip Arthroplasty total anterior approach on 19.  2. Impaired mobility-Unable to bear weight, on admission, post procedure using crutches and walker, At [facility] to [address] orders at discharge: PT/OT/Nursing    [address]: At [address]  Date of initial visit: 8/27/19    Durable Medical Equipment ordered/company: Sangeeta Alexander, crutches  Durable Medical Equipment received: none    Barriers to care? Impaired mobiity    Advance Care Planning:   Does patient have an Advance Directive:  reviewed and current     Medication(s):   New Medications at Discharge: ASA  Changed Medications at Discharge:  Discontinued Medications at Discharge:     Medication reconciliation was performed with patient, who verbalizes understanding of administration of home medications. There were no barriers to obtaining medications identified at this time. Referral to Pharm D needed: no     Current Outpatient Medications   Medication Sig    aspirin delayed-release 325 mg tablet Take 1 Tab by mouth two (2) times a day.  traMADol (ULTRAM) 50 mg tablet Take 1 Tab by mouth every six (6) hours as needed for Pain for up to 30 days. Max Daily Amount: 200 mg.    zolpidem (AMBIEN) 5 mg tablet TAKE ONE TABLET BY MOUTH AT BEDTIME AS NEEDED FOR INSOMNIA    furosemide (LASIX) 20 mg tablet TAKE ONE TABLET BY MOUTH TWICE A DAY     No current facility-administered medications for this visit. There are no discontinued medications.     [facility] follow up appointment(s):   Future Appointments   Date Time Provider Meera Shea   11/19/2019 10:00 AM SPT US 1 [facility] SPT      Non-[facility] follow up appointment(s):   [facility]:  information provided as a resource       Goals        Patient Stated     Return to baseline activity (pt-stated)      08/27/19  · Verbalized understanding of discharge instructions and follow up visit  · At [facility] to visit on today- 8/27/19  · Decline [facility]/PCP follow-up at this time, verbalized will call for concerns about medical condition  · Verbalized understanding of when to go to Ed or call surgeon  · Admits to taking ASA as ordered and know when to call if suspected blood clots  · Admits to taking all medications as ordered and drinking fluids  · Has order for stool softener if needed. · NN will follow in one week to ten days.  pk

## 2019-08-29 NOTE — NURSE NAVIGATOR
[facility]  [facility] Orthopaedic Pathway Handoff     FROM:                                TO: At [address]                                                      ([facility] or Facility name)  Giovanna Jane [address]  [address] [place] [address]  Dept: [address]: [phone]                                      Room#:  557/01                                                       Nurse Navigator:  Lyly Tamez RN         SITUATION      ASAScore: ASA 2 - Patient with mild systemic disease with no functional limitations    Admitted:  8/23/2019  Hospital Day: 4      Attending Provider:  No att. providers found     Consultations:  IP CONSULT TO ORTHOPEDIC SURGERY    PCP:  Bon Wick MD   [phone]     Admitting Dx: Femoral fracture ([address] [S72.90XA]  Left displaced femoral neck fracture ([address] [S72.002A]       Active Problems:    Femoral fracture ([address] (8/23/2019)      Left displaced femoral neck fracture ([address] (8/23/2019)      5 Days Post-Op of   Procedure(s):  LEFT HIP ARTHROPLASTY TOTAL ANTERIOR APPROACH   BY: Jackie Allen MD             ON: 8/24/2019                  Code Status: Prior             Advance Directive? Yes Not W Pt (Send w/patient)     Isolation:  There are currently no Active Isolations       MDRO: No current active infections    BACKGROUND     Allergies:   Allergies   Allergen Reactions    Codeine Rash     Rash and swelling on arm    Adhesive Tape-Silicones Rash    Keflex [Cephalexin] Nausea and Vomiting    Tramadol Other (comments)     Drowsy      Wellbutrin [Bupropion Hcl] Other (comments)     fatigue       Past Medical History:   Diagnosis Date    Actinic keratosis     Left arm    Adjustment disorder with mixed anxiety and depressed mood     Adverse effect of anesthesia     HARD TO WAKE UP SLOW BREATHING ,Pt stated doesn't take much anesthesia    Arthritis     OSTEO    Chronic pain     Diverticulitis     Kidney stones 22,67,08    left    Left knee DJD     Lumbar nerve root impingement     RLQ pain    Menopause     LMP-53years old    Nausea & vomiting     Other and unspecified hyperlipidemia     Other idiopathic scoliosis, thoracolumbar region 2018    Postmenopausal HRT (hormone replacement therapy)     Right knee DJD        Past Surgical History:   Procedure Laterality Date    COLONOSCOPY N/A 2016    COLONOSCOPY performed by Alexandra Astorga MD at [address] [place] [address] HX CATARACT REMOVAL Right 2019    HX CATARACT REMOVAL Left 2019    HX  SECTION  5306,0260    X2    HX GI      COLONOSCOPY    HX HYSTERECTOMY  2009    LMP-53years old    HX KNEE ARTHROSCOPY Bilateral 1966    SCOPE of diana bilateral    HX LITHOTRIPSY      X 2    HX LUMBAR LAMINECTOMY  2000    X2    HX MENISCECTOMY      left knee    HX ORTHOPAEDIC  12    left shoulder repair; 14 screws and 2 plates    HX ORTHOPAEDIC Right     BROKEN ARM SURGERY X2    HX ORTHOPAEDIC Left      REPAIR OF Left MENISCUS    HX ORTHOPAEDIC Left     BROKEN ARM    HX POLYPECTOMY      HX TONSILLECTOMY  1956    HX TUBAL LIGATION      BSPO adhesion conization abn pap    TOTAL KNEE ARTHROPLASTY Left 2016       Prior to Admission Medications   Prescriptions Last Dose Informant Patient Reported? Taking?   furosemide (LASIX) 20 mg tablet 2019 at Unknown time  No Yes   Sig: TAKE ONE TABLET BY MOUTH TWICE A DAY   zolpidem (AMBIEN) 5 mg tablet   No Yes   Sig: TAKE ONE TABLET BY MOUTH AT BEDTIME AS NEEDED FOR INSOMNIA      Facility-Administered Medications: None       Vaccinations:    Immunization History   Administered Date(s) Administered    Influenza Vaccine 11/15/2017    Influenza Vaccine (Tri) Adjuvanted 2018    Td, Adsorbed PF 2013    dT Vaccine 1999         ASSESSMENT   Age: 68 y.o.              Gender: female        Height: Height: 5' 3.5\" (161.3 cm) Weight:Weight: 88.5 kg (195 lb)     No data found. Active Orders   There are no active orders of the following types: Diet. Orientation: Orientation Level: Oriented X4    Active Lines/Drains:  (Peg Tube / Foss / CL or S/L?):no    Urinary Status: Voiding      Last BM: Last Bowel Movement Date: 08/23/19     Skin Integrity: Incision (comment)(dry aquacel dsg over left hip)             Mobility: Slightly limited   Weight Bearing Status: WBAT (Weight Bearing as Tolerated)      Gait Training  Assistive Device: Gait belt, Walker, rolling  Ambulation - Level of Assistance: Modified independent, Adaptive equipment, Additional time  Distance (ft): 120 Feet (ft)  Stairs - Level of Assistance: Supervision, Additional time(for safety)  Number of Stairs Trained: 4  Rail Use: Both  Interventions: Safety awareness training, Tactile cues, Verbal cues, Visual/Demos     On Anticoagulation? YES  Aspirin  325 mg BID                                       Pain Medications given:  Tramadol 50 mg                                    Lab Results   Component Value Date/Time    Glucose 141 (H) 08/25/2019 03:21 AM    Hemoglobin A1c 5.9 08/16/2019 12:07 PM    INR 1.0 08/23/2019 12:32 PM    INR 1.0 08/16/2019 12:07 PM    HGB 9.8 (L) 08/25/2019 03:21 AM    HGB 14.9 08/23/2019 12:32 PM    HGB 15.0 08/16/2019 12:07 PM    HGB 16.5 (H) 07/17/2019 11:50 AM       Readmission Risks:  Score:         RECOMMENDATION     See After Visit Summary (AVS) for:  · Discharge instructions  · After [address]   · Medication Reconciliation          [facility] [place] [facility]opaedic Nurse Navigator  JAVIER Deng, RN-BC       Office  [phone]  Cell      [phone]  Fax      [phone]  [email or web address]             . Ana María

## 2019-08-30 ENCOUNTER — PATIENT OUTREACH (OUTPATIENT)
Dept: CASE MANAGEMENT | Age: 69
End: 2019-08-30

## 2019-08-30 NOTE — PROGRESS NOTES
This note will not be viewable in [address] Post Discharge Follow-up contact after Joint Replacement    Patient discharged on 8/26/19  By  Eloisa Cornelius   following  left hip Arthroplasty following displaced femoral neck fracture. Spoke with patient today, who reports they are \" so far so good. I am very sensitive to narcotics, but the Tramadol is really working for me\"  Denies Fever, Shortness of Breath or Chest Pain. Home Health has visited. Patient also reports:. Incision  clean, dry, intact  Calf is non-tender,   operative extremity has minimal swelling. Pain is well managed. Discussed use of ice & elevation. Patient is progressing with therapy and is exercising independently. Taking Aspirin 325 mg BID for anticoagulation, Tramadol for pain. Patient is not experiencing symptoms of constipation & urinating without difficulty. Discussed side effects of anticoagulants & pain medications (bleeding/bruising, constipation, lightheaded/dizziness)  Follow up appointment is scheduled. Discussed calling surgeon Dr Cyd Dubin  for drainage, bleeding, swelling in operative extremity, fever or pain. Discussed calling PCP Dr Pranay Saunders with other medical issues.

## 2019-09-02 NOTE — DISCHARGE SUMMARY
[address] [place], [place] [id number] [place] [id number]    DISCHARGE SUMMARY     Patient: Marquez Evans                             Medical Record Number: [id number]                : 1950  Age: 68 y.o. Admit Date: 2019  Discharge Date: 2019  Admission Diagnosis: Femoral fracture ([facility] [S72.90XA]  Left displaced femoral neck fracture ([facility] [S72.002A]  Discharge Diagnosis: fractured left hip  Procedures: Procedure(s):  LEFT HIP ARTHROPLASTY TOTAL ANTERIOR APPROACH  Surgeon: Nelli Sutton  Assistants: Radha  Anesthesia: general  Complications: None     History of Present Illness:  Marquez Evans is a 68 y.o. female with a history of Left hip pain, swelling, and marked loss of function. Despite conservative management and after clinical and radiographic evaluation, it was determined that she suffered from end-stage osteoarthritis and would benefit from Procedure(s):  LEFT HIP ARTHROPLASTY TOTAL ANTERIOR APPROACH, which she consented to undergo after a discussion of the risks, benefits, alternatives, rehab concerns, and potential complications of surgery. Hospital Course:  Marquez Evans tolerated the procedure well. She was transferred  to the recovery room in stable condition. After a brief stay the patient was then transferred to the Joint Replacement Unit at [address] [place] [address].  On postoperative day #1, the dressing was clean and dry, she was neurovascularly intact. The patient was afebrile and vital signs were stable. Calves were soft and non-tender bilaterally. On postoperative day  # 2, the patient was tolerating a regular diet and making satisfactory progress with physical therapy.   Hemoglobin and INR prior to discharge were   Lab Results   Component Value Date/Time    HGB 9.8 (L) 2019 03:21 AM    INR 1.0 2019 12:32 PM   .  Marquez Evans made satisfactory progress with physical therapy and was discharged to Home in stable condition on postoperative day 3. She was provided with routine postoperative instructions and advised to follow up in my office in 3 weeks following discharge from the hospital.  She was prescribed ASA for DVT prophylaxis and tramadol for post-operative pain. Discharge Medications:  Cannot display discharge medications since this patient is not currently admitted.       Signed by: Mehnaz Muhammad MD  9/2/2019

## 2019-09-12 NOTE — TELEPHONE ENCOUNTER
PCP: Walker Herrera MD    Last appt: 8/19/2019  Future Appointments   Date Time Provider Meera Shea   11/19/2019 10:00 AM SPT US 1 [facility] SPT       Requested Prescriptions     Pending Prescriptions Disp Refills    furosemide (LASIX) 20 mg tablet [Pharmacy Med Name: FUROSEMIDE 20 MG TABLET] 60 Tab 1     Sig: TAKE ONE TABLET BY MOUTH TWICE A DAY

## 2019-09-13 NOTE — TELEPHONE ENCOUNTER
PCP: Doraine Skiff, MD    Last appt: 8/19/2019  Future Appointments   Date Time Provider Meera Shea   11/19/2019 10:00 AM SPT US 1 [facility] SPT       Requested Prescriptions     Pending Prescriptions Disp Refills    furosemide (LASIX) 20 mg tablet [Pharmacy Med Name: FUROSEMIDE 20 MG TABLET] 60 Tab 1     Sig: TAKE ONE TABLET BY MOUTH TWICE A DAY

## 2019-09-13 NOTE — TELEPHONE ENCOUNTER
----- Message from Mino Wolfe sent at 9/13/2019  1:27 PM EDT -----  Regarding: Dr. Sergio Henao (if not patient):      Relationship of caller (if not patient):      Best contact number(s):      Name of medication and dosage if known:      Is patient out of this medication (yes/no):      Pharmacy name: [facility] on [address] [place] [address] listed in chart? (yes/no): yes  Pharmacy phone number:      Details to clarify the request: Pt is trying to get a refill of Furosemide at [address] [place] [address] before the weekend.       Mino Wolfe

## 2019-09-15 RX ORDER — FUROSEMIDE 20 MG/1
TABLET ORAL
Qty: 60 TAB | Refills: 1 | Status: SHIPPED | OUTPATIENT
Start: 2019-09-15 | End: 2019-11-08 | Stop reason: SDUPTHER

## 2019-09-20 ENCOUNTER — PATIENT OUTREACH (OUTPATIENT)
Dept: FAMILY MEDICINE CLINIC | Age: 69
End: 2019-09-20

## 2019-09-20 NOTE — PROGRESS NOTES
FREDDY Follow-up assessment: [facility] admit 8/23/19-8/26/19 for Left Hip Arthroplasty Total Anterior Approach  Outbound call made to patient today to complete FREDDY follow up assessment. Patient verified by 3 identifiers. NN was able to talk with spouse. Follow-up appointments reviewed, and medication reconciliation completed. NN contact information given for questions and concerns. Current Outpatient Medications:     furosemide (LASIX) 20 mg tablet, TAKE ONE TABLET BY MOUTH TWICE A DAY, Disp: 60 Tab, Rfl: 1    aspirin delayed-release 325 mg tablet, Take 1 Tab by mouth two (2) times a day., Disp: 60 Tab, Rfl: 0    traMADol (ULTRAM) 50 mg tablet, Take 1 Tab by mouth every six (6) hours as needed for Pain for up to 30 days. Max Daily Amount: 200 mg., Disp: 60 Tab, Rfl: 0    zolpidem (AMBIEN) 5 mg tablet, TAKE ONE TABLET BY MOUTH AT BEDTIME AS NEEDED FOR INSOMNIA, Disp: 30 Tab, Rfl: 2  Case management Plan:  NN will continue to attempt contacts with patient by telephone or during office visit within next 7-10 days.  Will continue to follow as necessary for the remaining 30 days post visit and will reassess for needs before discharge

## 2019-10-11 NOTE — OP NOTES
[facility]  OPERATIVE REPORT    Name:  Emili Luther  MR#:  [id number]  :  1950  ACCOUNT #:  [de-identified]  DATE OF SERVICE:  2019      PREOPERATIVE DIAGNOSES:  1. Ageing face. 2.  Blepharochalasis. 3.  Brow ptosis. POSTOPERATIVE DIAGNOSES:  1. Ageing face. 2.  Blepharochalasis. 3.  Brow ptosis. PROCEDURE PERFORMED:  1. Rhytidectomy. 2.  Lower lid blepharoplasties, bilateral.  3.  Endoscopic brow lift. SURGEON:  Abran Betancourt MD    ASSISTANT:  Surgical assistant. ANESTHESIA:  General endotracheal tube anesthesia. COMPLICATIONS:  No complications. SPECIMENS REMOVED:  No specimen. ESTIMATED BLOOD LOSS:  20 mL. IMPLANTS:  No implant. INDICATIONS AND FINDINGS:  The patient presented with bilateral upper lid ptosis, functional problem for which we have seen an additional aesthetic component and hence indications. DETAILS OF PROCEDURE:  In the operating room, the patient was induced under general endotracheal tube anesthesia, following which she was prepped and draped in a sterile fashion. She had previously been marked out noting her landmarks with  supratarsal crease and an area of lid skin approximately 1 cm beneath the lower border of the eyebrow was marked out and the fusiform raised laterally. This was incised under 3-point tension with a 15 blade dissecting out the skin in the immediate subdermal plane. Rim of orbicularis was excised with tenotomy scissors. A pocket was opened medially through the septum identifying the superomedial fat pad which was injected with 1% lidocaine and 1:100,000 part of epinephrine, cross clamped and cut, and cauterized with bipolar cautery. Bleeding sites were cauterized with bipolar cautery. The functional component of the surgery was done at this time additionally. The wound was closed with interrupted and running intracuticular 6-0 plain gut suture.     The endoscopic brow procedure was performed with a 30-degree scope behind the hairline making a horizontal 2 cm incision, 2 cm behind the central hairline and 2 cm vertical incisions vertically aligned with maximum tension centered at the superolateral brow arch. The incisions were carried through the periosteum using a sequence of different curved scalp elevators to elevate the entire scalp in the subpericranial plane. The temporal lines were broached bilaterally dissecting onto the bony brow and lateral orbital rim protecting the temporal branch of the facial nerve under direct vision. The supraorbital and supratrochlear neurovascular bundles were isolated dividing the  muscle at this site protecting the bundles. The procerus was identified centrally and similarly divided with grasping forceps additionally. The absorbable struts were placed through the outer table of the cranium, posterior aspect of the oblique scalp incisions putting the scalp under extreme tension and using 3-0 PDS to tether the scalp to the absorbable struts. Scalp incisions were then closed in 2 layers with 4-0 Vicryl to the galea and staples to the skin. A deep plane facelift, this was done in mainly symmetric fashion with unilateral anatomy taken into regard. Incision under the sideline was made curving behind the tragus and around the lobe in the postauricular crease on the mastoid side cutting back posteriorly at the junction of an helix with triangularis into the hairline making it 1 cm inferior back cut to remove a standing curve. The facial flap in the preauricular area was created in the median subcutaneous plane dissecting out to the anterior aspect of the parotid gland over the zygoma and around the SCM muscle protecting the greater auricular nerve and the deep subcutaneous plane over the mastoid fascia posteriorly.   The SMAS was entered in the preauricular area dissecting a sub-SMAS plane, supramuscular into the midface, and connecting over the jawline with the platysma. The submental anatomy was next addressed making a 2 cm incision in the submandibular horizontal crease and elevating the immediate subcutaneous flap. The submental fat pad was then dissected out with Bovie cautery from the platysma dissecting under the platysma medially to remove the fat pad entirely reaching down to the hyoid area. Later, a platysmorrhaphy was performed with mattress sutures of 3-0 Vicryl to tighten this area, and the skin was closed in 2 layers with 4-0 Vicryl deep, and 4-0 plain gut suture to the skin. The SMAS flaps were next performed dividing the SMAS with superior fork tethered under extreme tension using a Dermott-Destin suture mattress to the deep temporalis fascia in an anterior superior pocket created to expose the deep temporalis fascia. The inferior fork created tension along the jawline sutured with the 4-0 Vicryl to the mastoid fascia. Excess SMAS fatty tissue were trimmed and contoured. The skin was then redraped in appropriate superolateral vector. The skin was tethered above and below the tragus and to the mastoid with 4-0 Vicryl using a running stitch to pin the rest of the SMAS in its newly elevated position. The skin was then trimmed in a jigsaw puzzle fit removing approximately an inch and half of skin on both sides, removing Burow's triangles in the sideburn area and in the mastoid area, and closing the skin with 4-0 Vicryl deep, and a combination of 5-0 and 4-0 plain gut sutures on the skin. In the hairline posteriorly, 3-0 Vicryl was used to anchor the skin closing this area with staples. Bilateral facial LA NENA drains had been placed, secured with nylon sutures in the posterior superior auricular area. The wounds were cleaned with antibiotic ointment, Adaptic, fluffed gauze, and Coban dressings were then applied. The patient handed over to Anesthesia for awakening and transfer to the recovery room.       Janina Rowland MD      [id number]  D: 10/10/2019 8:03  T:  10/10/2019 23:06  JOB #:  [id number]  CC:  [id number]

## 2019-10-17 ENCOUNTER — PATIENT OUTREACH (OUTPATIENT)
Dept: FAMILY MEDICINE CLINIC | Age: 69
End: 2019-10-17

## 2019-10-17 NOTE — PROGRESS NOTES
Outbound call to patient today to complete a follow up assessment. NN was not able to reach anyone at numbers listed. NN contact information left on . Goals Addressed                 This Visit's Progress       Patient Stated     Return to baseline activity (pt-stated)        08/27/19  · Verbalized understanding of discharge instructions and follow up visit  · At [facility] to visit on today- 8/27/19  · Decline [facility]/PCP follow-up at this time, verbalized will call for concerns about medical condition  · Verbalized understanding of when to go to Ed or call surgeon  · Admits to taking ASA as ordered and know when to call if suspected blood clots  · Admits to taking all medications as ordered and drinking fluids  · Has order for stool softener if needed. · NN will follow in one week to ten days. pk    09/20/19  · Patient doing much better, being released by PT due to doing so well  · Denies red flags:Fever, Shortness of Breath or Chest Pain. · Incision remain  clean, dry, intact  · Calf is non-tender,   · Operative extremity has minimal swelling  · Progressing well with therapy  · Patient verbalized knowledge of when to follow up with PCP and Surgeon. NN will follow in 7-10 days. pk  10/17/19  · Several outbound calls to patient to complete F/U assessment  · NN unable to reach  · NN contact information left on   · NN will follow up again in 7-10 days. pk            Case management Plan:  NN will continue to attempt contacts with patient by telephone or during office visit within next few days.  Will continue to follow as necessary for the remaining 90 days post visit and will reassess for needs before discharge

## 2019-10-18 ENCOUNTER — PATIENT OUTREACH (OUTPATIENT)
Dept: FAMILY MEDICINE CLINIC | Age: 69
End: 2019-10-18

## 2019-11-05 ENCOUNTER — PATIENT OUTREACH (OUTPATIENT)
Dept: FAMILY MEDICINE CLINIC | Age: 69
End: 2019-11-05

## 2019-11-05 NOTE — PROGRESS NOTES
ACM follow up assessment. Patient verified by 3 identifiers. NN was able to talk with spouse. Follow-up appointments reviewed, and medication reconciliation completed. NN contact information given for questions and concerns;    Goals Addressed                 This Visit's Progress       Patient Stated     Return to baseline activity (pt-stated)   On track     08/27/19  · Verbalized understanding of discharge instructions and follow up visit  · At [facility] to visit on today- 8/27/19  · Decline [facility]/PCP follow-up at this time, verbalized will call for concerns about medical condition  · Verbalized understanding of when to go to Ed or call surgeon  · Admits to taking ASA as ordered and know when to call if suspected blood clots  · Admits to taking all medications as ordered and drinking fluids  · Has order for stool softener if needed. · NN will follow in one week to ten days. pk    09/20/19  · Patient doing much better, being released by PT due to doing so well  · Denies red flags:Fever, Shortness of Breath or Chest Pain. · Incision remain  clean, dry, intact  · Calf is non-tender,   · Operative extremity has minimal swelling  · Progressing well with therapy  · Patient verbalized knowledge of when to follow up with PCP and Surgeon. NN will follow in 7-10 days. pk  10/17/19  · Several outbound calls to patient to complete F/U assessment  · NN unable to reach  · NN contact information left on   · NN will follow up again in 7-10 days.  pk    11/05/19  · Outreach to patient who says she is doing well  · Walking 1 mile per day  · Was in [place] for 3 weeks, just getting home  · Continued to do water aerobics and beach exercises while on vacation  · Medications reviewed  · No HH at this time-not needed  · Decline PCP/HH follow up at this time  · ACM will follow in 7-10 days          Current Outpatient Medications:     furosemide (LASIX) 20 mg tablet, TAKE ONE TABLET BY MOUTH TWICE A DAY, Disp: 60 Tab, Rfl: 1    aspirin delayed-release 325 mg tablet, Take 1 Tab by mouth two (2) times a day., Disp: 60 Tab, Rfl: 0    zolpidem (AMBIEN) 5 mg tablet, TAKE ONE TABLET BY MOUTH AT BEDTIME AS NEEDED FOR INSOMNIA, Disp: 30 Tab, Rfl: 2

## 2019-11-08 RX ORDER — FUROSEMIDE 20 MG/1
TABLET ORAL
Qty: 60 TAB | Refills: 0 | Status: SHIPPED | OUTPATIENT
Start: 2019-11-08 | End: 2019-12-16 | Stop reason: SDUPTHER

## 2019-12-09 ENCOUNTER — PATIENT OUTREACH (OUTPATIENT)
Dept: FAMILY MEDICINE CLINIC | Age: 69
End: 2019-12-09

## 2019-12-16 DIAGNOSIS — F51.01 PRIMARY INSOMNIA: ICD-10-CM

## 2019-12-16 NOTE — TELEPHONE ENCOUNTER
PCP: Toribio Verde MD    Last appt: 8/19/2019  No future appointments.     Requested Prescriptions     Pending Prescriptions Disp Refills    furosemide (LASIX) 20 mg tablet [Pharmacy Med Name: FUROSEMIDE 20 MG TABLET] 60 Tab 0     Sig: TAKE ONE TABLET BY MOUTH TWICE A DAY    zolpidem (AMBIEN) 5 mg tablet [Pharmacy Med Name: ZOLPIDEM TARTRATE 5 MG TABLET] 30 Tab 1     Sig: TAKE ONE TABLET BY MOUTH EVERY NIGHT AT BEDTIME AS NEEDED FOR INSOMNIA

## 2019-12-17 RX ORDER — FUROSEMIDE 20 MG/1
TABLET ORAL
Qty: 60 TAB | Refills: 0 | Status: SHIPPED | OUTPATIENT
Start: 2019-12-17 | End: 2019-12-20 | Stop reason: SDUPTHER

## 2019-12-17 RX ORDER — ZOLPIDEM TARTRATE 5 MG/1
TABLET ORAL
Qty: 30 TAB | Refills: 0 | Status: SHIPPED | OUTPATIENT
Start: 2019-12-17 | End: 2020-01-14

## 2019-12-19 NOTE — PROGRESS NOTES
Patient has graduated from the Transitions of Care Coordination  program on 10/18/19. Patient's symptoms are stable at this time. Patient/family has the ability to self-manage. Care management goals have been completed at this time. No further nurse navigator follow up scheduled. Goals Addressed    None         Pt has nurse navigator's contact information for any further questions, concerns, or needs.   Patients upcoming visits:    Future Appointments   Date Time Provider Meera Shea   12/20/2019  1:45 PM Abdias Schreiber MD [address] [place] [address] [facility]

## 2019-12-19 NOTE — PROGRESS NOTES
Ambulatory Care Management Note    Date/Time:  12/9/19 10:22 AM    This [facility] out[facility] to patient today to offer care management services. Introduction to self and role of care manager provided. Patient accepted care management services at this time. Follow up call scheduled at this time. Patient has Ambulatory Care Manager's contact number for for any questions or concerns.

## 2019-12-20 ENCOUNTER — OFFICE VISIT (OUTPATIENT)
Dept: FAMILY MEDICINE CLINIC | Age: 69
End: 2019-12-20

## 2019-12-20 ENCOUNTER — HOSPITAL ENCOUNTER (OUTPATIENT)
Dept: LAB | Age: 69
Discharge: HOME | End: 2019-12-20
Payer: MEDICARE

## 2019-12-20 VITALS
TEMPERATURE: 98.6 F | BODY MASS INDEX: 35 KG/M2 | RESPIRATION RATE: 16 BRPM | HEART RATE: 88 BPM | OXYGEN SATURATION: 99 % | SYSTOLIC BLOOD PRESSURE: 122 MMHG | WEIGHT: 205 LBS | DIASTOLIC BLOOD PRESSURE: 78 MMHG | HEIGHT: 64 IN

## 2019-12-20 DIAGNOSIS — M54.16 LUMBAR NERVE ROOT IMPINGEMENT: ICD-10-CM

## 2019-12-20 DIAGNOSIS — Z71.89 ACP (ADVANCE CARE PLANNING): ICD-10-CM

## 2019-12-20 DIAGNOSIS — E66.01 SEVERE OBESITY (HCC): ICD-10-CM

## 2019-12-20 DIAGNOSIS — F51.05 INSOMNIA DUE TO ANXIETY AND FEAR: ICD-10-CM

## 2019-12-20 DIAGNOSIS — Z00.00 MEDICARE ANNUAL WELLNESS VISIT, SUBSEQUENT: ICD-10-CM

## 2019-12-20 DIAGNOSIS — M17.12 PRIMARY OSTEOARTHRITIS OF LEFT KNEE: ICD-10-CM

## 2019-12-20 DIAGNOSIS — R60.0 LOCALIZED EDEMA: Primary | ICD-10-CM

## 2019-12-20 DIAGNOSIS — F51.01 PRIMARY INSOMNIA: ICD-10-CM

## 2019-12-20 DIAGNOSIS — Z86.59 PERSONAL HISTORY OF ANXIETY DISORDER: ICD-10-CM

## 2019-12-20 DIAGNOSIS — F40.9 INSOMNIA DUE TO ANXIETY AND FEAR: ICD-10-CM

## 2019-12-20 DIAGNOSIS — E78.5 HYPERLIPIDEMIA WITH TARGET LOW DENSITY LIPOPROTEIN (LDL) CHOLESTEROL LESS THAN 130 MG/DL: ICD-10-CM

## 2019-12-20 PROCEDURE — 36415 COLL VENOUS BLD VENIPUNCTURE: CPT

## 2019-12-20 PROCEDURE — 80053 COMPREHEN METABOLIC PANEL: CPT

## 2019-12-20 PROCEDURE — 85027 COMPLETE CBC AUTOMATED: CPT

## 2019-12-20 RX ORDER — ZOLPIDEM TARTRATE 5 MG/1
TABLET ORAL
Qty: 30 TAB | Refills: 5 | Status: CANCELLED | OUTPATIENT
Start: 2019-12-20

## 2019-12-20 RX ORDER — ESTRADIOL 1 MG/1
1 TABLET ORAL
COMMUNITY
End: 2020-05-19 | Stop reason: ALTCHOICE

## 2019-12-20 RX ORDER — FUROSEMIDE 20 MG/1
TABLET ORAL
Qty: 60 TAB | Refills: 5 | Status: SHIPPED | OUTPATIENT
Start: 2019-12-20 | End: 2020-09-02

## 2019-12-20 NOTE — PROGRESS NOTES
HISTORY OF PRESENT ILLNESS   HPI  Andrea Avina is a 68 y.o. Female with a history of left knee DJD, thoracolumbar idiopathic scoliosis, adjustment disorder with anxiety and depression, kidney stones and hyperlipidemia with LDL goal <130, who presents to the office today for facial and hand swelling. Pt notes increased swelling on her right hands and fingers, as well as her knees and ankles. Pt takes the furosemide regularly, one tablet once a day usually. Pt notes having a fall on  where she broke her left hip, and then receiving a hip replacement after. Pt denies unusual SOB, chest pain, and any recent ER visits or hospitalizations.            Past Medical History:   Diagnosis Date    Actinic keratosis     Left arm    Adjustment disorder with mixed anxiety and depressed mood     Adverse effect of anesthesia     HARD TO WAKE UP SLOW BREATHING ,Pt stated doesn't take much anesthesia    Arthritis     OSTEO    Chronic pain     Diverticulitis     Kidney stones 95,96,98    left    Left knee DJD     Lumbar nerve root impingement     RLQ pain    Menopause     LMP-53years old    Nausea & vomiting     Other and unspecified hyperlipidemia     Other idiopathic scoliosis, thoracolumbar region 2018    Postmenopausal HRT (hormone replacement therapy)     Right knee DJD      Past Surgical History:   Procedure Laterality Date    COLONOSCOPY N/A 2016    COLONOSCOPY performed by Krysten Treviño MD at [address] HX CATARACT REMOVAL Right 2019    HX CATARACT REMOVAL Left 2019    HX  SECTION  1413,8042    X2    HX GI      COLONOSCOPY    HX HIP FRACTURE TX Right 2019    Total R hip- Dr. Ovalles Miracle HYSTERECTOMY  2009    LMP-53years old    HX KNEE ARTHROSCOPY Bilateral 1966    SCOPE of diana bilateral    HX LITHOTRIPSY      X 2    HX LUMBAR LAMINECTOMY  2000    X2    HX MENISCECTOMY  1990    left knee    HX ORTHOPAEDIC  12/24/12    left shoulder repair; 14 screws and 2 plates    HX ORTHOPAEDIC Right     BROKEN ARM SURGERY X2    HX ORTHOPAEDIC Left      REPAIR OF Left MENISCUS    HX ORTHOPAEDIC Left     BROKEN ARM    HX POLYPECTOMY  2013    HX TONSILLECTOMY  1956    HX TUBAL LIGATION  1981    BSPO adhesion conization abn pap    TOTAL KNEE ARTHROPLASTY Left 2016     Current Outpatient Medications on File Prior to Visit   Medication Sig Dispense Refill    estradiol (ESTRACE) 1 mg tablet Take 1 mg by mouth daily.  zolpidem (AMBIEN) 5 mg tablet TAKE ONE TABLET BY MOUTH EVERY NIGHT AT BEDTIME AS NEEDED FOR INSOMNIA 30 Tab 0    [DISCONTINUED] furosemide (LASIX) 20 mg tablet TAKE ONE TABLET BY MOUTH TWICE A DAY 60 Tab 0    [DISCONTINUED] aspirin delayed-release 325 mg tablet Take 1 Tab by mouth two (2) times a day. 60 Tab 0     No current facility-administered medications on file prior to visit. Allergies   Allergen Reactions    Codeine Rash     Rash and swelling on arm    Adhesive Tape-Silicones Rash    Keflex [Cephalexin] Nausea and Vomiting    Tramadol Other (comments)     Drowsy      Wellbutrin [Bupropion Hcl] Other (comments)     fatigue     Family History   Problem Relation Age of Onset    Cancer Mother         colon    Arthritis-osteo Mother     Anesth Problems Mother         delayed awakening    Cancer Maternal Grandmother         stomach    Heart Disease Father 80    Heart Attack Maternal Grandfather [de-identified]    Heart Attack Paternal Grandmother 80    Diabetes Cousin         cervical cancer     Social History     Socioeconomic History    Marital status:      Spouse name: Not on file    Number of children: Not on file    Years of education: Not on file    Highest education level: Not on file   Tobacco Use    Smoking status: Never Smoker    Smokeless tobacco: Never Used   Substance and Sexual Activity    Alcohol use:  Yes     Alcohol/week: 0.8 standard drinks     Types: 1 Standard drinks or equivalent per week     Comment: RARE    Drug use: No   Other Topics Concern     Service No    Caffeine Concern No    Hobby Hazards No    Sleep Concern No    Stress Concern No    Weight Concern No    Special Diet No    Exercise Yes    Bike Helmet Yes    Seat Belt Yes    Self-Exams Yes             Review of Systems   Constitutional: Negative for chills, diaphoresis, fever, malaise/fatigue and weight loss. Eyes: Negative for blurred vision, double vision, pain and redness. Respiratory: Negative for cough, shortness of breath and wheezing. Cardiovascular: Negative for chest pain, palpitations, orthopnea, claudication, leg swelling and PND. Skin: Negative for itching and rash. Neurological: Negative for dizziness, tingling, tremors, sensory change, speech change, focal weakness, seizures, loss of consciousness, weakness and headaches. Results for orders placed or performed during the hospital encounter of 08/23/19   SAMPLES BEING HELD   Result Value Ref Range    SAMPLES BEING HELD 1RED     COMMENT        Add-on orders for these samples will be processed based on acceptable specimen integrity and analyte stability, which may vary by analyte. CBC WITH AUTOMATED DIFF   Result Value Ref Range    WBC 9.3 3.6 - 11.0 K/uL    RBC 5.01 3.80 - 5.20 M/uL    HGB 14.9 11.5 - 16.0 g/dL    HCT 46.8 35.0 - 47.0 %    MCV 93.4 80.0 - 99.0 FL    MCH 29.7 26.0 - 34.0 PG    MCHC 31.8 30.0 - 36.5 g/dL    RDW 13.6 11.5 - 14.5 %    PLATELET 960 503 - 446 K/uL    MPV 9.6 8.9 - 12.9 FL    NRBC 0.0 0  WBC    ABSOLUTE NRBC 0.00 0.00 - 0.01 K/uL    NEUTROPHILS 73 32 - 75 %    BAND NEUTROPHILS 3 0 - 6 %    LYMPHOCYTES 18 12 - 49 %    MONOCYTES 3 (L) 5 - 13 %    EOSINOPHILS 1 0 - 7 %    BASOPHILS 2 (H) 0 - 1 %    IMMATURE GRANULOCYTES 0 %    ABS. NEUTROPHILS 7.0 1.8 - 8.0 K/UL    ABS. LYMPHOCYTES 1.7 0.8 - 3.5 K/UL    ABS. MONOCYTES 0.3 0.0 - 1.0 K/UL    ABS.  EOSINOPHILS 0.1 0.0 - 0.4 K/UL    ABS. BASOPHILS 0.2 (H) 0.0 - 0.1 K/UL    ABS. IMM. GRANS. 0.0 K/UL    DF MANUAL      RBC COMMENTS OTILIA CELLS  FEW       PROTHROMBIN TIME + INR   Result Value Ref Range    INR 1.0 0.9 - 1.1      Prothrombin time 9.8 9.0 - 85.2 sec   METABOLIC PANEL, BASIC   Result Value Ref Range    Sodium 140 136 - 145 mmol/L    Potassium 4.1 3.5 - 5.1 mmol/L    Chloride 106 97 - 108 mmol/L    CO2 27 21 - 32 mmol/L    Anion gap 7 5 - 15 mmol/L    Glucose 120 (H) 65 - 100 mg/dL    BUN 16 6 - 20 MG/DL    Creatinine 0.68 0.55 - 1.02 MG/DL    BUN/Creatinine ratio 24 (H) 12 - 20      GFR est AA >60 >60 ml/min/1.73m2    GFR est non-AA >60 >60 ml/min/1.73m2    Calcium 9.1 8.5 - 95.8 MG/DL   METABOLIC PANEL, BASIC   Result Value Ref Range    Sodium 142 136 - 145 mmol/L    Potassium 4.1 3.5 - 5.1 mmol/L    Chloride 115 (H) 97 - 108 mmol/L    CO2 22 21 - 32 mmol/L    Anion gap 5 5 - 15 mmol/L    Glucose 141 (H) 65 - 100 mg/dL    BUN 11 6 - 20 MG/DL    Creatinine 0.55 0.55 - 1.02 MG/DL    BUN/Creatinine ratio 20 12 - 20      GFR est AA >60 >60 ml/min/1.73m2    GFR est non-AA >60 >60 ml/min/1.73m2    Calcium 8.4 (L) 8.5 - 10.1 MG/DL   HEMOGLOBIN   Result Value Ref Range    HGB 9.8 (L) 11.5 - 16.0 g/dL         Physical Exam  Visit Vitals  /78   Pulse 88   Temp 98.6 °F (37 °C)   Resp 16   Ht 5' 3.5\" (1.613 m)   Wt 205 lb (93 kg)   LMP 08/24/1995   SpO2 99%   BMI 35.74 kg/m²       Neck: supple, symmetrical, trachea midline, no adenopathy, thyroid: not enlarged, symmetric, no tenderness/mass/nodules, no carotid bruit and no JVD  Lungs: clear to auscultation bilaterally  Heart: regular rate and rhythm, S1, S2 normal, no murmur, click, rub or gallop  Neurologic: Grossly normal  Extremities: extremities normal, atraumatic, no cyanosis or edema. She does not have generalized swelling, but there is swelling in her proximal phalanx of her ring finger on her right hand. It is not pitting.  There is no erythema, warmth or tenderness to palpation of this area. ASSESSMENT and PLAN    ICD-10-CM ICD-9-CM    1. Localized edema Q64.8 335.8 METABOLIC PANEL, COMPREHENSIVE      CBC W/O DIFF   2. Primary insomnia F51.01 307.42    3. Severe obesity ([address] E66.01 278.01    4. Hyperlipidemia with target low density lipoprotein (LDL) cholesterol less than 130 mg/dL E78.5 272.4    5. Personal history of anxiety disorder Z86.59 V11.8    6. Primary osteoarthritis of left knee M17.12 715.16    7. 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 ACP (advance care planning) Z71.89 V65.49    9. Medicare annual wellness visit, subsequent Z00.00 V70.0    10. lumbar nerve root impingement-2010- RLQ/ inguinal pain M54.16 724.4      Diagnoses and all orders for this visit:    1. Localized edema  -     METABOLIC PANEL, COMPREHENSIVE  -     CBC W/O DIFF    2. Primary insomnia    3. Severe obesity ([facility] [address]    4. Hyperlipidemia with target low density lipoprotein (LDL) cholesterol less than 130 mg/dL    5. Personal history of anxiety disorder    6. Primary osteoarthritis of left knee    7. Insomnia due to anxiety and fear    8. ACP (advance care planning)    9. Medicare annual wellness visit, subsequent    10. lumbar nerve root impingement-2010- RLQ/ inguinal pain    Other orders  -     furosemide (LASIX) 20 mg tablet; TAKE ONE TABLET BY MOUTH TWICE A DAY      Follow-up and Dispositions    · Return in about 6 months (around 6/20/2020), or if finger symptoms worsen or fail to improve, for insomnia, edema. lab results and schedule of future lab studies reviewed with patient  reviewed diet, exercise and weight control  cardiovascular risk and specific lipid/LDL goals reviewed  reviewed medications and side effects in detail  Please call my office if there are any questions- [phone]. I will arrange for follow up after the lab tests done from today return  Recommended a weekly \"heart check. \" I went into detail how to do this.   Call for refills on medications as needed. Discussed expected course/resolution/complications of diagnosis in detail with patient. Medication risks/benefits/costs/interactions/alternatives discussed with patient. Pt was given an after visit summary which includes diagnoses, current medications & vitals. Pt expressed understanding with the diagnosis and plan. Total 25 minutes,60 % counseling re:  Regular exercise is very important to your health; it helps mentally, physically, socially; it prevents injuries if done properly. Exercise, even as simple as walking 20-30 minutes daily has major benefits to your health even though your \"numbers\" are the same in the lab. See if you can add this into your daily regimen and after a few months it will become a regular habit-\"just something you do,\" like brushing your teeth. A combination of aerobic exercise and strengthening and stretching is felt to be the best for you, so this should be your ultimate goal.   This can be done in the privacy of your home or in a group setting as at the gym  Some prefer having a , others prefer to do exercise in groups or individually. Do what \"works\" for you. You need to make it simple and \"fun,\" or you most likely will not continue it. Recommended a weekly \"heart check. \" I went into detail how to do this. BMI is significantly elevated- in the obese range. I reviewed diet, exercise and weight control. Discussed weight control in detail, the importance of mainly decreased carbs, and for weight maintenance, exercise; discussed different diets and that it isn't as important to watch the type of foods as it is to decrease calorie intake no matter what type of diet you do, etc.     CBC done to check for post op anemia. Told her that localized swelling like that is usually due to inflammation or infection, or injury, but she has no sign of any of these.  We will check some baseline lab work to make sure there is no sign of kidney or liver dysfunction, or protein in her blood. It is possible that her weight gain she has had recently is causing this, although she states it suddenly became an issue just 2 days ago. Suggested that she go ahead and follow a low sodium diet and increase the furosemide to 2 a day for the next 5 days, and to call us if she has any new symptoms. She has not had her blood checked since her surgery in August so we checked her CBC to make sure it is back to normal.  Also, discussed symptoms of concern that were noted today in the note above, treatment options( including doing nothing), when to follow up before recommended time frame. Also, answered all questions. This document was written by Rodger Little, as dictated by Isreal Miner MD.  I have reviewed and agree with the above note and have made corrections where appropriate Jim Maciel M.D. This is the Subsequent Medicare Annual Wellness Exam, performed 12 months or more after the Initial AWV or the last Subsequent AWV    I have reviewed the patient's medical history in detail and updated the computerized patient record.      History     Patient Active Problem List   Diagnosis Code    lumbar nerve root impingement-2010- RLQ/ inguinal pain M54.16    Left knee DJD M17.12    Adjustment disorder with mixed anxiety and depressed mood F43.23    Hyperlipidemia with target low density lipoprotein (LDL) cholesterol less than 130 mg/dL E78.5    Postmenopausal HRT (hormone replacement therapy) Z79.890    History of complications due to general anesthesia- slow to wake up from anesthesia Z91.89    History of kidney stones Z87.442    ACP (advance care planning) Z71.89    Other idiopathic scoliosis, thoracolumbar region M41.25    Personal history of anxiety disorder Z86.59    History of depression Z86.59    Femoral fracture ([address] S72.90XA    Left displaced femoral neck fracture ([address] S72.002A    Severe obesity ([address] E66.01     Past Medical History: Diagnosis Date    Actinic keratosis     Left arm    Adjustment disorder with mixed anxiety and depressed mood     Adverse effect of anesthesia     HARD TO WAKE UP SLOW BREATHING ,Pt stated doesn't take much anesthesia    Arthritis     OSTEO    Chronic pain     Diverticulitis     Kidney stones 95,96,98    left    Left knee DJD     Lumbar nerve root impingement     RLQ pain    Menopause     LMP-53years old    Nausea & vomiting     Other and unspecified hyperlipidemia     Other idiopathic scoliosis, thoracolumbar region 2018    Postmenopausal HRT (hormone replacement therapy)     Right knee DJD       Past Surgical History:   Procedure Laterality Date    COLONOSCOPY N/A 2016    COLONOSCOPY performed by Kodak Mckinley MD at [address] [place] [address] [address] HX CATARACT REMOVAL Right 2019    HX CATARACT REMOVAL Left 2019    HX  SECTION  6086,9064    X2    HX GI      COLONOSCOPY    HX HIP FRACTURE TX Right 2019    Total R hip- Dr. Signa Brunner HX HYSTERECTOMY  2009    LMP-53years old    HX KNEE ARTHROSCOPY Bilateral 1966    SCOPE of knnees bilateral    HX LITHOTRIPSY      X 2    HX LUMBAR LAMINECTOMY  2000    X2    HX MENISCECTOMY      left knee    HX ORTHOPAEDIC  12    left shoulder repair; 14 screws and 2 plates    HX ORTHOPAEDIC Right     BROKEN ARM SURGERY X2    HX ORTHOPAEDIC Left      REPAIR OF Left MENISCUS    HX ORTHOPAEDIC Left     BROKEN ARM    HX POLYPECTOMY      HX TONSILLECTOMY  195    HX TUBAL LIGATION      BSPO adhesion conization abn pap    TOTAL KNEE ARTHROPLASTY Left 2016     Current Outpatient Medications   Medication Sig Dispense Refill    estradiol (ESTRACE) 1 mg tablet Take 1 mg by mouth daily.       furosemide (LASIX) 20 mg tablet TAKE ONE TABLET BY MOUTH TWICE A DAY 60 Tab 5    zolpidem (AMBIEN) 5 mg tablet TAKE ONE TABLET BY MOUTH EVERY NIGHT AT BEDTIME AS NEEDED FOR INSOMNIA 30 Tab 0     Allergies   Allergen Reactions    Codeine Rash     Rash and swelling on arm    Adhesive Tape-Silicones Rash    Keflex [Cephalexin] Nausea and Vomiting    Tramadol Other (comments)     Drowsy      Wellbutrin [Bupropion Hcl] Other (comments)     fatigue       Family History   Problem Relation Age of Onset    Cancer Mother         colon    Arthritis-osteo Mother     Anesth Problems Mother         delayed awakening    Cancer Maternal Grandmother         stomach    Heart Disease Father 80    Heart Attack Maternal Grandfather [de-identified]    Heart Attack Paternal Grandmother 80    Diabetes Cousin         cervical cancer     Social History     Tobacco Use    Smoking status: Never Smoker    Smokeless tobacco: Never Used   Substance Use Topics    Alcohol use: Yes     Alcohol/week: 0.8 standard drinks     Types: 1 Standard drinks or equivalent per week     Comment: RARE       Depression Risk Factor Screening:     3 most recent PHQ Screens 5/9/2019   Little interest or pleasure in doing things Not at all   Feeling down, depressed, irritable, or hopeless Not at all   Total Score PHQ 2 0       Alcohol Risk Factor Screening:   Do you average 1 drink per night or more than 7 drinks a week:  No    On any one occasion in the past three months have you have had more than 3 drinks containing alcohol:  No      Functional Ability and Level of Safety:   Hearing: Hearing is good. Activities of Daily Living: The home contains: no safety equipment. Patient does total self care    Ambulation: with no difficulty    Fall Risk:  Fall Risk Assessment, last 12 mths 12/20/2019   Able to walk? Yes   Fall in past 12 months?  Yes   Number of falls in past 12 months 1       Abuse Screen:  Patient is not abused    Cognitive Screening   Has your family/caregiver stated any concerns about your memory: no  Cognitive Screening: Normal - Mini Cog Test    Patient Care Team   Patient Care Team:  Dane Mike MD as PCP - Paola Azul MD as PCP - [facility] Empaneled Provider  Alee Jones RN as [facility])    Assessment/Plan   Education and counseling provided:  Are appropriate based on today's review and evaluation    Diagnoses and all orders for this visit:    1. Localized edema  -     METABOLIC PANEL, COMPREHENSIVE  -     CBC W/O DIFF    2. Primary insomnia    3. Severe obesity ([address]    4. Hyperlipidemia with target low density lipoprotein (LDL) cholesterol less than 130 mg/dL    5. Personal history of anxiety disorder    6. Primary osteoarthritis of left knee    7. Insomnia due to anxiety and fear    8. ACP (advance care planning)    9.  Medicare annual wellness visit, subsequent    10. lumbar nerve root impingement-2010- RLQ/ inguinal pain    Other orders  -     furosemide (LASIX) 20 mg tablet; TAKE ONE TABLET BY MOUTH TWICE A DAY        Health Maintenance Due   Topic Date Due    Shingrix Vaccine Age 49> (1 of 2) 01/29/1998    Pneumococcal 65+ years (1 of 1 - PPSV23) 01/29/2013    GLAUCOMA SCREENING Q2Y  08/17/2018    Influenza Age 9 to Adult  08/01/2019    MEDICARE YEARLY EXAM  08/11/2019

## 2019-12-20 NOTE — PROGRESS NOTES
Advance Care Planning (ACP) Provider Note - Comprehensive     Date of ACP Conversation: 12/20/19  Persons included in Conversation:  patient  Length of ACP Conversation in minutes:  <16 minutes (Non-Billable)    Authorized Decision Maker (if patient is incapable of making informed decisions): This person is:  Healthcare Agent/Medical Power of  under Advance Directive            General ACP for ALL Patients with Decision Making Capacity:   Importance of advance care planning, including choosing a healthcare agent to communicate patient's healthcare decisions if patient lost the ability to make decisions, such as after a sudden illness or accident  Understanding of the healthcare agent role was assessed and information provided    Review of Existing Advance Directive:  Does this advance directive still reflect your preferences?   Yes (Provide new form/Refer for assistance in updating)    For Serious or Chronic Illness:  No known illness    Interventions Provided:  Recommended completion of Advance Directive form after review of ACP materials and conversation with prospective healthcare agent   Recommended communicating the plan and making copies for the healthcare agent, personal physician, and others as appropriate (e.g., health system)  Recommended review of completed ACP document annually or upon change in health status

## 2019-12-20 NOTE — PROGRESS NOTES
Chief Complaint   Patient presents with    Swelling     face and hands    Fall     broke hip 08-23 had surgery   1. Have you been to the ER, urgent care clinic since your last visit? Hospitalized since your last visit? No    2. Have you seen or consulted any other health care providers outside of the [address] [place] [address] since your last visit? Include any pap smears or colon screening.  Yes Reason for visit: Dr Shena Osborne hip surgery

## 2019-12-20 NOTE — PATIENT INSTRUCTIONS
Medicare Wellness Visit, Female     The best way to live healthy is to have a lifestyle where you eat a well-balanced diet, exercise regularly, limit alcohol use, and quit all forms of tobacco/nicotine, if applicable. Regular preventive services are another way to keep healthy. Preventive services (vaccines, screening tests, monitoring & exams) can help personalize your care plan, which helps you manage your own care. Screening tests can find health problems at the earliest stages, when they are easiest to treat. [facility] follows the current, evidence-based guidelines published by the [facility] ([facility]) when recommending preventive services for our patients. Because we follow these guidelines, sometimes recommendations change over time as research supports it. (For example, mammograms used to be recommended annually. Even though Medicare will still pay for an annual mammogram, the newer guidelines recommend a mammogram every two years for women of average risk). Of course, you and your doctor may decide to screen more often for some diseases, based on your risk and your co-morbidities (chronic disease you are already diagnosed with). Preventive services for you include:  - Medicare offers their members a free annual wellness visit, which is time for you and your primary care provider to discuss and plan for your preventive service needs. Take advantage of this benefit every year!  -All adults over the age of 72 should receive the recommended pneumonia vaccines. Current USPSTF guidelines recommend a series of two vaccines for the best pneumonia protection.   -All adults should have a flu vaccine yearly and a tetanus vaccine every 10 years.   -All adults age 48 and older should receive the shingles vaccines (series of two vaccines).       -All adults age 38-68 who are overweight should have a diabetes screening test once every three years.   -All adults born between [birth year] and 1967 should be screened once for Hepatitis C.  -Other screening tests and preventive services for persons with diabetes include: an eye exam to screen for diabetic retinopathy, a kidney function test, a foot exam, and stricter control over your cholesterol.   -Cardiovascular screening for adults with routine risk involves an electrocardiogram (ECG) at intervals determined by your doctor.   -Colorectal cancer screenings should be done for adults age 54-65 with no increased risk factors for colorectal cancer. There are a number of acceptable methods of screening for this type of cancer. Each test has its own benefits and drawbacks. Discuss with your doctor what is most appropriate for you during your annual wellness visit. The different tests include: colonoscopy (considered the best screening method), a fecal occult blood test, a fecal DNA test, and sigmoidoscopy.    -A bone mass density test is recommended when a woman turns 65 to screen for osteoporosis. This test is only recommended one time, as a screening. Some providers will use this same test as a disease monitoring tool if you already have osteoporosis. -Breast cancer screenings are recommended every other year for women of normal risk, age 54-69.  -Cervical cancer screenings for women over age 72 are only recommended with certain risk factors.      Here is a list of your current Health Maintenance items (your personalized list of preventive services) with a due date:  Health Maintenance Due   Topic Date Due    Shingles Vaccine (1 of 2) 01/29/1998    Pneumococcal Vaccine (1 of 1 - PPSV23) 01/29/2013    Glaucoma Screening   08/17/2018    Flu Vaccine  08/01/2019    Annual Well Visit  08/11/2019

## 2019-12-21 LAB
ALBUMIN SERPL-MCNC: 4.2 G/DL (ref 3.5–4.8)
ALBUMIN/GLOB SERPL: 1.4 {RATIO} (ref 1.2–2.2)
ALP SERPL-CCNC: 111 IU/L (ref 39–117)
ALT SERPL-CCNC: 15 IU/L (ref 0–32)
AST SERPL-CCNC: 8 IU/L (ref 0–40)
BILIRUB SERPL-MCNC: 0.2 MG/DL (ref 0–1.2)
BUN SERPL-MCNC: 21 MG/DL (ref 8–27)
BUN/CREAT SERPL: 33 (ref 12–28)
CALCIUM SERPL-MCNC: 9.6 MG/DL (ref 8.7–10.3)
CHLORIDE SERPL-SCNC: 104 MMOL/L (ref 96–106)
CO2 SERPL-SCNC: 20 MMOL/L (ref 20–29)
CREAT SERPL-MCNC: 0.64 MG/DL (ref 0.57–1)
ERYTHROCYTE [DISTWIDTH] IN BLOOD BY AUTOMATED COUNT: 14 % (ref 12.3–15.4)
GLOBULIN SER CALC-MCNC: 2.9 G/DL (ref 1.5–4.5)
GLUCOSE SERPL-MCNC: 101 MG/DL (ref 65–99)
HCT VFR BLD AUTO: 50 % (ref 34–46.6)
HGB BLD-MCNC: 16.5 G/DL (ref 11.1–15.9)
MCH RBC QN AUTO: 28.4 PG (ref 26.6–33)
MCHC RBC AUTO-ENTMCNC: 33 G/DL (ref 31.5–35.7)
MCV RBC AUTO: 86 FL (ref 79–97)
PLATELET # BLD AUTO: 410 X10E3/UL (ref 150–450)
POTASSIUM SERPL-SCNC: 4.3 MMOL/L (ref 3.5–5.2)
PROT SERPL-MCNC: 7.1 G/DL (ref 6–8.5)
RBC # BLD AUTO: 5.8 X10E6/UL (ref 3.77–5.28)
SODIUM SERPL-SCNC: 141 MMOL/L (ref 134–144)
WBC # BLD AUTO: 11.7 X10E3/UL (ref 3.4–10.8)

## 2019-12-23 ENCOUNTER — PATIENT OUTREACH (OUTPATIENT)
Dept: FAMILY MEDICINE CLINIC | Age: 69
End: 2019-12-23

## 2020-01-01 NOTE — PROGRESS NOTES
GREAT NEWS!! All of your recent lab tests are normal or at goal.  No diabetes, normal liver and kidney tests. Normal CBC( red and white blood cells and platelets) except slight hemoglobin increase which we can recheck at next office visit. .   I would continue everything the same and schedule your next office visit in 6 months to monitor the kidney function, sodium and potassium which are sometimes affected by the furosemide you are taking. I also noticed after you left the office, that you need your pneumonia vaccine ( pneumovac 23).

## 2020-01-13 ENCOUNTER — TELEPHONE (OUTPATIENT)
Dept: FAMILY MEDICINE CLINIC | Age: 70
End: 2020-01-13

## 2020-01-13 NOTE — TELEPHONE ENCOUNTER
----- Message from Pearl Lindsay sent at 1/13/2020 12:59 PM EST -----  Regarding: Dr. Memo York: [phone]  Caller's first and last name: self  Reason for call: Message to nurse Freddy Son required yes/no and why:  yes  Best contact number(s): [phone]  Details to clarify the request: Pt says she needs an antibiotic for her hip before she goes to the dentist on Wed, 1/22/20.

## 2020-01-14 DIAGNOSIS — F51.01 PRIMARY INSOMNIA: ICD-10-CM

## 2020-01-14 RX ORDER — ZOLPIDEM TARTRATE 5 MG/1
TABLET ORAL
Qty: 30 TAB | Refills: 5 | Status: SHIPPED | OUTPATIENT
Start: 2020-01-14 | End: 2020-07-21

## 2020-01-14 RX ORDER — AMOXICILLIN 500 MG/1
CAPSULE ORAL
Qty: 6 CAP | Refills: 0 | Status: SHIPPED | OUTPATIENT
Start: 2020-01-14 | End: 2020-02-19

## 2020-01-14 NOTE — TELEPHONE ENCOUNTER
MD Bk Diamond LPN   Caller: Unspecified Alli Haas,  1:43 PM)             OK Amoxicillin 500 mg 6 PO x 1  1 hr prior to dental work

## 2020-01-20 NOTE — PROGRESS NOTES
Ambulatory Care Management Note    Date/Time:  1/20/2020 9:55 AM    This Ambulatory Care Manager (ACM) reviewed and updated the following screenings during this call; general assessment    Patient's challenges to self management identified:   Functional physical ability      Medication Management:  Admits to good understanding and good adherence. Medication review completed. Advance Care Planning:   Does patient have an Advance Directive:  reviewed and current    [employer], Referrals, and Durable Medical Equipment:       Health Maintenance Due   Topic Date Due    Shingrix Vaccine Age 50> (1 of 2) 01/29/1998    Pneumococcal 65+ years (1 of 1 - PPSV23) 01/29/2013    GLAUCOMA SCREENING Q2Y  08/17/2018    Influenza Age 9 to Adult  08/01/2019     Health Maintenance reviewed - Discussed past due gaps. .    Patient was asked to consider health care goals that they would like to focus on with this ACM. ACM will follow up with patient to discuss goals and establish care plan in the next 7-14 days. PCP/Specialist follow up: No future appointments. Had OV on 12/20/ 19. Told to return in 6 months, around 6/20/2020. Plan to follow up with specialist on 2/24/20 for knee surgery.

## 2020-01-22 ENCOUNTER — PATIENT OUTREACH (OUTPATIENT)
Dept: FAMILY MEDICINE CLINIC | Age: 70
End: 2020-01-22

## 2020-02-10 NOTE — PROGRESS NOTES
Ambulatory Care Management Note      Date/Time:  1/22/2020 9:48 AM    Top Challenges reviewed with the provider   Scheduled for TKA on 2/24/2020  Scheduled for pre-screening on 2/11/20  Decline PCP follow up at this time     Ambulatory  contacted patient for discussion and case management of ACM management for chronic pain, anxiety and self management   Summary of patients top problems:   1. Chronic Knee pain- plan to have a TKA done on 2/24/20  PCP/Specialist follow up: No future appointments. Goals        Patient Stated    [facility] Return to baseline activity (pt-stated)      08/27/19  · Verbalized understanding of discharge instructions and follow up visit  · At [address] [facility] [place] to visit on today- 8/27/19  · Decline [facility]/PCP follow-up at this time, verbalized will call for concerns about medical condition  · Verbalized understanding of when to go to Ed or call surgeon  · Admits to taking ASA as ordered and know when to call if suspected blood clots  · Admits to taking all medications as ordered and drinking fluids  · Has order for stool softener if needed. · NN will follow in one week to ten days. pk    09/20/19  · Patient doing much better, being released by PT due to doing so well  · Denies red flags:Fever, Shortness of Breath or Chest Pain. · Incision remain  clean, dry, intact  · Calf is non-tender,   · Operative extremity has minimal swelling  · Progressing well with therapy  · Patient verbalized knowledge of when to follow up with PCP and Surgeon. NN will follow in 7-10 days. pk  10/17/19  · Several outbound calls to patient to complete F/U assessment  · NN unable to reach  · NN contact information left on   · NN will follow up again in 7-10 days.  pk    11/05/19  · Outreach to patient who says she is doing well  · Walking 1 mile per day  · Was in [place] for 3 weeks, just getting home  · Continued to do water aerobics and beach exercises while on vacation  · Medications reviewed  · No HH at this time-not needed  · Decline PCP/HH follow up at this time  · ACM will follow in 7-10 days    1/22/2020  · Outreach to patient who says she is doing well  · Continue to Walk 1 mile per day  · Scheduled for Ortho testing on 2/11/2020  · Scheduled for TKR on 2/24/20  · Reviewed medications, admits to taking as ordered  · Decline PCP/HH follow up at this time  · ACM will follow in 7-10 days. pk           Current Outpatient Medications   Medication Sig    amoxicillin (AMOXIL) 500 mg capsule 6 caps 1 hour prior to dental work    zolpidem (AMBIEN) 5 mg tablet TAKE ONE TABLET BY MOUTH EVERY NIGHT AT BEDTIME AS NEEDED FOR INSOMNIA    estradiol (ESTRACE) 1 mg tablet Take 1 mg by mouth every morning.  furosemide (LASIX) 20 mg tablet TAKE ONE TABLET BY MOUTH TWICE A DAY     No current facility-administered medications for this visit. Patient verbalized understanding of all information discussed. Patient has this Nurse Navigators contact information for any further questions, concerns, or needs.

## 2020-02-11 ENCOUNTER — PATIENT OUTREACH (OUTPATIENT)
Dept: FAMILY MEDICINE CLINIC | Age: 70
End: 2020-02-11

## 2020-02-11 ENCOUNTER — HOSPITAL ENCOUNTER (OUTPATIENT)
Dept: PREADMISSION TESTING | Age: 70
Discharge: HOME | End: 2020-02-11
Payer: MEDICARE

## 2020-02-11 DIAGNOSIS — R73.09 ELEVATED HEMOGLOBIN A1C: ICD-10-CM

## 2020-02-11 LAB
ABO + RH BLD: NORMAL
ANION GAP SERPL CALC-SCNC: 6 MMOL/L (ref 5–15)
APPEARANCE UR: CLEAR
ATRIAL RATE: 83 BPM
BACTERIA URNS QL MICRO: ABNORMAL /HPF
BILIRUB UR QL: NEGATIVE
BLOOD GROUP ANTIBODIES SERPL: NORMAL
BUN SERPL-MCNC: 17 MG/DL (ref 6–20)
BUN/CREAT SERPL: 31 (ref 12–20)
CALCIUM SERPL-MCNC: 8.6 MG/DL (ref 8.5–10.1)
CALCULATED P AXIS, ECG09: 90 DEGREES
CALCULATED R AXIS, ECG10: -11 DEGREES
CALCULATED T AXIS, ECG11: 36 DEGREES
CHLORIDE SERPL-SCNC: 108 MMOL/L (ref 97–108)
CO2 SERPL-SCNC: 26 MMOL/L (ref 21–32)
COLOR UR: ABNORMAL
CREAT SERPL-MCNC: 0.54 MG/DL (ref 0.55–1.02)
DIAGNOSIS, 93000: NORMAL
EPITH CASTS URNS QL MICRO: ABNORMAL /LPF
ERYTHROCYTE [DISTWIDTH] IN BLOOD BY AUTOMATED COUNT: 14.7 % (ref 11.5–14.5)
EST. AVERAGE GLUCOSE BLD GHB EST-MCNC: 126 MG/DL
GLUCOSE SERPL-MCNC: 102 MG/DL (ref 65–100)
GLUCOSE UR STRIP.AUTO-MCNC: NEGATIVE MG/DL
HBA1C MFR BLD: 6 % (ref 4–5.6)
HCT VFR BLD AUTO: 49.1 % (ref 35–47)
HGB BLD-MCNC: 15.9 G/DL (ref 11.5–16)
HGB UR QL STRIP: NEGATIVE
HYALINE CASTS URNS QL MICRO: ABNORMAL /LPF (ref 0–5)
INR PPP: 1 (ref 0.9–1.1)
KETONES UR QL STRIP.AUTO: NEGATIVE MG/DL
LEUKOCYTE ESTERASE UR QL STRIP.AUTO: NEGATIVE
MCH RBC QN AUTO: 29.1 PG (ref 26–34)
MCHC RBC AUTO-ENTMCNC: 32.4 G/DL (ref 30–36.5)
MCV RBC AUTO: 89.9 FL (ref 80–99)
NITRITE UR QL STRIP.AUTO: NEGATIVE
NRBC # BLD: 0 K/UL (ref 0–0.01)
NRBC BLD-RTO: 0 PER 100 WBC
P-R INTERVAL, ECG05: 130 MS
PH UR STRIP: 6 [PH] (ref 5–8)
PLATELET # BLD AUTO: 330 K/UL (ref 150–400)
PMV BLD AUTO: 10.5 FL (ref 8.9–12.9)
POTASSIUM SERPL-SCNC: 3.9 MMOL/L (ref 3.5–5.1)
PROT UR STRIP-MCNC: NEGATIVE MG/DL
PROTHROMBIN TIME: 9.8 SEC (ref 9–11.1)
Q-T INTERVAL, ECG07: 384 MS
QRS DURATION, ECG06: 70 MS
QTC CALCULATION (BEZET), ECG08: 451 MS
RBC # BLD AUTO: 5.46 M/UL (ref 3.8–5.2)
RBC #/AREA URNS HPF: ABNORMAL /HPF (ref 0–5)
SODIUM SERPL-SCNC: 140 MMOL/L (ref 136–145)
SP GR UR REFRACTOMETRY: 1.01 (ref 1–1.03)
SPECIMEN EXP DATE BLD: NORMAL
UA: UC IF INDICATED,UAUC: ABNORMAL
UROBILINOGEN UR QL STRIP.AUTO: 1 EU/DL (ref 0.2–1)
VENTRICULAR RATE, ECG03: 83 BPM
WBC # BLD AUTO: 10 K/UL (ref 3.6–11)
WBC URNS QL MICRO: ABNORMAL /HPF (ref 0–4)

## 2020-02-11 PROCEDURE — 81001 URINALYSIS AUTO W/SCOPE: CPT

## 2020-02-11 PROCEDURE — 80048 BASIC METABOLIC PNL TOTAL CA: CPT

## 2020-02-11 PROCEDURE — 85027 COMPLETE CBC AUTOMATED: CPT

## 2020-02-11 PROCEDURE — 87086 URINE CULTURE/COLONY COUNT: CPT

## 2020-02-11 PROCEDURE — 86900 BLOOD TYPING SEROLOGIC ABO: CPT

## 2020-02-11 PROCEDURE — 87186 SC STD MICRODIL/AGAR DIL: CPT

## 2020-02-11 PROCEDURE — 93005 ELECTROCARDIOGRAM TRACING: CPT

## 2020-02-11 PROCEDURE — 85610 PROTHROMBIN TIME: CPT

## 2020-02-11 PROCEDURE — 87077 CULTURE AEROBIC IDENTIFY: CPT

## 2020-02-11 PROCEDURE — 83036 HEMOGLOBIN GLYCOSYLATED A1C: CPT

## 2020-02-11 NOTE — PROGRESS NOTES
Removed tramadol and Keflex from this patient's allergy list, at the request of PAT nurse Nisha Laughlin RN.

## 2020-02-12 VITALS
DIASTOLIC BLOOD PRESSURE: 71 MMHG | TEMPERATURE: 98.2 F | HEIGHT: 64 IN | HEART RATE: 86 BPM | SYSTOLIC BLOOD PRESSURE: 103 MMHG | BODY MASS INDEX: 36.19 KG/M2 | RESPIRATION RATE: 18 BRPM | WEIGHT: 212 LBS

## 2020-02-12 PROBLEM — R73.09 ELEVATED HEMOGLOBIN A1C: Status: ACTIVE | Noted: 2020-02-12

## 2020-02-12 LAB
BACTERIA SPEC CULT: NORMAL
BACTERIA SPEC CULT: NORMAL
SERVICE CMNT-IMP: NORMAL

## 2020-02-12 NOTE — PROGRESS NOTES
Goals Addressed                 This Visit's Progress       Patient Stated     Return to baseline activity (pt-stated)        08/27/19  · Verbalized understanding of discharge instructions and follow up visit  · At [address] to visit on today- 8/27/19  · Decline [facility]/PCP follow-up at this time, verbalized will call for concerns about medical condition  · Verbalized understanding of when to go to Ed or call surgeon  · Admits to taking ASA as ordered and know when to call if suspected blood clots  · Admits to taking all medications as ordered and drinking fluids  · Has order for stool softener if needed. · NN will follow in one week to ten days. pk    09/20/19  · Patient doing much better, being released by PT due to doing so well  · Denies red flags:Fever, Shortness of Breath or Chest Pain. · Incision remain  clean, dry, intact  · Calf is non-tender,   · Operative extremity has minimal swelling  · Progressing well with therapy  · Patient verbalized knowledge of when to follow up with PCP and Surgeon. NN will follow in 7-10 days. pk  10/17/19  · Several outbound calls to patient to complete F/U assessment  · NN unable to reach  · NN contact information left on VM  · NN will follow up again in 7-10 days. pk    11/05/19  · Outreach to patient who says she is doing well  · Walking 1 mile per day  · Was in [place] for 3 weeks, just getting home  · Continued to do water aerobics and beach exercises while on vacation  · Medications reviewed  · No HH at this time-not needed  · Decline PCP/HH follow up at this time  · ACM will follow in 7-10 days    1/22/2020  · Outreach to patient who says she is doing well  · Continue to Walk 1 mile per day  · Scheduled for Ortho testing on 2/11/2020  · Scheduled for TKR on 2/24/20  · Reviewed medications, admits to taking as ordered  · Decline PCP/HH follow up at this time  · ACM will follow in 7-10 days.  pk    02/12/20  · Attended Ortho follow up on 2/11/20 for prescreening labs  · TKA Scheduled for 2/24/2020  · Decline PCP follow up at this time  · Medications reviewed  · No HH needed at this time-not needed  · ACM will follow in 7-10 days. pk          Current Outpatient Medications   Medication Sig    amoxicillin (AMOXIL) 500 mg capsule 6 caps 1 hour prior to dental work    zolpidem (AMBIEN) 5 mg tablet TAKE ONE TABLET BY MOUTH EVERY NIGHT AT BEDTIME AS NEEDED FOR INSOMNIA    estradiol (ESTRACE) 1 mg tablet Take 1 mg by mouth every morning.  furosemide (LASIX) 20 mg tablet TAKE ONE TABLET BY MOUTH TWICE A DAY     No current facility-administered medications for this visit.

## 2020-02-13 LAB
BACTERIA SPEC CULT: ABNORMAL
CC UR VC: ABNORMAL
SERVICE CMNT-IMP: ABNORMAL

## 2020-02-14 PROBLEM — R82.79 URINE CULTURE POSITIVE: Status: ACTIVE | Noted: 2020-02-14

## 2020-02-14 RX ORDER — SULFAMETHOXAZOLE AND TRIMETHOPRIM 800; 160 MG/1; MG/1
1 TABLET ORAL 2 TIMES DAILY
Qty: 10 TAB | Refills: 0 | Status: SHIPPED | OUTPATIENT
Start: 2020-02-14 | End: 2020-02-19

## 2020-02-14 NOTE — ADVANCED PRACTICE NURSE
Patient is a 66 y/o female who was seen by the PAT RN as a standard pre-op appointment on 2/11/20. Reviewed urine culture results on 2/14/20 and results were >100,000 colonies E. Coli. Prescription for Bactrim DS bid x 5 days e-prescribed to [facility]. Patient notified of prescription and possible side effects, and verbalized understanding of treatment regimen, goals of therapy, and UTI preventive measures. Provided contact info for further questions and reasons to follow up with PCP/surgeon, if needed.     JODIE Lee

## 2020-02-18 ENCOUNTER — PATIENT OUTREACH (OUTPATIENT)
Dept: FAMILY MEDICINE CLINIC | Age: 70
End: 2020-02-18

## 2020-02-19 ENCOUNTER — OFFICE VISIT (OUTPATIENT)
Dept: FAMILY MEDICINE CLINIC | Age: 70
End: 2020-02-19

## 2020-02-19 ENCOUNTER — TELEPHONE (OUTPATIENT)
Dept: FAMILY MEDICINE CLINIC | Age: 70
End: 2020-02-19

## 2020-02-19 VITALS
RESPIRATION RATE: 18 BRPM | WEIGHT: 210.8 LBS | SYSTOLIC BLOOD PRESSURE: 133 MMHG | TEMPERATURE: 97.9 F | HEART RATE: 94 BPM | BODY MASS INDEX: 35.99 KG/M2 | HEIGHT: 64 IN | OXYGEN SATURATION: 95 % | DIASTOLIC BLOOD PRESSURE: 85 MMHG

## 2020-02-19 DIAGNOSIS — Z01.818 PREOP EXAMINATION: Primary | ICD-10-CM

## 2020-02-19 DIAGNOSIS — M17.11 PRIMARY OSTEOARTHRITIS OF RIGHT KNEE: ICD-10-CM

## 2020-02-19 NOTE — PROGRESS NOTES
[facility] [facility] Note      Subjective:     Chief Complaint   Patient presents with    Pre-op Exam     Right knee SX: 2/24/2020 Dr. Dave Spicer is a 65y.o. year old female who presents for pre operative evaluation:      OA:   Chronic since greater than 10 years  Bilateral knees  Left knee status post total replacement  Current treatment: None  Previous treatment: Cortisone injection, ibuprofen, Tylenol  Managed by orthopedist.  Plan for total right knee replacement as below. Pre Op  Procedure: Right Total Knee Arthroplasty  Expected Date: 2/24/2020  Surgeon: Cleve Blackmon  Anesthesia: General   Hx of complications with general anesthesia:  - Had surgery in 8/2019 with general anesthesia and no complications  - states preop injection causes oversedation. Surgeon is aware  - No history of malignant hyperthermia  Signs and symptoms of cardiovascular disease: Chest pain, shortness of  OND  Have any of the following: CVA, CHF, Cr > 2.0, insulin-dependent DM, ischemic cardiac disease, or suprainguinal vascular/intrathroacic/intraabdominal surgery: None  Able to meet > 4 METS: Yes  STOP-BANG (snoring, tiredness, observed apnea, high BP, BMI>35, age >47, neck circumference> 15 in, male): 3+ risk factors - No      Never smoker      Review of Systems   Pertinent positives and negative per HPI. All other systems  reviewed are negative for a Comprehensive ROS (10+).      Family History   Problem Relation Age of Onset    Cancer Mother         colon   [facility] Arthritis-osteo Mother     Anesth Problems Mother         delayed awakening    Cancer Maternal Grandmother         stomach    Heart Disease Father 80    Heart Attack Maternal Grandfather [de-identified]    Heart Attack Paternal Grandmother 80    Diabetes Cousin         cervical cancer       Objective:     Vitals:    02/19/20 1749   BP: 138/90   Pulse: 76   Resp: 18   Temp: 97.9 °F (36.6 °C)   TempSrc: Oral   SpO2: 95%   Weight: 210 lb 12.8 oz (95.6 kg)   Height: 5' 4\" (1.626 m)       Physical Examination:  General: Alert, cooperative, no distress, appears stated age. Obese  Eyes: Conjunctivae clear. Pupils equally round and reactive to light, Extraocular muscles intact. Ears: Normal external ear canals both ears. Nose: Nares normal. Septum midline. Mucosa normal. No drainage or sinus tenderness. Mouth/Throat: Lips, mucosa, and tongue normal. No oropharyngeal erythema. No tonsillar enlargement or exudate. Neck: Supple, symmetrical, trachea midline, no adenopathy. No thyroid enlargement/tenderness/nodules  Respiratory: Breathing comfortably, in no acute respiratory distress. Clear to auscultation bilaterally. Normal inspiratory and expiratory ratio. Cardiovascular: Regular rate and rhythm, S1, S2 normal, no murmur, click, rub or gallop.   -Extremities no edema. Pulses 2+ and symmetric radial and dorsalis pedis   Abdomen: Soft, non-tender, not distended. Bowel sounds normal. No masses or organomegaly. MSK: Extremities normal appearing, atraumatic, no effusion. Gait steady and unassisted. - Right knee crepitus noted. Skin: Skin color, texture, turgor normal. No rashes or lesions on exposed skin. Lymph nodes: Cervical, supraclavicular nodes normal.  Neurologic: Cranial nerves II-XII intact. Strength 5/5 grossly. Sensation and reflexes normal throughout.   Psychiatric: Affect appropriate      Hospital Outpatient Visit on 02/11/2020   Component Date Value Ref Range Status    Sodium 02/11/2020 140  136 - 145 mmol/L Final    Potassium 02/11/2020 3.9  3.5 - 5.1 mmol/L Final    Chloride 02/11/2020 108  97 - 108 mmol/L Final    CO2 02/11/2020 26  21 - 32 mmol/L Final    Anion gap 02/11/2020 6  5 - 15 mmol/L Final    Glucose 02/11/2020 102* 65 - 100 mg/dL Final    BUN 02/11/2020 17  6 - 20 MG/DL Final    Creatinine 02/11/2020 0.54* 0.55 - 1.02 MG/DL Final    BUN/Creatinine ratio 02/11/2020 31* 12 - 20   Final    GFR est AA 02/11/2020 >60  >60 ml/min/1.73m2 Final    GFR est non-AA 02/11/2020 >60  >60 ml/min/1.73m2 Final    Comment: Estimated GFR is calculated using the IDMS-traceable Modification of Diet in Renal Disease (MDRD) Study equation, reported for both  Americans (GFRAA) and non- Americans (GFRNA), and normalized to 1.73m2 body surface area. The physician must decide which value applies to the patient. The MDRD study equation should only be used in individuals age 25 or older. It has not been validated for the following: pregnant women, patients with serious comorbid conditions, or on certain medications, or persons with extremes of body size, muscle mass, or nutritional status.  Calcium 02/11/2020 8.6  8.5 - 10.1 MG/DL Final    WBC 02/11/2020 10.0  3.6 - 11.0 K/uL Final    RBC 02/11/2020 5.46* 3.80 - 5.20 M/uL Final    HGB 02/11/2020 15.9  11.5 - 16.0 g/dL Final    HCT 02/11/2020 49.1* 35.0 - 47.0 % Final    MCV 02/11/2020 89.9  80.0 - 99.0 FL Final    MCH 02/11/2020 29.1  26.0 - 34.0 PG Final    MCHC 02/11/2020 32.4  30.0 - 36.5 g/dL Final    RDW 02/11/2020 14.7* 11.5 - 14.5 % Final    PLATELET 82/56/0854 259  150 - 400 K/uL Final    MPV 02/11/2020 10.5  8.9 - 12.9 FL Final    NRBC 02/11/2020 0.0  0  WBC Final    ABSOLUTE NRBC 02/11/2020 0.00  0.00 - 0.01 K/uL Final    Crossmatch Expiration 02/11/2020 02/25/2020   Final    ABO/Rh(D) 02/11/2020 O POSITIVE   Final    Antibody screen 02/11/2020 NEG   Final    INR 02/11/2020 1.0  0.9 - 1.1   Final    A single therapeutic range for Vit K antagonists may not be optimal for all indications - see June, 2008 issue of Chest, American College of Chest Physicians Evidence-Based Clinical Practice Guidelines, 8th Edition.     Prothrombin time 02/11/2020 9.8  9.0 - 11.1 sec Final    Color 02/11/2020 YELLOW/STRAW    Final    Color Reference Range: Straw, Yellow or Dark Yellow    Appearance 02/11/2020 CLEAR  CLEAR   Final    Specific gravity 02/11/2020 1.014  1.003 - 1.030   Final    pH (UA) 02/11/2020 6.0  5.0 - 8.0   Final    Protein 02/11/2020 NEGATIVE   NEG mg/dL Final    Glucose 02/11/2020 NEGATIVE   NEG mg/dL Final    Ketone 02/11/2020 NEGATIVE   NEG mg/dL Final    Bilirubin 02/11/2020 NEGATIVE   NEG   Final    Blood 02/11/2020 NEGATIVE   NEG   Final    Urobilinogen 02/11/2020 1.0  0.2 - 1.0 EU/dL Final    Nitrites 02/11/2020 NEGATIVE   NEG   Final    Leukocyte Esterase 02/11/2020 NEGATIVE   NEG   Final    UA:UC IF INDICATED 02/11/2020 URINE CULTURE ORDERED* CNI   Final    WBC 02/11/2020 0-4  0 - 4 /hpf Final    RBC 02/11/2020 0-5  0 - 5 /hpf Final    Epithelial cells 02/11/2020 FEW  FEW /lpf Final    Epithelial cell category consists of squamous cells and /or transitional urothelial cells. Renal tubular cells, if present, are separately identified as such.     Bacteria 02/11/2020 4+* NEG /hpf Final    Hyaline cast 02/11/2020 0-2  0 - 5 /lpf Final    Ventricular Rate 02/11/2020 83  BPM Final    Atrial Rate 02/11/2020 83  BPM Final    P-R Interval 02/11/2020 130  ms Final    QRS Duration 02/11/2020 70  ms Final    Q-T Interval 02/11/2020 384  ms Final    QTC Calculation (Bezet) 02/11/2020 451  ms Final    Calculated P Axis 02/11/2020 90  degrees Final    Calculated R Axis 02/11/2020 -11  degrees Final    Calculated T Axis 02/11/2020 36  degrees Final    Diagnosis 02/11/2020    Final                    Value:Sinus rhythm with occasional premature ventricular complexes and premature   atrial complexes  Low voltage QRS  Nonspecific ST abnormality  Abnormal ECG  When compared with ECG of 16-AUG-2019 11:27,  premature ventricular complexes are now present  Nonspecific T wave abnormality has replaced inverted T waves in Inferior   leads  Confirmed by Kenia Recio MD, Yasmine Woodruff ([id number]) on 2/11/2020 7:13:22 PM      Hemoglobin A1c 02/11/2020 6.0* 4.0 - 5.6 % Final    Comment: NEW METHOD  PLEASE NOTE NEW REFERENCE RANGE  (NOTE)  HbA1C Interpretive Ranges  <5.7              Normal  5.7 - 6.4         Consider Prediabetes  >6.5              Consider Diabetes      Est. average glucose 02/11/2020 126  mg/dL Final    Special Requests: 02/11/2020 NO SPECIAL REQUESTS    Final    Culture result: 02/11/2020 MRSA NOT PRESENT    Final    Culture result: 02/11/2020     Screening of patient nares for MRSA is for surveillance purposes and, if positive, to facilitate isolation considerations in high risk settings. It is not intended for automatic decolonization interventions per se as regimens are not sufficiently effective to warrant routine use. Final    Special Requests: 02/11/2020     Final                    Value:NO SPECIAL REQUESTS  Reflexed from [id number]      [place] Count 02/11/2020     Final                    Value:>100,000  COLONIES/mL      Culture result: 02/11/2020 ESCHERICHIA COLI*   Final   Office Visit on 12/20/2019   Component Date Value Ref Range Status    Glucose 12/20/2019 101* 65 - 99 mg/dL Final    BUN 12/20/2019 21  8 - 27 mg/dL Final    Creatinine 12/20/2019 0.64  0.57 - 1.00 mg/dL Final    GFR est non-AA 12/20/2019 90  >59 mL/min/1.73 Final    GFR est AA 12/20/2019 104  >59 mL/min/1.73 Final    BUN/Creatinine ratio 12/20/2019 33* 12 - 28 Final    Sodium 12/20/2019 141  134 - 144 mmol/L Final    Potassium 12/20/2019 4.3  3.5 - 5.2 mmol/L Final    Chloride 12/20/2019 104  96 - 106 mmol/L Final    CO2 12/20/2019 20  20 - 29 mmol/L Final    Calcium 12/20/2019 9.6  8.7 - 10.3 mg/dL Final    Protein, total 12/20/2019 7.1  6.0 - 8.5 g/dL Final    Albumin 12/20/2019 4.2  3.5 - 4.8 g/dL Final    GLOBULIN, TOTAL 12/20/2019 2.9  1.5 - 4.5 g/dL Final    A-G Ratio 12/20/2019 1.4  1.2 - 2.2 Final    Bilirubin, total 12/20/2019 0.2  0.0 - 1.2 mg/dL Final    Alk.  phosphatase 12/20/2019 111  39 - 117 IU/L Final    AST (SGOT) 12/20/2019 8  0 - 40 IU/L Final    ALT (SGPT) 12/20/2019 15  0 - 32 IU/L Final    WBC 12/20/2019 11.7* 3.4 - 10.8 x10E3/uL Final    RBC 12/20/2019 5.80* 3.77 - 5.28 x10E6/uL Final    HGB 12/20/2019 16.5* 11.1 - 15.9 g/dL Final    HCT 12/20/2019 50.0* 34.0 - 46.6 % Final    MCV 12/20/2019 86  79 - 97 fL Final    MCH 12/20/2019 28.4  26.6 - 33.0 pg Final    MCHC 12/20/2019 33.0  31.5 - 35.7 g/dL Final    RDW 12/20/2019 14.0  12.3 - 15.4 % Final    Comment: **Effective January 6, 2020, the RDW pediatric reference**    interval will be removed and the adult reference interval    will be changing to:                             Female 11.7 - 15.4                                                       Male 11.6 - 15.4      PLATELET 88/45/6705 717  150 - 450 x10E3/uL Final       Assessment/ Plan:   Diagnoses and all orders for this visit:    1. Preop examination    2. Primary osteoarthritis of right knee        Chronic OA knee resistant to conservative treatment. Plan for joint replacement. Risk of cardiac death and nonfatal myocardial infarction for noncardiac surgical procedures:  Less than 0.4%  https://riskcalculator. facs.org/RiskCalculator/PatientInfo.jsp  Patient has no clinical predictor of perioperative cardiac complications. These risk calculators have been reviewed with the patient who expressed understanding of the relative cardiac risk of his/her upcoming procedure given his/her current risk factors. According to the [employer] of Cardiology and AHA Guidelines of perioperative cardiovascular evaluation for noncardiac surgery and the examination performed today, no contraindications have been noted and [place] Card  is an acceptable risk for the requested proposed procedure. Please proceed with the surgery as scheduled. Medications changes:  Hold diuretics at least one day prior to surgery. I have discussed the diagnosis with the patient and the intended plan as seen in the above orders.   The patient has been offered or received an after-visit summary and questions were answered concerning future plans. I have discussed medication side effects and warnings with the patient as well. Follow-up and Dispositions    · Return if symptoms worsen or fail to improve.        Signed,    Leanne Guzmán MD  2/19/2020

## 2020-02-19 NOTE — TELEPHONE ENCOUNTER
----- Message from [employer] sent at 2/19/2020  1:06 PM EST -----  Regarding: Dr. Merly Vital: [phone]  Keysha Austin (if not patient): Mr. Lamar Jara, spouse. Reason for call: The pt is having surgery Friday and needs to be cleared for her appt. He is requesting a call back from someone. Callback required yes/no and why: Yes.    Best contact number(s): [phone]  Details to clarify the request: N/A    Please advice

## 2020-02-19 NOTE — PROGRESS NOTES
Chief Complaint   Patient presents with    Pre-op Exam     Right knee SX: 2/24/2020 Dr. Hans Mayberry     1. Have you been to the ER, urgent care clinic since your last visit? Hospitalized since your last visit? No    2. Have you seen or consulted any other health care providers outside of the [address] [place] [address] since your last visit? Include any pap smears or colon screening.  No

## 2020-02-20 ENCOUNTER — DOCUMENTATION ONLY (OUTPATIENT)
Dept: FAMILY MEDICINE CLINIC | Age: 70
End: 2020-02-20

## 2020-02-20 NOTE — PATIENT INSTRUCTIONS
Knee Arthritis: Exercises  Introduction  Here are some examples of exercises for you to try. The exercises may be suggested for a condition or for rehabilitation. Start each exercise slowly. Ease off the exercises if you start to have pain. You will be told when to start these exercises and which ones will work best for you. How to do the exercises  Knee flexion with heel slide    1. Lie on your back with your knees bent. 2. Slide your heel back by bending your affected knee as far as you can. Then hook your other foot around your ankle to help pull your heel even farther back. 3. Hold for about 6 seconds, then rest for up to 10 seconds. 4. Repeat 8 to 12 times. 5. Switch legs and repeat steps 1 through 4, even if only one knee is sore. Quad sets    1. Sit with your affected leg straight and supported on the floor or a firm bed. Place a small, rolled-up towel under your knee. Your other leg should be bent, with that foot flat on the floor. 2. Tighten the thigh muscles of your affected leg by pressing the back of your knee down into the towel. 3. Hold for about 6 seconds, then rest for up to 10 seconds. 4. Repeat 8 to 12 times. 5. Switch legs and repeat steps 1 through 4, even if only one knee is sore. Straight-leg raises to the front    1. Lie on your back with your good knee bent so that your foot rests flat on the floor. Your affected leg should be straight. Make sure that your low back has a normal curve. You should be able to slip your hand in between the floor and the small of your back, with your palm touching the floor and your back touching the back of your hand. 2. Tighten the thigh muscles in your affected leg by pressing the back of your knee flat down to the floor. Hold your knee straight. 3. Keeping the thigh muscles tight and your leg straight, lift your affected leg up so that your heel is about 12 inches off the floor. Hold for about 6 seconds, then lower slowly.   4. Relax for up to 10 seconds between repetitions. 5. Repeat 8 to 12 times. 6. Switch legs and repeat steps 1 through 5, even if only one knee is sore. Active knee flexion    1. Lie on your stomach with your knees straight. If your kneecap is uncomfortable, roll up a washcloth and put it under your leg just above your kneecap. 2. Lift the foot of your affected leg by bending the knee so that you bring the foot up toward your buttock. If this motion hurts, try it without bending your knee quite as far. This may help you avoid any painful motion. 3. Slowly move your leg up and down. 4. Repeat 8 to 12 times. 5. Switch legs and repeat steps 1 through 4, even if only one knee is sore. Quadriceps stretch (facedown)    1. Lie flat on your stomach, and rest your face on the floor. 2. Wrap a towel or belt strap around the lower part of your affected leg. Then use the towel or belt strap to slowly pull your heel toward your buttock until you feel a stretch. 3. Hold for about 15 to 30 seconds, then relax your leg against the towel or belt strap. 4. Repeat 2 to 4 times. 5. Switch legs and repeat steps 1 through 4, even if only one knee is sore. Stationary exercise bike    1. If you do not have a stationary exercise bike at home, you can find one to ride at your local health club or community center. 2. Adjust the height of the bike seat so that your knee is slightly bent when your leg is extended downward. If your knee hurts when the pedal reaches the top, you can raise the seat so that your knee does not bend as much. 3. Start slowly. At first, try to do 5 to 10 minutes of cycling with little to no resistance. Then increase your time and the resistance bit by bit until you can do 20 to 30 minutes without pain. 4. If you start to have pain, rest your knee until your pain gets back to the level that is normal for you. Or cycle for less time or with less effort.     Follow-up care is a key part of your treatment and safety. Be sure to make and go to all appointments, and call your doctor if you are having problems. It's also a good idea to know your test results and keep a list of the medicines you take. Where can you learn more? Go to http://mary-dion.info/. Enter C159 in the search box to learn more about \"Knee Arthritis: Exercises. \"  Current as of: June 26, 2019  Content Version: 12.2  © 5209-7150 [employer]. Care instructions adapted under license by [employer] (which disclaims liability or warranty for this information). If you have questions about a medical condition or this instruction, always ask your healthcare professional. [address] any warranty or liability for your use of this information.

## 2020-02-23 NOTE — H&P
[facility]  Location: [place] [address] [address]  Patient #: [id number]  : 1950   / Language: [place] / Race: White  Female      History of Present Illness Brigette Diehl MD; 2020 12:25 PM)  The patient is a 68year old female who presents for a Recheck of Knee Pain. The onset of the knee pain has been sudden and has been occurring in a persistent pattern for 1 day. The course has been worsening. The knee pain is severe. The knee pain is characterized as a dull aching. The knee pain is described as being located in the entire knee (right). Note for \"Knee Pain\": Patient is scheduled for right total knee replacement next month. She presents today to discuss treatment options for the interim as her symptoms are severe and activity limiting. Problem List/Past Medical Mliss Anyi Olivas; 2020 12:00 PM)  REVIEW OF SYSTEMS: Systems were reviewed by the provider.    Dietary counseling (V65.3)    LMT, ACUTE (836.1)    Status post total left knee replacement using cement (V43.65  Z96.652)    Trigger finger, right little finger (727.03  M65.351)    OA, KNEE (715.16) (715.96  M17.12)   PT visit #14  Chronic pain of right knee (719.46  M25.561)    FOLLOW-UP AFTER SURGERY (V67.00)    Patient was referred to their primary care physician for Blood Pressure screening.    Hamstring tendinitis (727.09  M76.899)    SHOULDER PAIN (719.41)    Gan fracture, right, with routine healing, subsequent encounter    Acute medial meniscus tear, right, initial encounter (836.0  S83.241A)    Hypertension, goal below 140/90 (401.9  I10)    Weight above 97th percentile (V49.89  Z78.9)    Patellofemoral arthritis of right knee (716.96  M17.11)    Right hand pain (729.5  M79.641)    Flexor tendon rupture of hand, right, initial encounter (842.10  D51.433L)    Closed displaced fracture of fifth metatarsal bone of right foot with nonunion (733.82  S92.351K)    Status post total hip replacement, left (V43.64  L83.794)    Flexor tenosynovitis of finger (727.05  M65.9)    Adhesive capsulitis (726.0)    Gan fracture, right, closed, initial encounter (825.25  S92.351A)    Contusion of knee, right (924.11)      Allergies Derick Givens; 2020 12:00 PM)  Codeine/Codeine Derivatives    No Known Drug Allergies  [2014]: Allergies Reconciled      Family History Christi Ramos[address] [place] [address] Ave; 2020 12:00 PM)  Osteoporosis    Colon Cancer      Social History Derick William [facility]; 2020 12:00 PM)  Seat Belt Use   2012: always  Caffeine use   2012: Drinks tea occasionally. Exercise   2012: exercise activities occasionally. Sun Exposure   2012: occasionally  Tobacco use   Never smoker. HIV risk factors   2012: no  Tobacco / smoke exposure   2012: No  Alcohol use   2012: Never drinks. Medication History Derick William CMA; 2020 12:01 PM)  traMADol HCl  (50MG Tablet, 1 Tablet Oral every 4 to 6 hours as needed for pain, Taken starting 2019) Active. Furosemide  (Oral) Specific strength unknown - Active. Estradiol  (1MG Tablet, Oral) Active. Medications Reconciled     Past Surgical History Derick Givens; 2020 12:00 PM)  Arthroscopy of Knee   right  Spinal Surgery    Appendectomy    Hysterectomy   non-cancerous: complete and vaginal   Delivery   2 times    Diagnostic Studies History Derick Givens; 2020 12:00 PM)  Knee X-ray   Date: 12/15/2015 3 views of the patient's left knee show bone to bone in the patellofemoral compartment and lateral compartment. Other Problems Derick William CMA; 2020 12:00 PM)  Bladder Problems    Oophorectomy   right  Unspecified Diagnosis    Humerus fracture (812.20)    Post-op pain (338.18  G89.18)    Treatment options were discussed with the patient in full.    Unspecified Diagnosis          Review of Systems Derick Rajput CMA; 1/9/2020 12:00 PM)  General Not Present- Chills and Fatigue. Skin Not Present- Bruising, Pallor and Skin Color Changes. Respiratory Not Present- Cough and Difficulty Breathing. Cardiovascular Not Present- Chest Pain, Fainting / Blacking Out and Rapid Heart Rate. Musculoskeletal Present- Joint Pain. Not Present- Decreased Range of Motion and Joint Swelling. Neurological Not Present- Dysesthesia, Paresthesias and Weakness In Extremities. Hematology Not Present- Abnormal Bleeding, Blood Clots and Petechiae. Vitals Maritza William CMA; 1/9/2020 12:00 PM)  1/9/2020 12:00 PM  Weight: 195 lb   Height: 64 in   Weight was reported by patient. Height was reported by patient. Body Surface Area: 1.94 m²   Body Mass Index: 33.47 kg/m²                Physical Exam Wilfredo Sheets MD; 1/9/2020 12:27 PM)  Musculoskeletal  Global Assessment  Examination of related systems reveals - well-developed, well-nourished, in no acute distress, alert and oriented x 3, no rashes or ulcers of bilateral upper and lower extremities, head or trunk, no generalized swelling or edema of extremities and normal coordination. Gait and Station - normal posture. Right Lower Extremity - Note: Hip was examined and has painless range of motion with negative impingement and apprehension sign. Straight leg raise and femoral nerve stretch test are negative. Lower extremity has palpable dorsalis pedis pulse. Skin is intact and foot is sensate and well-perfused. Knee was examined and is ligamentously stable x4. Knee range of motion 5-120 degrees. There is a small effusion. Medial and lateral joint lines are tender to palpation. Patellofemoral crepitation throughout the arc of motion. Motor testing reveals 5 out of 5 strength of the hip flexors, quads, ankle plantar flexors, and ankle dorsiflexors.         Assessment & Plan Wilfredo Sheets MD; 1/9/2020 12:28 PM)  Patellofemoral arthritis of right knee (716.96  M17.11)  Impression: Known DJD of right knee. Injected knee today with hyaluronic acid to get her through until surgery at the end of February. All questions were answered and risks and benefits discussed. Current Plans  Pt Education - How to [address] using Patient Portal and 3rd Party Apps: discussed with patient and provided information. Pt Education - Educational materials were provided.: discussed with patient and provided information. MAJOR JOINT - KNEE, HIP, SHOULDER ([id number])  MONOVISC 22MG/ML () (4ML) (Treatment options were discussed, including risks and benefits. The patient elected to proceed with Visco supplementation injections. Under sterile prep the patient was injected.  The patient tolerated the procedure without complications.)  REVIEW OF SYSTEMS: Systems were reviewed by the provider.(V49.9)  Weight above 97th percentile (V49.89  Z78.9)  Current Plans  LIFESTYLE EDUCATION REGARDING DIET (92432)      Signed by Oren Parsons MD

## 2020-02-24 ENCOUNTER — ANESTHESIA (OUTPATIENT)
Dept: SURGERY | Age: 70
End: 2020-02-24
Payer: MEDICARE

## 2020-02-24 ENCOUNTER — HOSPITAL ENCOUNTER (OUTPATIENT)
Age: 70
Setting detail: OBSERVATION
Discharge: HOME HEALTH | End: 2020-02-25
Attending: ORTHOPAEDIC SURGERY | Admitting: ORTHOPAEDIC SURGERY
Payer: MEDICARE

## 2020-02-24 ENCOUNTER — ANESTHESIA EVENT (OUTPATIENT)
Dept: SURGERY | Age: 70
End: 2020-02-24
Payer: MEDICARE

## 2020-02-24 DIAGNOSIS — Z96.651 S/P TOTAL KNEE ARTHROPLASTY, RIGHT: ICD-10-CM

## 2020-02-24 DIAGNOSIS — M17.11 PRIMARY OSTEOARTHRITIS OF RIGHT KNEE: Primary | ICD-10-CM

## 2020-02-24 PROCEDURE — 74011000250 HC RX REV CODE- 250: Performed by: ORTHOPAEDIC SURGERY

## 2020-02-24 PROCEDURE — 76010000171 HC OR TIME 2 TO 2.5 HR INTENSV-TIER 1: Performed by: ORTHOPAEDIC SURGERY

## 2020-02-24 PROCEDURE — 77030040922 HC BLNKT HYPOTHRM STRY -A

## 2020-02-24 PROCEDURE — 74011250636 HC RX REV CODE- 250/636: Performed by: ORTHOPAEDIC SURGERY

## 2020-02-24 PROCEDURE — 77030027138 HC INCENT SPIROMETER -A

## 2020-02-24 PROCEDURE — 77030008462 HC STPLR SKN PROX J&J -A: Performed by: ORTHOPAEDIC SURGERY

## 2020-02-24 PROCEDURE — 74011000258 HC RX REV CODE- 258: Performed by: NURSE ANESTHETIST, CERTIFIED REGISTERED

## 2020-02-24 PROCEDURE — 74011250637 HC RX REV CODE- 250/637: Performed by: PHYSICIAN ASSISTANT

## 2020-02-24 PROCEDURE — 77030035236 HC SUT PDS STRATFX BARB J&J -B: Performed by: ORTHOPAEDIC SURGERY

## 2020-02-24 PROCEDURE — 99218 HC RM OBSERVATION: CPT

## 2020-02-24 PROCEDURE — 77030028907 HC WRP KNEE WO BGS SOLM -B

## 2020-02-24 PROCEDURE — 76210000016 HC OR PH I REC 1 TO 1.5 HR: Performed by: ORTHOPAEDIC SURGERY

## 2020-02-24 PROCEDURE — 97530 THERAPEUTIC ACTIVITIES: CPT

## 2020-02-24 PROCEDURE — 77030031139 HC SUT VCRL2 J&J -A: Performed by: ORTHOPAEDIC SURGERY

## 2020-02-24 PROCEDURE — C9290 INJ, BUPIVACAINE LIPOSOME: HCPCS | Performed by: ORTHOPAEDIC SURGERY

## 2020-02-24 PROCEDURE — 74011000258 HC RX REV CODE- 258: Performed by: ORTHOPAEDIC SURGERY

## 2020-02-24 PROCEDURE — 74011250637 HC RX REV CODE- 250/637: Performed by: ORTHOPAEDIC SURGERY

## 2020-02-24 PROCEDURE — 76060000035 HC ANESTHESIA 2 TO 2.5 HR: Performed by: ORTHOPAEDIC SURGERY

## 2020-02-24 PROCEDURE — 97161 PT EVAL LOW COMPLEX 20 MIN: CPT

## 2020-02-24 PROCEDURE — 74011250636 HC RX REV CODE- 250/636: Performed by: ANESTHESIOLOGY

## 2020-02-24 PROCEDURE — 74011250636 HC RX REV CODE- 250/636: Performed by: NURSE ANESTHETIST, CERTIFIED REGISTERED

## 2020-02-24 PROCEDURE — 77030040361 HC SLV COMPR DVT MDII -B

## 2020-02-24 PROCEDURE — C1713 ANCHOR/SCREW BN/BN,TIS/BN: HCPCS | Performed by: ORTHOPAEDIC SURGERY

## 2020-02-24 PROCEDURE — 77030010783 HC BOWL MX BN CEM J&J -B: Performed by: ORTHOPAEDIC SURGERY

## 2020-02-24 PROCEDURE — 97116 GAIT TRAINING THERAPY: CPT

## 2020-02-24 PROCEDURE — 77030018836 HC SOL IRR NACL ICUM -A: Performed by: ORTHOPAEDIC SURGERY

## 2020-02-24 PROCEDURE — 77030006822 HC BLD SAW SAG BRSM -B: Performed by: ORTHOPAEDIC SURGERY

## 2020-02-24 PROCEDURE — C1776 JOINT DEVICE (IMPLANTABLE): HCPCS | Performed by: ORTHOPAEDIC SURGERY

## 2020-02-24 PROCEDURE — 77030018846 HC SOL IRR STRL H20 ICUM -A: Performed by: ORTHOPAEDIC SURGERY

## 2020-02-24 PROCEDURE — 77030005513 HC CATH URETH FOL11 MDII -B: Performed by: ORTHOPAEDIC SURGERY

## 2020-02-24 PROCEDURE — 77030011640 HC PAD GRND REM COVD -A: Performed by: ORTHOPAEDIC SURGERY

## 2020-02-24 PROCEDURE — 77030000032 HC CUF TRNQT ZIMM -B: Performed by: ORTHOPAEDIC SURGERY

## 2020-02-24 PROCEDURE — 74011250636 HC RX REV CODE- 250/636: Performed by: PHYSICIAN ASSISTANT

## 2020-02-24 PROCEDURE — 74011000250 HC RX REV CODE- 250: Performed by: NURSE ANESTHETIST, CERTIFIED REGISTERED

## 2020-02-24 PROCEDURE — 77030018673: Performed by: ORTHOPAEDIC SURGERY

## 2020-02-24 DEVICE — IMPLANTABLE DEVICE
Type: IMPLANTABLE DEVICE | Site: KNEE | Status: FUNCTIONAL
Brand: PERSONA®

## 2020-02-24 DEVICE — IMPLANTABLE DEVICE
Type: IMPLANTABLE DEVICE | Site: KNEE | Status: FUNCTIONAL
Brand: NEXGEN®

## 2020-02-24 DEVICE — KIT TKR CEM VIT E SURF AND INSTRMT: Type: IMPLANTABLE DEVICE | Site: KNEE | Status: FUNCTIONAL

## 2020-02-24 DEVICE — IMPLANTABLE DEVICE
Type: IMPLANTABLE DEVICE | Site: KNEE | Status: FUNCTIONAL
Brand: PERSONA® VIVACIT-E®

## 2020-02-24 DEVICE — SMARTSET GHV GENTAMICIN HIGH VISCOSITY BONE CEMENT 40G
Type: IMPLANTABLE DEVICE | Site: KNEE | Status: FUNCTIONAL
Brand: SMARTSET

## 2020-02-24 DEVICE — IMPLANTABLE DEVICE
Type: IMPLANTABLE DEVICE | Site: KNEE | Status: FUNCTIONAL
Brand: PERSONA® NATURAL TIBIA®

## 2020-02-24 RX ORDER — CELECOXIB 200 MG/1
200 CAPSULE ORAL ONCE
Status: COMPLETED | OUTPATIENT
Start: 2020-02-24 | End: 2020-02-24

## 2020-02-24 RX ORDER — FENTANYL CITRATE 50 UG/ML
50 INJECTION, SOLUTION INTRAMUSCULAR; INTRAVENOUS AS NEEDED
Status: DISCONTINUED | OUTPATIENT
Start: 2020-02-24 | End: 2020-02-24 | Stop reason: HOSPADM

## 2020-02-24 RX ORDER — TRAMADOL HYDROCHLORIDE 50 MG/1
50 TABLET ORAL
Status: DISCONTINUED | OUTPATIENT
Start: 2020-02-24 | End: 2020-02-25 | Stop reason: HOSPADM

## 2020-02-24 RX ORDER — SODIUM CHLORIDE 0.9 % (FLUSH) 0.9 %
5-40 SYRINGE (ML) INJECTION AS NEEDED
Status: DISCONTINUED | OUTPATIENT
Start: 2020-02-24 | End: 2020-02-24 | Stop reason: HOSPADM

## 2020-02-24 RX ORDER — CEFAZOLIN SODIUM/WATER 2 G/20 ML
2 SYRINGE (ML) INTRAVENOUS EVERY 8 HOURS
Status: COMPLETED | OUTPATIENT
Start: 2020-02-24 | End: 2020-02-25

## 2020-02-24 RX ORDER — POLYETHYLENE GLYCOL 3350 17 G/17G
17 POWDER, FOR SOLUTION ORAL DAILY
Status: DISCONTINUED | OUTPATIENT
Start: 2020-02-25 | End: 2020-02-25 | Stop reason: HOSPADM

## 2020-02-24 RX ORDER — ASPIRIN 325 MG
325 TABLET ORAL 2 TIMES DAILY
Status: DISCONTINUED | OUTPATIENT
Start: 2020-02-24 | End: 2020-02-25 | Stop reason: HOSPADM

## 2020-02-24 RX ORDER — SODIUM CHLORIDE 0.9 % (FLUSH) 0.9 %
5-40 SYRINGE (ML) INJECTION EVERY 8 HOURS
Status: DISCONTINUED | OUTPATIENT
Start: 2020-02-24 | End: 2020-02-25 | Stop reason: HOSPADM

## 2020-02-24 RX ORDER — MIDAZOLAM HYDROCHLORIDE 1 MG/ML
1 INJECTION, SOLUTION INTRAMUSCULAR; INTRAVENOUS AS NEEDED
Status: DISCONTINUED | OUTPATIENT
Start: 2020-02-24 | End: 2020-02-24 | Stop reason: HOSPADM

## 2020-02-24 RX ORDER — NALOXONE HYDROCHLORIDE 0.4 MG/ML
0.4 INJECTION, SOLUTION INTRAMUSCULAR; INTRAVENOUS; SUBCUTANEOUS AS NEEDED
Status: DISCONTINUED | OUTPATIENT
Start: 2020-02-24 | End: 2020-02-25 | Stop reason: HOSPADM

## 2020-02-24 RX ORDER — SODIUM CHLORIDE 0.9 % (FLUSH) 0.9 %
5-40 SYRINGE (ML) INJECTION AS NEEDED
Status: DISCONTINUED | OUTPATIENT
Start: 2020-02-24 | End: 2020-02-25 | Stop reason: HOSPADM

## 2020-02-24 RX ORDER — SODIUM CHLORIDE, SODIUM LACTATE, POTASSIUM CHLORIDE, CALCIUM CHLORIDE 600; 310; 30; 20 MG/100ML; MG/100ML; MG/100ML; MG/100ML
50 INJECTION, SOLUTION INTRAVENOUS CONTINUOUS
Status: DISCONTINUED | OUTPATIENT
Start: 2020-02-24 | End: 2020-02-24 | Stop reason: HOSPADM

## 2020-02-24 RX ORDER — FACIAL-BODY WIPES
10 EACH TOPICAL DAILY PRN
Status: DISCONTINUED | OUTPATIENT
Start: 2020-02-26 | End: 2020-02-25 | Stop reason: HOSPADM

## 2020-02-24 RX ORDER — ACETAMINOPHEN 500 MG
1000 TABLET ORAL ONCE
Status: COMPLETED | OUTPATIENT
Start: 2020-02-24 | End: 2020-02-24

## 2020-02-24 RX ORDER — ACETAMINOPHEN 500 MG
1000 TABLET ORAL EVERY 6 HOURS
Status: DISCONTINUED | OUTPATIENT
Start: 2020-02-24 | End: 2020-02-25 | Stop reason: HOSPADM

## 2020-02-24 RX ORDER — LIDOCAINE HYDROCHLORIDE 10 MG/ML
0.1 INJECTION, SOLUTION EPIDURAL; INFILTRATION; INTRACAUDAL; PERINEURAL AS NEEDED
Status: DISCONTINUED | OUTPATIENT
Start: 2020-02-24 | End: 2020-02-24 | Stop reason: HOSPADM

## 2020-02-24 RX ORDER — ROPIVACAINE HYDROCHLORIDE 5 MG/ML
30 INJECTION, SOLUTION EPIDURAL; INFILTRATION; PERINEURAL AS NEEDED
Status: DISCONTINUED | OUTPATIENT
Start: 2020-02-24 | End: 2020-02-24 | Stop reason: HOSPADM

## 2020-02-24 RX ORDER — ONDANSETRON 2 MG/ML
4 INJECTION INTRAMUSCULAR; INTRAVENOUS AS NEEDED
Status: DISCONTINUED | OUTPATIENT
Start: 2020-02-24 | End: 2020-02-24 | Stop reason: HOSPADM

## 2020-02-24 RX ORDER — FENTANYL CITRATE 50 UG/ML
25 INJECTION, SOLUTION INTRAMUSCULAR; INTRAVENOUS
Status: DISCONTINUED | OUTPATIENT
Start: 2020-02-24 | End: 2020-02-24 | Stop reason: HOSPADM

## 2020-02-24 RX ORDER — PHENYLEPHRINE HCL IN 0.9% NACL 0.4MG/10ML
SYRINGE (ML) INTRAVENOUS AS NEEDED
Status: DISCONTINUED | OUTPATIENT
Start: 2020-02-24 | End: 2020-02-24 | Stop reason: HOSPADM

## 2020-02-24 RX ORDER — SODIUM CHLORIDE 0.9 % (FLUSH) 0.9 %
5-40 SYRINGE (ML) INJECTION EVERY 8 HOURS
Status: DISCONTINUED | OUTPATIENT
Start: 2020-02-24 | End: 2020-02-24 | Stop reason: HOSPADM

## 2020-02-24 RX ORDER — EPHEDRINE SULFATE/0.9% NACL/PF 50 MG/5 ML
SYRINGE (ML) INTRAVENOUS AS NEEDED
Status: DISCONTINUED | OUTPATIENT
Start: 2020-02-24 | End: 2020-02-24 | Stop reason: HOSPADM

## 2020-02-24 RX ORDER — AMOXICILLIN 250 MG
1 CAPSULE ORAL 2 TIMES DAILY
Status: DISCONTINUED | OUTPATIENT
Start: 2020-02-24 | End: 2020-02-25 | Stop reason: HOSPADM

## 2020-02-24 RX ORDER — ONDANSETRON 2 MG/ML
4 INJECTION INTRAMUSCULAR; INTRAVENOUS
Status: DISCONTINUED | OUTPATIENT
Start: 2020-02-24 | End: 2020-02-25 | Stop reason: HOSPADM

## 2020-02-24 RX ORDER — FUROSEMIDE 20 MG/1
20 TABLET ORAL DAILY
Status: DISCONTINUED | OUTPATIENT
Start: 2020-02-25 | End: 2020-02-25 | Stop reason: HOSPADM

## 2020-02-24 RX ORDER — ROPIVACAINE HYDROCHLORIDE 5 MG/ML
INJECTION, SOLUTION EPIDURAL; INFILTRATION; PERINEURAL
Status: COMPLETED | OUTPATIENT
Start: 2020-02-24 | End: 2020-02-24

## 2020-02-24 RX ORDER — MORPHINE SULFATE 2 MG/ML
2 INJECTION, SOLUTION INTRAMUSCULAR; INTRAVENOUS
Status: DISCONTINUED | OUTPATIENT
Start: 2020-02-24 | End: 2020-02-25 | Stop reason: HOSPADM

## 2020-02-24 RX ORDER — CELECOXIB 200 MG/1
200 CAPSULE ORAL 2 TIMES DAILY
Status: DISCONTINUED | OUTPATIENT
Start: 2020-02-24 | End: 2020-02-25 | Stop reason: HOSPADM

## 2020-02-24 RX ORDER — PROPOFOL 10 MG/ML
INJECTION, EMULSION INTRAVENOUS AS NEEDED
Status: DISCONTINUED | OUTPATIENT
Start: 2020-02-24 | End: 2020-02-24 | Stop reason: HOSPADM

## 2020-02-24 RX ORDER — BUPIVACAINE HYDROCHLORIDE 5 MG/ML
INJECTION, SOLUTION EPIDURAL; INTRACAUDAL AS NEEDED
Status: DISCONTINUED | OUTPATIENT
Start: 2020-02-24 | End: 2020-02-24 | Stop reason: HOSPADM

## 2020-02-24 RX ORDER — MIDAZOLAM HYDROCHLORIDE 1 MG/ML
INJECTION, SOLUTION INTRAMUSCULAR; INTRAVENOUS AS NEEDED
Status: DISCONTINUED | OUTPATIENT
Start: 2020-02-24 | End: 2020-02-24 | Stop reason: HOSPADM

## 2020-02-24 RX ORDER — HYDROMORPHONE HYDROCHLORIDE 2 MG/1
4 TABLET ORAL
Status: DISCONTINUED | OUTPATIENT
Start: 2020-02-24 | End: 2020-02-25 | Stop reason: HOSPADM

## 2020-02-24 RX ORDER — PROPOFOL 10 MG/ML
INJECTION, EMULSION INTRAVENOUS
Status: DISCONTINUED | OUTPATIENT
Start: 2020-02-24 | End: 2020-02-24 | Stop reason: HOSPADM

## 2020-02-24 RX ORDER — ZOLPIDEM TARTRATE 5 MG/1
5 TABLET ORAL
Status: DISCONTINUED | OUTPATIENT
Start: 2020-02-24 | End: 2020-02-25 | Stop reason: HOSPADM

## 2020-02-24 RX ORDER — SODIUM CHLORIDE 9 MG/ML
125 INJECTION, SOLUTION INTRAVENOUS CONTINUOUS
Status: DISPENSED | OUTPATIENT
Start: 2020-02-24 | End: 2020-02-25

## 2020-02-24 RX ORDER — CEFAZOLIN SODIUM/WATER 2 G/20 ML
2 SYRINGE (ML) INTRAVENOUS ONCE
Status: COMPLETED | OUTPATIENT
Start: 2020-02-24 | End: 2020-02-24

## 2020-02-24 RX ORDER — DIPHENHYDRAMINE HCL 12.5MG/5ML
12.5 ELIXIR ORAL
Status: DISCONTINUED | OUTPATIENT
Start: 2020-02-24 | End: 2020-02-25 | Stop reason: HOSPADM

## 2020-02-24 RX ORDER — HYDROMORPHONE HYDROCHLORIDE 2 MG/1
2 TABLET ORAL
Status: DISCONTINUED | OUTPATIENT
Start: 2020-02-24 | End: 2020-02-25 | Stop reason: HOSPADM

## 2020-02-24 RX ADMIN — MIDAZOLAM HYDROCHLORIDE 1 MG: 1 INJECTION, SOLUTION INTRAMUSCULAR; INTRAVENOUS at 11:34

## 2020-02-24 RX ADMIN — ASPIRIN 325 MG ORAL TABLET 325 MG: 325 PILL ORAL at 22:10

## 2020-02-24 RX ADMIN — PROPOFOL 10 MG: 10 INJECTION, EMULSION INTRAVENOUS at 09:47

## 2020-02-24 RX ADMIN — Medication 40 MCG: at 11:35

## 2020-02-24 RX ADMIN — MIDAZOLAM HYDROCHLORIDE 1 MG: 1 INJECTION, SOLUTION INTRAMUSCULAR; INTRAVENOUS at 11:48

## 2020-02-24 RX ADMIN — Medication 10 MG: at 10:59

## 2020-02-24 RX ADMIN — PROPOFOL 20 MCG/KG/MIN: 10 INJECTION, EMULSION INTRAVENOUS at 10:00

## 2020-02-24 RX ADMIN — ACETAMINOPHEN 1000 MG: 500 TABLET ORAL at 08:30

## 2020-02-24 RX ADMIN — Medication 40 MCG: at 11:41

## 2020-02-24 RX ADMIN — PROPOFOL 20 MG: 10 INJECTION, EMULSION INTRAVENOUS at 10:42

## 2020-02-24 RX ADMIN — MIDAZOLAM HYDROCHLORIDE 1 MG: 1 INJECTION, SOLUTION INTRAMUSCULAR; INTRAVENOUS at 09:43

## 2020-02-24 RX ADMIN — CELECOXIB 200 MG: 200 CAPSULE ORAL at 22:07

## 2020-02-24 RX ADMIN — ROPIVACAINE HYDROCHLORIDE 30 ML: 5 INJECTION, SOLUTION EPIDURAL; INFILTRATION; PERINEURAL at 09:12

## 2020-02-24 RX ADMIN — BUPIVACAINE HYDROCHLORIDE 10 MG: 5 INJECTION, SOLUTION EPIDURAL; INTRACAUDAL; PERINEURAL at 10:08

## 2020-02-24 RX ADMIN — SENNOSIDES AND DOCUSATE SODIUM 1 TABLET: 8.6; 5 TABLET ORAL at 17:40

## 2020-02-24 RX ADMIN — Medication 2 G: at 17:46

## 2020-02-24 RX ADMIN — PROPOFOL 20 MG: 10 INJECTION, EMULSION INTRAVENOUS at 11:27

## 2020-02-24 RX ADMIN — Medication 20 MG: at 10:17

## 2020-02-24 RX ADMIN — Medication 10 ML: at 17:46

## 2020-02-24 RX ADMIN — MIDAZOLAM HYDROCHLORIDE 1 MG: 1 INJECTION, SOLUTION INTRAMUSCULAR; INTRAVENOUS at 11:28

## 2020-02-24 RX ADMIN — TRANEXAMIC ACID 1 G: 100 INJECTION, SOLUTION INTRAVENOUS at 09:56

## 2020-02-24 RX ADMIN — FENTANYL CITRATE 50 MCG: 50 INJECTION INTRAMUSCULAR; INTRAVENOUS at 08:59

## 2020-02-24 RX ADMIN — Medication 80 MCG: at 11:45

## 2020-02-24 RX ADMIN — TRAMADOL HYDROCHLORIDE 50 MG: 50 TABLET, FILM COATED ORAL at 17:45

## 2020-02-24 RX ADMIN — HYDROMORPHONE HYDROCHLORIDE 2 MG: 2 TABLET ORAL at 19:11

## 2020-02-24 RX ADMIN — MIDAZOLAM HYDROCHLORIDE 0.5 MG: 1 INJECTION, SOLUTION INTRAMUSCULAR; INTRAVENOUS at 09:52

## 2020-02-24 RX ADMIN — Medication 2 G: at 09:43

## 2020-02-24 RX ADMIN — SODIUM CHLORIDE, POTASSIUM CHLORIDE, SODIUM LACTATE AND CALCIUM CHLORIDE: 600; 310; 30; 20 INJECTION, SOLUTION INTRAVENOUS at 09:43

## 2020-02-24 RX ADMIN — SODIUM CHLORIDE 125 ML/HR: 900 INJECTION, SOLUTION INTRAVENOUS at 12:08

## 2020-02-24 RX ADMIN — MIDAZOLAM HYDROCHLORIDE 0.5 MG: 1 INJECTION, SOLUTION INTRAMUSCULAR; INTRAVENOUS at 10:42

## 2020-02-24 RX ADMIN — Medication 40 MCG: at 11:38

## 2020-02-24 RX ADMIN — ACETAMINOPHEN 1000 MG: 500 TABLET ORAL at 17:40

## 2020-02-24 RX ADMIN — CELECOXIB 200 MG: 200 CAPSULE ORAL at 08:30

## 2020-02-24 RX ADMIN — MIDAZOLAM 1 MG: 1 INJECTION INTRAMUSCULAR; INTRAVENOUS at 09:00

## 2020-02-24 RX ADMIN — Medication 40 MCG: at 10:31

## 2020-02-24 RX ADMIN — PROPOFOL 10 MG: 10 INJECTION, EMULSION INTRAVENOUS at 09:53

## 2020-02-24 RX ADMIN — SODIUM CHLORIDE 125 ML/HR: 900 INJECTION, SOLUTION INTRAVENOUS at 22:06

## 2020-02-24 NOTE — PROGRESS NOTES
Primary Nurse Joslyn Dowling and Wally Phoenix, RN performed a dual skin assessment on this patient No impairment noted  Jed score is 21    Only deficit appears to be new incision on Right knee

## 2020-02-24 NOTE — ANESTHESIA PROCEDURE NOTES
Spinal Block    Start time: 2/24/2020 10:04 AM  End time: 2/24/2020 10:08 AM  Performed by: Bre Mae CRNA  Authorized by: Rosas Odonnell MD     Pre-procedure:   Indications: procedure for pain and primary anesthetic  Preanesthetic Checklist: patient identified, risks and benefits discussed, anesthesia consent, site marked, patient being monitored and timeout performed      Spinal Block:           Location:  L3-4  Technique:  Single shot        Needle:   Needle Type:  Quincke  Needle Gauge:  22 G  Attempts:  2      Events: CSF confirmed and no blood with aspiration        Assessment:  Insertion:  Uncomplicated  Patient tolerance:  Patient tolerated the procedure well with no immediate complications

## 2020-02-24 NOTE — ANESTHESIA POSTPROCEDURE EVALUATION
Procedure(s):  RIGHT TOTAL KNEE ARTHROPLASTY. spinal    Anesthesia Post Evaluation      Multimodal analgesia: multimodal analgesia used between 6 hours prior to anesthesia start to PACU discharge  Patient location during evaluation: bedside  Patient participation: waiting for patient participation  Level of consciousness: awake  Pain management: adequate  Airway patency: patent  Anesthetic complications: no  Cardiovascular status: acceptable  Respiratory status: unassisted  Hydration status: acceptable  Comments: Post-Anesthesia Evaluation and Assessment    I have evaluated the patient and they are ready for PACU discharge. Patient: Edgardo Alfaro MRN: [id number]  SSN: [id number]   YOB: 1950  Age: 65 y.o. Sex: female      Cardiovascular Function/Vital Signs  BP 95/60   Pulse 75   Temp 36.6 °C (97.8 °F)   Resp 18   Ht 5' 4\" (1.626 m)   Wt 96.2 kg (212 lb)   SpO2 92%   BMI 36.39 kg/m²     Patient is status post Spinal anesthesia for Procedure(s):  RIGHT TOTAL KNEE ARTHROPLASTY. Nausea/Vomiting: None    Postoperative hydration reviewed and adequate. Pain:  Pain Intensity 1: 5 (02/24/20 0819)   Managed    Neurological Status:   Neuro (WDL): Within Defined Limits (02/24/20 5464)   At baseline    Mental Status, Level of Consciousness: Alert and  oriented to person, place, and time    Pulmonary Status:   O2 Device: CO2 nasal cannula (02/24/20 1159)   Adequate oxygenation and airway patent    Complications related to anesthesia: None    Post-anesthesia assessment completed. No concerns    Signed By: Danielle Sutton MD    February 24, 2020                   Vitals Value Taken Time   BP 95/60 2/24/2020 12:00 PM   Temp 36.6 °C (97.8 °F) 2/24/2020 11:59 AM   Pulse 84 2/24/2020 12:04 PM   Resp 19 2/24/2020 12:04 PM   SpO2 92 % 2/24/2020 12:04 PM   Vitals shown include unvalidated device data.

## 2020-02-24 NOTE — PERIOP NOTES
TRANSFER - OUT REPORT:    Verbal report given to Laura Rodriguez RN(name) on Ester Zavala  being transferred to 566(unit) for routine post - op       Report consisted of patients Situation, Background, Assessment and   Recommendations(SBAR). Information from the following report(s) SBAR, Kardex, OR Summary, Procedure Summary, Intake/Output, MAR and Cardiac Rhythm NSR was reviewed with the receiving nurse. Lines:   Peripheral IV 02/24/20 Left;Posterior Hand (Active)   Site Assessment Clean, dry, & intact 2/24/2020 12:30 PM   Phlebitis Assessment 0 2/24/2020 12:30 PM   Infiltration Assessment 0 2/24/2020 12:30 PM   Dressing Status Clean, dry, & intact 2/24/2020 12:30 PM   Dressing Type Transparent;Tape 2/24/2020 12:30 PM   Hub Color/Line Status Pink; Infusing 2/24/2020 12:30 PM   Action Taken Open ports on tubing capped 2/24/2020 12:30 PM   Alcohol Cap Used Yes 2/24/2020 12:30 PM        Opportunity for questions and clarification was provided.       Patient transported with:   O2 @ 2 liters  Tech

## 2020-02-24 NOTE — PROGRESS NOTES
Problem: Mobility Impaired (Adult and Pediatric)  Goal: *Acute Goals and Plan of Care (Insert Text)  Description  FUNCTIONAL STATUS PRIOR TO ADMISSION: Patient was modified independent using a rolling walker for functional mobility. HOME SUPPORT PRIOR TO ADMISSION: The patient lived with  but did not require assist.    Physical Therapy Goals  Initiated 2/24/2020    1. Patient will move from supine to sit and sit to supine , scoot up and down and roll side to side in bed with modified independence within 4 days. 2. Patient will perform sit to stand with modified independence within 4 days. 3. Patient will ambulate with modified independence for 150 feet with the least restrictive device within 4 days. 4. Patient will ascend/descend 4 stairs with cane and one handrail with modified independence within 4 days. 5. Patient will perform home exercise program per protocol with independence within 4 days. 6. Patient will demonstrate AROM 0-90 degrees in operative joint within 4 days. Outcome: Progressing Towards Goal   PHYSICAL THERAPY EVALUATION  Patient: Naomy Basilio (56 y.o. female)  Date: 2/24/2020  Primary Diagnosis: Osteoarthritis of right knee [M17.11]  Procedure(s) (LRB):  RIGHT TOTAL KNEE ARTHROPLASTY (Right) Day of Surgery   Precautions:   Fall, WBAT      ASSESSMENT  Based on the objective data described below, the patient presents with  impairment in functional mobility, activity tolerance and balance s/p R TKA. PLOF: Independent with use of RW in ADLs and IADLs. Lives on first floor of 2 story home with , 4 steps with rail to enter. Patient's mobility was on target for POD#0. Will address more exercises, increase gait distance, negotiate stairs and assess for discharge at am PT session tomorrow. Patient instructed NOT to get up from bed, chair or commode without calling for assistance.  She will benefit from [facility] PT in order to achieve maximum level of safe, functional mobility, balance and return to independent PLOF. Current Level of Function Impacting Discharge (mobility/balance): Minimal assistance required for bed mobility to manage RLE; contact guard for transfers (on/off bedside commode) and gait with RW (65 ft, antalgic step-to, but steady, gait). Functional Outcome Measure: The patient scored 45/100 on the Barthel outcome measure which is indicative of moderate dependence on caregivers for assistance with mobility and ADLs. Other factors to consider for discharge: Motivated/A & O x 4/Supportive Family/Independent PLOF      Patient will benefit from skilled therapy intervention to address the above noted impairments. PLAN :  Recommendations and Planned Interventions: bed mobility training, transfer training, gait training, therapeutic exercises, patient and family training/education, and therapeutic activities      Frequency/Duration: Patient will be followed by physical therapy:  twice daily to address goals. Recommendation for discharge: (in order for the patient to meet his/her long term goals)  Physical therapy at least 2 days/week in the home     This discharge recommendation:  Has been made in collaboration with the attending provider and/or case management    IF patient discharges home will need the following DME: patient owns DME required for discharge         SUBJECTIVE:   Patient stated I am ready to get up.     OBJECTIVE DATA SUMMARY:   HISTORY:    Past Medical History:   Diagnosis Date    Actinic keratosis     Left arm    Adjustment disorder with mixed anxiety and depressed mood     Adverse effect of anesthesia     HARD TO WAKE UP SLOW BREATHING ,Pt stated doesn't take much anesthesia    Arthritis     OSTEO    Chronic pain     Diverticulitis     Kidney stones 95,96,98    left    Left knee DJD     Lumbar nerve root impingement     RLQ pain    Menopause     LMP-53years old    Nausea & vomiting     Other and unspecified hyperlipidemia Other idiopathic scoliosis, thoracolumbar region 2018    Postmenopausal HRT (hormone replacement therapy)     Right knee DJD     Urine culture positive 2020     Past Surgical History:   Procedure Laterality Date    COLONOSCOPY N/A 2016    COLONOSCOPY performed by Aurora Fields MD at [address] [place] [address]    HX CATARACT REMOVAL Right 2019    HX CATARACT REMOVAL Left 2019    HX  SECTION  9807,5904    X2    HX GI      COLONOSCOPY    HX HIP FRACTURE TX Right 2019    Total R hip- Dr. Saman Jimenez HYSTERECTOMY  2009    LMP-53years old    HX KNEE ARTHROSCOPY Bilateral 1966    SCOPE of knees bilateral    HX KNEE REPLACEMENT Left     HX LITHOTRIPSY      X 2    HX LUMBAR LAMINECTOMY  2000    X2    HX MENISCECTOMY      left knee    HX ORTHOPAEDIC  10    left shoulder repair; 14 screws and 2 plates    HX ORTHOPAEDIC Right     BROKEN ARM SURGERY X2    HX ORTHOPAEDIC Left      REPAIR OF Left MENISCUS    HX ORTHOPAEDIC Left     BROKEN ARM    HX ORTHOPAEDIC Left 2019    hip replacement    HX POLYPECTOMY      HX TONSILLECTOMY  195    HX TUBAL LIGATION      BSPO adhesion conization abn pap    TOTAL KNEE ARTHROPLASTY Left 2016       Personal factors and/or comorbidities impacting plan of care: Motivated/A & O x 4/Supportive Family/Independent PLOF     Home Situation  Home Environment: Private residence  # Steps to Enter: 3  Rails to Enter: Yes  Hand Rails : Right  Wheelchair Ramp: No  One/Two Story Residence: Two story, live on 1st floor  Lift Chair Available: No  Living Alone: No  Support Systems: Spouse/Significant Other/Partner, Family member(s)  Patient Expects to be Discharged to[de-identified] Private residence  Current DME Used/Available at Home: Anne Mcdonald, straight, Walker, rolling  Tub or Shower Type: Shower    EXAMINATION/PRESENTATION/DECISION MAKING:   Critical Behavior:              Hearing:   Auditory  Auditory Impairment: None    Range Of Motion:  AROM: Generally decreased, functional           PROM: Generally decreased, functional           Strength:    Strength: Generally decreased, functional                    Tone & Sensation:   Tone: Normal              Sensation: Intact               Coordination:  Coordination: Within functional limits  Vision:      Functional Mobility:  Bed Mobility:     Supine to Sit: Minimum assistance(to manage RLE)  Sit to Supine: Minimum assistance(to manage RLE)  Scooting: Minimum assistance(to manage RLE)  Transfers:  Sit to Stand: Contact guard assistance  Stand to Sit: Contact guard assistance                       Balance:   Sitting: Intact  Standing: Impaired; Without support  Standing - Static: Good;Constant support  Standing - Dynamic : Good;Constant support  Ambulation/Gait Training:  Distance (ft): 65 Feet (ft)  Assistive Device: Walker, rolling;Gait belt  Ambulation - Level of Assistance: Contact guard assistance        Gait Abnormalities: Antalgic;Decreased step clearance; Step to gait  Right Side Weight Bearing: As tolerated     Base of Support: Shift to left; Widened  Stance: Right decreased  Speed/Deborah: Slow  Step Length: Left shortened  Swing Pattern: Right asymmetrical         Therapeutic Exercises: Ankle Pumps  Quad sets (5 second hold)  X 10 reps every hour   Heel slides x 10     Functional Measure:  Barthel Index:    Bathin  Bladder: 10  Bowels: 10  Groomin  Dressin  Feeding: 10  Mobility: 0  Stairs: 0  Toilet Use: 5  Transfer (Bed to Chair and Back): 10  Total: 45/100       The Barthel ADL Index: Guidelines  1. The index should be used as a record of what a patient does, not as a record of what a patient could do. 2. The main aim is to establish degree of independence from any help, physical or verbal, however minor and for whatever reason. 3. The need for supervision renders the patient not independent.   4. A patient's performance should be established using the best available evidence. Asking the patient, friends/relatives and nurses are the usual sources, but direct observation and common sense are also important. However direct testing is not needed. 5. Usually the patient's performance over the preceding 24-48 hours is important, but occasionally longer periods will be relevant. 6. Middle categories imply that the patient supplies over 50 per cent of the effort. 7. Use of aids to be independent is allowed. Roxana Herrera., BarthelJULIETTE. (7962). Functional evaluation: the Barthel Index. [address] [facility] (14)2. ANGEL Scott, Moreno Caban., [facility]., [place], [address] (). Measuring the change indisability after inpatient rehabilitation; comparison of the responsiveness of the Barthel Index and Functional [place] Measure. Journal of Neurology, Neurosurgery, and Psychiatry, 66(4), 451-048. Arelis Zmaora, NGARRET.BRIAN, ESTEFANÍA Heard, & Benedict Richmond M.A. (2004.) Assessment of post-stroke quality of life in cost-effectiveness studies: The usefulness of the Barthel Index and the EuroQoL-5D. Quality of Life Research, 15, 823-36           Physical Therapy Evaluation Charge Determination   History Examination Presentation Decision-Making   LOW Complexity : Zero comorbidities / personal factors that will impact the outcome / POC LOW Complexity : 1-2 Standardized tests and measures addressing body structure, function, activity limitation and / or participation in recreation  LOW Complexity : Stable, uncomplicated  LOW Complexity : FOTO score of       Based on the above components, the patient evaluation is determined to be of the following complexity level: LOW     Pain Ratin/10    Activity Tolerance:   Good  Please refer to the flowsheet for vital signs taken during this treatment.   Vitals:    20 1600 20 1620 20 1630 20 1645   BP: 110/71 114/72 133/78 129/73   BP 1 Location: Right arm Right arm Right arm Right arm   BP Patient Position: At rest At rest;Head of bed elevated (Comment degrees); Other (comment)  Comment: 35 degrees During activity; Sitting Post activity; Sitting   Pulse: 90 93 (!) 118 94   Resp: 14      Temp: 98.3 °F (36.8 °C)      SpO2: 92%      Weight:       Height:            After treatment patient left in no apparent distress:   Supine in bed, Call bell within reach, Side rails x 3, and nurse notified. COMMUNICATION/EDUCATION:   The patients plan of care was discussed with: Registered Nurse. Fall prevention education was provided and the patient/caregiver indicated understanding., Patient/family have participated as able in goal setting and plan of care. , and Patient/family agree to work toward stated goals and plan of care.     Thank you for this referral.  Irish Opitz   Time Calculation: 30 mins

## 2020-02-24 NOTE — ANESTHESIA PROCEDURE NOTES
Peripheral Block    Performed by: Alicja Okeefe MD  Authorized by: Alicja Okeefe MD       Pre-procedure: Indications: at surgeon's request and post-op pain management    Preanesthetic Checklist: patient identified, risks and benefits discussed, site marked, timeout performed, anesthesia consent given and patient being monitored      Block Type:   Block Type:   Adductor canal  Laterality:  Right  Monitoring:  Standard ASA monitoring, responsive to questions, continuous pulse ox, frequent vital sign checks, heart rate and oxygen  Injection Technique:  Single shot  Procedures: ultrasound guided    Patient Position: supine  Prep: alcohol    Location:  Mid thigh  Needle Gauge:  21 G  Needle Localization:  Ultrasound guidance    Assessment:    Injection Assessment:  Incremental injection every 5 mL, local visualized surrounding nerve on ultrasound, negative aspiration for blood, no intravascular symptoms, ultrasound image on chart, no paresthesia and negative aspiration for CSF  Patient tolerance:  Patient tolerated the procedure well with no immediate complications

## 2020-02-24 NOTE — PROGRESS NOTES
Ortho Post-Op Note    2/24/2020  4:35 PM    POD:  Day of Surgery  S/P:  Procedure(s):  RIGHT TOTAL KNEE ARTHROPLASTY    Afebrile/VSS, NAD, A&Ox 3  Doing well without complaints of nausea  Pain well controlled  Calves soft/NTTP Bilaterally  Leg soft. Dressing clean and dry  Moving lower extremities well. Neurocirculatory exam intact and within normal range. Lab Results   Component Value Date/Time    HGB 15.9 02/11/2020 11:53 AM    INR 1.0 02/11/2020 11:53 AM     Recent Labs     02/11/20  1153 12/20/19  1456 08/25/19  0321 08/23/19  1232 08/16/19  1207 07/17/19  1150 06/04/19  1050   CREA 0.54* 0.64 0.55 0.68 0.63 0.60 0.56*   BUN 17 21 11 16 19 24* 15     Estimated Creatinine Clearance: 81.8 mL/min (A) (by C-G formula based on SCr of 0.54 mg/dL (L)).     PLAN:  DVT prophylaxis: Warfarin  WBAT with PT-mobilization  Pain Control: Tylenol, dilaudid, tramadol  Plan to D/C home with HH/PT in 1-2 days      VENESSA Soto

## 2020-02-24 NOTE — PROGRESS NOTES
Pharmacist Note - Warfarin Dosing  Consult provided for this 65 y.o. female to manage warfarin for VTE PPx s/p Rt TKA    INR Goal: 1.7- 2.2  Therapy Day: 1  Preop Dose: [place]- none    Drugs that may increase INR: None  Drugs that may decrease INR: Estrogen  Other current anticoagulants/ drugs that may increase bleeding risk: NSAID  Risk factors: Age > 65  Daily INR ordered: YES    No results for input(s): HGB, INR, HGBEXT, INREXT, HGBEXT, INREXT in the last 72 hours. CBC + INR Trend  Recent Labs     02/11/20  1153 12/20/19  1456 08/25/19  0321 08/23/19  1232 08/16/19  1207 07/17/19  1150   HGB 15.9 16.5* 9.8* 14.9 15.0 16.5*   HCT 49.1* 50.0*  --  46.8 46.5 50.7*    410  --  334 342 316   INR 1.0  --   --  1.0 1.0  --      Date               INR                 Dose  2/11  1.0  ---  2/23  ---  None PreOp  2/24  ---  6 mg    Assessment/ Plan: Will order warfarin 6 mg PO x 1 dose. Pharmacy will continue to monitor daily and adjust therapy as indicated.      Carroll Baldwin, PharmD  Clinical Pharmacist, Orthopedics and Med/Surg () [address] [facility] [phone]

## 2020-02-24 NOTE — H&P
Date of Surgery Update:  Nafisa Rivera was seen and examined. History and physical has been reviewed. The patient has been examined.  There have been no significant clinical changes since the completion of the originally dated History and Physical.    Signed By: VENESSA Villareal     February 24, 2020 8:19 AM

## 2020-02-24 NOTE — OP NOTES
Name: Silvana Arango  MRN:  [id number]  : 1950  Age:  65 y.o. Surgery Date: 2020      OPERATIVE REPORT - RIGHT TOTAL KNEE REPLACEMENT    PREOPERATIVE DIAGNOSIS: Osteoarthritis, right knee. POSTOPERATIVE DIAGNOSIS: Osteoarthritis, right knee. PROCEDURE PERFORMED: Right total knee arthroplasty. SURGEON: Stephy Blood MD    FIRST ASSISTANT:  Sina Mayen PA-C    ANESTHESIA: Spinal    PRE-OP ANTIBIOTIC: Ancef 2g    COMPLICATIONS: None. ESTIMATED BLOOD LOSS: 50 mL. SPECIMENS REMOVED: None. COMPONENTS IMPLANTED:   Implant Name Type Inv. Item Serial No.  Lot No. LRB No. Used Action   CEMENT BNE GENTAMC GHV 40GM -- SMARTSET - SN/A Cement CEMENT BNE GENTAMC GHV 40GM -- SMARTSET N/A [facility] [id number] Right 2 Implanted   PATELLA   NA [employer] [id number] Right 1 Implanted   TIB PRN NP STM 5 DEG SZ ER -- PERSONA - SNA  TIB PRN NP STM 5 DEG SZ ER -- PERSONA NA [employer] [id number] Right 1 Implanted   FEM CR CHRISTOPHER CCR NRW SZ 8 RT -- PERSONA - SNA  FEM CR CHRISTOPHER CCR NRW SZ 8 RT -- PERSONA NA [employer] [id number] Right 1 Implanted   PERSONA VIVACIT-E MEDIAL CONGRUENT RIGHT 11MM HEIGHT Joint Component  N/A [employer] [id number] Right 1 Implanted       INDICATIONS: The patient is an 65 yrs female with progressive debilitating right knee pain due to severe osteoarthritis. Symptoms have progressed despite comprehensive conservative treatment and the patient presents for right total knee replacement. Risks, benefits, alternatives of the procedure were reviewed in detail and the patient elects to proceed. The patient understands the risk for perioperative medical complications. DESCRIPTION OF PROCEDURE: Spinal anesthesia was initiated. Preoperative dose of antibiotic was given. Foss catheter was not placed. The right lower extremity was prepped and draped in the usual sterile fashion. The skin was covered with Ioban occlusive dressing.  The right lower extremity was exsanguinated. A medial parapatellar incision was made to the right knee. The right knee was exposed and the distal femur was cut at 5 degrees distal femoral valgus. Femur was sized for a size 8. An AP cutting block was placed, rotated based on bony landmarks. Femoral cuts were completed for a size 8. The tibia was subluxed and a neutral varus/valgus proximal tibial cut was made matching the patient's slope. The meniscal remnants were removed. The posterior osteophytes were removed from the femur. Gaps were examined. The flexion and extension gaps were 11 mm. The femur was finished for a 8, tibia for a E. Trials were placed. Patella was cut and a 32 mm trial was fitted . The knee tracked well with all trial implants. The trials were then removed. Bony surfaces were drilled, lavaged, and dried. All components were cemented. Excess cement was removed. A 11 polyethylene component was placed. After the cement had fully cured, the knee was ranged and  irrigated copiously with pulsatile lavage. All surrounding soft tissues were infiltrated with local anesthetic. The arthrotomy was closed with #2 Vicryl sutures and 0 Vicryl sutures. Skin and subcutaneous tissues were irrigated and closed in standard fashion. Sterile dressing was applied. There were no complications. The procedure was a RIGHT TOTAL KNEE REPLACEMENT. A Paul total knee construct was utilized. No specimens were obtained/sent. The patient was transferred to the recovery room in stable condition. Varus knee with severe patella femoral DJD. Medial release. PCL intact. Kelin Cobb PA-C was critical throughout the case to assist with positioning, retraction and closure. There were no other available residents or surgical assistants to assist during this procedure.       Spring Dee MD

## 2020-02-24 NOTE — ANESTHESIA PREPROCEDURE EVALUATION
Relevant Problems   No relevant active problems       Anesthetic History     PONV          Review of Systems / Medical History  Patient summary reviewed, nursing notes reviewed and pertinent labs reviewed    Pulmonary  Within defined limits                 Neuro/Psych   Within defined limits           Cardiovascular  Within defined limits                     GI/Hepatic/Renal  Within defined limits              Endo/Other        Morbid obesity and arthritis     Other Findings              Physical Exam    Airway  Mallampati: II  TM Distance: > 6 cm  Neck ROM: normal range of motion   Mouth opening: Normal     Cardiovascular  Regular rate and rhythm,  S1 and S2 normal,  no murmur, click, rub, or gallop             Dental  No notable dental hx       Pulmonary  Breath sounds clear to auscultation               Abdominal  GI exam deferred       Other Findings            Anesthetic Plan    ASA: 2  Anesthesia type: spinal      Post-op pain plan if not by surgeon: peripheral nerve block single    Induction: Intravenous  Anesthetic plan and risks discussed with: Patient

## 2020-02-25 VITALS
WEIGHT: 212 LBS | TEMPERATURE: 98.7 F | RESPIRATION RATE: 16 BRPM | BODY MASS INDEX: 36.19 KG/M2 | OXYGEN SATURATION: 90 % | HEIGHT: 64 IN | DIASTOLIC BLOOD PRESSURE: 61 MMHG | HEART RATE: 76 BPM | SYSTOLIC BLOOD PRESSURE: 98 MMHG

## 2020-02-25 LAB
ANION GAP SERPL CALC-SCNC: 6 MMOL/L (ref 5–15)
BUN SERPL-MCNC: 16 MG/DL (ref 6–20)
BUN/CREAT SERPL: 25 (ref 12–20)
CALCIUM SERPL-MCNC: 8.3 MG/DL (ref 8.5–10.1)
CHLORIDE SERPL-SCNC: 111 MMOL/L (ref 97–108)
CO2 SERPL-SCNC: 22 MMOL/L (ref 21–32)
CREAT SERPL-MCNC: 0.63 MG/DL (ref 0.55–1.02)
GLUCOSE SERPL-MCNC: 120 MG/DL (ref 65–100)
HGB BLD-MCNC: 12.7 G/DL (ref 11.5–16)
POTASSIUM SERPL-SCNC: 4.5 MMOL/L (ref 3.5–5.1)
SODIUM SERPL-SCNC: 139 MMOL/L (ref 136–145)

## 2020-02-25 PROCEDURE — 97116 GAIT TRAINING THERAPY: CPT

## 2020-02-25 PROCEDURE — 85018 HEMOGLOBIN: CPT

## 2020-02-25 PROCEDURE — 74011250637 HC RX REV CODE- 250/637: Performed by: PHYSICIAN ASSISTANT

## 2020-02-25 PROCEDURE — 36415 COLL VENOUS BLD VENIPUNCTURE: CPT

## 2020-02-25 PROCEDURE — 74011250636 HC RX REV CODE- 250/636: Performed by: PHYSICIAN ASSISTANT

## 2020-02-25 PROCEDURE — 80048 BASIC METABOLIC PNL TOTAL CA: CPT

## 2020-02-25 PROCEDURE — 99218 HC RM OBSERVATION: CPT

## 2020-02-25 PROCEDURE — 97110 THERAPEUTIC EXERCISES: CPT

## 2020-02-25 RX ORDER — ASPIRIN 325 MG
325 TABLET ORAL 2 TIMES DAILY
Qty: 60 TAB | Refills: 0 | Status: SHIPPED | OUTPATIENT
Start: 2020-02-25 | End: 2020-05-19 | Stop reason: ALTCHOICE

## 2020-02-25 RX ORDER — CELECOXIB 200 MG/1
200 CAPSULE ORAL 2 TIMES DAILY
Qty: 30 CAP | Refills: 0 | Status: SHIPPED | OUTPATIENT
Start: 2020-02-25 | End: 2020-03-11

## 2020-02-25 RX ORDER — HYDROMORPHONE HYDROCHLORIDE 2 MG/1
2 TABLET ORAL
Qty: 30 TAB | Refills: 0 | Status: SHIPPED | OUTPATIENT
Start: 2020-02-25 | End: 2020-03-01

## 2020-02-25 RX ORDER — TRAMADOL HYDROCHLORIDE 50 MG/1
50 TABLET ORAL
Qty: 40 TAB | Refills: 0 | Status: SHIPPED | OUTPATIENT
Start: 2020-02-25 | End: 2020-03-06

## 2020-02-25 RX ORDER — AMOXICILLIN 250 MG
1 CAPSULE ORAL 2 TIMES DAILY
Qty: 30 TAB | Refills: 0 | Status: SHIPPED | OUTPATIENT
Start: 2020-02-25 | End: 2020-05-19 | Stop reason: ALTCHOICE

## 2020-02-25 RX ORDER — WARFARIN 2.5 MG/1
TABLET ORAL
Qty: 50 TAB | Refills: 0 | Status: SHIPPED | OUTPATIENT
Start: 2020-02-25 | End: 2020-02-25

## 2020-02-25 RX ADMIN — ACETAMINOPHEN 1000 MG: 500 TABLET ORAL at 02:14

## 2020-02-25 RX ADMIN — HYDROMORPHONE HYDROCHLORIDE 2 MG: 2 TABLET ORAL at 04:33

## 2020-02-25 RX ADMIN — POLYETHYLENE GLYCOL 3350 17 G: 17 POWDER, FOR SOLUTION ORAL at 09:17

## 2020-02-25 RX ADMIN — ASPIRIN 325 MG ORAL TABLET 325 MG: 325 PILL ORAL at 09:17

## 2020-02-25 RX ADMIN — Medication 2 G: at 02:13

## 2020-02-25 RX ADMIN — HYDROMORPHONE HYDROCHLORIDE 2 MG: 2 TABLET ORAL at 13:05

## 2020-02-25 RX ADMIN — SENNOSIDES AND DOCUSATE SODIUM 1 TABLET: 8.6; 5 TABLET ORAL at 09:17

## 2020-02-25 RX ADMIN — HYDROMORPHONE HYDROCHLORIDE 2 MG: 2 TABLET ORAL at 00:18

## 2020-02-25 RX ADMIN — HYDROMORPHONE HYDROCHLORIDE 2 MG: 2 TABLET ORAL at 09:18

## 2020-02-25 RX ADMIN — CELECOXIB 200 MG: 200 CAPSULE ORAL at 09:18

## 2020-02-25 NOTE — DISCHARGE INSTRUCTIONS
Discharge Instructions Knee Replacement  Dr. Kellen Pickard    Patient Name: Nick Serrato  Date of procedure: 2/24/2020   Procedure: Procedure(s):  RIGHT TOTAL KNEE ARTHROPLASTY  Surgeon: Afia Wesley) and Role:     * Arthur Lopez MD - Primary  PCP: Reanna Rashid MD  Date of discharge: 2/25/20      Follow up appointments   Follow up with Dr. Kellen Pickard in 3 weeks. Call [phone] to make an appointment.  If home health has been arranged for you the agency will contact you to arrange dates/times for visits. Please call them if you do not hear from them within 24 hours after you are discharged    When to call your Orthopaedic Surgeon: Call [phone]. If you call after 5pm or on a weekend, the on call physician will be contacted   Unrelieved pain   Signs of infection-if your incision is red; continues to have drainage; drainage has a foul odor or if you have a persistent fever over 101 degrees   Signs of a blood clot in your leg-calf pain, tenderness, redness, swelling of lower leg    When to call your Primary Care Physician:   Concerns about medical conditions such as diabetes, high blood pressure, asthma, congestive heart failure   Call if blood sugars are elevated, persistent headache or dizziness, coughing or congestion, constipation or diarrhea, burning with urination, abnormal heart rate    When to call [id number] go to the nearest emergency room   Sudden onset of chest pain, shortness of breath, difficulty breathing    Activity   Weight bearing as tolerated with walker or crutches. Refer to your patient handbook for instructions and pictures   Complete your Home Exercise Program daily as instructed by your therapist.  Refer to your handbook for instructions and pictures   Get up every one hour and walk (except at night when sleeping)   Do not drive or operate heavy machinery    Incision Care   The Aquacel (brown, waterproof) surgical dressing is to remain on your knee for 7 days.  On the 7th day have someone gently peel the dressing off by carefully lifting the edge and stretching it slightly to break the adhesive seal and leave incision open to air. You may take a shower with the Aquacel dressing in place.  You  have staples in your knee incision; they will be removed by the [address] [place] [address] staff in 14 days and steri-strips will be applied. Leave the steri-strips on until they fall off   Once the Aquacel is removed, you may get your incision wet but do not submerge your incision under water in a bath tub, hot tub or swimming pool for 6 weeks after surgery. Preventing blood clots    Take Aspirin 325 mg twice daily for 1 month as prescribed. Pain management   Keep ice wrap in place except when walking; changing gel packs every 4 hours   Lie down and elevate your leg on pillows for about 30 minutes after walking to decrease swelling and pain   Do ankle pumps (10 repetitions) every hour while awake and get up frequently to walk   Take narcotic pain pill as prescribed if needed. Take with food; avoid alcohol while taking pain medication. Decrease the amount of narcotic pain medication as your pain lessens   Do not take any over-the-counter medication except for Tylenol (acetaminophen). Please be aware that many medications contain Tylenol. We do not want you to take too much Tylenol so please use this as your guide:  o Do not take more than 3 Grams of Tylenol in a 24 hour period. (There are 1000 milligrams in one Gram  o 325mg of Tylenol per tablet (do not take more than 9 tablets in 24 hours)  o 500mg of Tylenol per tablet (do not take more than 6 tablets in 24 hours)    Diet   Resume usual diet; drink plenty of fluids; eat foods high in fiber   Take over-the-counter stool softeners and laxatives to prevent constipation.

## 2020-02-25 NOTE — PROGRESS NOTES
Problem: Mobility Impaired (Adult and Pediatric)  Goal: *Acute Goals and Plan of Care (Insert Text)  Description  FUNCTIONAL STATUS PRIOR TO ADMISSION: Patient was modified independent using a rolling walker for functional mobility. HOME SUPPORT PRIOR TO ADMISSION: The patient lived with  but did not require assist.    Physical Therapy Goals  Initiated 2/24/2020    1. Patient will move from supine to sit and sit to supine , scoot up and down and roll side to side in bed with modified independence within 4 days. 2. Patient will perform sit to stand with modified independence within 4 days. 3. Patient will ambulate with modified independence for 150 feet with the least restrictive device within 4 days. 4. Patient will ascend/descend 4 stairs with cane and one handrail with modified independence within 4 days. 5. Patient will perform home exercise program per protocol with independence within 4 days. 6. Patient will demonstrate AROM 0-90 degrees in operative joint within 4 days. Outcome: Resolved/Met   PHYSICAL THERAPY TREATMENT/DISCHARGE  Patient: Blas Briceño (15 y.o. female)  Date: 2/25/2020  Diagnosis: Osteoarthritis of right knee [M17.11]   <principal problem not specified>  Procedure(s) (LRB):  RIGHT TOTAL KNEE ARTHROPLASTY (Right) 1 Day Post-Op  Precautions: Fall, WBAT  Chart, physical therapy assessment, plan of care and goals were reviewed. ASSESSMENT  Patient continues with skilled PT services and has met acute care PT goals. Educated patient on Discharge Instructions relating to PT program progression post-discharge. She demonstrated/verbalized understanding. Reviewed post-TKA exercise protocol and gave same in written, illustrated form. She will benefit from [address] [place] [address] [facility] in order to achieve maximum level of safe, functional mobility, balance and return to independent PLOF. Other factors to consider for discharge:  Motivated/A & O x 4/Supportive Family/Independent PLOF PLAN :  Patient will be discharged from acute skilled physical therapy at this time. Rationale for discharge:  Goals achieved    Recommendation for discharge: (in order for the patient to meet his/her long term goals)  Physical therapy at least 2 days/week in the home     This discharge recommendation:  Has been made in collaboration with the attending provider and/or case management    IF patient discharges home will need the following DME: patient owns DME required for discharge       SUBJECTIVE:   Patient stated I am feeling pretty good and I want to go home today if I can.     OBJECTIVE DATA SUMMARY:   Critical Behavior:              Functional Mobility Training:  Bed Mobility:     Supine to Sit: Supervision  Sit to Supine: Supervision  Scooting: Supervision        Transfers:  Sit to Stand: Supervision  Stand to Sit: Supervision        Bed to Chair: Supervision                    Balance:  Sitting: Intact  Standing: Intact; With support  Standing - Static: Good;Constant support  Standing - Dynamic : Good;Constant support  Ambulation/Gait Training:  Distance (ft): 300 Feet (ft)  Assistive Device: Walker, rolling;Gait belt  Ambulation - Level of Assistance: Supervision        Gait Abnormalities: Antalgic  Right Side Weight Bearing: As tolerated     Base of Support: Shift to left  Stance: Right decreased  Speed/Deborah: Slow  Step Length: Left shortened  Swing Pattern: Right asymmetrical          Stairs:  Number of Stairs Trained: 8  Stairs - Level of Assistance: Supervision   Rail Use: Right (one rail & cane 4 steps/both hands right rail, lateral saul)    Therapeutic Exercises:   Patient  is independent with post-op TKA exercise protocol and has same in written, illlustrated form. Pain Ratin/10    Activity Tolerance:   Good      After treatment patient left in no apparent distress:   Supine in bed, Call bell within reach, Side rails x 3, and nurse notified.      COMMUNICATION/COLLABORATION:   The patients plan of care was discussed with: Registered Nurse and     Cyn Villagomez   Time Calculation: 30 mins

## 2020-02-25 NOTE — PROGRESS NOTES
Problem: Falls - Risk of  Goal: *Absence of Falls  Description  Document Margarita Alejo Fall Risk and appropriate interventions in the flowsheet.   Outcome: Progressing Towards Goal  Note: Fall Risk Interventions:  Mobility Interventions: PT Consult for mobility concerns, Patient to call before getting OOB         Medication Interventions: Patient to call before getting OOB    Elimination Interventions: Call light in reach    History of Falls Interventions: Consult care management for discharge planning, Door open when patient unattended, Evaluate medications/consider consulting pharmacy, Investigate reason for fall, Room close to nurse's station, Utilize gait belt for transfer/ambulation, Assess for delayed presentation/identification of injury for 48 hrs (comment for end date), Vital signs minimum Q4HRs X 24 hrs (comment for end date)         Problem: Knee Replacement: Post-Op Day 1  Goal: *Optimal pain control at patient's stated goal  Outcome: Progressing Towards Goal  Goal: *Hemodynamically stable  Outcome: Progressing Towards Goal

## 2020-02-25 NOTE — ROUTINE PROCESS
Bedside shift change report given to Leslye Paz (oncoming nurse) by Norris Will (offgoing nurse). Report included the following information SBAR.

## 2020-02-25 NOTE — PROGRESS NOTES
FREDDY- Home with [facility]    Reason for Admission:   Right total knee arthroplasty. RUR Score:  6%                  Plan for utilizing home health: Yes                          Current Advanced Directive/Advance Care Plan: On file in chart. Transition of Care Plan: CM met with pt to introduce her to CM role. This pt lives at home with her . She stated that she was independent prior to admission. CM informed pt that she has home health orders and offered her freedom of choice. She would like to use [facility] for home health. FOC form signed. CM sent a referral to [facility] via [facility] and they accepted this pt. CM placed this information on pt's AVS.IVETTE Aragon,[facility]-    Observation notice provided in writing to patient and/or caregiver as well as verbal explanation of the policy. Patients who are in outpatient status also receive the Observation notice. Care Management Interventions  PCP Verified by CM:  Yes  Palliative Care Criteria Met (RRAT>21 & CHF Dx)?: No  Transition of Care Consult (CM Consult): Discharge Planning  MyChart Signup: No  Discharge Durable Medical Equipment: No  Physical Therapy Consult: Yes  Occupational Therapy Consult: Yes  Speech Therapy Consult: No  Current Support Network: Lives with Spouse  Confirm Follow Up Transport: Family  The Patient and/or Patient Representative was Provided with a Choice of Provider and Agrees with the Discharge Plan?: Yes  Freedom of Choice List was Provided with Basic Dialogue that Supports the Patient's Individualized Plan of Care/Goals, Treatment Preferences and Shares the Quality Data Associated with the Providers?: Yes  The [employer] Information Provided?: No

## 2020-02-25 NOTE — PROGRESS NOTES
Spiritual Care Partner Volunteer visited patient in room 566-1 on 2/25/20 . Documented by:    Katia Williamson D.Min, M.Div.    paging service [phone])

## 2020-02-25 NOTE — PROGRESS NOTES
Ortho Daily Progress Note    2/25/2020  10:33 AM    POD:  1 Day Post-Op  S/P:  Procedure(s):  RIGHT TOTAL KNEE ARTHROPLASTY    Afebrile/VSS, NAD, A&O x 3  Doing well without complaints of nausea  Pain well controlled  Calves soft/NTTP Bilaterally  Leg soft. Dressing clean and dry  Moving lower extremities well. Neurocirculatory exam intact and within normal range. Lab Results   Component Value Date/Time    HGB 12.7 02/25/2020 02:25 AM    INR 1.0 02/11/2020 11:53 AM     Recent Labs     02/25/20  0225 02/11/20  1153 12/20/19  1456 08/25/19  0321 08/23/19  1232 08/16/19  1207 07/17/19  1150 06/04/19  1050   CREA 0.63 0.54* 0.64 0.55 0.68 0.63 0.60 0.56*   BUN 16 17 21 11 16 19 24* 15     Estimated Creatinine Clearance: 81.8 mL/min (by C-G formula based on SCr of 0.63 mg/dL).     PLAN:  DVT prophylaxis:  mg bid  WBAT with PT-mobilization  Pain Control: tylenol, oxycodone  Plan to D/C home with HH/PT later today      VENESSA Rosario

## 2020-02-25 NOTE — PROGRESS NOTES
Problem: Falls - Risk of  Goal: *Absence of Falls  Description  Document Laure Chaudhry Fall Risk and appropriate interventions in the flowsheet. Note: Fall Risk Interventions:  Mobility Interventions: PT Consult for mobility concerns, PT Consult for assist device competence, Patient to call before getting OOB, Utilize walker, cane, or other assistive device    Mentation Interventions: Evaluate medications/consider consulting pharmacy    Medication Interventions: Patient to call before getting OOB, Evaluate medications/consider consulting pharmacy, Teach patient to arise slowly    Elimination Interventions: Call light in reach, Elevated toilet seat, Patient to call for help with toileting needs, Stay With Me (per policy)    History of Falls Interventions: Consult care management for discharge planning, Evaluate medications/consider consulting pharmacy         Problem: Knee Replacement: Post-Op Day 1  Goal: Activity/Safety  Note:   Patient to work with PT/OT prior to discharge. Patient to call for assistance prior to getting out of bed. Patient will utilize walker during ambulation. Goal: *Optimal pain control at patient's stated goal  Note:   Assess patient's pain level using numeric scale. Administer pain medication as needed/ordered.

## 2020-02-26 ENCOUNTER — PATIENT OUTREACH (OUTPATIENT)
Dept: FAMILY MEDICINE CLINIC | Age: 70
End: 2020-02-26

## 2020-02-26 ENCOUNTER — PATIENT OUTREACH (OUTPATIENT)
Dept: CASE MANAGEMENT | Age: 70
End: 2020-02-26

## 2020-02-26 NOTE — NURSE NAVIGATOR
[address]  [facility]     FROM:                                TO: At [address]                                                      ([address] [place] [address] or Facility name)  Giovanna Jane [address]  [address] [address]  Dept: [address]: [phone]                                      Room#:  566/01                                                       Nurse Navigator:  Fredis Echeverria RN         SITUATION      ASAScore: ASA 2 - Patient with mild systemic disease with no functional limitations    Admitted:  2/24/2020  Hospital Day: 2      Attending Provider:  No att. providers found     Consultations:  None    PCP:  Jamaal Caban MD   [phone]     Admitting Dx:  Osteoarthritis of right knee [M17.11]       Active Problems:    Osteoarthritis of right knee (2/24/2020)      2 Days Post-Op of   Procedure(s):  RIGHT TOTAL KNEE ARTHROPLASTY   BY: Arin Ramon MD             ON: 2/24/2020                  Code Status: Prior             Advance Directive? Yes Not W Pt (Send w/patient)     Isolation:  There are currently no Active Isolations       MDRO: No current active infections    BACKGROUND     Allergies:   Allergies   Allergen Reactions    Codeine Rash     Rash and swelling on arm    Adhesive Tape-Silicones Rash    Wellbutrin [Bupropion Hcl] Other (comments)     fatigue       Past Medical History:   Diagnosis Date    Actinic keratosis     Left arm    Adjustment disorder with mixed anxiety and depressed mood     Adverse effect of anesthesia     HARD TO WAKE UP SLOW BREATHING ,Pt stated doesn't take much anesthesia    Arthritis     OSTEO    Chronic pain     Diverticulitis     Kidney stones 95,96,98    left    Left knee DJD     Lumbar nerve root impingement     RLQ pain    Menopause     LMP-53years old    Nausea & vomiting     Other and unspecified hyperlipidemia     Other idiopathic scoliosis, thoracolumbar region 2018    Postmenopausal HRT (hormone replacement therapy)     Right knee DJD     Urine culture positive 2020       Past Surgical History:   Procedure Laterality Date    COLONOSCOPY N/A 2016    COLONOSCOPY performed by Delicia Mills MD at [address] [facility]  [address] HX CATARACT REMOVAL Right 2019    HX CATARACT REMOVAL Left 2019    HX  SECTION  0804,5979    X2    HX GI      COLONOSCOPY    HX HIP FRACTURE TX Right 2019    Total R hip- Dr. Selvin Varela HX HYSTERECTOMY  2009    LMP-53years old    HX KNEE ARTHROSCOPY Bilateral 1966    SCOPE of knees bilateral    HX KNEE REPLACEMENT Left     HX LITHOTRIPSY      X 2    HX LUMBAR LAMINECTOMY  2000    X2    HX MENISCECTOMY      left knee    HX ORTHOPAEDIC  12    left shoulder repair; 14 screws and 2 plates    HX ORTHOPAEDIC Right     BROKEN ARM SURGERY X2    HX ORTHOPAEDIC Left      REPAIR OF Left MENISCUS    HX ORTHOPAEDIC Left     BROKEN ARM    HX ORTHOPAEDIC Left 2019    hip replacement    HX POLYPECTOMY      HX TONSILLECTOMY  1956    HX TUBAL LIGATION  1981    BSPO adhesion conization abn pap    TOTAL KNEE ARTHROPLASTY Left 2016       Prior to Admission Medications   Prescriptions Last Dose Informant Patient Reported? Taking?   estradiol (ESTRACE) 1 mg tablet 2020 at Unknown time  Yes Yes   Sig: Take 1 mg by mouth every morning.    furosemide (LASIX) 20 mg tablet 2020 at Unknown time  No Yes   Sig: TAKE ONE TABLET BY MOUTH TWICE A DAY   zolpidem (AMBIEN) 5 mg tablet 2020 at Unknown time  No Yes   Sig: TAKE ONE TABLET BY MOUTH EVERY NIGHT AT BEDTIME AS NEEDED FOR INSOMNIA      Facility-Administered Medications: None       Vaccinations:    Immunization History   Administered Date(s) Administered    Influenza Vaccine 11/15/2017    Influenza Vaccine (Tri) Adjuvanted 2018    Td, Adsorbed PF 2013    dT Vaccine 01/05/1999         ASSESSMENT   Age: 65 y.o. Gender: female        Height: Height: 5' 4\" (162.6 cm)                    Weight:Weight: 96.2 kg (212 lb)     No data found. Active Orders   There are no active orders of the following types: Diet. Orientation:      Active Lines/Drains:  (Peg Tube / Foss / CL or S/L?):no           Last BM: Last Bowel Movement Date: 02/23/20     Skin Integrity: Incision (comment)(Right Knee)             Mobility: Slightly limited   Weight Bearing Status: WBAT (Weight Bearing as Tolerated)      Gait Training  Assistive Device: Walker, rolling, Gait belt  Ambulation - Level of Assistance: Supervision  Distance (ft): 300 Feet (ft)  Stairs - Level of Assistance: Supervision  Number of Stairs Trained: 8  Rail Use: Right (one rail & cane 4 steps/both hands right rail, lateral saul)     On Anticoagulation? YES  Aspirin  325 mg BID                                       Pain Medications given:  Hydromorphone; Tramadol                                   Lab Results   Component Value Date/Time    Glucose 120 (H) 02/25/2020 02:25 AM    Hemoglobin A1c 6.0 (H) 02/11/2020 11:53 AM    INR 1.0 02/11/2020 11:53 AM    INR 1.0 08/23/2019 12:32 PM    HGB 12.7 02/25/2020 02:25 AM    HGB 15.9 02/11/2020 11:53 AM    HGB 16.5 (H) 12/20/2019 02:56 PM    HGB 9.8 (L) 08/25/2019 03:21 AM       Readmission Risks:  Score:         RECOMMENDATION     See After Visit Summary (AVS) for:  · Discharge instructions  · After [address]    · Medication Reconciliation          [facility] Orthopaedic Nurse Navigator  JAVIER Lozano, RN-BC       Office  [phone]  Cell      [phone]  Fax      [phone]  [email or web address]             . Ana María

## 2020-02-26 NOTE — PROGRESS NOTES
Hospital Discharge Follow-Up      Date/Time:  2020 9:51 AM  [facility] Ambulatory Care Coordination Team  Phone [phone]    Patient was admitted to [facility] on  and discharged on  for osteoarthritis right knee. Right TKR . The physician discharge summary was not available at the time of outreach. Patient was contacted within 1 business days of discharge. Top Challenges reviewed with the provider   [facility] - Right TKR per . No complications with hospitalization. Hgb 12.7 on . Advance Care Planning:   Does patient have an Advance Directive:  reviewed and current        Method of communication with provider :chart routing,face to face    Was this a readmission? no   Patient stated reason for the readmission:     Care Transition Nurse (CTN) contacted the patient by telephone to perform post hospital discharge assessment. Verified name and  with patient as identifiers. Provided introduction to self, and explanation of the CTN role. Reports she is in a lot of discomfort, currently about #8 on 1-10 scale for pain. Had just taken the Dilaudid 2mg a few minutes ago. (around 10:15am) Had taken prior dose around 6:30am. Had not taken any of the Tramadol. Did not think she could take if taking the Dilaudid. Education provided, teaching on alternating the Tramadol with the Dilaudid if pain persisted. Confirmed she is taking the ASA 325mg bid and the Celebrex bid. Advised to take the Tramadol and then if no relief in 2-3 hours, can take the Dilaudid. Patient received hospital discharge instructions. CTN reviewed discharge instructions and red flags with patient who verbalized understanding. Patient given an opportunity to ask questions and does not have any further questions or concerns at this time. The patient agrees to contact the PCP office for questions related to their healthcare.  CTN provided contact information for future reference. Disease Specific:   N/A    Patients top risk factors for readmission:  None noted at this time. Home Health orders at discharge: SN/PT  [address]: [address]  Date of initial visit: 2/26    [address] ordered at discharge: none  [address]: none  [address] received: na    Medication(s):   New Medications at Discharge: ASA 325mg bid, Celebrex 200mg bid, Dilaudid 2mg- one q 4 hours prn pain, Pericolace bid, Tramadol 50mg q 6 hours prn pain. Changed Medications at Discharge: none  Discontinued Medications at Discharge: none    Medication reconciliation was performed with patient, who verbalizes understanding of administration of home medications. There were no barriers to obtaining medications identified at this time. Referral to Pharm D needed: no     Current Outpatient Medications   Medication Sig    aspirin (ASPIRIN) 325 mg tablet Take 1 Tab by mouth two (2) times a day.  celecoxib (CELEBREX) 200 mg capsule Take 1 Cap by mouth two (2) times a day for 30 doses. Indications: acute pain following an operation    HYDROmorphone (DILAUDID) 2 mg tablet Take 1 Tab by mouth every four (4) hours as needed for Pain for up to 5 days. Max Daily Amount: 12 mg. Indications: excessive pain    senna-docusate (PERICOLACE) 8.6-50 mg per tablet Take 1 Tab by mouth two (2) times a day. Indications: constipation    traMADol (ULTRAM) 50 mg tablet Take 1 Tab by mouth every six (6) hours as needed for Pain for up to 10 days. Max Daily Amount: 200 mg. Indications: pain    zolpidem (AMBIEN) 5 mg tablet TAKE ONE TABLET BY MOUTH EVERY NIGHT AT BEDTIME AS NEEDED FOR INSOMNIA    estradiol (ESTRACE) 1 mg tablet Take 1 mg by mouth every morning.  furosemide (LASIX) 20 mg tablet TAKE ONE TABLET BY MOUTH TWICE A DAY     No current facility-administered medications for this visit.         There are no discontinued medications. [facility] follow up appointment(s): No future appointments. Declined FREDDY with pcp at this time. Non-[facility] follow up appointment(s): - reminded to call and schedule for 3 week f/u appt. [facility]:  information provided as a resource       Goals        Patient Stated     Return to baseline activity (pt-stated)      08/27/19  · Verbalized understanding of discharge instructions and follow up visit  · At [address] to visit on today- 8/27/19  · Decline [facility]/PCP follow-up at this time, verbalized will call for concerns about medical condition  · Verbalized understanding of when to go to Ed or call surgeon  · Admits to taking ASA as ordered and know when to call if suspected blood clots  · Admits to taking all medications as ordered and drinking fluids  · Has order for stool softener if needed. · NN will follow in one week to ten days. pk    09/20/19  · Patient doing much better, being released by PT due to doing so well  · Denies red flags:Fever, Shortness of Breath or Chest Pain. · Incision remain  clean, dry, intact  · Calf is non-tender,   · Operative extremity has minimal swelling  · Progressing well with therapy  · Patient verbalized knowledge of when to follow up with PCP and Surgeon. NN will follow in 7-10 days. pk  10/17/19  · Several outbound calls to patient to complete F/U assessment  · NN unable to reach  · NN contact information left on   · NN will follow up again in 7-10 days.  pk    11/05/19  · Outreach to patient who says she is doing well  · Walking 1 mile per day  · Was in [place] for 3 weeks, just getting home  · Continued to do water aerobics and beach exercises while on vacation  · Medications reviewed  · No HH at this time-not needed  · Decline PCP/HH follow up at this time  · ACM will follow in 7-10 days    1/22/2020  · Outreach to patient who says she is doing well  · Continue to Walk 1 mile per day  · Scheduled for Ortho testing on 2/11/2020  · Scheduled for TKR on 2/24/20  · Reviewed medications, admits to taking as ordered  · Decline PCP/HH follow up at this time  · ACM will follow in 7-10 days. pk    02/12/20  · Attended Ortho follow up on 2/11/20 for prescreening labs  · TKA Scheduled for 2/24/2020  · Decline PCP follow up at this time  · Medications reviewed  · No HH needed at this time-not needed  · ACM will follow in 7-10 days. pk  2/26/20  · Right TKR [facility] 2/24 and discharged to home 2/25. · Reviewed discharge instructions- ice, meds,incision care. · Home Health nurse from [facility] [address] was there earlier to see patient. PT scheduled to start tomorrow. Reminded to walk frequently, do the ankle pumps. · Reviewed meds- education on new meds. Did not think she could take the Tramadol if taking Dilaudid. Advised to alternate. Take the Tramadol and then if pain not relieved, could take the Dilaudid maybe 2-3 hours later if needed. · Reviewed red flags: fever,nausea,vomiting,diarrhea, sob,chest pain,signs of blood clot in leg: tenderness in calf,redness,swelling. · Reminded to call and schedule f/u with  in 3 weeks. · Declined FREDDY with pcp. · Given info on [facility] as resource. · Given CTN contact info if any questions/concerns.   · Advised CTN to check back with her in 1-2 weeks,sooner prn.ole

## 2020-02-26 NOTE — PROGRESS NOTES
This note will not be viewable in [address] Post Discharge Follow-up contact after Joint Replacement    Patient discharged on 2/25/20  By  Lesia Owens   following  right knee Arthroplasty. Spoke with patient today, who reports they have \" finally gotten the pain under control. \"  Denies Fever, Shortness of Breath or Chest Pain. Home Health has visited. Patient also reports:  Incision  clean, dry, intact  Calf is non-tender, operative extremity has moderate swelling. Pain is well managed. Discussed use of ice & elevation. Patient is progressing with therapy and is exercising independently. Taking Aspirin for anticoagulation, hydromorphone for pain. Patient is not experiencing symptoms of constipation & urinating without difficulty. Discussed side effects of anticoagulants & pain medications (bleeding/bruising, constipation, lightheaded/dizziness)  Follow up appointment is not scheduled; but patient states plan to schedule. Discussed calling surgeon Dr Sylvester Hodgkins  for drainage, bleeding, swelling in operative extremity, fever or pain. Discussed calling PCP Dr Garcia Hinton with other medical issues.

## 2020-03-24 NOTE — DISCHARGE INSTRUCTIONS
Patient Education   Patient Education   Patient Education     Wound Care: After Your Visit  Your Care Instructions  Taking good care of your wound at home will help it heal quickly and reduce your chance of infection. The doctor has checked you carefully, but problems can develop later. If you notice any problems or new symptoms, get medical treatment right away. Follow-up care is a key part of your treatment and safety. Be sure to make and go to all appointments, and call your doctor if you are having problems. It's also a good idea to know your test results and keep a list of the medicines you take. How can you care for yourself at home? · Clean the area with soap and water 2 times a day unless your doctor gives you different instructions. Don't use hydrogen peroxide or alcohol, which can slow healing. ¨ You may cover the wound with a thin layer of antibiotic ointment, such as bacitracin, and a nonstick bandage. ¨ Apply more ointment and replace the bandage as needed. · Take pain medicines exactly as directed. Some pain is normal with a wound, but do not ignore pain that is getting worse instead of better. You could have an infection. ¨ If the doctor gave you a prescription medicine for pain, take it as prescribed. ¨ If you are not taking a prescription pain medicine, ask your doctor if you can take an over-the-counter medicine. · Your doctor may have closed your wound with stitches (sutures), staples, or skin glue. ¨ If you have stitches, your doctor may remove them after several days to 2 weeks. Or you may have stitches that dissolve on their own. ¨ If you have staples, your doctor may remove them after 7 to 10 days. ¨ If your wound was closed with skin glue, the glue will wear off in a few days to 2 weeks. When should you call for help?   Call your doctor now or seek immediate medical care if:  · You have signs of infection, such as:  ¨ Increased pain, swelling, warmth, or redness near the This is a telephone visit.   Pt has agreed and verbally consented to a telephone visit.   11 Minutes total was spent on this visit.   This visit was conducted with [place] Ivan at home.     bs 122-187     bp stable.     No cp. No sob.       ASSESSMENT/PLAN:     Mild iron def anemia: pt denies any bleeding. Egd/colonoscopy noted. Normal small bowel series noted. Iron NL on f/u labs. Anemia stable. Cont ferrous sulfate 325mg daily.     Esophagitis/gastric intestinal metaplasia: cont ppi. egd due NOW. ordered and recommended to pt.     Diffuse intra and extrahepatic biliary dilatation: To get EGD with EUS + FNA with Dr. Teague. Pt states he will call to schedule.     Asthma:   Comment: sxs stable. flovent too expensive. Cont advair 250 bid. cont alb inhaler/nebs q 6hrs prn.    Diabetes:  - a1c 7.5. Off glipizide due to possible allergic reaction. Cont metformin as noted, januvia 100mg daily, Starlix 60mg tid before meals. rec to monitor for low bs.   - Diet and exercise reviewed.   - cont checking bs.   - Eye Exam: due NOW. Has scheduled outside.   - Flu Vaccine: UTD.   - Pneumonia Vaccine: UTD.   - Aspirin: cont aspirin (81mg) daily.   - Microalbumin: due 8/20   - foot exam due 11/20.     Dyslipidemia  Comment: ldl at goal. Cont crestor 20mg daily.     elev LFTs: Neg for hepatitis. Other labs per Dr. Mitchell neg. Multiple CT's unremarkable. Cont f/u with dr. Corral.     Bloating: sxs persistent. egd/colonoscopy noted 10/15. CT abd/pelvis noted 12/14 and 12/15. Cont ppi. Keep f/u with dr. Corral.     Left calf pain: sxs resolved. Us neg for DVT. To call if sxs return.     innumerable calcified and noncalcified pulmonary nodules bilaterally on CXR: CT chest 12/17 showed scarring within both upper lung regions. No evidence for new or enlarging lung nodules. Cont pulmonary f/u. Due to see Dr. Powers NOW. He states he will schedule this.     Hypertension:   Comment: stable.     Chronic back pain  Comment: on norco, muscle  relaxant, tramadol chronically. sxs stable. To call with any concern. Narcotic agreement completed and discussed with patient previously.     Smoker  Comment: stopped smoking. Congratulated and encouraged to keep up the good work.     Allergic rhinitis  Comment: sxs stable. Cont flonase.     H/o TB: was hospitalized. completed tx.     H/o alcoholism: clean now.     ED: did not try Viagra due to cost.     PSA due 8/20.     Colonoscopy due 3/23.     Medicare wellness check due 2/2021.      Patient advised on Adult Immunizations.     Low fat, low cholesterol, low salt diet recommended. Encouraged that diet should also comprise of fruits and vegetables.  Regular exercise recommended.      See orders     FU in 3 months or sooner prn.   Check fasting labs prior to next appt.   Patient expresses understanding and agrees with the plan.     Electronically signed by:  Landen Peterson MD  3/24/2020     wound.  ¨ Red streaks leading from the wound. ¨ Pus draining from the wound. ¨ A fever. · You bleed so much from your incision that you soak one or more bandages over 2 to 4 hours. Watch closely for changes in your health, and be sure to contact your doctor if:  · The wound is not getting better each day. Where can you learn more? Go to "[employer]be  Enter M973 in the search box to learn more about \"Wound Care: After Your Visit. \"   © 8803-5828 Healthwise, Incorporated. Care instructions adapted under license by [facility] (which disclaims liability or warranty for this information). This care instruction is for use with your licensed healthcare professional. If you have questions about a medical condition or this instruction, always ask your healthcare professional. [address] any warranty or liability for your use of this information. Content Version: 58.7.328246; Last Revised: April 23, 2012                    Face-Lift: What to Expect at [address] is surgery to firm and tighten the skin of the face and neck to give a more youthful appearance. It may remove many wrinkles, but it does not change the texture of your skin. You may have stitches or staples in your cut (incision). Your doctor will take these out in the first week. A bandage will cover the incision. You may have gauze wrapped around your head and neck. You also may have an elastic bandage around your chin and the top of your head. The bandage probably will be removed on the day after surgery. You may have a drain in place to remove excess blood and fluid from your face and neck. If so, follow your doctor's instructions on how to care for the drain and how to write down how much fluid comes out. Your face likely will be bruised and swollen. The swelling may get worse before it gets better, but it will probably go away in 1 to 2 weeks.  After a few days you may get some bruises on your neck and chest. This is caused by gravity, which pulls the excess blood and bruising downward. You will feel some pain for 2 to 4 days after surgery. You may have some trouble opening your mouth for several days. The skin around the incision probably will be numb. You may have some itching or shooting pain as the feeling returns, though it may take several months for the numbness to go away. Most people recover in 4 to 6 weeks. But it probably will take 3 to 4 months to see the final result from the surgery. This care sheet gives you a general idea about how long it will take for you to recover. But each person recovers at a different pace. Follow the steps below to get better as quickly as possible. How can you care for yourself at home? Activity    · Rest when you feel tired. Getting enough sleep will help you recover.     · Keep your head raised for several days after surgery. Sleep with your head up by using 2 or 3 pillows.     · Try to walk each day. Start by walking a little more than you did the day before. Bit by bit, increase the amount you walk.     · You will probably need to take 1 to 2 weeks off from work. It depends on the type of work you do, how you feel, and whether you want to return to work before you have completely healed.     · Avoid strenuous activities, such as bicycle riding, jogging, weight lifting, or aerobic exercise, for about 3 weeks or until your doctor says it is okay.     · Ask your doctor when you can drive again.     · Follow your doctor's directions about showering. You may be able to shower 1 or 2 days after surgery.     · Use a baby toothbrush to brush your teeth if you have trouble opening your mouth.     · For men: Be very careful shaving after a face-lift, because you will not be able to feel the blade on your skin. It may help to switch to an electric razor for a few months, until feeling returns to the skin on your face and neck.    Diet    · Eat soft foods for the first few days after surgery. Try soup, juice, pudding, yogurt, applesauce, scrambled eggs, and mashed potatoes.     · You may notice that your bowel movements are not regular right after your surgery. This is common. Try to avoid constipation and straining with bowel movements. You may want to take a fiber supplement every day. If you have not had a bowel movement after a couple of days, ask your doctor about taking a mild laxative. Medicines    · Your doctor will tell you if and when you can restart your medicines. He or she will also give you instructions about taking any new medicines.     · If you take blood thinners, such as warfarin (Coumadin), clopidogrel (Plavix), or aspirin, be sure to talk to your doctor. He or she will tell you if and when to start taking those medicines again. Make sure that you understand exactly what your doctor wants you to do.     · Be safe with medicines. Take pain medicines exactly as directed. ? If the doctor gave you a prescription medicine for pain, take it as prescribed. ? If you are not taking a prescription pain medicine, ask your doctor if you can take an over-the-counter medicine.     · If your doctor prescribed antibiotics, take them as directed. Do not stop taking them just because you feel better. You need to take the full course of antibiotics.     · If you think your pain medicine is making you sick to your stomach:  ? Take your medicine after meals (unless your doctor has told you not to). ? Ask your doctor for a different pain medicine. Incision care    · If you have strips of tape on the cut (incision) the doctor made, leave the tape on for a week or until it falls off. Or follow your doctor's instructions for removing the tape.     · When your doctor tells you that it is okay, you may wash the incision with soap and water and gently dry the area.  Don't use hydrogen peroxide or alcohol, which can slow healing.     · Do not put lotions or ointments on your incisions unless your doctor tells you to do so. Other instructions    · Put ice or a cold pack on your face for 10 to 20 minutes at a time. Try to do this every 1 to 2 hours for the first 2 or 3 days after surgery (when you are awake) or until the swelling goes down. Put a thin cloth between the ice and your skin. Follow-up care is a key part of your treatment and safety. Be sure to make and go to all appointments, and call your doctor if you are having problems. It's also a good idea to know your test results and keep a list of the medicines you take. When should you call for help? Call 911 anytime you think you may need emergency care. For example, call if:    · You passed out (lost consciousness).     · You have severe trouble breathing.     · You have sudden chest pain and shortness of breath, or you cough up blood.    Call your doctor now or seek immediate medical care if:    · You have pain that does not get better after you take pain medicine.     · You have loose stitches, or your incision comes open.     · You are bleeding from the incision.     · You have signs of infection, such as:  ? Increased pain, swelling, warmth, or redness. ? Red streaks leading from the incision. ? Pus draining from the incision. ? A fever.     · You have signs of a blood clot in your leg, such as:  ? Pain in your calf, back of the knee, thigh, or groin. ? Redness and swelling in your leg or groin.    Watch closely for any changes in your health, and be sure to contact your doctor if:    · You do not get better as expected. Where can you learn more? Go to http://mary-dion.info/. Enter Z485 in the search box to learn more about \"Face-Lift: What to Expect at Home. \"  Current as of: April 1, 2019  Content Version: 12.1  © 8820-3993 Healthwise, Incorporated.  Care instructions adapted under license by [employer] (which disclaims liability or warranty for this information). If you have questions about a medical condition or this instruction, always ask your healthcare professional. [address] any warranty or liability for your use of this information. Eyelid Surgery: What to Expect at Home  Your Recovery  After surgery, your eyelid may feel tight and sore. Your eye may be watery, dry, sticky, itchy, or sensitive to light. Your vision may be blurry for a few days. Your doctor will give you medicines to help with pain and discomfort. It is important to keep your eyelid clean and to avoid rubbing it. Follow your doctor's instructions on how to clean and care for your eye. Your stitches may dissolve on their own. Or your doctor may remove them 3 to 5 days after surgery. Your eyelid may be swollen and bruised for 1 to 3 weeks after surgery. The appearance of your eye may continue to get better for 1 to 3 months. Most people feel ready to go out in public and back to work in about 10 to 14 days. This may depend on your job and how you feel about people knowing about your surgery. Even after 2 weeks, you may still have some bruising around your eyes. After surgery for a droopy eyelid, or ptosis (say \"JOHNSON-isael\"), you may find that your lid does not lower as much when you look down. Or you may find that your lid does not close fully when you sleep. If this occurs, tell your doctor. He or she may suggest putting drops or gels in the eye to keep it moist.  For the first few weeks, your eye will be swollen. When the swelling is gone, you'll be able to see the changes. This care sheet gives you a general idea about how long it will take for you to recover. But each person recovers at a different pace. Follow the steps below to get better as quickly as possible. How can you care for yourself at home? Activity    · Rest when you feel tired. Getting enough sleep will help you recover.     · Keep your head raised for several days after surgery. Sleep with your head up by using 2 or 3 pillows.     · Ask your doctor when it is okay to drive.     · Your eyes will get tired easily. Limit reading, computer work, and TV for the first few days.     · Do not wear contact lenses for about 2 weeks or until your doctor says it is okay.     · Do not wear eye makeup for 2 weeks. You may also want to avoid face cream or lotion.     · You can shower or wash your hair the day after surgery. Keep water, soap, shampoo, hair spray, and shaving lotion out of your eye, especially for the first week.     · Do not rub or put pressure on your eye for at least 1 week.     · Do not get your hair colored or permed for 10 days after surgery.     · Do not bend over or do any strenuous activities, such as biking, jogging, weight lifting, or aerobic exercise, for 2 weeks or until your doctor says it is okay.     · Avoid swimming, hot tubs, gardening, and dusting for 1 to 2 weeks.     · Wear sunglasses on bright days for 1 year after surgery. Medicines    · Your doctor will tell you if and when you can restart your medicines. He or she will also give you instructions about taking any new medicines.     · If you take blood thinners, such as warfarin (Coumadin), clopidogrel (Plavix), or aspirin, be sure to talk to your doctor. He or she will tell you if and when to start taking those medicines again. Make sure that you understand exactly what your doctor wants you to do.     · Follow your doctor's instructions for when to use eye drops and antibiotic ointment. Always wash your hands before you put your drops in. To put in eye drops:  ? Tilt your head back, and pull your lower eyelid down with one finger. ? Drop or squirt the medicine inside the lower lid. ? Close your eye for 30 to 60 seconds to let the drops or ointment move around. ? Do not touch the ointment or dropper tip to your eyelashes or any other surface.     · Follow your doctor's instructions for taking pain medicines. Incision care    · If you have a bandage on your eye, wear it for as long as your doctor recommends.     · Keep the area clean and dry.    Ice    · Close your eye and put ice or a cold pack on it for 10 to 20 minutes at a time. Try to do this every 1 to 2 hours for the next 3 days (when you are awake) or until the swelling goes down. Put a thin cloth between the ice and your skin. Follow-up care is a key part of your treatment and safety. Be sure to make and go to all appointments, and call your doctor if you are having problems. It's also a good idea to know your test results and keep a list of the medicines you take. When should you call for help? Call 911 anytime you think you may need emergency care. For example, call if:    · You passed out (lost consciousness).     · You have severe trouble breathing.     · You have sudden chest pain and shortness of breath, or you cough up blood.    Call your doctor now or seek immediate medical care if:    · You have pain that does not get better after you take pain medicine.     · You have loose stitches, or your incision comes open.     · You are bleeding from the incision.     · You have signs of infection, such as:  ? Increased pain, swelling, warmth, or redness. ? Red streaks leading from the incision. ? Pus draining from the incision. ? A fever.     · You have signs of a blood clot in your leg, such as:  ? Pain in your calf, back of the knee, thigh, or groin. ? Redness and swelling in your leg or groin.     · You have vision changes.    Watch closely for any changes in your health, and be sure to contact your doctor if:    · You do not get better as expected. Where can you learn more? Go to http://mary-dion.info/. Michael Floor in the search box to learn more about \"Eyelid Surgery: What to Expect at Home. \"  Current as of: April 1, 2019  Content Version: 12.1  © 1358-5882 Healthwise, Incorporated.  Care instructions adapted under license by [employer] (which disclaims liability or warranty for this information). If you have questions about a medical condition or this instruction, always ask your healthcare professional. [address] any warranty or liability for your use of this information. DISCHARGE SUMMARY from Nurse    ALL CLOTHING/VALUABLES RETURNED TO PATIENT BEFORE D/C HOME    PATIENT INSTRUCTIONS:    The first meal should be something that is light; not greasy or spicy. If this is tolerated, then advance to regular diet as tolerated. After general anesthesia or intravenous sedation, for 24 hours or while taking prescription Narcotics:  · Limit your activities  · Do not drive and operate hazardous machinery  · Do not make important personal or business decisions  · Do  not drink alcoholic beverages  · If you have not urinated within 8 hours after discharge, please contact your surgeon on call. Pain  · Take pain medication as directed by your doctor  ·  Call your doctor if pain is NOT relieved by medication  · DO NOT take aspirin or blood thinners until directed by your doctor    Report the following to your surgeon:  · Excessive pain, swelling, redness or odor of or around the surgical area  · Temperature over 100.5  · Nausea and vomiting lasting longer than 4 hours or if unable to take medications  · Any signs of decreased circulation or nerve impairment to extremity: change in color, persistent  numbness, tingling, coldness or increase pain  · Any questions    ALSO:  [facility] Phone Calls  · Are usually made nursing staff, the day after your surgery  · If you have any problems, call your doctor as needed      The discharge information has been reviewed. *  Please give a list of your current medications to your Primary Care Provider.     *  Please update this list whenever your medications are discontinued, doses are      changed, or new medications (including over-the-counter products) are added. *  Please carry medication information at all times in case of emergency situations.

## 2020-05-19 ENCOUNTER — VIRTUAL VISIT (OUTPATIENT)
Dept: FAMILY MEDICINE CLINIC | Age: 70
End: 2020-05-19

## 2020-05-19 DIAGNOSIS — E66.09 CLASS 2 OBESITY DUE TO EXCESS CALORIES WITHOUT SERIOUS COMORBIDITY WITH BODY MASS INDEX (BMI) OF 36.0 TO 36.9 IN ADULT: Primary | ICD-10-CM

## 2020-05-19 DIAGNOSIS — M17.12 PRIMARY OSTEOARTHRITIS OF LEFT KNEE: Chronic | ICD-10-CM

## 2020-05-19 DIAGNOSIS — F43.23 ADJUSTMENT DISORDER WITH MIXED ANXIETY AND DEPRESSED MOOD: ICD-10-CM

## 2020-05-19 DIAGNOSIS — Z86.59 HISTORY OF DEPRESSION: ICD-10-CM

## 2020-05-19 DIAGNOSIS — M17.11 PRIMARY OSTEOARTHRITIS OF RIGHT KNEE: ICD-10-CM

## 2020-05-19 DIAGNOSIS — R73.09 ELEVATED HEMOGLOBIN A1C: ICD-10-CM

## 2020-05-19 RX ORDER — PHENTERMINE HYDROCHLORIDE 37.5 MG/1
37.5 TABLET ORAL
Qty: 30 TAB | Refills: 0 | Status: SHIPPED | OUTPATIENT
Start: 2020-05-19 | End: 2020-06-23

## 2020-05-19 NOTE — PROGRESS NOTES
HISTORY OF PRESENT ILLNESS  HPI  Kumar Wilson is a 65 y.o. female with a history of left knee DJD, thoracolumbar idiopathic scoliosis, adjustment disorder with anxiety and depression, kidney stones and hyperlipidemia with LDL goal <130. Pt is seen today through virtual visit. Pt says she had her right knee replaced in February and notes she has not been able to exercise as much due to lingering discomfort in the area. She adds that she currently weighs 209 pounds. Pt requests something to help with weight loss and mentions trying [employer]i-Weightloss in the past with good results, but says she cannot currently afford it. She reports she was put on phentermine 37.5 mg in the past to help with weight loss and notes this worked well for her. Pt is taking the furosemide once to twice a day. Pt denies unusual SOB, chest pain, and any recent ER visits or hospitalizations. Consent: Kumar Wilson, who was seen by synchronous (real-time) audio-video technology, and/or her healthcare decision maker, is aware that this patient-initiated, Telehealth encounter on 5/19/2020 is a billable service, with coverage as determined by her insurance carrier. She is aware that she may receive a bill and has provided verbal consent to proceed: Yes. Kumar Wilson is a 65 y.o. female who was evaluated by an audio-video encounter for concerns as above. Patient identification was verified prior to start of the visit. A caregiver was present when appropriate. Due to this being a TeleHealth encounter ([facility]), evaluation of the following organ systems was limited: Vitals/Constitutional/EENT/Resp/CV/GI//MS/Neuro/Skin/Heme-Lymph-Imm.   Pursuant to the emergency declaration under the [facility], 1135 waiver authority and the [place] and [employer] Act, this Virtual Visit was conducted, with patient's (and/or legal guardian's) consent, to reduce the patient's risk of exposure to COVID-19 and provide necessary medical care. Services were provided through a synchronous discussion virtually to substitute for in-person clinic visit. I was in the office. The patient was at home.         Past Medical History:   Diagnosis Date    Actinic keratosis     Left arm    Adjustment disorder with mixed anxiety and depressed mood     Adverse effect of anesthesia     HARD TO WAKE UP SLOW BREATHING ,Pt stated doesn't take much anesthesia    Arthritis     OSTEO    Chronic pain     Diverticulitis     Kidney stones 95,96,98    left    Left knee DJD     Lumbar nerve root impingement     RLQ pain    Menopause     LMP-53years old    Nausea & vomiting     Other and unspecified hyperlipidemia     Other idiopathic scoliosis, thoracolumbar region 2018    Postmenopausal HRT (hormone replacement therapy)     Right knee DJD     Urine culture positive 2020     Past Surgical History:   Procedure Laterality Date    COLONOSCOPY N/A 2016    COL[facility] performed by [facility] Rud[facility] at [facility] END[facility]COPY    [address] HX [address] HX CATARACT REMOVAL Right 2019    HX CATARACT REMOVAL Left 2019    HX  SECTION  8887,8438    X2    HX GI      COLONOSCOPY    HX HIP FRACTURE TX Right 2019    Total R hip- Dr. Ana Dominguez HX HYSTERECTOMY  2009    LMP-53years old    HX KNEE ARTHROSCOPY Bilateral 1966    SCOPE of knees bilateral    HX KNEE REPLACEMENT Left     HX LITHOTRIPSY      X 2    HX LUMBAR LAMINECTOMY  2000    X2    HX MENISCECTOMY      left knee    HX ORTHOPAEDIC  12    left shoulder repair; 14 screws and 2 plates    HX ORTHOPAEDIC Right     BROKEN ARM SURGERY X2    HX ORTHOPAEDIC Left      REPAIR OF Left MENISCUS    HX ORTHOPAEDIC Left     BROKEN ARM    HX ORTHOPAEDIC Left 2019    hip replacement    HX POLYPECTOMY  2013    HX TONSILLECTOMY  1956    HX TUBAL LIGATION  1981    BSPO adhesion conization abn pap    TOTAL KNEE ARTHROPLASTY Left 2016     Current Outpatient Medications on File Prior to Visit   Medication Sig Dispense Refill    zolpidem (AMBIEN) 5 mg tablet TAKE ONE TABLET BY MOUTH EVERY NIGHT AT BEDTIME AS NEEDED FOR INSOMNIA 30 Tab 5    furosemide (LASIX) 20 mg tablet TAKE ONE TABLET BY MOUTH TWICE A DAY 60 Tab 5    [DISCONTINUED] aspirin (ASPIRIN) 325 mg tablet Take 1 Tab by mouth two (2) times a day. 60 Tab 0    [DISCONTINUED] senna-docusate (PERICOLACE) 8.6-50 mg per tablet Take 1 Tab by mouth two (2) times a day. Indications: constipation 30 Tab 0    [DISCONTINUED] estradiol (ESTRACE) 1 mg tablet Take 1 mg by mouth every morning. No current facility-administered medications on file prior to visit. Allergies   Allergen Reactions    Codeine Rash     Rash and swelling on arm    Adhesive Tape-Silicones Rash    Wellbutrin [Bupropion Hcl] Other (comments)     fatigue     Family History   Problem Relation Age of Onset    Cancer Mother         colon   [facility] Arthritis-osteo Mother     Anesth Problems Mother         delayed awakening    Cancer Maternal Grandmother         stomach    Heart Disease Father 80    Heart Attack Maternal Grandfather [de-identified]    Heart Attack Paternal Grandmother 80    Diabetes Cousin         cervical cancer     Social History     Socioeconomic History    Marital status:      Spouse name: Not on file    Number of children: Not on file    Years of education: Not on file    Highest education level: Not on file   Tobacco Use    Smoking status: Never Smoker    Smokeless tobacco: Never Used   Substance and Sexual Activity    Alcohol use:  Yes     Alcohol/week: 0.8 standard drinks     Types: 1 Standard drinks or equivalent per week     Comment: RARE    Drug use: No   Other Topics Concern     Service No    Caffeine Concern No    Hobby Hazards No    Sleep Concern No    Stress Concern No    Weight Concern No    Special Diet No    Exercise Yes    Bike Helmet Yes    Seat Belt Yes    Self-Exams Yes             Review of Systems   Constitutional: Negative for chills, diaphoresis, fever, malaise/fatigue and weight loss. Eyes: Negative for blurred vision, double vision, pain and redness. Respiratory: Negative for cough, shortness of breath and wheezing. Cardiovascular: Negative for chest pain, palpitations, orthopnea, claudication, leg swelling and PND. Skin: Negative for itching and rash. Neurological: Negative for dizziness, tingling, tremors, sensory change, speech change, focal weakness, seizures, loss of consciousness, weakness and headaches.      Results for orders placed or performed during the hospital encounter of 55/67/63   METABOLIC PANEL, BASIC   Result Value Ref Range    Sodium 139 136 - 145 mmol/L    Potassium 4.5 3.5 - 5.1 mmol/L    Chloride 111 (H) 97 - 108 mmol/L    CO2 22 21 - 32 mmol/L    Anion gap 6 5 - 15 mmol/L    Glucose 120 (H) 65 - 100 mg/dL    BUN 16 6 - 20 MG/DL    Creatinine 0.63 0.55 - 1.02 MG/DL    BUN/Creatinine ratio 25 (H) 12 - 20      GFR est AA >60 >60 ml/min/1.73m2    GFR est non-AA >60 >60 ml/min/1.73m2    Calcium 8.3 (L) 8.5 - 10.1 MG/DL   HEMOGLOBIN   Result Value Ref Range    HGB 12.7 11.5 - 16.0 g/dL         Physical Exam  Visit Vitals  LMP 08/24/1995       General: alert, cooperative, no distress   Mental  status: normal mood, behavior, speech, dress, motor activity, and thought processes, able to follow commands   HENT: NCAT   Neck: no visualized mass   Resp: no respiratory distress   Neuro: no gross deficits   Skin: no discoloration or lesions of concern on visible areas   Psychiatric: normal affect, consistent with stated mood, no evidence of hallucinations       ASSESSMENT and PLAN    ICD-10-CM ICD-9-CM    1. Class 2 obesity due to excess calories without serious comorbidity with body mass index (BMI) of 36.0 to 36.9 in adult E66.09 278.00 phentermine (ADIPEX-P) 37.5 mg tablet    Z68.36 V85.36    2. Elevated hemoglobin A1c R73.09 790.29    3. Primary osteoarthritis of right knee M17.11 715.16    4. History of depression Z86.59 V11.8    5. Adjustment disorder with mixed anxiety and depressed mood F43.23 309.28    6. Primary osteoarthritis of left knee M17.12 715.16      Diagnoses and all orders for this visit:    1. Class 2 obesity due to excess calories without serious comorbidity with body mass index (BMI) of 36.0 to 36.9 in adult  -     phentermine (ADIPEX-P) 37.5 mg tablet; Take 1 Tab by mouth every morning. Max Daily Amount: 37.5 mg. Indications: weight loss management for an obese person    2. Elevated hemoglobin A1c    3. Primary osteoarthritis of right knee    4. History of depression    5. Adjustment disorder with mixed anxiety and depressed mood    6. Primary osteoarthritis of left knee      Follow-up and Dispositions    · Return in about 1 month (around 6/19/2020), or if  unable to lose weight, for F/U of obesity. reviewed medications and side effects in detail  Please call my office if there are any questions- [phone]. Discussed expected course/resolution/complications of diagnosis in detail with patient. Medication risks/benefits/costs/interactions/alternatives discussed with patient. Pt was given an after visit summary which includes diagnoses, current medications & vitals. Pt expressed understanding with the diagnosis and plan. Patient to call if no better in 3 -4 days and prn new problems. BMI is significantly elevated- in the obese range. I reviewed diet, exercise and weight control.  Discussed weight control in detail, the importance of mainly decreased carbs, and for weight maintenance, exercise; discussed different diets and that it isn't as important to watch the type of foods as it is to decrease calorie intake no matter what type of diet you do, etc.     Total 25 minutes,60 % counseling re:  Regular exercise is very important to your health; it helps mentally, physically, socially; it prevents injuries if done properly. Exercise, even as simple as walking 20-30 minutes daily has major benefits to your health even though your \"numbers\" are the same in the lab. See if you can add this into your daily regimen and after a few months it will become a regular habit-\"just something you do,\" like brushing your teeth. A combination of aerobic exercise and strengthening and stretching is felt to be the best for you, so this should be your ultimate goal.   This can be done in the privacy of your home or in a group setting as at the gym  Some prefer having a , others prefer to do exercise in groups or individually. Do what \"works\" for you. You need to make it simple and \"fun,\" or you most likely will not continue it. Recommended a weekly \"heart check. \" I went into detail how to do this. Reviewed symptoms, or lack thereof, of hypertension and elevated cholesterol. Also, discussed symptoms of concern that were noted today in the note above, treatment options( including doing nothing), when to follow up before recommended time frame. Also, answered all questions. Pt is very stressed related to her weight gain, although the weight she gave is similar to the most recent weights we have in the chart. She was 205 pounds before [place] but after that and into the new year she was around 212 when last measured on 2/24. Talked about the importance of weight loss and that there are different ways to control weight. We will use the phentermine to get started but long term studies have shown this does not work. At that point she will need to try some other way of changing her calorie intake. She is aware exercise helps maintain weight but doesn't really help lose weight.     This document was written by Toan Vazquez, as dictated by Quita Fletcher MD. 1+1 for leg stabilizing

## 2020-05-19 NOTE — PROGRESS NOTES
Chief Complaint   Patient presents with    Medication Evaluation     1. Have you been to the ER, urgent care clinic since your last visit? Hospitalized since your last visit? No    2. Have you seen or consulted any other health care providers outside of the [address] [place] [address] since your last visit? Include any pap smears or colon screening.  No

## 2020-06-10 ENCOUNTER — HOSPITAL ENCOUNTER (EMERGENCY)
Age: 70
Discharge: HOME | End: 2020-06-10
Attending: EMERGENCY MEDICINE
Payer: MEDICARE

## 2020-06-10 ENCOUNTER — APPOINTMENT (OUTPATIENT)
Dept: CT IMAGING | Age: 70
End: 2020-06-10
Attending: EMERGENCY MEDICINE
Payer: MEDICARE

## 2020-06-10 VITALS
TEMPERATURE: 98.5 F | SYSTOLIC BLOOD PRESSURE: 126 MMHG | DIASTOLIC BLOOD PRESSURE: 73 MMHG | HEART RATE: 87 BPM | OXYGEN SATURATION: 94 % | RESPIRATION RATE: 17 BRPM

## 2020-06-10 DIAGNOSIS — N20.0 KIDNEY STONE: Primary | ICD-10-CM

## 2020-06-10 LAB
ALBUMIN SERPL-MCNC: 3.6 G/DL (ref 3.5–5)
ALBUMIN/GLOB SERPL: 0.9 {RATIO} (ref 1.1–2.2)
ALP SERPL-CCNC: 99 U/L (ref 45–117)
ALT SERPL-CCNC: 21 U/L (ref 12–78)
ANION GAP SERPL CALC-SCNC: 11 MMOL/L (ref 5–15)
APPEARANCE UR: CLEAR
AST SERPL-CCNC: 10 U/L (ref 15–37)
BACTERIA URNS QL MICRO: NEGATIVE /HPF
BASOPHILS # BLD: 0.1 K/UL (ref 0–0.1)
BASOPHILS NFR BLD: 1 % (ref 0–1)
BILIRUB SERPL-MCNC: 0.5 MG/DL (ref 0.2–1)
BILIRUB UR QL: NEGATIVE
BUN SERPL-MCNC: 17 MG/DL (ref 6–20)
BUN/CREAT SERPL: 22 (ref 12–20)
CALCIUM SERPL-MCNC: 9.2 MG/DL (ref 8.5–10.1)
CAOX CRY URNS QL MICRO: ABNORMAL
CHLORIDE SERPL-SCNC: 106 MMOL/L (ref 97–108)
CO2 SERPL-SCNC: 25 MMOL/L (ref 21–32)
COLOR UR: ABNORMAL
COMMENT, HOLDF: NORMAL
CREAT SERPL-MCNC: 0.78 MG/DL (ref 0.55–1.02)
DIFFERENTIAL METHOD BLD: ABNORMAL
EOSINOPHIL # BLD: 0.2 K/UL (ref 0–0.4)
EOSINOPHIL NFR BLD: 2 % (ref 0–7)
EPITH CASTS URNS QL MICRO: ABNORMAL /LPF
ERYTHROCYTE [DISTWIDTH] IN BLOOD BY AUTOMATED COUNT: 13 % (ref 11.5–14.5)
GLOBULIN SER CALC-MCNC: 4.2 G/DL (ref 2–4)
GLUCOSE SERPL-MCNC: 109 MG/DL (ref 65–100)
GLUCOSE UR STRIP.AUTO-MCNC: NEGATIVE MG/DL
HCT VFR BLD AUTO: 50 % (ref 35–47)
HGB BLD-MCNC: 16.3 G/DL (ref 11.5–16)
HGB UR QL STRIP: ABNORMAL
HYALINE CASTS URNS QL MICRO: ABNORMAL /LPF (ref 0–5)
IMM GRANULOCYTES # BLD AUTO: 0 K/UL (ref 0–0.04)
IMM GRANULOCYTES NFR BLD AUTO: 0 % (ref 0–0.5)
KETONES UR QL STRIP.AUTO: NEGATIVE MG/DL
LEUKOCYTE ESTERASE UR QL STRIP.AUTO: NEGATIVE
LYMPHOCYTES # BLD: 1.6 K/UL (ref 0.8–3.5)
LYMPHOCYTES NFR BLD: 16 % (ref 12–49)
MCH RBC QN AUTO: 29.2 PG (ref 26–34)
MCHC RBC AUTO-ENTMCNC: 32.6 G/DL (ref 30–36.5)
MCV RBC AUTO: 89.4 FL (ref 80–99)
MONOCYTES # BLD: 1.1 K/UL (ref 0–1)
MONOCYTES NFR BLD: 11 % (ref 5–13)
MUCOUS THREADS URNS QL MICRO: ABNORMAL /LPF
NEUTS SEG # BLD: 6.9 K/UL (ref 1.8–8)
NEUTS SEG NFR BLD: 70 % (ref 32–75)
NITRITE UR QL STRIP.AUTO: NEGATIVE
NRBC # BLD: 0 K/UL (ref 0–0.01)
NRBC BLD-RTO: 0 PER 100 WBC
PH UR STRIP: 6 [PH] (ref 5–8)
PLATELET # BLD AUTO: 335 K/UL (ref 150–400)
PMV BLD AUTO: 9.4 FL (ref 8.9–12.9)
POTASSIUM SERPL-SCNC: 3.8 MMOL/L (ref 3.5–5.1)
PROT SERPL-MCNC: 7.8 G/DL (ref 6.4–8.2)
PROT UR STRIP-MCNC: 100 MG/DL
RBC # BLD AUTO: 5.59 M/UL (ref 3.8–5.2)
RBC #/AREA URNS HPF: ABNORMAL /HPF (ref 0–5)
SAMPLES BEING HELD,HOLD: NORMAL
SODIUM SERPL-SCNC: 142 MMOL/L (ref 136–145)
SP GR UR REFRACTOMETRY: 1.02 (ref 1–1.03)
UR CULT HOLD, URHOLD: NORMAL
UROBILINOGEN UR QL STRIP.AUTO: 1 EU/DL (ref 0.2–1)
WBC # BLD AUTO: 9.9 K/UL (ref 3.6–11)
WBC URNS QL MICRO: ABNORMAL /HPF (ref 0–4)

## 2020-06-10 PROCEDURE — 81001 URINALYSIS AUTO W/SCOPE: CPT

## 2020-06-10 PROCEDURE — 74011250636 HC RX REV CODE- 250/636: Performed by: EMERGENCY MEDICINE

## 2020-06-10 PROCEDURE — 74176 CT ABD & PELVIS W/O CONTRAST: CPT

## 2020-06-10 PROCEDURE — 99283 EMERGENCY DEPT VISIT LOW MDM: CPT

## 2020-06-10 PROCEDURE — 96374 THER/PROPH/DIAG INJ IV PUSH: CPT

## 2020-06-10 PROCEDURE — 80053 COMPREHEN METABOLIC PANEL: CPT

## 2020-06-10 PROCEDURE — 96375 TX/PRO/DX INJ NEW DRUG ADDON: CPT

## 2020-06-10 PROCEDURE — 36415 COLL VENOUS BLD VENIPUNCTURE: CPT

## 2020-06-10 PROCEDURE — 85025 COMPLETE CBC W/AUTO DIFF WBC: CPT

## 2020-06-10 RX ORDER — KETOROLAC TROMETHAMINE 30 MG/ML
15 INJECTION, SOLUTION INTRAMUSCULAR; INTRAVENOUS
Status: COMPLETED | OUTPATIENT
Start: 2020-06-10 | End: 2020-06-10

## 2020-06-10 RX ORDER — ONDANSETRON 8 MG/1
8 TABLET, ORALLY DISINTEGRATING ORAL
Qty: 12 TAB | Refills: 0 | Status: SHIPPED | OUTPATIENT
Start: 2020-06-10 | End: 2020-12-02 | Stop reason: ALTCHOICE

## 2020-06-10 RX ORDER — TAMSULOSIN HYDROCHLORIDE 0.4 MG/1
0.4 CAPSULE ORAL DAILY
Qty: 15 CAP | Refills: 0 | Status: SHIPPED | OUTPATIENT
Start: 2020-06-10 | End: 2020-06-25

## 2020-06-10 RX ORDER — MORPHINE SULFATE 2 MG/ML
2 INJECTION, SOLUTION INTRAMUSCULAR; INTRAVENOUS ONCE
Status: COMPLETED | OUTPATIENT
Start: 2020-06-10 | End: 2020-06-10

## 2020-06-10 RX ADMIN — MORPHINE SULFATE 2 MG: 2 INJECTION, SOLUTION INTRAMUSCULAR; INTRAVENOUS at 10:54

## 2020-06-10 RX ADMIN — KETOROLAC TROMETHAMINE 15 MG: 30 INJECTION, SOLUTION INTRAMUSCULAR at 10:54

## 2020-06-10 NOTE — ED PROVIDER NOTES
17-year-old female with a history of kidney stones requiring lithotripsy presents with left flank pain that woke her from sleep at 5 AM.  She has had nausea but no vomiting. She denies any changes in her urination. She is followed by a urologist Dr. Adelia Trimble with [facility]. She states that she is has a little bit of constipation that is been chronic. She denies any fevers or chills. She had a lithotripsy many years ago. She rates her pain 8 out of 10 with no modifying factors. Flank Pain    Pertinent negatives include no chest pain, no fever, no headaches and no abdominal pain.         Past Medical History:   Diagnosis Date    Actinic keratosis     Left arm    Adjustment disorder with mixed anxiety and depressed mood     Adverse effect of anesthesia     HARD TO WAKE UP SLOW BREATHING ,Pt stated doesn't take much anesthesia    Arthritis     OSTEO    Chronic pain     Diverticulitis     Kidney stones 95,96,98    left    Left knee DJD     Lumbar nerve root impingement     RLQ pain    Menopause     LMP-53years old    Nausea & vomiting     Other and unspecified hyperlipidemia     Other idiopathic scoliosis, thoracolumbar region 2018    Postmenopausal HRT (hormone replacement therapy)     Right knee DJD     Urine culture positive 2020       Past Surgical History:   Procedure Laterality Date    COLONOSCOPY N/A 2016    COL[facility]islinn Edward MD at [facility] ENDOSCOPY    [address] HX [address] HX CATARACT REMOVAL Right 2019    HX CATARACT REMOVAL Left 2019    HX  SECTION  4516,6993    X2    HX GI      COLONOSCOPY    HX HIP FRACTURE TX Right 2019    Total R hip- Dr. Sandoval Doctor HX HYSTERECTOMY  2009    LMP-53years old    HX KNEE ARTHROSCOPY Bilateral 1966    SCOPE of knees bilateral    HX KNEE REPLACEMENT Left 2015    HX LITHOTRIPSY      X 2    HX LUMBAR LAMINECTOMY      [address] left knee    HX ORTHOPAEDIC  12/24/12    left shoulder repair; 14 screws and 2 plates    HX ORTHOPAEDIC Right     BROKEN ARM SURGERY X2    HX ORTHOPAEDIC Left      REPAIR OF Left MENISCUS    HX ORTHOPAEDIC Left     BROKEN ARM    HX ORTHOPAEDIC Left 08/24/2019    hip replacement    HX POLYPECTOMY  2013    HX TONSILLECTOMY  1956    HX TUBAL LIGATION  1981    BSPO adhesion conization abn pap    TOTAL KNEE ARTHROPLASTY Left 2016         Family History:   Problem Relation Age of Onset    Cancer Mother         colon   Bertell Halim Arthritis-osteo Mother     Anesth Problems Mother         delayed awakening    Cancer Maternal Grandmother         stomach    Heart Disease Father 80    Heart Attack Maternal Grandfather [de-identified]    Heart Attack Paternal Grandmother 80    Diabetes Cousin         cervical cancer       Social History     Socioeconomic History    Marital status:      Spouse name: Not on file    Number of children: Not on file    Years of education: Not on file    Highest education level: Not on file   Occupational History    Not on file   Social Needs    Financial resource strain: Not on file    Food insecurity     Worry: Not on file     Inability: Not on file    Transportation needs     Medical: Not on file     Non-medical: Not on file   Tobacco Use    Smoking status: Never Smoker    Smokeless tobacco: Never Used   Substance and Sexual Activity    Alcohol use:  Yes     Alcohol/week: 0.8 standard drinks     Types: 1 Standard drinks or equivalent per week     Comment: RARE    Drug use: No    Sexual activity: Not on file   Lifestyle    Physical activity     Days per week: Not on file     Minutes per session: Not on file    Stress: Not on file   Relationships    Social connections     Talks on phone: Not on file     Gets together: Not on file     Attends Orthodoxy service: Not on file     Active member of club or organization: Not on file     Attends meetings of clubs or organizations: Not on file Relationship status: Not on file    Intimate partner violence     Fear of current or ex partner: Not on file     Emotionally abused: Not on file     Physically abused: Not on file     Forced sexual activity: Not on file   Other Topics Concern     Service No    Blood Transfusions Not Asked    Caffeine Concern No    Occupational Exposure Not Asked    Hobby Hazards No    Sleep Concern No    Stress Concern No    Weight Concern No    Special Diet No    Back Care Not Asked    Exercise Yes    Bike Helmet Yes    Seat Belt Yes    Self-Exams Yes   Social History Narrative    Not on file         ALLERGIES: Codeine; Adhesive tape-silicones; and Wellbutrin [bupropion hcl]    Review of Systems   Constitutional: Negative for chills and fever. HENT: Negative for ear pain and sore throat. Eyes: Negative for pain. Respiratory: Negative for chest tightness and shortness of breath. Cardiovascular: Negative for chest pain. Gastrointestinal: Positive for nausea. Negative for abdominal pain. Genitourinary: Positive for flank pain. Musculoskeletal: Negative for back pain. Skin: Negative for rash. Neurological: Negative for headaches. All other systems reviewed and are negative. Vitals:    06/10/20 1023 06/10/20 1103   BP: (!) 144/100 126/73   Pulse: 89    Resp: 18    Temp: 98.6 °F (37 °C)    SpO2: 96%             Physical Exam  Vitals signs and nursing note reviewed. Constitutional:       General: She is not in acute distress. HENT:      Head: Normocephalic and atraumatic. Mouth/Throat:      Mouth: Mucous membranes are moist.   Eyes:      General: No scleral icterus. Conjunctiva/sclera: Conjunctivae normal.      Pupils: Pupils are equal, round, and reactive to light. Neck:      Musculoskeletal: Neck supple. Trachea: No tracheal deviation. Cardiovascular:      Rate and Rhythm: Normal rate and regular rhythm.    Pulmonary:      Effort: Pulmonary effort is normal. No respiratory distress. Breath sounds: No wheezing or rales. Abdominal:      General: There is no distension. Palpations: Abdomen is soft. Tenderness: There is no abdominal tenderness. Genitourinary:     Comments: deferred  Musculoskeletal:         General: No deformity. Skin:     General: Skin is warm and dry. Neurological:      General: No focal deficit present. Mental Status: She is alert. Psychiatric:         Mood and Affect: Mood normal.          MDM       Procedures          11:54 AM  Patient re-evaluated. Patient states her pain is now 3 out of 10 much improved. Discussed with her incidental finding of lung nodule. She will follow-up with her primary care doctor to have this followed. All questions answered. Patient appropriate for discharge. Given return precautions and follow up instructions. LABORATORY TESTS:  Labs Reviewed   URINALYSIS W/MICROSCOPIC - Abnormal; Notable for the following components:       Result Value    Protein 100 (*)     Blood LARGE (*)     Epithelial cells MODERATE (*)     Mucus 1+ (*)     CA Oxalate crystals 1+ (*)     All other components within normal limits   METABOLIC PANEL, COMPREHENSIVE - Abnormal; Notable for the following components:    Glucose 109 (*)     BUN/Creatinine ratio 22 (*)     AST (SGOT) 10 (*)     Globulin 4.2 (*)     A-G Ratio 0.9 (*)     All other components within normal limits   CBC WITH AUTOMATED DIFF - Abnormal; Notable for the following components:    RBC 5.59 (*)     HGB 16.3 (*)     HCT 50.0 (*)     ABS. MONOCYTES 1.1 (*)     All other components within normal limits   URINE CULTURE HOLD SAMPLE   SAMPLES BEING HELD       IMAGING RESULTS:  CT ABD PELV WO CONT   Final Result   IMPRESSION:      1. Moderate to severe left-sided hydronephrosis due to 2 mm stone proximal left   ureter   2. 4 mm nodule left lower lobe unchanged. Please see below   3. Diverticulosis.  No evidence of acute diverticulitis      Guidelines by the Fleischner society (Radiology 2017 special report) suggest   that patients with low risk for lung cancer and solid nodule(s) less than or   equal to 6 mm in diameter require no follow-up. In patients with a higher risk,   such as smokers, follow-up noncontrast chest CT should be considered at 12   months. Patients with a known malignancy are at increased risk for metastasis   and should receive a three month follow-up. MEDICATIONS GIVEN:  Medications   ketorolac (TORADOL) injection 15 mg (15 mg IntraVENous Given 6/10/20 1054)   morphine injection 2 mg (2 mg IntraVENous Given 6/10/20 1054)       IMPRESSION:  1. Kidney stone        PLAN:  1. Current Discharge Medication List      START taking these medications    Details   tamsulosin (Flomax) 0.4 mg capsule Take 1 Cap by mouth daily for 15 days. Qty: 15 Cap, Refills: 0      ondansetron (ZOFRAN ODT) 8 mg disintegrating tablet Take 1 Tab by mouth every eight (8) hours as needed for Nausea. Qty: 12 Tab, Refills: 0           2. Follow-up Information     Follow up With Specialties Details Why Contact Info    Emanuel Henry MD Urology Schedule an appointment as soon as possible for a visit  [facility]  [phone]      [facility] [address] [place] [address] Emergency Medicine  If symptoms worsen or new concerns Elizabeth Michael [address] [facility] [place] [facility] [place] [id number]        3. Return to ED for new or worsening symptoms       Jose Weir MD

## 2020-06-10 NOTE — DISCHARGE INSTRUCTIONS
Patient Education        Kidney Stone: Care Instructions  Your Care Instructions     Kidney stones are formed when salts, minerals, and other substances normally found in the urine clump together. They can be as small as grains of sand or, rarely, as large as golf balls. While the stone is traveling through the ureter, which is the tube that carries urine from the kidney to the bladder, you will probably feel pain. The pain may be mild or very severe. You may also have some blood in your urine. As soon as the stone reaches the bladder, any intense pain should go away. If a stone is too large to pass on its own, you may need a medical procedure to help you pass the stone. The doctor has checked you carefully, but problems can develop later. If you notice any problems or new symptoms, get medical treatment right away. Follow-up care is a key part of your treatment and safety. Be sure to make and go to all appointments, and call your doctor if you are having problems. It's also a good idea to know your test results and keep a list of the medicines you take. How can you care for yourself at home? · Drink plenty of fluids, enough so that your urine is light yellow or clear like water. If you have kidney, heart, or liver disease and have to limit fluids, talk with your doctor before you increase the amount of fluids you drink. · Take pain medicines exactly as directed. Call your doctor if you think you are having a problem with your medicine. ? If the doctor gave you a prescription medicine for pain, take it as prescribed. ? If you are not taking a prescription pain medicine, ask your doctor if you can take an over-the-counter medicine. Read and follow all instructions on the label. · Your doctor may ask you to strain your urine so that you can collect your kidney stone when it passes. You can use a kitchen strainer or a tea strainer to catch the stone.  Store it in a plastic bag until you see your doctor again.  Preventing future kidney stones  Some changes in your diet may help prevent kidney stones. Depending on the cause of your stones, your doctor may recommend that you:  · Drink plenty of fluids, enough so that your urine is light yellow or clear like water. If you have kidney, heart, or liver disease and have to limit fluids, talk with your doctor before you increase the amount of fluids you drink. · Limit coffee, tea, and alcohol. Also avoid grapefruit juice. · Do not take more than the recommended daily dose of vitamins C and D.  · Avoid antacids such as Gaviscon, Maalox, Mylanta, or Tums. · Limit the amount of salt (sodium) in your diet. · Eat a balanced diet that is not too high in protein. · Limit foods that are high in a substance called oxalate, which can cause kidney stones. These foods include dark green vegetables, rhubarb, chocolate, wheat bran, nuts, cranberries, and beans. When should you call for help? Call your doctor now or seek immediate medical care if:  · You cannot keep down fluids. · Your pain gets worse. · You have a fever or chills. · You have new or worse pain in your back just below your rib cage (the flank area). · You have new or more blood in your urine. Watch closely for changes in your health, and be sure to contact your doctor if:  · You do not get better as expected. Where can you learn more? Go to http://mary-dion.info/  Enter Q829 in the search box to learn more about \"Kidney Stone: Care Instructions. \"  Current as of: August 12, 2019               Content Version: 12.5  © 0099-0697 Healthwise, Incorporated. Care instructions adapted under license by [employer] (which disclaims liability or warranty for this information). If you have questions about a medical condition or this instruction, always ask your healthcare professional. [address] any warranty or liability for your use of this information.

## 2020-06-10 NOTE — ED TRIAGE NOTES
Jhon Anand   1/18/2017 10:00 AM   Office Visit    Provider:  Mk Martinez MD   Department:  [facility] INTER[facility] MEDICINE AND [facility]ED[facility]ATRICS   Dept Phone:  [phone]                Your Full Care Plan              Today's Medication Changes          These changes are accurate as of: 1/18/17 10:45 AM.  If you have any questions, ask your nurse or doctor.               New Medication(s)Ordered:     azithromycin 250 MG tablet   Commonly known as:  ZITHROMAX Z-ISIDRO   Take 2 tablets the first day, then 1 tablet daily for 4 days.         Medication(s)that have changed:     ALPRAZolam 0.25 MG tablet   Commonly known as:  XANAX   Take 1 tablet by mouth 3 (Three) Times a Day.   What changed:    - medication strength  - when to take this         Stop taking medication(s)listed here:     HYDROcodone-acetaminophen 7.5-325 MG per tablet   Commonly known as:  NORCO           HYDROmorphone 2 MG tablet   Commonly known as:  DILAUDID                Where to Get Your Medications      These medications were sent to [id number] - [place], [place] - [address] [phone]  - [phone]   [address] [address]     Phone:  [phone]     azithromycin 250 MG tablet         You can get these medications from any pharmacy     Bring a paper prescription for each of these medications     ALPRAZolam 0.25 MG tablet                  Your Updated Medication List          This list is accurate as of: 1/18/17 10:45 AM.  Always use your most recent med list.                ALPRAZolam 0.25 MG tablet   Commonly known as:  XANAX   Take 1 tablet by mouth 3 (Three) Times a Day.       azithromycin 250 MG tablet   Commonly known as:  ZITHROMAX Z-ISIDRO   Take 2 tablets the first day, then 1 tablet daily for 4 days.       omeprazole 20 MG capsule   Commonly known as:  priLOSEC       rosuvastatin 10 MG tablet   Commonly known as:  CRESTOR   TAKE 1 TABLET DAILY       TRIAGE NOTE: Pt arrives for LEFT flank pain since 5am.  Pt has known kidney stone, followed by Dr. Nima Harding, Urology. States nausea,. Denies urinary symptoms. traZODone 150 MG tablet   Commonly known as:  DESYREL       ZOLOFT 100 MG tablet   Generic drug:  sertraline               You Were Diagnosed With        Codes Comments    Mixed hyperlipidemia    -  Primary ICD-10-CM: E78.2  ICD-9-CM: 272.2     Bronchitis     ICD-10-CM: J40  ICD-9-CM: 490     Reactive depression     ICD-10-CM: F32.9  ICD-9-CM: 300.4     Anxiety     ICD-10-CM: F41.9  ICD-9-CM: 300.00       Instructions     None    Patient Instructions History      [employer] Signup     [employer] allows you to send messages to your doctor, view your test results, renew your prescriptions, schedule appointments, and more. To sign up, go to [employer] and click on the Sign Up Now link in the New User? box. Enter your [facility] Activation Code exactly as it appears below along with the last four digits of your Social Security Number and your Date of Birth () to complete the sign-up process. If you do not sign up before the expiration date, you must request a new code.    [facility] Activation Code: [email or web address]  Expires: 2017 10:43 AM    If you have questions, you can email [email or web address] or call [phone] to talk to our [facility] staff. Remember, [facility] [facility] is NOT to be used for urgent needs. For medical emergencies, dial 911.               Other Info from Your Visit           Your Appointments     Apr 10, 2017  2:15 PM EDT   Follow Up with Mk Martinez MD   [facility] (--)    [address] [place] [id number] [phone]           Arrive 15 minutes prior to appointment.              Allergies     Codeine        Reason for Visit     Insomnia     Atrial Flutter           Vital Signs     Blood Pressure Pulse Respirations Weight Body Mass Index Smoking Status    122/70 95 20 231 lb (105 kg) 31.33 kg/m2 Never Smoker      Problems and Diagnoses Noted     Anxiety problem    Mixed hyperlipidemia     Reactive depression    Bronchitis

## 2020-06-18 ENCOUNTER — E-VISIT (OUTPATIENT)
Dept: FAMILY MEDICINE CLINIC | Age: 70
End: 2020-06-18

## 2020-06-18 DIAGNOSIS — R91.1 NODULE OF LEFT LUNG: Primary | ICD-10-CM

## 2020-06-19 NOTE — TELEPHONE ENCOUNTER
Let her know:     4 mm nodule at left base seen on abd CT- no change from 2019 CT; because nonsmoker and< 8 mm, no need for further follow up.

## 2020-06-23 DIAGNOSIS — E66.09 CLASS 2 OBESITY DUE TO EXCESS CALORIES WITHOUT SERIOUS COMORBIDITY WITH BODY MASS INDEX (BMI) OF 36.0 TO 36.9 IN ADULT: ICD-10-CM

## 2020-06-23 RX ORDER — PHENTERMINE HYDROCHLORIDE 37.5 MG/1
TABLET ORAL
Qty: 30 TAB | Refills: 0 | Status: SHIPPED | OUTPATIENT
Start: 2020-06-23 | End: 2020-07-28

## 2020-06-30 DIAGNOSIS — Z79.890 POSTMENOPAUSAL HRT (HORMONE REPLACEMENT THERAPY): ICD-10-CM

## 2020-06-30 RX ORDER — ESTRADIOL 1 MG/1
TABLET ORAL
Qty: 90 TAB | Refills: 2 | Status: SHIPPED | OUTPATIENT
Start: 2020-06-30 | End: 2021-09-22

## 2020-06-30 NOTE — TELEPHONE ENCOUNTER
PCP: Josephine Ferraro MD    Last appt: 5/19/2020  Future Appointments   Date Time Provider Meera Shea   7/13/2020 10:00 AM SPT MAMMO 1 SPTMAMMO SPT       Requested Prescriptions     Pending Prescriptions Disp Refills    estradioL (ESTRACE) 1 mg tablet [Pharmacy Med Name: ESTRADIOL 1 MG TABLET] 90 Tab 2     Sig: TAKE ONE TABLET BY MOUTH DAILY

## 2020-07-13 ENCOUNTER — HOSPITAL ENCOUNTER (OUTPATIENT)
Dept: MAMMOGRAPHY | Age: 70
Discharge: HOME | End: 2020-07-13
Attending: FAMILY MEDICINE
Payer: MEDICARE

## 2020-07-13 DIAGNOSIS — Z12.31 OTHER SCREENING MAMMOGRAM: ICD-10-CM

## 2020-07-13 PROCEDURE — 77063 BREAST TOMOSYNTHESIS BI: CPT

## 2020-07-21 DIAGNOSIS — F51.01 PRIMARY INSOMNIA: ICD-10-CM

## 2020-07-21 RX ORDER — ZOLPIDEM TARTRATE 5 MG/1
TABLET ORAL
Qty: 30 TAB | Refills: 4 | Status: SHIPPED | OUTPATIENT
Start: 2020-07-21 | End: 2021-01-23

## 2020-07-21 NOTE — TELEPHONE ENCOUNTER
PCP: Lisa Galindo MD    Last appt: 5/19/2020  No future appointments.     Requested Prescriptions     Pending Prescriptions Disp Refills    zolpidem (AMBIEN) 5 mg tablet [Pharmacy Med Name: ZOLPIDEM TARTRATE 5 MG TABLET] 30 Tab 4     Sig: TAKE ONE TABLET BY MOUTH EVERY NIGHT AT BEDTIME AS NEEDED FOR INSOMNIA

## 2020-07-24 DIAGNOSIS — E66.09 CLASS 2 OBESITY DUE TO EXCESS CALORIES WITHOUT SERIOUS COMORBIDITY WITH BODY MASS INDEX (BMI) OF 36.0 TO 36.9 IN ADULT: ICD-10-CM

## 2020-07-28 RX ORDER — PHENTERMINE HYDROCHLORIDE 37.5 MG/1
TABLET ORAL
Qty: 30 TAB | Refills: 0 | Status: SHIPPED | OUTPATIENT
Start: 2020-07-28 | End: 2020-09-02

## 2020-09-01 DIAGNOSIS — E66.09 CLASS 2 OBESITY DUE TO EXCESS CALORIES WITHOUT SERIOUS COMORBIDITY WITH BODY MASS INDEX (BMI) OF 36.0 TO 36.9 IN ADULT: ICD-10-CM

## 2020-09-02 RX ORDER — FUROSEMIDE 20 MG/1
TABLET ORAL
Qty: 60 TAB | Refills: 4 | Status: SHIPPED | OUTPATIENT
Start: 2020-09-02 | End: 2021-01-31 | Stop reason: SDUPTHER

## 2020-09-02 RX ORDER — PHENTERMINE HYDROCHLORIDE 37.5 MG/1
TABLET ORAL
Qty: 30 TAB | Refills: 0 | Status: SHIPPED | OUTPATIENT
Start: 2020-09-02 | End: 2020-09-25

## 2020-09-21 DIAGNOSIS — E66.09 CLASS 2 OBESITY DUE TO EXCESS CALORIES WITHOUT SERIOUS COMORBIDITY WITH BODY MASS INDEX (BMI) OF 36.0 TO 36.9 IN ADULT: ICD-10-CM

## 2020-09-22 NOTE — TELEPHONE ENCOUNTER
PCP: Anna Spencer MD    Last appt: 5/19/2020  No future appointments.     Requested Prescriptions     Pending Prescriptions Disp Refills    phentermine (ADIPEX-P) 37.5 mg tablet [Pharmacy Med Name: PHENTERMINE 37.5 MG TABLET] 30 Tab 0     Sig: TAKE ONE TABLET BY MOUTH EVERY MORNING

## 2020-09-22 NOTE — TELEPHONE ENCOUNTER
Needs to schedule an  office visit every 3 months if wants to stay on this medication. . Once visit is scheduled we can refill medication until appointment.

## 2020-09-25 ENCOUNTER — TELEPHONE (OUTPATIENT)
Dept: FAMILY MEDICINE CLINIC | Age: 70
End: 2020-09-25

## 2020-09-25 RX ORDER — PHENTERMINE HYDROCHLORIDE 37.5 MG/1
TABLET ORAL
Qty: 30 TAB | Refills: 1 | Status: SHIPPED | OUTPATIENT
Start: 2020-09-25 | End: 2021-01-03

## 2020-09-25 NOTE — TELEPHONE ENCOUNTER
[place] [facility] calling to verify whether they are able to fill the rx early, due to the pt going out of [facility].        BCB#[phone]

## 2020-11-30 ENCOUNTER — OFFICE VISIT (OUTPATIENT)
Dept: FAMILY MEDICINE CLINIC | Age: 70
End: 2020-11-30
Payer: MEDICARE

## 2020-11-30 VITALS
OXYGEN SATURATION: 99 % | RESPIRATION RATE: 16 BRPM | HEART RATE: 82 BPM | SYSTOLIC BLOOD PRESSURE: 136 MMHG | WEIGHT: 197 LBS | TEMPERATURE: 98.5 F | DIASTOLIC BLOOD PRESSURE: 82 MMHG | BODY MASS INDEX: 33.63 KG/M2 | HEIGHT: 64 IN

## 2020-11-30 DIAGNOSIS — Z86.59 HISTORY OF DEPRESSION: ICD-10-CM

## 2020-11-30 DIAGNOSIS — R60.0 LOCALIZED EDEMA: Primary | ICD-10-CM

## 2020-11-30 DIAGNOSIS — E78.5 HYPERLIPIDEMIA WITH TARGET LOW DENSITY LIPOPROTEIN (LDL) CHOLESTEROL LESS THAN 130 MG/DL: ICD-10-CM

## 2020-11-30 DIAGNOSIS — M54.16 LUMBAR NERVE ROOT IMPINGEMENT: ICD-10-CM

## 2020-11-30 DIAGNOSIS — Z00.00 MEDICARE ANNUAL WELLNESS VISIT, SUBSEQUENT: ICD-10-CM

## 2020-11-30 DIAGNOSIS — M17.12 PRIMARY OSTEOARTHRITIS OF LEFT KNEE: ICD-10-CM

## 2020-11-30 DIAGNOSIS — Z71.89 ACP (ADVANCE CARE PLANNING): ICD-10-CM

## 2020-11-30 DIAGNOSIS — E66.9 OBESITY (BMI 30.0-34.9): ICD-10-CM

## 2020-11-30 DIAGNOSIS — Z79.890 POSTMENOPAUSAL HRT (HORMONE REPLACEMENT THERAPY): ICD-10-CM

## 2020-11-30 PROCEDURE — G9899 SCRN MAM PERF RSLTS DOC: HCPCS | Performed by: FAMILY MEDICINE

## 2020-11-30 PROCEDURE — G0463 HOSPITAL OUTPT CLINIC VISIT: HCPCS | Performed by: FAMILY MEDICINE

## 2020-11-30 PROCEDURE — G8427 DOCREV CUR MEDS BY ELIG CLIN: HCPCS | Performed by: FAMILY MEDICINE

## 2020-11-30 PROCEDURE — 3017F COLORECTAL CA SCREEN DOC REV: CPT | Performed by: FAMILY MEDICINE

## 2020-11-30 PROCEDURE — 1090F PRES/ABSN URINE INCON ASSESS: CPT | Performed by: FAMILY MEDICINE

## 2020-11-30 PROCEDURE — G9717 DOC PT DX DEP/BP F/U NT REQ: HCPCS | Performed by: FAMILY MEDICINE

## 2020-11-30 PROCEDURE — G8536 NO DOC ELDER MAL SCRN: HCPCS | Performed by: FAMILY MEDICINE

## 2020-11-30 PROCEDURE — G8399 PT W/DXA RESULTS DOCUMENT: HCPCS | Performed by: FAMILY MEDICINE

## 2020-11-30 PROCEDURE — 99213 OFFICE O/P EST LOW 20 MIN: CPT | Performed by: FAMILY MEDICINE

## 2020-11-30 PROCEDURE — 1101F PT FALLS ASSESS-DOCD LE1/YR: CPT | Performed by: FAMILY MEDICINE

## 2020-11-30 PROCEDURE — G8417 CALC BMI ABV UP PARAM F/U: HCPCS | Performed by: FAMILY MEDICINE

## 2020-11-30 NOTE — PROGRESS NOTES
1. Have you been to the ER, urgent care clinic since your last visit? Hospitalized since your last visit? No    2. Have you seen or consulted any other health care providers outside of the [address] [place] [address] since your last visit? Include any pap smears or colon screening.  No     Chief Complaint   Patient presents with    Weight Gain     refill meds lasix and phen

## 2020-11-30 NOTE — PROGRESS NOTES
HISTORY OF PRESENT ILLNESS  HPI  Manolo Drew is a 65 y.o. female with a history of left knee DJD, osteoarthritis of right knee, thoracolumbar idiopathic scoliosis, adjustment disorder with anxiety and depression, kidney stones and hyperlipidemia with LDL goal <130, who presents to the office today for a f/u of her BP and these health problems. Pt reports that she has a left sided kidney stone for years. She states that she went to f/u with Dr. Deb Burton (urology), who told her that her kidney is swollen. She affirms she will see Dr. Deb Burton to remove this. Pt notes that her left sided discomfort due to her kidney stone only lasts for a few hours. Pt recalls she had other kidney stones in the past.    She estimates she has been on the same dose of Lasix for more than 10 years. She feels that she has some swelling in her hands,but has the most in her feet, but not much elsewhere. Pt remarks that she does not use her Ambien regularly. Pt mentions she has an injection scheduled tomorrow for some numbness in her back. She asks if there could be complications with this. Pt denies unusual SOB, chest pain, and any recent ER visits or hospitalizations.          Past Medical History:   Diagnosis Date    Actinic keratosis     Left arm    Adjustment disorder with mixed anxiety and depressed mood     Adverse effect of anesthesia     HARD TO WAKE UP SLOW BREATHING ,Pt stated doesn't take much anesthesia    Arthritis     OSTEO    Chronic pain     Diverticulitis     DJD (degenerative joint disease), lumbar     seen on 2012 MRI- DDD and DJD    Kidney stones 95,96,98    left    Left knee DJD     Lumbar nerve root impingement     RLQ pain    Menopause     LMP-53years old    Nausea & vomiting     Other and unspecified hyperlipidemia     Other idiopathic scoliosis, thoracolumbar region 2/27/2018    Postmenopausal HRT (hormone replacement therapy)     Right knee DJD     Urine culture positive 2/14/2020 Past Surgical History:   Procedure Laterality Date    COLONOSCOPY N/A 2016    COLONOSCOPY performed by Ghazala Rhodes MD at [address] [facility] HX CATARACT REMOVAL Right 2019    HX CATARACT REMOVAL Left 2019    HX  SECTION  [id number],[id number]    X2    HX GI      COLONOSCOPY    HX HIP FRACTURE TX Right 2019    Total R hip- Dr. Russ Gallagher HX HYSTERECTOMY  2009    LMP-53years old    HX KNEE ARTHROSCOPY Bilateral 1966    SCOPE of knees bilateral    HX KNEE REPLACEMENT Left 2015    HX LITHOTRIPSY      X 2    HX LUMBAR LAMINECTOMY  2000    X2    HX MENISCECTOMY      left knee    HX ORTHOPAEDIC  12    left shoulder repair; 14 screws and 2 plates    HX ORTHOPAEDIC Right     BROKEN ARM SURGERY X2    HX ORTHOPAEDIC Left      REPAIR OF Left MENISCUS    HX ORTHOPAEDIC Left     BROKEN ARM    HX ORTHOPAEDIC Left 2019    hip replacement    HX POLYPECTOMY      HX TONSILLECTOMY  195    HX TUBAL LIGATION      BSPO adhesion conization abn pap    TOTAL KNEE ARTHROPLASTY Left 2016     Current Outpatient Medications on File Prior to Visit   Medication Sig Dispense Refill    phentermine (ADIPEX-P) 37.5 mg tablet TAKE ONE TABLET BY MOUTH EVERY MORNING 30 Tab 1    furosemide (LASIX) 20 mg tablet TAKE ONE TABLET BY MOUTH TWICE A DAY 60 Tab 4    zolpidem (AMBIEN) 5 mg tablet TAKE ONE TABLET BY MOUTH EVERY NIGHT AT BEDTIME AS NEEDED FOR INSOMNIA 30 Tab 4    estradioL (ESTRACE) 1 mg tablet TAKE ONE TABLET BY MOUTH DAILY 90 Tab 2    [DISCONTINUED] ondansetron (ZOFRAN ODT) 8 mg disintegrating tablet Take 1 Tab by mouth every eight (8) hours as needed for Nausea. 12 Tab 0     No current facility-administered medications on file prior to visit.       Allergies   Allergen Reactions    Codeine Rash     Rash and swelling on arm    Adhesive Tape-Silicones Rash    Wellbutrin [Bupropion Hcl] Other (comments)     fatigue Family History   Problem Relation Age of Onset    Cancer Mother         colon   Shahzad Navarro Arthritis-osteo Mother     Anesth Problems Mother         delayed awakening    Cancer Maternal Grandmother         stomach    Heart Disease Father 80    Heart Attack Maternal Grandfather [de-identified]    Heart Attack Paternal Grandmother 80    Diabetes Cousin         cervical cancer     Social History     Socioeconomic History    Marital status:      Spouse name: Not on file    Number of children: Not on file    Years of education: Not on file    Highest education level: Not on file   Tobacco Use    Smoking status: Never Smoker    Smokeless tobacco: Never Used   Substance and Sexual Activity    Alcohol use: Yes     Alcohol/week: 0.8 standard drinks     Types: 1 Standard drinks or equivalent per week     Comment: RARE    Drug use: No   Other Topics Concern     Service No    Caffeine Concern No    Hobby Hazards No    Sleep Concern No    Stress Concern No    Weight Concern No    Special Diet No    Exercise Yes    Bike Helmet Yes    Seat Belt Yes    Self-Exams Yes             Review of Systems   Constitutional: Negative for chills, diaphoresis, fever, malaise/fatigue and weight loss. Eyes: Negative for blurred vision, double vision, pain and redness. Respiratory: Negative for cough, shortness of breath and wheezing. Cardiovascular: Negative for chest pain, palpitations, orthopnea, claudication, leg swelling and PND. Skin: Negative for itching and rash. Neurological: Negative for dizziness, tingling, tremors, sensory change, speech change, focal weakness, seizures, loss of consciousness, weakness and headaches.      Results for orders placed or performed in visit on 19/03/78   METABOLIC PANEL, COMPREHENSIVE   Result Value Ref Range    Sodium 138 136 - 145 mmol/L    Potassium 4.0 3.5 - 5.1 mmol/L    Chloride 105 97 - 108 mmol/L    CO2 24 21 - 32 mmol/L    Anion gap 9 5 - 15 mmol/L    Glucose 100 65 - 100 mg/dL    BUN 14 6 - 20 MG/DL    Creatinine 0.78 0.55 - 1.02 MG/DL    BUN/Creatinine ratio 18 12 - 20      GFR est AA >60 >60 ml/min/1.73m2    GFR est non-AA >60 >60 ml/min/1.73m2    Calcium 8.9 8.5 - 10.1 MG/DL    Bilirubin, total 0.6 0.2 - 1.0 MG/DL    ALT (SGPT) 16 12 - 78 U/L    AST (SGOT) 6 (L) 15 - 37 U/L    Alk. phosphatase 138 (H) 45 - 117 U/L    Protein, total 7.7 6.4 - 8.2 g/dL    Albumin 3.7 3.5 - 5.0 g/dL    Globulin 4.0 2.0 - 4.0 g/dL    A-G Ratio 0.9 (L) 1.1 - 2.2               Physical Exam  Visit Vitals  /82   Pulse 82   Temp 98.5 °F (36.9 °C)   Resp 16   Ht 5' 4\" (1.626 m)   Wt 197 lb (89.4 kg)   LMP 08/24/1995   SpO2 99%   BMI 33.81 kg/m²         Head: Normocephalic, without obvious abnormality, atraumatic  Eyes: conjunctivae/corneas clear. PERRL, EOM's intact. Neck: supple, symmetrical, trachea midline, no adenopathy, thyroid: not enlarged, symmetric, no tenderness/mass/nodules, no carotid bruit and no JVD  Lungs: clear to auscultation bilaterally  Heart: regular rate and rhythm, S1, S2 normal, no murmur, click, rub or gallop  Extremities: extremities normal, atraumatic, no cyanosis. Trace edema on her ankles bilaterally. Pulses: 2+ and symmetric  Lymph nodes: Cervical, supraclavicular, and axillary nodes normal.  Neurologic: Grossly normal          ASSESSMENT and PLAN    ICD-10-CM ICD-9-CM    1. Localized edema  R13.3 997.6 METABOLIC PANEL, COMPREHENSIVE      METABOLIC PANEL, COMPREHENSIVE   2. Obesity (BMI 30.0-34. 9)  E66.9 278.00    3. Postmenopausal HRT (hormone replacement therapy)  Z79.890 V07.4    4. History of depression  Z86.59 V11.8    5. Primary osteoarthritis of left knee  M17.12 715.16    6. Hyperlipidemia with target low density lipoprotein (LDL) cholesterol less than 130 mg/dL  E78.5 272.4    7. ACP (advance care planning)  Z71.89 V65.49    8.  Medicare annual wellness visit, subsequent  Z00.00 V70.0    9. lumbar nerve root impingement-2010- RLQ/ inguinal pain  M54.16 724.4      Diagnoses and all orders for this visit:    1. Localized edema  -     METABOLIC PANEL, COMPREHENSIVE; Future    2. Obesity (BMI 30.0-34.9)    3. Postmenopausal HRT (hormone replacement therapy)    4. History of depression    5. Primary osteoarthritis of left knee    6. Hyperlipidemia with target low density lipoprotein (LDL) cholesterol less than 130 mg/dL    7. ACP (advance care planning)    8. Medicare annual wellness visit, subsequent    9. lumbar nerve root impingement-2010- RLQ/ inguinal pain      Follow-up and Dispositions    · Return in about 3 months (around 2/28/2021) for F/U of phentermine use, every 6 mths for edema and insomnia.           lab results and schedule of future lab studies reviewed with patient  reviewed diet, exercise and weight control  cardiovascular risk and specific lipid/LDL goals reviewed  reviewed medications and side effects in detail  Please call my office if there are any questions- [phone]. I will arrange for follow up after the lab tests done from today return  Recommended a weekly \"heart check. \" I went into detail how to do this. Call for refills on medications as needed. Discussed expected course/resolution/complications of diagnosis in detail with patient. Medication risks/benefits/costs/interactions/alternatives discussed with patient. Pt was given an after visit summary which includes diagnoses, current medications & vitals. Pt expressed understanding with the diagnosis and plan      BMI is significantly elevated- in the obese range. I reviewed diet, exercise and weight control. Discussed weight control in detail, the importance of mainly decreased carbs, and for weight maintenance, exercise; discussed different diets and that it isn't as important to watch the type of foods as it is to decrease calorie intake no matter what type of diet you do, etc.     Total 25 minutes,60 % counseling re: Recommended a weekly \"heart check. \" I went into detail how to do this.     Regular exercise is very important to your health; it helps mentally, physically, socially; it prevents injuries if done properly. Exercise, even as simple as walking 20-30 minutes daily has major benefits to your health even though your \"numbers\" are the same in the lab. See if you can add this into your daily regimen and after a few months it will become a regular habit-\"just something you do,\" like brushing your teeth. A combination of aerobic exercise and strengthening and stretching is felt to be the best for you, so this should be your ultimate goal.   This can be done in the privacy of your home or in a group setting as at the gym  Some prefer having a , others prefer to do exercise in groups or individually. Do what \"works\" for you. You need to make it simple and \"fun,\" or you most likely will not continue it. Also, discussed symptoms of concern that were noted today in the note above, treatment options( including doing nothing), when to follow up before recommended time frame. Also, answered all questions. I encouraged her to use the Lasix prn which she is doing but doesn't go without it at least once a day. I told her it would be fine to not take it at all a day here and there. She should see if she really needs to take it every day. Informed that obesity does aggravate edema, causing worsening of lower extremity venous pressure. This document was written by Freddy Marina, as dictated by Luiz Moyer MD.  I have reviewed and agree with the above note and have made corrections where appropriate Jim Castellanos M.D.

## 2020-12-01 LAB
ALBUMIN SERPL-MCNC: 3.7 G/DL (ref 3.5–5)
ALBUMIN/GLOB SERPL: 0.9 {RATIO} (ref 1.1–2.2)
ALP SERPL-CCNC: 138 U/L (ref 45–117)
ALT SERPL-CCNC: 16 U/L (ref 12–78)
ANION GAP SERPL CALC-SCNC: 9 MMOL/L (ref 5–15)
AST SERPL-CCNC: 6 U/L (ref 15–37)
BILIRUB SERPL-MCNC: 0.6 MG/DL (ref 0.2–1)
BUN SERPL-MCNC: 14 MG/DL (ref 6–20)
BUN/CREAT SERPL: 18 (ref 12–20)
CALCIUM SERPL-MCNC: 8.9 MG/DL (ref 8.5–10.1)
CHLORIDE SERPL-SCNC: 105 MMOL/L (ref 97–108)
CO2 SERPL-SCNC: 24 MMOL/L (ref 21–32)
CREAT SERPL-MCNC: 0.78 MG/DL (ref 0.55–1.02)
GLOBULIN SER CALC-MCNC: 4 G/DL (ref 2–4)
GLUCOSE SERPL-MCNC: 100 MG/DL (ref 65–100)
POTASSIUM SERPL-SCNC: 4 MMOL/L (ref 3.5–5.1)
PROT SERPL-MCNC: 7.7 G/DL (ref 6.4–8.2)
SODIUM SERPL-SCNC: 138 MMOL/L (ref 136–145)

## 2020-12-04 ENCOUNTER — TELEPHONE (OUTPATIENT)
Dept: FAMILY MEDICINE CLINIC | Age: 70
End: 2020-12-04

## 2020-12-04 NOTE — TELEPHONE ENCOUNTER
----- Message from Mei Blair sent at 12/4/2020 10:30 AM EST -----  Regarding: Dr. Ibarra Mess Message/Vendor Calls    Caller's first and last name:  Nurse  Jahaira Fuentes  / [facility]    ( )      Reason for call:     Request for medical notes      Callback required yes/no and why:   yes if needed or can fax information      Best contact number(s):  ofc- [phone]    fax# [phone]      Details to clarify the request:   Per Jahaira Fuentes stated need results from EKG (02/2020) and last office notes from Dr. Zeeshan Clark

## 2021-01-02 DIAGNOSIS — E66.09 CLASS 2 OBESITY DUE TO EXCESS CALORIES WITHOUT SERIOUS COMORBIDITY WITH BODY MASS INDEX (BMI) OF 36.0 TO 36.9 IN ADULT: ICD-10-CM

## 2021-01-03 RX ORDER — PHENTERMINE HYDROCHLORIDE 37.5 MG/1
TABLET ORAL
Qty: 30 TAB | Refills: 0 | Status: SHIPPED | OUTPATIENT
Start: 2021-01-03 | End: 2021-01-31 | Stop reason: SDUPTHER

## 2021-01-14 ENCOUNTER — TELEPHONE (OUTPATIENT)
Dept: FAMILY MEDICINE CLINIC | Age: 71
End: 2021-01-14

## 2021-01-14 NOTE — TELEPHONE ENCOUNTER
----- Message from Ruthie Gama. Minomichele Candy sent at 1/14/2021  9:12 AM EST -----  Regarding: Prescription Question  Contact: [phone]  I have hip and knee implants. My dentist requires I have antibiotics when I have my teeth cleaned. Could you prescribe the antibiotics for me? My appointment is next week.   Thank you

## 2021-01-15 RX ORDER — AMOXICILLIN 500 MG/1
CAPSULE ORAL
Qty: 4 CAP | Refills: 0 | Status: SHIPPED | OUTPATIENT
Start: 2021-01-15 | End: 2021-03-14 | Stop reason: SDUPTHER

## 2021-01-15 NOTE — TELEPHONE ENCOUNTER
Amoxicillin 500 g 4 PO 30-60 minutes before procedure.    Rx signed and sent to pharmacy per verbal order Dr Alan Brown

## 2021-01-23 DIAGNOSIS — F51.01 PRIMARY INSOMNIA: ICD-10-CM

## 2021-01-23 RX ORDER — ZOLPIDEM TARTRATE 5 MG/1
TABLET ORAL
Qty: 30 TAB | Refills: 3 | Status: SHIPPED | OUTPATIENT
Start: 2021-01-23 | End: 2021-06-12 | Stop reason: SDUPTHER

## 2021-01-31 DIAGNOSIS — F51.01 PRIMARY INSOMNIA: ICD-10-CM

## 2021-01-31 DIAGNOSIS — E66.09 CLASS 2 OBESITY DUE TO EXCESS CALORIES WITHOUT SERIOUS COMORBIDITY WITH BODY MASS INDEX (BMI) OF 36.0 TO 36.9 IN ADULT: ICD-10-CM

## 2021-02-01 RX ORDER — FUROSEMIDE 20 MG/1
20 TABLET ORAL 2 TIMES DAILY
Qty: 60 TAB | Refills: 4 | Status: SHIPPED | OUTPATIENT
Start: 2021-02-01 | End: 2021-07-12 | Stop reason: SDUPTHER

## 2021-02-01 RX ORDER — PHENTERMINE HYDROCHLORIDE 37.5 MG/1
37.5 TABLET ORAL
Qty: 30 TAB | Refills: 0 | Status: SHIPPED | OUTPATIENT
Start: 2021-02-01 | End: 2021-04-17 | Stop reason: SDUPTHER

## 2021-02-01 RX ORDER — ZOLPIDEM TARTRATE 5 MG/1
5 TABLET ORAL
Qty: 30 TAB | Refills: 3 | OUTPATIENT
Start: 2021-02-01

## 2021-02-01 NOTE — TELEPHONE ENCOUNTER
I have meds for now but leave Feb 12 and wont be back until mid March so will be out of all by then. I have no refills on Fluid pills or weight loss. Even with refill I need ok to refill early. Can I come weigh before I leave?    I was there Nov 30

## 2021-02-01 NOTE — TELEPHONE ENCOUNTER
If wants to remain on phentermine, needs weigh in and office visit every 3 months as a minimum( due end of Feb).  She has ambien refills so denied

## 2021-02-02 DIAGNOSIS — E66.09 CLASS 2 OBESITY DUE TO EXCESS CALORIES WITHOUT SERIOUS COMORBIDITY WITH BODY MASS INDEX (BMI) OF 36.0 TO 36.9 IN ADULT: ICD-10-CM

## 2021-02-02 RX ORDER — FUROSEMIDE 20 MG/1
20 TABLET ORAL 2 TIMES DAILY
Qty: 60 TAB | Refills: 4 | Status: CANCELLED | OUTPATIENT
Start: 2021-02-02

## 2021-02-02 RX ORDER — PHENTERMINE HYDROCHLORIDE 37.5 MG/1
37.5 TABLET ORAL
Qty: 30 TAB | Refills: 0 | Status: CANCELLED | OUTPATIENT
Start: 2021-02-02

## 2021-02-02 NOTE — TELEPHONE ENCOUNTER
Can I come in early and weigh? I leave on Feb 12 and would like to take this medication with me. Please let me know if There is a possibility  That I can come weigh and have a video appointment with him?

## 2021-02-05 ENCOUNTER — VIRTUAL VISIT (OUTPATIENT)
Dept: FAMILY MEDICINE CLINIC | Age: 71
End: 2021-02-05
Payer: MEDICARE

## 2021-02-05 DIAGNOSIS — E78.5 HYPERLIPIDEMIA WITH TARGET LOW DENSITY LIPOPROTEIN (LDL) CHOLESTEROL LESS THAN 130 MG/DL: ICD-10-CM

## 2021-02-05 DIAGNOSIS — F51.01 PRIMARY INSOMNIA: ICD-10-CM

## 2021-02-05 DIAGNOSIS — R73.09 ELEVATED HEMOGLOBIN A1C: ICD-10-CM

## 2021-02-05 DIAGNOSIS — Z79.890 POSTMENOPAUSAL HRT (HORMONE REPLACEMENT THERAPY): ICD-10-CM

## 2021-02-05 DIAGNOSIS — Z71.89 ACP (ADVANCE CARE PLANNING): ICD-10-CM

## 2021-02-05 DIAGNOSIS — R60.0 LOCALIZED EDEMA: Primary | ICD-10-CM

## 2021-02-05 DIAGNOSIS — E66.09 CLASS 1 OBESITY DUE TO EXCESS CALORIES WITHOUT SERIOUS COMORBIDITY WITH BODY MASS INDEX (BMI) OF 32.0 TO 32.9 IN ADULT: ICD-10-CM

## 2021-02-05 DIAGNOSIS — Z91.89: ICD-10-CM

## 2021-02-05 PROCEDURE — 1101F PT FALLS ASSESS-DOCD LE1/YR: CPT | Performed by: FAMILY MEDICINE

## 2021-02-05 PROCEDURE — 99213 OFFICE O/P EST LOW 20 MIN: CPT | Performed by: FAMILY MEDICINE

## 2021-02-05 PROCEDURE — G8427 DOCREV CUR MEDS BY ELIG CLIN: HCPCS | Performed by: FAMILY MEDICINE

## 2021-02-05 PROCEDURE — G9717 DOC PT DX DEP/BP F/U NT REQ: HCPCS | Performed by: FAMILY MEDICINE

## 2021-02-05 PROCEDURE — G9899 SCRN MAM PERF RSLTS DOC: HCPCS | Performed by: FAMILY MEDICINE

## 2021-02-05 PROCEDURE — 3017F COLORECTAL CA SCREEN DOC REV: CPT | Performed by: FAMILY MEDICINE

## 2021-02-05 PROCEDURE — G0463 HOSPITAL OUTPT CLINIC VISIT: HCPCS | Performed by: FAMILY MEDICINE

## 2021-02-05 PROCEDURE — G8399 PT W/DXA RESULTS DOCUMENT: HCPCS | Performed by: FAMILY MEDICINE

## 2021-02-05 PROCEDURE — 1090F PRES/ABSN URINE INCON ASSESS: CPT | Performed by: FAMILY MEDICINE

## 2021-02-05 NOTE — PROGRESS NOTES
HISTORY OF PRESENT ILLNESS  HPI  Natasha Watson is a 66 y. o. female with a history of left knee DJD, osteoarthritis of right knee, thoracolumbar idiopathic scoliosis, adjustment disorder with anxiety and depression, kidney stones and hyperlipidemia with LDL goal <130. Pt is seen through virtual today for weight management and f/u of these health problems. She states that she takes her phentermine almost every day. She recalls that she started taking this medicine about 10 years ago. Pt states that she had 2 kidney stones removed this past week. Pt describes the stone to be directly removed from kidney. She adds that her BP and heart rate at this time were normal. She recalls that she had 4-5 kidney stone removed in the past with her first one occurring in 2000. Pt denies hx of kidney stones in the family. Pt denies unusual SOB, chest pain, and any recent ER visits or hospitalizations. Herb Elijah, who was evaluated through a synchronous (real-time) audio-video encounter, and/or her healthcare decision maker, is aware that it is a billable service, with coverage as determined by her insurance carrier. She provided verbal consent to proceed: Yes, and patient identification was verified. It was conducted pursuant to the emergency declaration under the [address] [place] [address] authority and the [employer] General Act. A caregiver was present when appropriate. Ability to conduct physical exam was limited. I was in the office. The patient was at home.             Past Medical History:   Diagnosis Date    Actinic keratosis     Left arm    Adjustment disorder with mixed anxiety and depressed mood     Adverse effect of anesthesia     HARD TO WAKE UP SLOW BREATHING ,Pt stated doesn't take much anesthesia    Arthritis     OSTEO    Chronic pain     Diverticulitis     DJD (degenerative joint disease), lumbar seen on  MRI- DDD and DJD    Kidney stones 95,96,98    left    Left knee DJD     Lumbar nerve root impingement     RLQ pain    Menopause     LMP-53years old    Nausea & vomiting     Other and unspecified hyperlipidemia     Other idiopathic scoliosis, thoracolumbar region 2018    Postmenopausal HRT (hormone replacement therapy)     Right knee DJD     Urine culture positive 2020     Past Surgical History:   Procedure Laterality Date    COLONOSCOPY N/A 2016    COLONOSCOPY performed by Arash Richardson MD at [address] [place] [address] HX CATARACT REMOVAL Right 2019    HX CATARACT REMOVAL Left 2019    HX  SECTION  6467,8925    X2    HX GI      COLONOSCOPY    HX HIP FRACTURE TX Right 2019    Total R hip- Dr. Annette White HX HYSTERECTOMY  2009    LMP-53years old    HX KNEE ARTHROSCOPY Bilateral 1966    SCOPE of knees bilateral    HX KNEE REPLACEMENT Left 2015    HX LITHOTRIPSY      X 2    HX LUMBAR LAMINECTOMY  2000    X2    HX MENISCECTOMY      left knee    HX ORTHOPAEDIC  12    left shoulder repair; 14 screws and 2 plates    HX ORTHOPAEDIC Right     BROKEN ARM SURGERY X2    HX ORTHOPAEDIC Left      REPAIR OF Left MENISCUS    HX ORTHOPAEDIC Left     BROKEN ARM    HX ORTHOPAEDIC Left 2019    hip replacement    HX POLYPECTOMY  2013    HX TONSILLECTOMY  195    HX TUBAL LIGATION  1981    BSPO adhesion conization abn pap    HX UROLOGICAL  2020    kidney stone removed Dr Clarissa Espinoza Left      Current Outpatient Medications on File Prior to Visit   Medication Sig Dispense Refill    furosemide (LASIX) 20 mg tablet Take 1 Tab by mouth two (2) times a day. Ok to fill today 60 Tab 4    phentermine (ADIPEX-P) 37.5 mg tablet Take 1 Tab by mouth every morning.  Max Daily Amount: 37.5 mg. 30 Tab 0    zolpidem (AMBIEN) 5 mg tablet TAKE ONE TABLET BY MOUTH EVERY NIGHT AT BEDTIME AS NEEDED FOR INSOMNIA. 30 Tab 3    estradioL (ESTRACE) 1 mg tablet TAKE ONE TABLET BY MOUTH DAILY 90 Tab 2    amoxicillin (AMOXIL) 500 mg capsule 4 PO 30-60 minutes before procedure. 4 Cap 0     No current facility-administered medications on file prior to visit. Allergies   Allergen Reactions    Codeine Rash     Rash and swelling on arm    Adhesive Tape-Silicones Rash    Wellbutrin [Bupropion Hcl] Other (comments)     fatigue     Family History   Problem Relation Age of Onset    Cancer Mother         colon   Floydene Ada Arthritis-osteo Mother     Anesth Problems Mother         delayed awakening    Cancer Maternal Grandmother         stomach    Heart Disease Father 80    Heart Attack Maternal Grandfather [de-identified]    Heart Attack Paternal Grandmother 80    Diabetes Cousin         cervical cancer     Social History     Socioeconomic History    Marital status:      Spouse name: Not on file    Number of children: Not on file    Years of education: Not on file    Highest education level: Not on file   Tobacco Use    Smoking status: Never Smoker    Smokeless tobacco: Never Used   Substance and Sexual Activity    Alcohol use: Yes     Alcohol/week: 0.8 standard drinks     Types: 1 Standard drinks or equivalent per week     Comment: RARE    Drug use: No   Other Topics Concern     Service No    Caffeine Concern No    Hobby Hazards No    Sleep Concern No    Stress Concern No    Weight Concern No    Special Diet No    Exercise Yes    Bike Helmet Yes    Seat Belt Yes    Self-Exams Yes              Review of Systems   Constitutional: Negative for chills, diaphoresis, fever, malaise/fatigue and weight loss. Eyes: Negative for blurred vision, double vision, pain and redness. Respiratory: Negative for cough, shortness of breath and wheezing. Cardiovascular: Negative for chest pain, palpitations, orthopnea, claudication, leg swelling and PND. Skin: Negative for itching and rash. Neurological: Negative for dizziness, tingling, tremors, sensory change, speech change, focal weakness, seizures, loss of consciousness, weakness and headaches. Results for orders placed or performed in visit on 34/33/25   METABOLIC PANEL, COMPREHENSIVE   Result Value Ref Range    Sodium 138 136 - 145 mmol/L    Potassium 4.0 3.5 - 5.1 mmol/L    Chloride 105 97 - 108 mmol/L    CO2 24 21 - 32 mmol/L    Anion gap 9 5 - 15 mmol/L    Glucose 100 65 - 100 mg/dL    BUN 14 6 - 20 MG/DL    Creatinine 0.78 0.55 - 1.02 MG/DL    BUN/Creatinine ratio 18 12 - 20      GFR est AA >60 >60 ml/min/1.73m2    GFR est non-AA >60 >60 ml/min/1.73m2    Calcium 8.9 8.5 - 10.1 MG/DL    Bilirubin, total 0.6 0.2 - 1.0 MG/DL    ALT (SGPT) 16 12 - 78 U/L    AST (SGOT) 6 (L) 15 - 37 U/L    Alk. phosphatase 138 (H) 45 - 117 U/L    Protein, total 7.7 6.4 - 8.2 g/dL    Albumin 3.7 3.5 - 5.0 g/dL    Globulin 4.0 2.0 - 4.0 g/dL    A-G Ratio 0.9 (L) 1.1 - 2.2             Physical Exam  Visit Vitals  LMP 08/24/1995   Wgt- 193    General: alert, cooperative, no distress   Mental  status: normal mood, behavior, speech, dress, motor activity, and thought processes, able to follow commands   HENT: NCAT   Neck: no visualized mass   Resp: no respiratory distress   Neuro: no gross deficits   Skin: no discoloration or lesions of concern on visible areas   Psychiatric: normal affect, consistent with stated mood, no evidence of hallucinations           ASSESSMENT and PLAN    ICD-10-CM ICD-9-CM    1. Localized edema  R60.0 782.3    2. Class 1 obesity due to excess calories without serious comorbidity with body mass index (BMI) of 32.0 to 32.9 in adult  E66.09 278.00     Z68.32 V85.32    3. Primary insomnia  F51.01 307.42    4. Postmenopausal HRT (hormone replacement therapy)  Z79.890 V07.4    5. Hyperlipidemia with target low density lipoprotein (LDL) cholesterol less than 130 mg/dL  E78.5 272.4    6. ACP (advance care planning)  Z71.89 V65.49    7.  Elevated hemoglobin A1c  R73.09 790.29    8. History of complications due to general anesthesia- slow to wake up from anesthesia  Z91.89 V15.89      Diagnoses and all orders for this visit:    1. Localized edema    2. Class 1 obesity due to excess calories without serious comorbidity with body mass index (BMI) of 32.0 to 32.9 in adult    3. Primary insomnia    4. Postmenopausal HRT (hormone replacement therapy)    5. Hyperlipidemia with target low density lipoprotein (LDL) cholesterol less than 130 mg/dL    6. ACP (advance care planning)    7. Elevated hemoglobin A1c    8. History of complications due to general anesthesia- slow to wake up from anesthesia      Follow-up and Dispositions    · Return in about 3 months (around 5/5/2021) for F/U use of Phenetrmine. reviewed medications and side effects in detail  Please call my office if there are any questions- [phone]. Discussed expected course/resolution/complications of diagnosis in detail with patient. Medication risks/benefits/costs/interactions/alternatives discussed with patient. Pt was given an after visit summary which includes diagnoses, current medications & vitals. Pt expressed understanding with the diagnosis and plan. Patient to call if no better in 3 -4 days and prn new problems. BMI is significantly elevated- in the obese range. I reviewed diet, exercise and weight control. Discussed weight control in detail, the importance of mainly decreased carbs, and for weight maintenance, exercise; discussed different diets and that it isn't as important to watch the type of foods as it is to decrease calorie intake no matter what type of diet you do, etc.       Total 30 minutes  re:  Regular exercise is very important to your health; it helps mentally, physically, socially; it prevents injuries if done properly.   Exercise, even as simple as walking 20-30 minutes daily has major benefits to your health even though your \"numbers\" are the same in the lab. See if you can add this into your daily regimen and after a few months it will become a regular habit-\"just something you do,\" like brushing your teeth. A combination of aerobic exercise and strengthening and stretching is felt to be the best for you, so this should be your ultimate goal.   This can be done in the privacy of your home or in a group setting as at the gym  Some prefer having a , others prefer to do exercise in groups or individually. Do what \"works\" for you. You need to make it simple and \"fun,\" or you most likely will not continue it. Recommended a weekly \"heart check. \" I went into detail how to do this. Also, discussed symptoms of concern that were noted today in the note above, treatment options( including doing nothing), when to follow up before recommended time frame. Also, answered all questions. Pt continues to lose weight. Since the phentermine is working, we will continue with that unless it causes any HTN or irregular heartbeats. Since her BP and pulse were fine at the urologist, that gives confidence that the medicine is not causing problems. We will have her follow up every three months for a weight check and for he BP. For her kidney stones, I recommended plenty of water in addtion to the recommendations of her urologist    This document was written by Ayaan Tellez, as dictated by Emmie Butler MD.  I have reviewed and agree with the above note and have made corrections where appropriate Jim Barragan M.D.

## 2021-02-05 NOTE — PROGRESS NOTES
Kidney stones in december Dr Elva Mccauley   Patient presents with   [facility]     weight 02/04/21 193lbs started at 222lb   1. Have you been to the ER, urgent care clinic since your last visit? Hospitalized since your last visit? No    2. Have you seen or consulted any other health care providers outside of the [address] [place] [address] since your last visit? Include any pap smears or colon screening.  No

## 2021-03-11 NOTE — PROGRESS NOTES
I have reviewed discharge instructions with the patient and spouse. The patient and spouse verbalized understanding. Patient has viewed discharge video. Patient discharging home with home health. normal

## 2021-03-15 RX ORDER — AMOXICILLIN 500 MG/1
CAPSULE ORAL
Qty: 4 CAP | Refills: 0 | Status: SHIPPED | OUTPATIENT
Start: 2021-03-15 | End: 2021-08-17 | Stop reason: SDUPTHER

## 2021-04-01 ENCOUNTER — TELEPHONE (OUTPATIENT)
Dept: FAMILY MEDICINE CLINIC | Age: 71
End: 2021-04-01

## 2021-04-01 DIAGNOSIS — M25.512 ACUTE PAIN OF BOTH SHOULDERS: ICD-10-CM

## 2021-04-01 DIAGNOSIS — M54.9 OTHER ACUTE BACK PAIN: Primary | ICD-10-CM

## 2021-04-01 DIAGNOSIS — M25.511 ACUTE PAIN OF BOTH SHOULDERS: ICD-10-CM

## 2021-04-01 NOTE — TELEPHONE ENCOUNTER
----- Message from Marya Pinedo MD sent at 4/1/2021  1:34 AM EDT -----  Regarding: FW: Non-Urgent Medical Question  Contact: [phone]  If from the mass, massaging it would cause increased pain, not have helped some;- set her up for PT for this pain and follow up in office in 1 week- Thurs AM, no later than 9AM( let me know if not a possibility)- I would rather do this than cram her into a busy day. Another option is to offer another provider f that is available.  ----- Message -----  From: Luis Martin LPN  Sent: 1/25/7873   9:14 AM EDT  To: Marya Pinedo MD  Subject: FW: Non-Urgent Medical Question                    ----- Message -----  From: Ja Luis  Sent: 3/31/2021   9:12 AM EDT  To: [facility] Nurse Pool  Subject: RE: Non-Urgent Medical Question                  Range of motion doesnt help or hinder. Message helps for short term. Almost feels like I have a cramp in the area. Pain is usually worse the more I am on my feet for a long time . The pain is from my side across to my spine on left side. (About at the  line where my bra goes) Josiane Starkchcock says it feels like a raised area compared to other side of my back. The growth I had removed was in the same area (about the size of a large golf ball).     Thanks

## 2021-04-12 ENCOUNTER — OFFICE VISIT (OUTPATIENT)
Dept: FAMILY MEDICINE CLINIC | Age: 71
End: 2021-04-12
Payer: MEDICARE

## 2021-04-12 VITALS
SYSTOLIC BLOOD PRESSURE: 118 MMHG | DIASTOLIC BLOOD PRESSURE: 76 MMHG | OXYGEN SATURATION: 97 % | BODY MASS INDEX: 33.12 KG/M2 | RESPIRATION RATE: 18 BRPM | TEMPERATURE: 97.5 F | HEIGHT: 64 IN | HEART RATE: 88 BPM | WEIGHT: 194 LBS

## 2021-04-12 DIAGNOSIS — Z71.3 WEIGHT LOSS COUNSELING, ENCOUNTER FOR: ICD-10-CM

## 2021-04-12 DIAGNOSIS — Z98.890 HISTORY OF LOCAL EXCISION OF SKIN LESION: ICD-10-CM

## 2021-04-12 DIAGNOSIS — E66.01 SEVERE OBESITY (HCC): ICD-10-CM

## 2021-04-12 DIAGNOSIS — R52 PAIN IN SURGICAL SCAR: Primary | ICD-10-CM

## 2021-04-12 DIAGNOSIS — L90.5 PAIN IN SURGICAL SCAR: Primary | ICD-10-CM

## 2021-04-12 PROCEDURE — 1101F PT FALLS ASSESS-DOCD LE1/YR: CPT | Performed by: FAMILY MEDICINE

## 2021-04-12 PROCEDURE — G9717 DOC PT DX DEP/BP F/U NT REQ: HCPCS | Performed by: FAMILY MEDICINE

## 2021-04-12 PROCEDURE — G0463 HOSPITAL OUTPT CLINIC VISIT: HCPCS | Performed by: FAMILY MEDICINE

## 2021-04-12 PROCEDURE — G8427 DOCREV CUR MEDS BY ELIG CLIN: HCPCS | Performed by: FAMILY MEDICINE

## 2021-04-12 PROCEDURE — G9899 SCRN MAM PERF RSLTS DOC: HCPCS | Performed by: FAMILY MEDICINE

## 2021-04-12 PROCEDURE — 3017F COLORECTAL CA SCREEN DOC REV: CPT | Performed by: FAMILY MEDICINE

## 2021-04-12 PROCEDURE — 1090F PRES/ABSN URINE INCON ASSESS: CPT | Performed by: FAMILY MEDICINE

## 2021-04-12 PROCEDURE — G8399 PT W/DXA RESULTS DOCUMENT: HCPCS | Performed by: FAMILY MEDICINE

## 2021-04-12 PROCEDURE — G8536 NO DOC ELDER MAL SCRN: HCPCS | Performed by: FAMILY MEDICINE

## 2021-04-12 PROCEDURE — 99214 OFFICE O/P EST MOD 30 MIN: CPT | Performed by: FAMILY MEDICINE

## 2021-04-12 PROCEDURE — G8417 CALC BMI ABV UP PARAM F/U: HCPCS | Performed by: FAMILY MEDICINE

## 2021-04-12 NOTE — PROGRESS NOTES
[place] [facility] Note    Assessment/ Plan:   Diagnoses and all orders for this visit:    1. Pain in surgical scar  -     US CHEST; Future    2. History of local excision of skin lesion    3. Severe obesity ([address]    4. Weight loss counseling, encounter for      Recommendations based on history, physical exam and review of pertinent labs, studies and medications:   SKin scar from pervious surgcal excision with acute pain and no palpable abnormal. Ultrasound to evaluate underlying pathology. Obesity, uncontrolled with improving slightly. Diet and lifestyle modification encouraged for weight loss and chronic disease prevention/ management. Provided information for meal planning including eat this [email or web address]. Follow up with specialists per routine. Educated patient on red flag symptoms to warrant return to clinic or emergency room visit. I have discussed the diagnosis with the patient and the intended plan as seen in the above orders. The patient has been offered or received an after-visit summary and questions were answered concerning future plans. I have discussed medication side effects and warnings with the patient as well. Follow-up and Dispositions    · Return in about 3 months (around 7/12/2021) for Follow Up with PCP.        Subjective:     Chief Complaint   Patient presents with    Back Pain     patient has a little knot on the left side x 1 month     Meredith Wiggins is a 66y.o. year old female who presents for evaluation of the following:    Back Pain  Onset:  1 month ago  Character: dull aching  Pain in area of skin excision from 20 years ago  - his oty ro fskin grown th remived from back 20 years ago  Location: left   Denies rash, itching, discharge, bleeding    Obesity:   Patient perception: Gained most of weight in last 5 years after shoulder injury  Diet: unrestricted \"my downfall is breads\"  -No food log in office  Activity: None- limited but orthopedic conditions  Treatment:  Key Obesity Meds             furosemide (LASIX) 20 mg tablet (Taking) Take 1 Tab by mouth two (2) times a day. Ok to fill today    phentermine (ADIPEX-P) 37.5 mg tablet Take 1 Tab by mouth every morning. Max Daily Amount: 37.5 mg. Last Weight Metrics:  Weight Loss Metrics 4/12/2021 11/30/2020 2/24/2020 2/19/2020 2/11/2020 12/20/2019 8/23/2019   Today's Wt 194 lb 197 lb 212 lb 210 lb 12.8 oz 212 lb 205 lb 195 lb   BMI 33.3 kg/m2 33.81 kg/m2 36.39 kg/m2 36.18 kg/m2 36.39 kg/m2 35.74 kg/m2 34 kg/m2       Review of Systems   Pertinent positives and negative per HPI. All other systems  reviewed are negative for a Comprehensive ROS (10+). Past medical history, social history, family history reviewed. Medications reconciled. Objective:     Vitals:    04/12/21 1017   BP: 118/76   Pulse: 88   Resp: 18   Temp: 97.5 °F (36.4 °C)   TempSrc: Temporal   SpO2: 97%   Weight: 194 lb (88 kg)   Height: 5' 4\" (1.626 m)       Physical Examination:  General: Alert, cooperative, no distress, appears stated age. Obese  Eyes: Conjunctivae clear. Pupils equally round and reactive to light  Ears: Normal external ear canals both ears. Nose: Nares normal. Septum midline. Mucosa normal. No drainage or sinus tenderness. Mouth/Throat: Lips, mucosa, and tongue normal. No oropharyngeal erythema  Neck: Supple, symmetrical, trachea midline, no adenopathy  Respiratory: Breathing comfortably, in no acute respiratory distress. Clear to auscultation bilaterally. Normal inspiratory and expiratory ratio. Cardiovascular: Regular rate and rhythm, S1, S2 normal, no murmur, click, rub or gallop.   -Extremities no edema. Pulses 2+ and symmetric radial and dorsalis pedis   Abdomen: Soft, non-tender, not distended. Bowel sounds normal.   MSK: Extremities normal appearing, atraumatic, no effusion. Gait steady and unassisted.    Skin: About 3cm linar scar of left throtiaci back, not tender, no underlying abnormally palpated  Lymph nodes: Cervical, supraclavicular nodes normal.  Neurologic: Cranial nerves II-XII intact. Psychiatric: Affect appropriate. Mood euthymic.  Thoughts logical. Speech volume and speed normal      Signed,    Christine Giang MD  4/12/2021

## 2021-04-12 NOTE — PROGRESS NOTES
Chief Complaint   Patient presents with    Back Pain     patient has a little knot on the left side x 1 month       1. Have you been to the ER, urgent care clinic since your last visit? Hospitalized since your last visit? No    2. Have you seen or consulted any other health care providers outside of the [address] [place] [address] since your last visit? Include any pap smears or colon screening.  No    3 most recent PHQ Screens 4/12/2021   Little interest or pleasure in doing things Not at all   Feeling down, depressed, irritable, or hopeless Not at all   Total Score PHQ 2 0

## 2021-04-12 NOTE — PATIENT INSTRUCTIONS
Weight Loss Tips:  Remember this is like a part time job so your motivation and commitment is key to your success. Mindset  Weight loss like any other behavior change starts in your mind. Think hard about what your motivates you to lose weight then meditate on that. Remind yourself of your motivation  with phone alarms, scheduled meditation time, vision board, journal- just to name a few ideas. Have realistic goals. We expect with diligent healthy diet and physical activity you can lose 5% of your body weight in 3  months. Wt in lbs x 0.05 = #lbs you should lose in 3 months. Make food and activity changes with a goal of CONSISTENCY not perfection. Food  Start eating differently. Most of your weight loss and gain is from what you eat. Use small plates only  Drink 2 liters (1/2 gallon) of water every day  HALF of every meal should be fruit or vegetables  Try meal prepping on Sunday (or your day off) with new different vegetables. Consider meal prep service such as Cleaneatz.com, wepremeals. com  Replace soda with diet soda or other zero sugar drinks (selter water just fine)  Consider using the [employer] nicanor for calorie counting. Goal 7991-6690 calories per day    Activity  Staying physically active will help you lose more weight and can help you get over the plateau when you weight just  won't change any more with diet. Start exercise at least 5 days per week for 40 minutes. Consider [employer] training nicanor for home exercises. You can start with walking. I suggest walking at a speed of at least 3.5-4.5mph to for the weight loss benefit. Increase your speed or distance every 2 weeks. Do some slow stretching daily of legs, arms and back. Consider adding weight training with light weights at home or at the gym. See a doctor or a physical training for  instructions in order to avoid injuries from doing muscle training incorrectly.   Free fitness program in [place]: [employer] org/program/fitness-warriors/         Learning About Eating More Fruits and Vegetables  What are some quick tips for eating more fruits and vegetables? We're all encouraged to eat more fruits and vegetables. Yet it can seem like one more chore on the daily to-do list. But you can add color and crunch to your meals--and lots of nutrition--with these quick tips. · Brighten up your breakfast.  ? Add sliced fruit or frozen berries to your yogurt, pancakes, or cereal.  ? Blend fresh or frozen fruit, veggies, and yogurt with a little fruit juice, and you've got a tasty smoothie. ? Make your scrambled eggs a gourmet treat by adding onions, celery, and bell peppers. ? Bake up some bran muffins with grated carrots added into the mix. · Make a livelier lunch. ? Jazz up tuna or chicken salad with apple chunks, celery, or grapes--or all of them! ? Add cucumbers, avocado slices, tomatoes, and lettuce to your sandwiches. ? Kick up the flavor of grilled cheese sandwiches with spinach and tomatoes. ? Puree some potatoes or squash to add to tomato soup. · Add delicious veggies to dinner. ? Give more color and taste to salads. Stir in red cabbage, carrots, and bell peppers. Top salads with dried cranberries or raisins. \"Frost\" your salad with orange sections or strawberries. ? Keep a bag or two of frozen vegetables ready to pull out of the freezer for a side dish. ? Spice up spaghetti and meatballs with mushrooms and bell peppers. ? Roast vegetables like cauliflower or squash in the oven with olive oil to bring out their flavor. ? Season your veggie dish with herbs like basil and julio and a splash of lemon juice and olive oil. ? If you've got a main dish in the oven, stick in a potato to round out your meal.  · Grab some healthy snacks on the go. ? Scoop up an apple, banana, or plum for a quick snack. ? Cut up raw fruits and veggies to keep in your fridge.  Grapes, oranges, carrots, and celery are great choices. They'll be ready for a quick snack or an after-school treat. ? Dip raw vegetables in hummus or peanut butter. ? Keep dried fruit on hand for an easy \"take with you\" snack. · Make something sweet--and healthy. ? Try baked apples or pears topped with cinnamon and honey for a delicious dessert. ? Make chocolate chip cookies even better with grated carrots added to the mix. Where can you learn more? Go to http://www.gray.com/  Enter F050 in the search box to learn more about \"Learning About Eating More Fruits and Vegetables. \"  Current as of: December 17, 2020               Content Version: 12.8  © 5677-9295 Healthwise, [facility]. Care instructions adapted under license by [employer] (which disclaims liability or warranty for this information). If you have questions about a medical condition or this instruction, always ask your healthcare professional. [place] [address] any warranty or liability for your use of this information.

## 2021-04-15 ENCOUNTER — HOSPITAL ENCOUNTER (OUTPATIENT)
Dept: ULTRASOUND IMAGING | Age: 71
Discharge: HOME | End: 2021-04-15
Payer: MEDICARE

## 2021-04-15 DIAGNOSIS — L90.5 PAIN IN SURGICAL SCAR: ICD-10-CM

## 2021-04-15 DIAGNOSIS — R52 PAIN IN SURGICAL SCAR: ICD-10-CM

## 2021-04-15 PROCEDURE — 76882 US LMTD JT/FCL EVL NVASC XTR: CPT | Performed by: FAMILY MEDICINE

## 2021-04-17 DIAGNOSIS — E66.09 CLASS 2 OBESITY DUE TO EXCESS CALORIES WITHOUT SERIOUS COMORBIDITY WITH BODY MASS INDEX (BMI) OF 36.0 TO 36.9 IN ADULT: ICD-10-CM

## 2021-04-20 ENCOUNTER — TELEPHONE (OUTPATIENT)
Dept: FAMILY MEDICINE CLINIC | Age: 71
End: 2021-04-20

## 2021-04-20 RX ORDER — PHENTERMINE HYDROCHLORIDE 37.5 MG/1
37.5 TABLET ORAL
Qty: 30 TAB | Refills: 0 | Status: SHIPPED | OUTPATIENT
Start: 2021-04-20 | End: 2021-06-04

## 2021-04-20 NOTE — TELEPHONE ENCOUNTER
Needs to schedule an  office visit with me( not one of my partners) every 3 months while on this medication even if not taking daily. I sent Rx with this info on Rx.

## 2021-04-20 NOTE — PROGRESS NOTES
Your ultrasound was normal in the area of your scar and there are no concerning findings.  Mychart result comment sent

## 2021-05-17 NOTE — TELEPHONE ENCOUNTER
----- Message from Brody Nowak sent at 6/24/2019 10:41 AM EDT -----  Regarding: BRIE Butler/ Telephone   Pt is returning a call from [facility], to schedule a ct scan. Phone: [phone]       Outgoing call made back to Pt.  Pt noted she contacted Central Scheduling and she is to have CT scan done on 6/26/2019 @10:30 [place] location
----- Message from Luis M Miles sent at 6/24/2019 10:27 AM EDT -----  Regarding: Dr. Bryan Neves: [phone]  Pt. requesting information to schedule CT scan in regards to last appt. on 06/18/19 where she was advised she would hear back in 24hrs, no contact.  Best contact: home: [phone] or cell: [phone]
Outgoing call to Pt. M for Pt to return call to office to obtain contact number for Central Scheduling. Also left contact number [phone] on  for Pt if unable to reach office.
134

## 2021-06-04 DIAGNOSIS — E66.09 CLASS 2 OBESITY DUE TO EXCESS CALORIES WITHOUT SERIOUS COMORBIDITY WITH BODY MASS INDEX (BMI) OF 36.0 TO 36.9 IN ADULT: ICD-10-CM

## 2021-06-04 RX ORDER — PHENTERMINE HYDROCHLORIDE 37.5 MG/1
TABLET ORAL
Qty: 30 TABLET | Refills: 0 | Status: SHIPPED | OUTPATIENT
Start: 2021-06-04 | End: 2021-07-12 | Stop reason: SDUPTHER

## 2021-06-12 DIAGNOSIS — F51.01 PRIMARY INSOMNIA: ICD-10-CM

## 2021-06-14 ENCOUNTER — TRANSCRIBE ORDER (OUTPATIENT)
Dept: SCHEDULING | Age: 71
End: 2021-06-14

## 2021-06-14 DIAGNOSIS — Z12.31 VISIT FOR SCREENING MAMMOGRAM: Primary | ICD-10-CM

## 2021-06-14 DIAGNOSIS — F51.01 PRIMARY INSOMNIA: ICD-10-CM

## 2021-06-14 RX ORDER — ZOLPIDEM TARTRATE 5 MG/1
TABLET ORAL
Qty: 30 TABLET | Refills: 3 | Status: CANCELLED | OUTPATIENT
Start: 2021-06-14

## 2021-06-14 RX ORDER — ZOLPIDEM TARTRATE 5 MG/1
5 TABLET ORAL
Qty: 30 TABLET | Refills: 5 | Status: SHIPPED | OUTPATIENT
Start: 2021-06-14 | End: 2021-09-27 | Stop reason: SDUPTHER

## 2021-07-12 ENCOUNTER — OFFICE VISIT (OUTPATIENT)
Dept: FAMILY MEDICINE CLINIC | Age: 71
End: 2021-07-12
Payer: MEDICARE

## 2021-07-12 VITALS
BODY MASS INDEX: 33.12 KG/M2 | HEART RATE: 84 BPM | HEIGHT: 64 IN | RESPIRATION RATE: 17 BRPM | SYSTOLIC BLOOD PRESSURE: 122 MMHG | TEMPERATURE: 98.5 F | OXYGEN SATURATION: 99 % | WEIGHT: 194 LBS | DIASTOLIC BLOOD PRESSURE: 78 MMHG

## 2021-07-12 DIAGNOSIS — Z79.890 POSTMENOPAUSAL HRT (HORMONE REPLACEMENT THERAPY): ICD-10-CM

## 2021-07-12 DIAGNOSIS — Z91.89: ICD-10-CM

## 2021-07-12 DIAGNOSIS — Z00.00 MEDICARE ANNUAL WELLNESS VISIT, SUBSEQUENT: ICD-10-CM

## 2021-07-12 DIAGNOSIS — Z71.89 ACP (ADVANCE CARE PLANNING): ICD-10-CM

## 2021-07-12 DIAGNOSIS — F51.05 INSOMNIA DUE TO ANXIETY AND FEAR: ICD-10-CM

## 2021-07-12 DIAGNOSIS — F40.9 INSOMNIA DUE TO ANXIETY AND FEAR: ICD-10-CM

## 2021-07-12 DIAGNOSIS — Z71.3 WEIGHT LOSS COUNSELING, ENCOUNTER FOR: ICD-10-CM

## 2021-07-12 DIAGNOSIS — E66.09 CLASS 2 OBESITY DUE TO EXCESS CALORIES WITHOUT SERIOUS COMORBIDITY WITH BODY MASS INDEX (BMI) OF 36.0 TO 36.9 IN ADULT: ICD-10-CM

## 2021-07-12 DIAGNOSIS — F51.01 PRIMARY INSOMNIA: ICD-10-CM

## 2021-07-12 DIAGNOSIS — E78.5 HYPERLIPIDEMIA WITH TARGET LOW DENSITY LIPOPROTEIN (LDL) CHOLESTEROL LESS THAN 130 MG/DL: ICD-10-CM

## 2021-07-12 DIAGNOSIS — Z86.59 PERSONAL HISTORY OF ANXIETY DISORDER: ICD-10-CM

## 2021-07-12 DIAGNOSIS — R60.0 LOCALIZED EDEMA: Primary | ICD-10-CM

## 2021-07-12 DIAGNOSIS — E66.9 OBESITY (BMI 30.0-34.9): ICD-10-CM

## 2021-07-12 PROCEDURE — 99214 OFFICE O/P EST MOD 30 MIN: CPT | Performed by: FAMILY MEDICINE

## 2021-07-12 PROCEDURE — 1090F PRES/ABSN URINE INCON ASSESS: CPT | Performed by: FAMILY MEDICINE

## 2021-07-12 PROCEDURE — 1101F PT FALLS ASSESS-DOCD LE1/YR: CPT | Performed by: FAMILY MEDICINE

## 2021-07-12 PROCEDURE — 3017F COLORECTAL CA SCREEN DOC REV: CPT | Performed by: FAMILY MEDICINE

## 2021-07-12 PROCEDURE — G0439 PPPS, SUBSEQ VISIT: HCPCS | Performed by: FAMILY MEDICINE

## 2021-07-12 PROCEDURE — G8427 DOCREV CUR MEDS BY ELIG CLIN: HCPCS | Performed by: FAMILY MEDICINE

## 2021-07-12 PROCEDURE — G9717 DOC PT DX DEP/BP F/U NT REQ: HCPCS | Performed by: FAMILY MEDICINE

## 2021-07-12 PROCEDURE — G0463 HOSPITAL OUTPT CLINIC VISIT: HCPCS | Performed by: FAMILY MEDICINE

## 2021-07-12 PROCEDURE — G8536 NO DOC ELDER MAL SCRN: HCPCS | Performed by: FAMILY MEDICINE

## 2021-07-12 PROCEDURE — G9899 SCRN MAM PERF RSLTS DOC: HCPCS | Performed by: FAMILY MEDICINE

## 2021-07-12 PROCEDURE — G8399 PT W/DXA RESULTS DOCUMENT: HCPCS | Performed by: FAMILY MEDICINE

## 2021-07-12 PROCEDURE — G8417 CALC BMI ABV UP PARAM F/U: HCPCS | Performed by: FAMILY MEDICINE

## 2021-07-12 RX ORDER — PHENTERMINE HYDROCHLORIDE 37.5 MG/1
TABLET ORAL
Qty: 30 TABLET | Refills: 0 | Status: SHIPPED | OUTPATIENT
Start: 2021-07-12 | End: 2021-09-03

## 2021-07-12 RX ORDER — FUROSEMIDE 20 MG/1
20 TABLET ORAL 2 TIMES DAILY
Qty: 60 TABLET | Refills: 4 | Status: SHIPPED | OUTPATIENT
Start: 2021-07-12 | End: 2022-01-25 | Stop reason: SDUPTHER

## 2021-07-12 NOTE — PROGRESS NOTES
Chief Complaint   Patient presents with    Weight Management   1. Have you been to the ER, urgent care clinic since your last visit? Hospitalized since your last visit? No    2. Have you seen or consulted any other health care providers outside of the [address] [place] [address] since your last visit? Include any pap smears or colon screening.  No

## 2021-07-14 ENCOUNTER — HOSPITAL ENCOUNTER (OUTPATIENT)
Dept: MAMMOGRAPHY | Age: 71
Discharge: HOME | End: 2021-07-14
Attending: FAMILY MEDICINE
Payer: MEDICARE

## 2021-07-14 DIAGNOSIS — Z12.31 VISIT FOR SCREENING MAMMOGRAM: ICD-10-CM

## 2021-07-14 PROCEDURE — 77063 BREAST TOMOSYNTHESIS BI: CPT

## 2021-07-27 ENCOUNTER — LAB ONLY (OUTPATIENT)
Dept: FAMILY MEDICINE CLINIC | Age: 71
End: 2021-07-27

## 2021-07-27 DIAGNOSIS — R60.0 LOCALIZED EDEMA: ICD-10-CM

## 2021-07-27 DIAGNOSIS — E66.09 CLASS 2 OBESITY DUE TO EXCESS CALORIES WITHOUT SERIOUS COMORBIDITY WITH BODY MASS INDEX (BMI) OF 36.0 TO 36.9 IN ADULT: ICD-10-CM

## 2021-07-28 LAB
ALBUMIN SERPL-MCNC: 3.9 G/DL (ref 3.5–5)
ALBUMIN/GLOB SERPL: 1.1 {RATIO} (ref 1.1–2.2)
ALP SERPL-CCNC: 108 U/L (ref 45–117)
ALT SERPL-CCNC: 19 U/L (ref 12–78)
ANION GAP SERPL CALC-SCNC: 9 MMOL/L (ref 5–15)
AST SERPL-CCNC: 9 U/L (ref 15–37)
BILIRUB SERPL-MCNC: 0.4 MG/DL (ref 0.2–1)
BUN SERPL-MCNC: 18 MG/DL (ref 6–20)
BUN/CREAT SERPL: 29 (ref 12–20)
CALCIUM SERPL-MCNC: 9 MG/DL (ref 8.5–10.1)
CHLORIDE SERPL-SCNC: 106 MMOL/L (ref 97–108)
CO2 SERPL-SCNC: 25 MMOL/L (ref 21–32)
COMMENT, HOLDF: NORMAL
CREAT SERPL-MCNC: 0.63 MG/DL (ref 0.55–1.02)
GLOBULIN SER CALC-MCNC: 3.7 G/DL (ref 2–4)
GLUCOSE SERPL-MCNC: 88 MG/DL (ref 65–100)
POTASSIUM SERPL-SCNC: 4 MMOL/L (ref 3.5–5.1)
PROT SERPL-MCNC: 7.6 G/DL (ref 6.4–8.2)
SAMPLES BEING HELD,HOLD: NORMAL
SODIUM SERPL-SCNC: 140 MMOL/L (ref 136–145)

## 2021-08-17 ENCOUNTER — PATIENT MESSAGE (OUTPATIENT)
Dept: FAMILY MEDICINE CLINIC | Age: 71
End: 2021-08-17

## 2021-08-17 RX ORDER — AMOXICILLIN 500 MG/1
CAPSULE ORAL
Qty: 4 CAPSULE | Refills: 0 | Status: SHIPPED | OUTPATIENT
Start: 2021-08-17 | End: 2021-09-13 | Stop reason: SDUPTHER

## 2021-09-02 DIAGNOSIS — E66.09 CLASS 2 OBESITY DUE TO EXCESS CALORIES WITHOUT SERIOUS COMORBIDITY WITH BODY MASS INDEX (BMI) OF 36.0 TO 36.9 IN ADULT: ICD-10-CM

## 2021-09-03 RX ORDER — PHENTERMINE HYDROCHLORIDE 37.5 MG/1
TABLET ORAL
Qty: 30 TABLET | Refills: 0 | Status: SHIPPED | OUTPATIENT
Start: 2021-09-03 | End: 2021-11-21 | Stop reason: ALTCHOICE

## 2021-09-22 DIAGNOSIS — Z79.890 POSTMENOPAUSAL HRT (HORMONE REPLACEMENT THERAPY): ICD-10-CM

## 2021-09-22 RX ORDER — ESTRADIOL 1 MG/1
TABLET ORAL
Qty: 90 TABLET | Refills: 2 | Status: SHIPPED | OUTPATIENT
Start: 2021-09-22 | End: 2022-01-25 | Stop reason: SDUPTHER

## 2021-09-27 DIAGNOSIS — F51.01 PRIMARY INSOMNIA: ICD-10-CM

## 2021-09-27 RX ORDER — ZOLPIDEM TARTRATE 5 MG/1
5 TABLET ORAL
Qty: 30 TABLET | Refills: 5 | Status: SHIPPED | OUTPATIENT
Start: 2021-09-27 | End: 2022-01-10

## 2021-09-27 NOTE — TELEPHONE ENCOUNTER
----- Message from Vivek Pace MD sent at 9/27/2021  1:21 PM EDT -----  Regarding: FW: Prescription Question  Contact: [phone]  OK for getting Travis Shay early  ----- Message -----  From: Sadiq Laureano LPN  Sent: 0/86/9110   7:26 AM EDT  To: Vievk Pace MD  Subject: FW: Prescription Question                          ----- Message -----  From: Dex Sethi  Sent: 9/27/2021   6:50 AM EDT  To: [facility] Nurse Pool  Subject: Prescription Question                            I am leaving on Wed for 6 weeks. I have had vacation overrides on all my prescriptions except Ambian. The pharmacy said they had to have your permission to give me the perception early. I have 16 pills from my last refill. There isn't a [facility] in the area in [place]. I need to come  a prescription to take with me or I need you to ok an override . Let me know which I need to  Please.   Thank you

## 2021-11-12 ENCOUNTER — TELEPHONE (OUTPATIENT)
Dept: FAMILY MEDICINE CLINIC | Age: 71
End: 2021-11-12

## 2021-11-12 RX ORDER — LEVOFLOXACIN 500 MG/1
500 TABLET, FILM COATED ORAL DAILY
Qty: 10 TABLET | Refills: 0 | Status: SHIPPED | OUTPATIENT
Start: 2021-11-12 | End: 2021-11-22

## 2021-11-12 NOTE — TELEPHONE ENCOUNTER
Having return of sinusitis symptoms- pressure, drainage , mild sore throat. Usually will not resolve without antibiotic at this point; Levaquin 500 mg every day x 10 days. sent to her pharmacy.

## 2021-11-15 ENCOUNTER — OFFICE VISIT (OUTPATIENT)
Dept: FAMILY MEDICINE CLINIC | Age: 71
End: 2021-11-15
Payer: MEDICARE

## 2021-11-15 VITALS
TEMPERATURE: 98.2 F | BODY MASS INDEX: 33.63 KG/M2 | DIASTOLIC BLOOD PRESSURE: 78 MMHG | RESPIRATION RATE: 16 BRPM | SYSTOLIC BLOOD PRESSURE: 138 MMHG | HEART RATE: 89 BPM | OXYGEN SATURATION: 99 % | HEIGHT: 64 IN | WEIGHT: 197 LBS

## 2021-11-15 DIAGNOSIS — F51.01 PRIMARY INSOMNIA: ICD-10-CM

## 2021-11-15 DIAGNOSIS — R73.09 ELEVATED HEMOGLOBIN A1C: ICD-10-CM

## 2021-11-15 DIAGNOSIS — R60.0 LOCALIZED EDEMA: ICD-10-CM

## 2021-11-15 DIAGNOSIS — F51.05 INSOMNIA DUE TO ANXIETY AND FEAR: ICD-10-CM

## 2021-11-15 DIAGNOSIS — J01.90 ACUTE BACTERIAL SINUSITIS: ICD-10-CM

## 2021-11-15 DIAGNOSIS — E78.5 HYPERLIPIDEMIA WITH TARGET LOW DENSITY LIPOPROTEIN (LDL) CHOLESTEROL LESS THAN 130 MG/DL: ICD-10-CM

## 2021-11-15 DIAGNOSIS — B96.89 ACUTE BACTERIAL SINUSITIS: ICD-10-CM

## 2021-11-15 DIAGNOSIS — R19.06 EPIGASTRIC SWELLING OR MASS OR LUMP: Primary | ICD-10-CM

## 2021-11-15 DIAGNOSIS — F40.9 INSOMNIA DUE TO ANXIETY AND FEAR: ICD-10-CM

## 2021-11-15 PROCEDURE — G8417 CALC BMI ABV UP PARAM F/U: HCPCS | Performed by: FAMILY MEDICINE

## 2021-11-15 PROCEDURE — G9717 DOC PT DX DEP/BP F/U NT REQ: HCPCS | Performed by: FAMILY MEDICINE

## 2021-11-15 PROCEDURE — G0463 HOSPITAL OUTPT CLINIC VISIT: HCPCS | Performed by: FAMILY MEDICINE

## 2021-11-15 PROCEDURE — G8427 DOCREV CUR MEDS BY ELIG CLIN: HCPCS | Performed by: FAMILY MEDICINE

## 2021-11-15 PROCEDURE — G8399 PT W/DXA RESULTS DOCUMENT: HCPCS | Performed by: FAMILY MEDICINE

## 2021-11-15 PROCEDURE — 1090F PRES/ABSN URINE INCON ASSESS: CPT | Performed by: FAMILY MEDICINE

## 2021-11-15 PROCEDURE — G8536 NO DOC ELDER MAL SCRN: HCPCS | Performed by: FAMILY MEDICINE

## 2021-11-15 PROCEDURE — 1101F PT FALLS ASSESS-DOCD LE1/YR: CPT | Performed by: FAMILY MEDICINE

## 2021-11-15 PROCEDURE — 3017F COLORECTAL CA SCREEN DOC REV: CPT | Performed by: FAMILY MEDICINE

## 2021-11-15 PROCEDURE — 99213 OFFICE O/P EST LOW 20 MIN: CPT | Performed by: FAMILY MEDICINE

## 2021-11-15 PROCEDURE — G9899 SCRN MAM PERF RSLTS DOC: HCPCS | Performed by: FAMILY MEDICINE

## 2021-11-15 NOTE — PROGRESS NOTES
HISTORY OF PRESENT ILLNESS  HPI  Mainor Watson is a 71 y. o. female with a history of left knee DJD, osteoarthritis of right knee, thoracolumbar idiopathic scoliosis, adjustment disorder with anxiety and depression, kidney stones and hyperlipidemia with LDL goal <130, who presents to the office today for f/u of these health problems. Pt reports she has a cyst on her mid sternum just below her breastline. She endorses some sensitivity. She has had these for 3 weeks and there has been no changes. On , pt called c/o return of sinusitis symptoms including pressure, drainage, and mild sore throat. Pt was placed on Levaquin on  and she appears to be getting much better. Pt denies unusual SOB, chest pain, and any recent ER visits or hospitalizations.        Past Medical History:   Diagnosis Date    Actinic keratosis     Left arm    Adjustment disorder with mixed anxiety and depressed mood     Adverse effect of anesthesia     HARD TO WAKE UP SLOW BREATHING ,Pt stated doesn't take much anesthesia    Arthritis     OSTEO    Chronic pain     Diverticulitis     DJD (degenerative joint disease), lumbar     seen on  MRI- DDD and DJD    Kidney stones 95,96,98    left    Left knee DJD     Lumbar nerve root impingement     RLQ pain    Menopause     LMP-53years old    Nausea & vomiting     Other and unspecified hyperlipidemia     Other idiopathic scoliosis, thoracolumbar region 2018    Postmenopausal HRT (hormone replacement therapy)     Right knee DJD     Urine culture positive 2020     Past Surgical History:   Procedure Laterality Date    COLONOSCOPY N/A 2016    COLONOSCOPY performed by Chely Vilchis MD at [address] HX CATARACT REMOVAL Right 2019    HX CATARACT REMOVAL Left 2019    HX  SECTION  2422,0476    X2    HX GI      COLONOSCOPY    HX HIP FRACTURE TX Right 2019    Total R hip- Dr. Aliyah Swan [address]  2009    LMP-53years old    HX KNEE ARTHROSCOPY Bilateral 1966    SCOPE of knees bilateral    HX KNEE REPLACEMENT Left 2015    HX LITHOTRIPSY      X 2    HX LUMBAR LAMINECTOMY  2000    X2    HX MENISCECTOMY  1990    left knee    HX ORTHOPAEDIC  12/24/12    left shoulder repair; 14 screws and 2 plates    HX ORTHOPAEDIC Right     BROKEN ARM SURGERY X2    HX ORTHOPAEDIC Left      REPAIR OF Left MENISCUS    HX ORTHOPAEDIC Left     BROKEN ARM    HX ORTHOPAEDIC Left 08/24/2019    hip replacement    HX POLYPECTOMY  2013    HX TONSILLECTOMY  1956    HX TUBAL LIGATION  1981    BSPO adhesion conization abn pap    HX UROLOGICAL  12/2020    kidney stone removed Dr Cynthia Avila Left 2016     Current Outpatient Medications on File Prior to Visit   Medication Sig Dispense Refill    levoFLOXacin (LEVAQUIN) 500 mg tablet Take 1 Tablet by mouth daily for 10 days. 10 Tablet 0    zolpidem (AMBIEN) 5 mg tablet Take 1 Tablet by mouth nightly as needed for Sleep. Max Daily Amount: 5 mg. 30 Tablet 5    estradioL (ESTRACE) 1 mg tablet TAKE ONE TABLET BY MOUTH DAILY 90 Tablet 2    furosemide (LASIX) 20 mg tablet Take 1 Tablet by mouth two (2) times a day. Ok to fill today 60 Tablet 4    [DISCONTINUED] amoxicillin (AMOXIL) 500 mg capsule 4 PO 30-60 minutes before procedure. (Patient not taking: Reported on 11/15/2021) 4 Capsule 0    [DISCONTINUED] phentermine (ADIPEX-P) 37.5 mg tablet TAKE ONE TABLET BY MOUTH EVERY MORNING;due office visit in October for this med (Patient not taking: Reported on 11/15/2021) 30 Tablet 0     No current facility-administered medications on file prior to visit.      Allergies   Allergen Reactions    Codeine Rash     Rash and swelling on arm    Adhesive Tape-Silicones Rash    Wellbutrin [Bupropion Hcl] Other (comments)     fatigue     Family History   Problem Relation Age of Onset    Cancer Mother         colon    Arthritis-osteo Mother     Anesth Problems Mother         delayed awakening    Cancer Maternal Grandmother         stomach    Heart Disease Father 80    Heart Attack Maternal Grandfather [de-identified]    Heart Attack Paternal Grandmother 80    Diabetes Cousin         cervical cancer     Social History     Socioeconomic History    Marital status:    Tobacco Use    Smoking status: Never Smoker    Smokeless tobacco: Never Used   Vaping Use    Vaping Use: Never used   Substance and Sexual Activity    Alcohol use: Yes     Alcohol/week: 0.8 standard drinks     Types: 1 Standard drinks or equivalent per week     Comment: RARE    Drug use: No   Other Topics Concern     Service No    Caffeine Concern No    Hobby Hazards No    Sleep Concern No    Stress Concern No    Weight Concern No    Special Diet No    Exercise Yes    Bike Helmet Yes    Seat Belt Yes    Self-Exams Yes             Review of Systems   Constitutional: Negative for chills, diaphoresis, fever, malaise/fatigue and weight loss. Eyes: Negative for blurred vision, double vision, pain and redness. Respiratory: Negative for cough, shortness of breath and wheezing. Cardiovascular: Negative for chest pain, palpitations, orthopnea, claudication, leg swelling and PND. Skin: Negative for itching and rash. Neurological: Negative for dizziness, tingling, tremors, sensory change, speech change, focal weakness, seizures, loss of consciousness, weakness and headaches.      Results for orders placed or performed in visit on 33/16/99   METABOLIC PANEL, COMPREHENSIVE   Result Value Ref Range    Sodium 140 136 - 145 mmol/L    Potassium 4.0 3.5 - 5.1 mmol/L    Chloride 106 97 - 108 mmol/L    CO2 25 21 - 32 mmol/L    Anion gap 9 5 - 15 mmol/L    Glucose 88 65 - 100 mg/dL    BUN 18 6 - 20 MG/DL    Creatinine 0.63 0.55 - 1.02 MG/DL    BUN/Creatinine ratio 29 (H) 12 - 20      GFR est AA >60 >60 ml/min/1.73m2    GFR est non-AA >60 >60 ml/min/1.73m2    Calcium 9.0 8.5 - 10.1 MG/DL    Bilirubin, total 0.4 0.2 - 1.0 MG/DL    ALT (SGPT) 19 12 - 78 U/L    AST (SGOT) 9 (L) 15 - 37 U/L    Alk. phosphatase 108 45 - 117 U/L    Protein, total 7.6 6.4 - 8.2 g/dL    Albumin 3.9 3.5 - 5.0 g/dL    Globulin 3.7 2.0 - 4.0 g/dL    A-G Ratio 1.1 1.1 - 2.2     SAMPLES BEING HELD   Result Value Ref Range    SAMPLES BEING HELD 1 LAV     COMMENT        Add-on orders for these samples will be processed based on acceptable specimen integrity and analyte stability, which may vary by analyte. Physical Exam  Visit Vitals  /78 (BP 1 Location: Left arm, BP Patient Position: Sitting, BP Cuff Size: Large adult)   Pulse 89   Temp 98.2 °F (36.8 °C)   Resp 16   Ht 5' 4\" (1.626 m)   Wt 197 lb (89.4 kg)   LMP 08/24/1995   SpO2 99%   BMI 33.81 kg/m²       Pt did not cough or clear her throughout the entire time she was in the office  Head: Normocephalic, without obvious abnormality, atraumatic  Eyes: conjunctivae/corneas clear. PERRL, EOM's intact. Neck: supple, symmetrical, trachea midline, no adenopathy, thyroid: not enlarged, symmetric, no tenderness/mass/nodules, no carotid bruit and no JVD  Lungs: clear to auscultation bilaterally  Heart: regular rate and rhythm, S1, S2 normal, no murmur, click, rub or gallop  Extremities: extremities normal, atraumatic, no cyanosis or edema  Pulses: 2+ and symmetric  Abdomen: soft, non-tender. Bowel sounds normal. No masses,  no organomegaly except she has a proiminent xyphoid that is a little bit tender to palpation but the overlying skin looks normal.    Lymph nodes: Cervical, supraclavicular, and axillary nodes normal.  Neurologic: Grossly normal        ASSESSMENT and PLAN    ICD-10-CM ICD-9-CM    1. Epigastric swelling or mass or lump  R19.06 789.36     was Xiphoid process   2. Primary insomnia  F51.01 307.42    3. Elevated hemoglobin A1c  R73.09 790.29    4. Insomnia due to anxiety and fear  F51.05 300.20     F40.9 327.02    5.  Acute bacterial sinusitis  J01.90 461.9     B96.89     6. Localized edema  R60.0 782.3    7. Hyperlipidemia with target low density lipoprotein (LDL) cholesterol less than 130 mg/dL  E78.5 272.4      Diagnoses and all orders for this visit:    1. Epigastric swelling or mass or lump  Comments:  was Xiphoid process    2. Primary insomnia    3. Elevated hemoglobin A1c    4. Insomnia due to anxiety and fear    5. Acute bacterial sinusitis    6. Localized edema    7. Hyperlipidemia with target low density lipoprotein (LDL) cholesterol less than 130 mg/dL      Follow-up and Dispositions    · Return in about 2 months (around 1/15/2022) for F/U cholesterol, F/U weight concerns, F/U anxiety, edema. reviewed medications and side effects in detail  Please call my office if there are any questions- [phone]. Discussed expected course/resolution/complications of diagnosis in detail with patient. Medication risks/benefits/costs/interactions/alternatives discussed with patient. Pt was given an after visit summary which includes diagnoses, current medications & vitals. Pt expressed understanding with the diagnosis and plan. Patient to call if no better in 3 -4 days and prn new problems. BMI is significantly elevated- in the obese range. I reviewed diet, exercise and weight control. Discussed weight control in detail, the importance of mainly decreased carbs, and for weight maintenance, exercise; discussed different diets and that it isn't as important to watch the type of foods as it is to decrease calorie intake no matter what type of diet you do, etc.       Total 30 minutes  re: Recommended a weekly \"heart check. \" I went into detail how to do this. Regular exercise is very important to your health; it helps mentally, physically, socially; it prevents injuries if done properly. Exercise, even as simple as walking 20-30 minutes daily has major benefits to your health even though your \"numbers\" are the same in the lab.  See if you can add this into your daily regimen and after a few months it will become a regular habit-\"just something you do,\" like brushing your teeth. A combination of aerobic exercise and strengthening and stretching is felt to be the best for you, so this should be your ultimate goal.   This can be done in the privacy of your home or in a group setting as at the gym  Some prefer having a , others prefer to do exercise in groups or individually. Do what \"works\" for you. You need to make it simple and \"fun,\" or you most likely will not continue it. Reviewed symptoms, or lack thereof, of hypertension and elevated cholesterol. Also, discussed symptoms of concern that were noted today in the note above, treatment options( including doing nothing), when to follow up before recommended time frame. Also, answered all questions. She has follow up appt in Jan for follow up chol, glucose . Weight , etc.    I encouraged her to use saline nose spray until the sinus clears and finish with 10 days of Levaquin. Reassured her the xyphoid needs no treatment and she feels comfortable with that knowing it nothing more than that. This document was written by Michael Nelson, as dictated by Edel Baxter MD.  I have reviewed and agree with the above note and have made corrections where appropriate Jim Bah M.D.

## 2021-11-15 NOTE — PROGRESS NOTES
Chief Complaint   Patient presents with    Cyst     on mid sternum 3 weeks   1. \"Have you been to the ER, urgent care clinic since your last visit? Hospitalized since your last visit? \" Yes for sinus    2. \"Have you seen or consulted any other health care providers outside of the [address] [place] [address] since your last visit? \" No     3. For patients over 45: Has the patient had a colonoscopy? Yes, HM satisfied with blue hyperlink     If the patient is female:    4. For patients over 40: Has the patient had a mammogram? Yes, HM satisfied with blue hyperlink    5. For patients over 21: Has the patient had a pap smear?  Yes, HM satisfied with blue hyperlink

## 2021-11-30 NOTE — MR AVS SNAPSHOT
Visit Information Date & Time Provider Department Dept. Phone Encounter #  
 4/14/2017  3:00 PM Abdoulaye ERIC Olson [address] [facility] [phone] [id number] Upcoming Health Maintenance Date Due Pneumococcal 65+ Low/Medium Risk (2 of 2 - PPSV23) 8/18/2017 MEDICARE YEARLY EXAM 8/18/2017 BREAST CANCER SCRN MAMMOGRAM 10/1/2017 FOBT Q 1 YEAR AGE 50-75 1/19/2018 GLAUCOMA SCREENING Q2Y 8/17/2018 DTaP/Tdap/Td series (2 - Td) 8/3/2026 Allergies as of 4/14/2017  Review Complete On: 4/14/2017 By: Abdoulaye CounterERIC Severity Noted Reaction Type Reactions Codeine High 03/25/2010   Systemic Rash Keflex [Cephalexin]  03/25/2010    Nausea and Vomiting Tramadol  03/25/2010    Other (comments) Drowsy Wellbutrin [Bupropion Hcl]  03/25/2010    Other (comments)  
 fatigue Current Immunizations  Never Reviewed Name Date Td, Adsorbed PF 4/24/2013  
 dT Vaccine 1/5/1999 Not reviewed this visit You Were Diagnosed With   
  
 Codes Comments Chronic midline low back pain without sciatica    -  Primary ICD-10-CM: M54.5, G89.29 ICD-9-CM: 724.2, 338.29 Kidney infection     ICD-10-CM: N15.9 ICD-9-CM: 590.9 Insomnia, unspecified type     ICD-10-CM: G47.00 ICD-9-CM: 780.52 Vitals BP Pulse Temp Resp Height(growth percentile) Weight(growth percentile) 116/74 (BP 1 Location: Right arm, BP Patient Position: Sitting) 72 98.4 °F (36.9 °C) (Oral) 18 5' 5\" (1.651 m) 207 lb (93.9 kg) SpO2 BMI OB Status Smoking Status 98% 34.45 kg/m2 Hysterectomy Never Smoker Vitals History BMI and BSA Data Body Mass Index Body Surface Area 34.45 kg/m 2 2.08 m 2 Preferred Pharmacy Pharmacy Name Phone [facility] [address] [phone] Your Updated Medication List  
  
   
This list is accurate as of: 4/14/17  3:49 PM.  Always use your most recent med list.  
  
 citalopram 20 mg tablet Commonly known as:  CELEXA  
TAKE ONE TABLET BY MOUTH DAILY CLIMARA 0.1 mg/24 hr  
Generic drug:  estradiol 1 Patch by TransDERmal route Every Saturday. furosemide 20 mg tablet Commonly known as:  LASIX TAKE ONE TABLET BY MOUTH TWICE A DAY We Performed the Following AMB POC URINALYSIS DIP STICK AUTO W/O MICRO [70613 CPT(R)] Patient Instructions Insomnia: Care Instructions Your Care Instructions Insomnia is the inability to sleep well. It is a common problem for most people at some time. Insomnia may make it hard for you to get to sleep, stay asleep, or sleep as long as you need to. This can make you tired and grouchy during the day. It can also make you forgetful, less effective at work, and unhappy. Insomnia can be caused by conditions such as depression or anxiety. Pain can also affect your ability to sleep. When these problems are solved, the insomnia usually clears up. But sometimes bad sleep habits can cause insomnia. If insomnia is affecting your work or your enjoyment of life, you can take steps to improve your sleep. Follow-up care is a key part of your treatment and safety. Be sure to make and go to all appointments, and call your doctor if you are having problems. It's also a good idea to know your test results and keep a list of the medicines you take. How can you care for yourself at home? What to avoid · Do not have drinks with caffeine, such as coffee or black tea, for 8 hours before bed. · Do not smoke or use other types of tobacco near bedtime. Nicotine is a stimulant and can keep you awake. · Avoid drinking alcohol late in the evening, because it can cause you to wake in the middle of the night. · Do not eat a big meal close to bedtime. If you are hungry, eat a light snack. · Do not drink a lot of water close to bedtime, because the need to urinate may wake you up during the night. · Do not read or watch TV in bed. Use the bed only for sleeping and sexual activity. What to try · Go to bed at the same time every night, and wake up at the same time every morning. Do not take naps during the day. · Keep your bedroom quiet, dark, and cool. · Sleep on a comfortable pillow and mattress. · If watching the clock makes you anxious, turn it facing away from you so you cannot see the time. · If you worry when you lie down, start a worry book. Well before bedtime, write down your worries, and then set the book and your concerns aside. · Try meditation or other relaxation techniques before you go to bed. · If you cannot fall asleep, get up and go to another room until you feel sleepy. Do something relaxing. Repeat your bedtime routine before you go to bed again. · Make your house quiet and calm about an hour before bedtime. Turn down the lights, turn off the TV, log off the computer, and turn down the volume on music. This can help you relax after a busy day. When should you call for help? Watch closely for changes in your health, and be sure to contact your doctor if: 
· Your efforts to improve your sleep do not work. · Your insomnia gets worse. · You have been feeling down, depressed, or hopeless or have lost interest in things that you usually enjoy. Where can you learn more? Go to http://mary-dion.info/. Enter P513 in the search box to learn more about \"Insomnia: Care Instructions. \" Current as of: July 26, 2016 Content Version: 11.2 © 2371-0051 [employer]wise, Incorporated. Care instructions adapted under license by [employer] (which disclaims liability or warranty for this information). If you have questions about a medical condition or this instruction, always ask your healthcare professional. [address] any warranty or liability for your use of this information. Introducing [facility]!    
 Dear Cici Padron: 
 Thank you for requesting a [employer] account. Our records indicate that you have previously registered for a [employer] account but its currently inactive. Please call our [employer] support line at [phone]. Additional Information If you have questions, please visit the Frequently Asked Questions section of the [employer] website at [email or web address] Remember, [employer] is NOT to be used for urgent needs. For medical emergencies, dial 911. Now available from your iPhone and Android! Please provide this summary of care documentation to your next provider. Your primary care clinician is listed as [address] [place] [address], [facility] . If you have any questions after today's visit, please call [phone]. : Yes

## 2021-12-06 DIAGNOSIS — E66.09 CLASS 2 OBESITY DUE TO EXCESS CALORIES WITHOUT SERIOUS COMORBIDITY WITH BODY MASS INDEX (BMI) OF 36.0 TO 36.9 IN ADULT: ICD-10-CM

## 2021-12-06 NOTE — TELEPHONE ENCOUNTER
----- Message from Duyen Hunter. Leopoldo Mutters sent at 12/6/2021 10:40 AM EST -----  Regarding: Refill  May I have a refill on  Phentermine 37.5 MG  Just had office visit.   Thank you

## 2021-12-07 RX ORDER — PHENTERMINE HYDROCHLORIDE 37.5 MG/1
TABLET ORAL
Qty: 30 TABLET | Refills: 0 | Status: SHIPPED | OUTPATIENT
Start: 2021-12-07 | End: 2022-01-31

## 2022-01-04 ENCOUNTER — PATIENT MESSAGE (OUTPATIENT)
Dept: FAMILY MEDICINE CLINIC | Age: 72
End: 2022-01-04

## 2022-01-05 RX ORDER — AMOXICILLIN 500 MG/1
500 CAPSULE ORAL 3 TIMES DAILY
Qty: 30 CAPSULE | Refills: 0 | Status: SHIPPED | OUTPATIENT
Start: 2022-01-05 | End: 2022-01-15

## 2022-01-05 NOTE — TELEPHONE ENCOUNTER
Amox 500 TID X 10 days sent to pharmacy for sinus infection- thick green/yellow material clearing from the back of the throat typical of previous sinus infections.

## 2022-01-10 DIAGNOSIS — F51.01 PRIMARY INSOMNIA: ICD-10-CM

## 2022-01-10 RX ORDER — ZOLPIDEM TARTRATE 5 MG/1
TABLET ORAL
Qty: 30 TABLET | Refills: 5 | Status: SHIPPED | OUTPATIENT
Start: 2022-01-10 | End: 2022-04-12

## 2022-01-25 ENCOUNTER — OFFICE VISIT (OUTPATIENT)
Dept: FAMILY MEDICINE CLINIC | Age: 72
End: 2022-01-25
Payer: MEDICARE

## 2022-01-25 VITALS
WEIGHT: 196 LBS | TEMPERATURE: 98.3 F | OXYGEN SATURATION: 96 % | RESPIRATION RATE: 16 BRPM | HEIGHT: 64 IN | BODY MASS INDEX: 33.46 KG/M2 | DIASTOLIC BLOOD PRESSURE: 74 MMHG | HEART RATE: 96 BPM | SYSTOLIC BLOOD PRESSURE: 120 MMHG

## 2022-01-25 DIAGNOSIS — Z23 ENCOUNTER FOR IMMUNIZATION: ICD-10-CM

## 2022-01-25 DIAGNOSIS — R19.06 EPIGASTRIC SWELLING OR MASS OR LUMP: ICD-10-CM

## 2022-01-25 DIAGNOSIS — Z71.3 WEIGHT LOSS COUNSELING, ENCOUNTER FOR: ICD-10-CM

## 2022-01-25 DIAGNOSIS — R00.2 INTERMITTENT PALPITATIONS: ICD-10-CM

## 2022-01-25 DIAGNOSIS — Z79.890 POSTMENOPAUSAL HRT (HORMONE REPLACEMENT THERAPY): ICD-10-CM

## 2022-01-25 DIAGNOSIS — R60.0 LOCALIZED EDEMA: Primary | ICD-10-CM

## 2022-01-25 DIAGNOSIS — F51.01 PRIMARY INSOMNIA: ICD-10-CM

## 2022-01-25 DIAGNOSIS — E78.5 HYPERLIPIDEMIA WITH TARGET LOW DENSITY LIPOPROTEIN (LDL) CHOLESTEROL LESS THAN 130 MG/DL: ICD-10-CM

## 2022-01-25 DIAGNOSIS — E66.9 OBESITY (BMI 30.0-34.9): ICD-10-CM

## 2022-01-25 DIAGNOSIS — M17.11 PRIMARY OSTEOARTHRITIS OF RIGHT KNEE: ICD-10-CM

## 2022-01-25 PROCEDURE — 1090F PRES/ABSN URINE INCON ASSESS: CPT | Performed by: FAMILY MEDICINE

## 2022-01-25 PROCEDURE — 90732 PPSV23 VACC 2 YRS+ SUBQ/IM: CPT | Performed by: FAMILY MEDICINE

## 2022-01-25 PROCEDURE — 3017F COLORECTAL CA SCREEN DOC REV: CPT | Performed by: FAMILY MEDICINE

## 2022-01-25 PROCEDURE — 1101F PT FALLS ASSESS-DOCD LE1/YR: CPT | Performed by: FAMILY MEDICINE

## 2022-01-25 PROCEDURE — 99214 OFFICE O/P EST MOD 30 MIN: CPT | Performed by: FAMILY MEDICINE

## 2022-01-25 PROCEDURE — G8417 CALC BMI ABV UP PARAM F/U: HCPCS | Performed by: FAMILY MEDICINE

## 2022-01-25 PROCEDURE — G0463 HOSPITAL OUTPT CLINIC VISIT: HCPCS | Performed by: FAMILY MEDICINE

## 2022-01-25 PROCEDURE — G8399 PT W/DXA RESULTS DOCUMENT: HCPCS | Performed by: FAMILY MEDICINE

## 2022-01-25 PROCEDURE — G8427 DOCREV CUR MEDS BY ELIG CLIN: HCPCS | Performed by: FAMILY MEDICINE

## 2022-01-25 PROCEDURE — G9717 DOC PT DX DEP/BP F/U NT REQ: HCPCS | Performed by: FAMILY MEDICINE

## 2022-01-25 PROCEDURE — G9899 SCRN MAM PERF RSLTS DOC: HCPCS | Performed by: FAMILY MEDICINE

## 2022-01-25 PROCEDURE — G8536 NO DOC ELDER MAL SCRN: HCPCS | Performed by: FAMILY MEDICINE

## 2022-01-25 RX ORDER — ESTRADIOL 1 MG/1
1 TABLET ORAL DAILY
Qty: 90 TABLET | Refills: 2 | Status: SHIPPED | OUTPATIENT
Start: 2022-01-25

## 2022-01-25 RX ORDER — FUROSEMIDE 20 MG/1
20 TABLET ORAL 2 TIMES DAILY
Qty: 60 TABLET | Refills: 4 | Status: SHIPPED | OUTPATIENT
Start: 2022-01-25 | End: 2022-06-30

## 2022-01-25 NOTE — PROGRESS NOTES
Chief Complaint   Patient presents with    Insomnia     refill med    Irregular Heart Beat     feels like she has extra beat   1. \"Have you been to the ER, urgent care clinic since your last visit? Hospitalized since your last visit? \" Yes Urgent care for sinus    2. \"Have you seen or consulted any other health care providers outside of the [address] [place] [address] since your last visit? \" No     3. For patients over 45: Has the patient had a colonoscopy? Yes - no Care Gap present     If the patient is female:    4. For patients over 40: Has the patient had a mammogram? Yes - no Care Gap present    5. For patients over 21: Has the patient had a pap smear?  Yes - no Care Gap present

## 2022-01-25 NOTE — PROGRESS NOTES
HISTORY OF PRESENT ILLNESS  HPI  Chris Watson is a 71 y. o. female with a history of left knee DJD, osteoarthritis of right knee, thoracolumbar idiopathic scoliosis, adjustment disorder with anxiety and depression, kidney stones and hyperlipidemia with LDL goal <130, who presents to the office today c/o irregular heart beat and for f/u of these health problems. Pt comes in to follow up on her Ambien. Several years ago, felt her heart fluttering for a few minutes then go away. That did not return for years until yesterday. She had the extra beat that wound be there, go away, and then return. This pattern lasted for a minute of two and has not come back since yesterday. Pt has been on phentermine for a year. She takes a half tablet of phentermine 37.5 mg every day. She takes Ambien at 10 am -11 am. She mentions that the Ambien works well for her sleep but has noticed she wakes up earlier than usual, around 5 am.    Pt denies unusual SOB, chest pain, and any recent ER visits or hospitalizations.          Past Medical History:   Diagnosis Date    Actinic keratosis     Left arm    Adjustment disorder with mixed anxiety and depressed mood     Adverse effect of anesthesia     HARD TO WAKE UP SLOW BREATHING ,Pt stated doesn't take much anesthesia    Arthritis     OSTEO    Chronic pain     Diverticulitis     DJD (degenerative joint disease), lumbar     seen on 2012 MRI- DDD and DJD    Kidney stones 95,96,98    left    Left knee DJD     Lumbar nerve root impingement     RLQ pain    Menopause     LMP-53years old    Nausea & vomiting     Other and unspecified hyperlipidemia     Other idiopathic scoliosis, thoracolumbar region 2/27/2018    Postmenopausal HRT (hormone replacement therapy)     Right knee DJD     Urine culture positive 2/14/2020     Past Surgical History:   Procedure Laterality Date    COLONOSCOPY N/A 9/8/2016    COLONOSCOPY performed by Jony Jaime MD at [address] HX ADENOIDECTOMY  1956    HX APPENDECTOMY  1966    HX CATARACT REMOVAL Right 2019    HX CATARACT REMOVAL Left 2019    HX  SECTION  8947,0312    X2    HX GI      COLONOSCOPY    HX HIP FRACTURE TX Right 2019    Total R hip- Dr. Iliana Sanchez HX HYSTERECTOMY  2009    LMP-53years old    HX KNEE ARTHROSCOPY Bilateral 1966    SCOPE of knees bilateral    HX KNEE REPLACEMENT Left     HX LITHOTRIPSY      X 2    HX LUMBAR LAMINECTOMY  2000    X2    HX MENISCECTOMY  1990    left knee    HX ORTHOPAEDIC  12    left shoulder repair; 14 screws and 2 plates    HX ORTHOPAEDIC Right     BROKEN ARM SURGERY X2    HX ORTHOPAEDIC Left      REPAIR OF Left MENISCUS    HX ORTHOPAEDIC Left     BROKEN ARM    HX ORTHOPAEDIC Left 2019    hip replacement    HX POLYPECTOMY      HX TONSILLECTOMY      HX TUBAL LIGATION  1981    BSPO adhesion conization abn pap    HX UROLOGICAL  2020    kidney stone removed Dr Barb Escalante Left      Current Outpatient Medications on File Prior to Visit   Medication Sig Dispense Refill    zolpidem (AMBIEN) 5 mg tablet TAKE 1 TABLET BY MOUTH NIGHTLEY AS NEEDED FOR SLEEP. MAX DAILY AMOUNT IS 1 TABLET 30 Tablet 5    phentermine (ADIPEX-P) 37.5 mg tablet TAKE ONE TABLET BY MOUTH EVERY MORNING;due office visit in October for this med 30 Tablet 0     No current facility-administered medications on file prior to visit.      Allergies   Allergen Reactions    Codeine Rash     Rash and swelling on arm    Adhesive Tape-Silicones Rash    Wellbutrin [Bupropion Hcl] Other (comments)     fatigue     Family History   Problem Relation Age of Onset    Cancer Mother         colon    OSTEOARTHRITIS Mother     Anesth Problems Mother         delayed awakening    Cancer Maternal Grandmother         stomach    Heart Disease Father 80    Heart Attack Maternal Grandfather [de-identified]    Heart Attack Paternal Grandmother 80    Diabetes Cousin cervical cancer     Social History     Socioeconomic History    Marital status:    Tobacco Use    Smoking status: Never Smoker    Smokeless tobacco: Never Used   Vaping Use    Vaping Use: Never used   Substance and Sexual Activity    Alcohol use: Yes     Alcohol/week: 0.8 standard drinks     Types: 1 Standard drinks or equivalent per week     Comment: RARE    Drug use: No   Other Topics Concern     Service No    Caffeine Concern No    Hobby Hazards No    Sleep Concern No    Stress Concern No    Weight Concern No    Special Diet No    Exercise Yes    Bike Helmet Yes    Seat Belt Yes    Self-Exams Yes               Review of Systems   Constitutional: Negative for chills, diaphoresis, fever, malaise/fatigue and weight loss. Eyes: Negative for blurred vision, double vision, pain and redness. Respiratory: Negative for cough, shortness of breath and wheezing. Cardiovascular: Negative for chest pain, palpitations, orthopnea, claudication, leg swelling and PND. Skin: Negative for itching and rash. Neurological: Negative for dizziness, tingling, tremors, sensory change, speech change, focal weakness, seizures, loss of consciousness, weakness and headaches. Results for orders placed or performed in visit on 34/01/52   METABOLIC PANEL, COMPREHENSIVE   Result Value Ref Range    Sodium 140 136 - 145 mmol/L    Potassium 4.0 3.5 - 5.1 mmol/L    Chloride 106 97 - 108 mmol/L    CO2 25 21 - 32 mmol/L    Anion gap 9 5 - 15 mmol/L    Glucose 88 65 - 100 mg/dL    BUN 18 6 - 20 MG/DL    Creatinine 0.63 0.55 - 1.02 MG/DL    BUN/Creatinine ratio 29 (H) 12 - 20      GFR est AA >60 >60 ml/min/1.73m2    GFR est non-AA >60 >60 ml/min/1.73m2    Calcium 9.0 8.5 - 10.1 MG/DL    Bilirubin, total 0.4 0.2 - 1.0 MG/DL    ALT (SGPT) 19 12 - 78 U/L    AST (SGOT) 9 (L) 15 - 37 U/L    Alk.  phosphatase 108 45 - 117 U/L    Protein, total 7.6 6.4 - 8.2 g/dL    Albumin 3.9 3.5 - 5.0 g/dL    Globulin 3.7 2.0 - 4.0 g/dL    A-G Ratio 1.1 1.1 - 2.2     SAMPLES BEING HELD   Result Value Ref Range    SAMPLES BEING HELD 1 LAV     COMMENT        Add-on orders for these samples will be processed based on acceptable specimen integrity and analyte stability, which may vary by analyte. Physical Exam  Visit Vitals  /74 (BP 1 Location: Left arm, BP Patient Position: Sitting, BP Cuff Size: Large adult)   Pulse 96   Temp 98.3 °F (36.8 °C)   Resp 16   Ht 5' 4\" (1.626 m)   Wt 196 lb (88.9 kg)   LMP 08/24/1995   SpO2 96%   BMI 33.64 kg/m²           Head: Normocephalic, without obvious abnormality, atraumatic  Eyes: conjunctivae/corneas clear. PERRL, EOM's intact. Neck: supple, symmetrical, trachea midline, no adenopathy, thyroid: not enlarged, symmetric, no tenderness/mass/nodules, no carotid bruit and no JVD  Lungs: clear to auscultation bilaterally  Heart: regular rate and rhythm, S1, S2 normal, no murmur, click, rub or gallop. I listened to her heart for over a minute and no ectopy. Extremities: extremities normal, atraumatic, no cyanosis or edema  Pulses: 2+ and symmetric  Lymph nodes: Cervical, supraclavicular, and axillary nodes normal.  Neurologic: Grossly normal        ASSESSMENT and PLAN    ICD-10-CM ICD-9-CM    1. Localized edema  R60.0 782.3 furosemide (LASIX) 20 mg tablet      METABOLIC PANEL, COMPREHENSIVE      METABOLIC PANEL, COMPREHENSIVE   2. Postmenopausal HRT (hormone replacement therapy)  Z79.890 V07.4 estradioL (ESTRACE) 1 mg tablet      METABOLIC PANEL, COMPREHENSIVE      METABOLIC PANEL, COMPREHENSIVE   3. Encounter for immunization  Z23 V03.89 PNEUMOCOCCAL POLYSACCHARIDE VACCINE, 23-VALENT, ADULT OR IMMUNOSUPPRESSED PT DOSE,      ADMIN INFLUENZA VIRUS VAC      ADMIN PNEUMOCOCCAL VACCINE   4. Primary insomnia  F51.01 307.42    5. Hyperlipidemia with target low density lipoprotein (LDL) cholesterol less than 130 mg/dL  E78.5 272.4    6. Epigastric swelling or mass or lump  R19.06 789.36    7. Weight loss counseling, encounter for  Z71.3 V65.3    8. Obesity (BMI 30.0-34. 9)  E66.9 278.00    9. Primary osteoarthritis of right knee  M17.11 715.16    10. Intermittent palpitations  R00.2 785.1      Diagnoses and all orders for this visit:    1. Localized edema  -     furosemide (LASIX) 20 mg tablet; Take 1 Tablet by mouth two (2) times a day. Ok to fill today  -     METABOLIC PANEL, COMPREHENSIVE; Future    2. Postmenopausal HRT (hormone replacement therapy)  -     estradioL (ESTRACE) 1 mg tablet; Take 1 Tablet by mouth daily.  -     METABOLIC PANEL, COMPREHENSIVE; Future    3. Encounter for immunization  -     PNEUMOCOCCAL POLYSACCHARIDE VACCINE, 23-VALENT, ADULT OR IMMUNOSUPPRESSED PT DOSE,  -     ADMIN INFLUENZA VIRUS VAC  -     ADMIN PNEUMOCOCCAL VACCINE    4. Primary insomnia    5. Hyperlipidemia with target low density lipoprotein (LDL) cholesterol less than 130 mg/dL    6. Epigastric swelling or mass or lump    7. Weight loss counseling, encounter for    8. Obesity (BMI 30.0-34.9)    9. Primary osteoarthritis of right knee    10. Intermittent palpitations      Follow-up and Dispositions    · Return in about 6 months (around 7/25/2022) for edema, insomnia, F/U anxiety, F/U of obesity. lab results and schedule of future lab studies reviewed with patient  reviewed diet, exercise and weight control  cardiovascular risk and specific lipid/LDL goals reviewed  reviewed medications and side effects in detail  Please call my office if there are any questions- [phone]. I will arrange for follow up after the lab tests done from today return  Recommended a weekly \"heart check. \" I went into detail how to do this. Call for refills on medications as needed. Discussed expected course/resolution/complications of diagnosis in detail with patient. Medication risks/benefits/costs/interactions/alternatives discussed with patient.    Pt was given an after visit summary which includes diagnoses, current medications & vitals. Pt expressed understanding with the diagnosis and plan      BMI is significantly elevated- in the obese range. I reviewed diet, exercise and weight control. Discussed weight control in detail, the importance of mainly decreased carbs, and for weight maintenance, exercise; discussed different diets and that it isn't as important to watch the type of foods as it is to decrease calorie intake no matter what type of diet you do, etc.       Total 30 minutes  re: Reviewed symptoms, or lack thereof, of hypertension and elevated cholesterol. Regular exercise is very important to your health; it helps mentally, physically, socially; it prevents injuries if done properly. Exercise, even as simple as walking 20-30 minutes daily has major benefits to your health even though your \"numbers\" are the same in the lab. See if you can add this into your daily regimen and after a few months it will become a regular habit-\"just something you do,\" like brushing your teeth. A combination of aerobic exercise and strengthening and stretching is felt to be the best for you, so this should be your ultimate goal.   This can be done in the privacy of your home or in a group setting as at the gym  Some prefer having a , others prefer to do exercise in groups or individually. Do what \"works\" for you. You need to make it simple and \"fun,\" or you most likely will not continue it. Recommended a weekly \"heart check. \" I went into detail how to do this. Also, discussed symptoms of concern that were noted today in the note above, treatment options( including doing nothing), when to follow up before recommended time frame. Also, answered all questions. I informed pt that her palpitation are probably the same thing she had years ago but there is no way to tell for sure. I suggested we do further testing, basically a 24 hour Holter if her symptoms increase. For now, we will just monitor her symptoms.      We did update her immunizations with the Pneumovax 23. She has plans on schedule a follow up colonoscopy that is due. This document was written by Maia Quezada, as dictated by Apoorva Yarbrough MD.  I have reviewed and agree with the above note and have made corrections where appropriate Jim Cruz M.D.

## 2022-01-26 LAB
ALBUMIN SERPL-MCNC: 3.9 G/DL (ref 3.5–5)
ALBUMIN/GLOB SERPL: 1 {RATIO} (ref 1.1–2.2)
ALP SERPL-CCNC: 108 U/L (ref 45–117)
ALT SERPL-CCNC: 22 U/L (ref 12–78)
ANION GAP SERPL CALC-SCNC: 6 MMOL/L (ref 5–15)
AST SERPL-CCNC: 7 U/L (ref 15–37)
BILIRUB SERPL-MCNC: 0.5 MG/DL (ref 0.2–1)
BUN SERPL-MCNC: 16 MG/DL (ref 6–20)
BUN/CREAT SERPL: 22 (ref 12–20)
CALCIUM SERPL-MCNC: 9.5 MG/DL (ref 8.5–10.1)
CHLORIDE SERPL-SCNC: 103 MMOL/L (ref 97–108)
CO2 SERPL-SCNC: 28 MMOL/L (ref 21–32)
CREAT SERPL-MCNC: 0.72 MG/DL (ref 0.55–1.02)
GLOBULIN SER CALC-MCNC: 4.1 G/DL (ref 2–4)
GLUCOSE SERPL-MCNC: 96 MG/DL (ref 65–100)
POTASSIUM SERPL-SCNC: 4.1 MMOL/L (ref 3.5–5.1)
PROT SERPL-MCNC: 8 G/DL (ref 6.4–8.2)
SODIUM SERPL-SCNC: 137 MMOL/L (ref 136–145)

## 2022-01-31 DIAGNOSIS — E66.09 CLASS 2 OBESITY DUE TO EXCESS CALORIES WITHOUT SERIOUS COMORBIDITY WITH BODY MASS INDEX (BMI) OF 36.0 TO 36.9 IN ADULT: ICD-10-CM

## 2022-01-31 RX ORDER — PHENTERMINE HYDROCHLORIDE 37.5 MG/1
TABLET ORAL
Qty: 30 TABLET | Refills: 0 | Status: SHIPPED | OUTPATIENT
Start: 2022-01-31 | End: 2022-04-01 | Stop reason: ALTCHOICE

## 2022-03-18 PROBLEM — Z86.59 PERSONAL HISTORY OF ANXIETY DISORDER: Status: ACTIVE | Noted: 2019-08-19

## 2022-03-18 PROBLEM — E66.812 CLASS 2 OBESITY DUE TO EXCESS CALORIES WITHOUT SERIOUS COMORBIDITY WITH BODY MASS INDEX (BMI) OF 36.0 TO 36.9 IN ADULT: Status: ACTIVE | Noted: 2020-05-19

## 2022-03-18 PROBLEM — S72.002A LEFT DISPLACED FEMORAL NECK FRACTURE (HCC): Status: ACTIVE | Noted: 2019-08-23

## 2022-03-18 PROBLEM — E66.09 CLASS 2 OBESITY DUE TO EXCESS CALORIES WITHOUT SERIOUS COMORBIDITY WITH BODY MASS INDEX (BMI) OF 36.0 TO 36.9 IN ADULT: Status: ACTIVE | Noted: 2020-05-19

## 2022-03-18 PROBLEM — R82.79 URINE CULTURE POSITIVE: Status: ACTIVE | Noted: 2020-02-14

## 2022-03-19 PROBLEM — S72.90XA FEMORAL FRACTURE (HCC): Status: ACTIVE | Noted: 2019-08-23

## 2022-03-19 PROBLEM — M41.25 OTHER IDIOPATHIC SCOLIOSIS, THORACOLUMBAR REGION: Status: ACTIVE | Noted: 2018-02-27

## 2022-03-19 PROBLEM — Z86.59 HISTORY OF DEPRESSION: Status: ACTIVE | Noted: 2019-08-19

## 2022-03-20 PROBLEM — E66.01 SEVERE OBESITY (HCC): Status: ACTIVE | Noted: 2019-12-20

## 2022-03-20 PROBLEM — M17.11 OSTEOARTHRITIS OF RIGHT KNEE: Status: ACTIVE | Noted: 2020-02-24

## 2022-03-30 ENCOUNTER — HOSPITAL ENCOUNTER (EMERGENCY)
Age: 72
Discharge: HOME | End: 2022-03-30
Attending: EMERGENCY MEDICINE
Payer: MEDICARE

## 2022-03-30 VITALS
WEIGHT: 192.46 LBS | SYSTOLIC BLOOD PRESSURE: 130 MMHG | TEMPERATURE: 98.4 F | OXYGEN SATURATION: 96 % | HEIGHT: 64 IN | RESPIRATION RATE: 17 BRPM | HEART RATE: 73 BPM | DIASTOLIC BLOOD PRESSURE: 73 MMHG | BODY MASS INDEX: 32.86 KG/M2

## 2022-03-30 DIAGNOSIS — R00.2 PALPITATIONS: Primary | ICD-10-CM

## 2022-03-30 LAB
ALBUMIN SERPL-MCNC: 3.2 G/DL (ref 3.5–5)
ALBUMIN/GLOB SERPL: 0.8 {RATIO} (ref 1.1–2.2)
ALP SERPL-CCNC: 97 U/L (ref 45–117)
ALT SERPL-CCNC: 20 U/L (ref 12–78)
ANION GAP SERPL CALC-SCNC: 9 MMOL/L (ref 5–15)
AST SERPL-CCNC: 7 U/L (ref 15–37)
BASOPHILS # BLD: 0.1 K/UL (ref 0–0.1)
BASOPHILS NFR BLD: 1 % (ref 0–1)
BILIRUB SERPL-MCNC: 0.2 MG/DL (ref 0.2–1)
BNP SERPL-MCNC: 81 PG/ML (ref 0–125)
BUN SERPL-MCNC: 16 MG/DL (ref 6–20)
BUN/CREAT SERPL: 24 (ref 12–20)
CALCIUM SERPL-MCNC: 9.1 MG/DL (ref 8.5–10.1)
CHLORIDE SERPL-SCNC: 106 MMOL/L (ref 97–108)
CO2 SERPL-SCNC: 28 MMOL/L (ref 21–32)
COMMENT, HOLDF: NORMAL
CREAT SERPL-MCNC: 0.68 MG/DL (ref 0.55–1.02)
DIFFERENTIAL METHOD BLD: ABNORMAL
EOSINOPHIL # BLD: 0.1 K/UL (ref 0–0.4)
EOSINOPHIL NFR BLD: 2 % (ref 0–7)
ERYTHROCYTE [DISTWIDTH] IN BLOOD BY AUTOMATED COUNT: 12.9 % (ref 11.5–14.5)
GLOBULIN SER CALC-MCNC: 3.9 G/DL (ref 2–4)
GLUCOSE SERPL-MCNC: 89 MG/DL (ref 65–100)
HCT VFR BLD AUTO: 47.5 % (ref 35–47)
HGB BLD-MCNC: 15.3 G/DL (ref 11.5–16)
IMM GRANULOCYTES # BLD AUTO: 0 K/UL (ref 0–0.04)
IMM GRANULOCYTES NFR BLD AUTO: 0 % (ref 0–0.5)
LIPASE SERPL-CCNC: 138 U/L (ref 73–393)
LYMPHOCYTES # BLD: 2 K/UL (ref 0.8–3.5)
LYMPHOCYTES NFR BLD: 26 % (ref 12–49)
MAGNESIUM SERPL-MCNC: 2.2 MG/DL (ref 1.6–2.4)
MCH RBC QN AUTO: 30.1 PG (ref 26–34)
MCHC RBC AUTO-ENTMCNC: 32.2 G/DL (ref 30–36.5)
MCV RBC AUTO: 93.5 FL (ref 80–99)
MONOCYTES # BLD: 1 K/UL (ref 0–1)
MONOCYTES NFR BLD: 13 % (ref 5–13)
NEUTS SEG # BLD: 4.5 K/UL (ref 1.8–8)
NEUTS SEG NFR BLD: 58 % (ref 32–75)
NRBC # BLD: 0 K/UL (ref 0–0.01)
NRBC BLD-RTO: 0 PER 100 WBC
PLATELET # BLD AUTO: 364 K/UL (ref 150–400)
PMV BLD AUTO: 9.3 FL (ref 8.9–12.9)
POTASSIUM SERPL-SCNC: 3.9 MMOL/L (ref 3.5–5.1)
PROT SERPL-MCNC: 7.1 G/DL (ref 6.4–8.2)
RBC # BLD AUTO: 5.08 M/UL (ref 3.8–5.2)
SAMPLES BEING HELD,HOLD: NORMAL
SODIUM SERPL-SCNC: 143 MMOL/L (ref 136–145)
TROPONIN-HIGH SENSITIVITY: 9 NG/L (ref 0–51)
WBC # BLD AUTO: 7.8 K/UL (ref 3.6–11)

## 2022-03-30 PROCEDURE — 83880 ASSAY OF NATRIURETIC PEPTIDE: CPT

## 2022-03-30 PROCEDURE — 84484 ASSAY OF TROPONIN QUANT: CPT

## 2022-03-30 PROCEDURE — 83690 ASSAY OF LIPASE: CPT

## 2022-03-30 PROCEDURE — 85025 COMPLETE CBC W/AUTO DIFF WBC: CPT

## 2022-03-30 PROCEDURE — 36415 COLL VENOUS BLD VENIPUNCTURE: CPT

## 2022-03-30 PROCEDURE — 99284 EMERGENCY DEPT VISIT MOD MDM: CPT

## 2022-03-30 PROCEDURE — 93005 ELECTROCARDIOGRAM TRACING: CPT

## 2022-03-30 PROCEDURE — 80053 COMPREHEN METABOLIC PANEL: CPT

## 2022-03-30 PROCEDURE — 83735 ASSAY OF MAGNESIUM: CPT

## 2022-03-31 LAB
ATRIAL RATE: 83 BPM
CALCULATED P AXIS, ECG09: 41 DEGREES
CALCULATED R AXIS, ECG10: -28 DEGREES
CALCULATED T AXIS, ECG11: 7 DEGREES
DIAGNOSIS, 93000: NORMAL
P-R INTERVAL, ECG05: 140 MS
Q-T INTERVAL, ECG07: 386 MS
QRS DURATION, ECG06: 72 MS
QTC CALCULATION (BEZET), ECG08: 453 MS
VENTRICULAR RATE, ECG03: 83 BPM

## 2022-03-31 NOTE — DISCHARGE INSTRUCTIONS
Thank you for allowing us to provide you with medical care today. We realize that you have many choices for your emergency care needs. We thank you for choosing [facility]. Please choose us in the future for any continued health care needs. We hope we addressed all of your medical concerns. We strive to provide excellent quality care in the Emergency Department. Anything less than excellent does not meet our expectations. The exam and treatment you received in the Emergency Department were for an emergent problem and are not intended as complete care. It is important that you follow up with a doctor, nurse practitioner, or physician's assistant for ongoing care. If your symptoms worsen or you do not improve as expected and you are unable to reach your usual health care provider, you should return to the Emergency Department. We are available 24 hours a day. Take this sheet with you when you go to your follow-up visit. If you have any problem arranging the follow-up visit, contact the Emergency Department immediately. Make an appointment your family doctor for follow up of this visit. Return to the ER if you are unable to be seen in a timely manner.

## 2022-03-31 NOTE — ED PROVIDER NOTES
Please note that this dictation was completed with [employer], the computer voice recognition software.  Quite often unanticipated grammatical, syntax, homophones, and other interpretive errors are inadvertently transcribed by the computer software.  Please disregard these errors.  Please excuse any errors that have escaped final proofreading. 58-year-old female past medical history marked for actinic keratosis of the left arm adjustment disorder with anxiety, depression, hard to wake up after anesthesia, osteoarthritis, chronic pain, diverticulitis, DJD of the L-spine, left kidney stones,  nerve root impingement, postmenopausal, hyperlipidemia, idiopathic sclerosis of the thoracolumbar region, on HRT therapy, and right knee DJD presents the ER POV complaining of \" for the past week and a half 2 weeks have been drinking a lot of caffeine. Drinking for kidney stones but I have not had a kidney stone in years. I have had a lot to do so been trying to get things done. Then about 3 days ago I felt like my heartbeat was beating fast so he quit drinking the tea but still seemed to have elevated heart rates. We checked it earlier today and it was in the 90s. Been drinking water normally not having any chest pain no burning with urination no diarrhea. Is not waking me up at night. Had no vision changes no one-sided weakness no other current systemic complaints. Not having any burning with urination or vaginal discharge. Just thought since it is getting worse not getting better would better come get checked out.     pt denies HA, vison changes, diff swallowing, CP, SOB, Abd pain, F/Ch, N/V, D/Cons or other current systemic complaints    Social/ PSH reviewed in EMR    EMR Chart Reviewed           Past Medical History:   Diagnosis Date    Actinic keratosis     Left arm    Adjustment disorder with mixed anxiety and depressed mood     Adverse effect of anesthesia     HARD TO WAKE UP SLOW BREATHING ,Pt stated doesn't take much anesthesia    Arthritis     OSTEO    Chronic pain     Diverticulitis     DJD (degenerative joint disease), lumbar     seen on  MRI- DDD and DJD    Kidney stones 95,96,98    left    Left knee DJD     Lumbar nerve root impingement     RLQ pain    Menopause     LMP-53years old    Nausea & vomiting     Other and unspecified hyperlipidemia     Other idiopathic scoliosis, thoracolumbar region 2018    Postmenopausal HRT (hormone replacement therapy)     Right knee DJD     Urine culture positive 2020       Past Surgical History:   Procedure Laterality Date    COLONOSCOPY N/A 2016    COLONOSCOPY performed by Josh Bansal MD at [address] HX [address] HX CATARACT REMOVAL Right 2019    HX CATARACT REMOVAL Left 2019    HX  SECTION  3780,8312    X2    HX GI      COLONOSCOPY    HX HIP FRACTURE TX Right 2019    Total R hip- Dr. Sneha Stacy HX HYSTERECTOMY  2009    LMP-53years old    HX KNEE ARTHROSCOPY Bilateral 1966    SCOPE of knees bilateral    HX KNEE REPLACEMENT Left     HX LITHOTRIPSY      X 2    HX LUMBAR LAMINECTOMY  2000    X2    HX MENISCECTOMY      left knee    HX ORTHOPAEDIC  12    left shoulder repair; 14 screws and 2 plates    HX ORTHOPAEDIC Right     BROKEN ARM SURGERY X2    HX ORTHOPAEDIC Left      REPAIR OF Left MENISCUS    HX ORTHOPAEDIC Left     BROKEN ARM    HX ORTHOPAEDIC Left 2019    hip replacement    HX POLYPECTOMY      HX TONSILLECTOMY      HX TUBAL LIGATION      BSPO adhesion conization abn pap    HX UROLOGICAL  2020    kidney stone removed Dr Velasquez Counter Left 2016         Family History:   Problem Relation Age of Onset    Cancer Mother         colon    OSTEOARTHRITIS Mother     Anesth Problems Mother         delayed awakening    Cancer Maternal Grandmother         stomach    Heart Disease Father 80    Heart Attack Maternal Grandfather [de-identified]    Heart Attack Paternal Grandmother 80    Diabetes Cousin         cervical cancer       Social History     Socioeconomic History    Marital status:      Spouse name: Not on file    Number of children: Not on file    Years of education: Not on file    Highest education level: Not on file   Occupational History    Not on file   Tobacco Use    Smoking status: Never Smoker    Smokeless tobacco: Never Used   Vaping Use    Vaping Use: Never used   Substance and Sexual Activity    Alcohol use: Yes     Alcohol/week: 0.8 standard drinks     Types: 1 Standard drinks or equivalent per week     Comment: RARE    Drug use: No    Sexual activity: Not on file   Other Topics Concern     Service No    Blood Transfusions Not Asked    Caffeine Concern No    Occupational Exposure Not Asked    Hobby Hazards No    Sleep Concern No    Stress Concern No    Weight Concern No    Special Diet No    Back Care Not Asked    Exercise Yes    Bike Helmet Yes    Seat Belt Yes    Self-Exams Yes   Social History Narrative    Not on file     Social Determinants of Health     Financial Resource Strain:     Difficulty of Paying Living Expenses: Not on file   Food Insecurity:     Worried About Running Out of Food in the Last Year: Not on file    [place] of Food in the Last Year: Not on file   Transportation Needs:     Lack of Transportation (Medical): Not on file    Lack of Transportation (Non-Medical):  Not on file   Physical Activity:     Days of Exercise per Week: Not on file    Minutes of Exercise per Session: Not on file   Stress:     Feeling of Stress : Not on file   Social Connections:     Frequency of Communication with Friends and Family: Not on file    Frequency of Social Gatherings with Friends and Family: Not on file    Attends [employer] Services: Not on file    Active Member of Clubs or Organizations: Not on file    Attends Club or Organization Meetings: Not on file    Marital Status: Not on file   Intimate Partner Violence:     Fear of Current or Ex-Partner: Not on file    Emotionally Abused: Not on file    Physically Abused: Not on file    Sexually Abused: Not on file   Housing Stability:     Unable to Pay for Housing in the Last Year: Not on file    Number of [place] in the Last Year: Not on file    Unstable Housing in the Last Year: Not on file         ALLERGIES: Codeine, Adhesive tape-silicones, and Wellbutrin [bupropion hcl]    Review of Systems   Constitutional: Negative for chills and fever. HENT: Negative for drooling, trouble swallowing and voice change. Eyes: Negative for visual disturbance. Respiratory: Negative for cough, chest tightness and shortness of breath. Cardiovascular: Positive for palpitations. Negative for chest pain and leg swelling. Gastrointestinal: Negative for abdominal pain, diarrhea, nausea and vomiting. Genitourinary: Negative for dysuria, vaginal bleeding and vaginal discharge. Musculoskeletal: Negative for back pain. Skin: Negative for rash. Neurological: Negative for facial asymmetry and speech difficulty. Psychiatric/Behavioral: Negative for behavioral problems. All other systems reviewed and are negative. Vitals:    03/30/22 2100 03/30/22 2115 03/30/22 2130 03/30/22 2145   BP: 129/74 122/76 130/73    Pulse: 67 71 72 73   Resp: 19 14 16 17   Temp:       SpO2:  94%  96%   Weight:       Height:                Physical Exam  Vitals and nursing note reviewed. Constitutional:       General: She is not in acute distress. Appearance: Normal appearance. She is well-developed. She is obese. She is not ill-appearing, toxic-appearing or diaphoretic. Comments: NAD, AxOx4, speaking in complete sentences       HENT:      Head: Normocephalic and atraumatic. Right Ear: External ear normal.      Left Ear: External ear normal.   Eyes:      General: No scleral icterus.         Right eye: No discharge. Left eye: No discharge. Extraocular Movements: Extraocular movements intact. Conjunctiva/sclera: Conjunctivae normal.      Pupils: Pupils are equal, round, and reactive to light. Neck:      Vascular: No JVD. Trachea: No tracheal deviation. Cardiovascular:      Rate and Rhythm: Normal rate and regular rhythm. Pulses: Normal pulses. Heart sounds: Normal heart sounds. No murmur heard. No friction rub. No gallop. Pulmonary:      Effort: Pulmonary effort is normal. No respiratory distress. Breath sounds: Normal breath sounds. No stridor. No wheezing, rhonchi or rales. Chest:      Chest wall: No tenderness. Abdominal:      General: Bowel sounds are normal.      Palpations: Abdomen is soft. Tenderness: There is no abdominal tenderness. There is no guarding or rebound. Hernia: No hernia is present. Genitourinary:     Comments: Pt denies urinary/ vaginal complaints  Musculoskeletal:         General: No tenderness, deformity or signs of injury. Normal range of motion. Cervical back: Normal range of motion and neck supple. Right lower leg: No edema. Left lower leg: No edema. Skin:     General: Skin is warm and dry. Capillary Refill: Capillary refill takes less than 2 seconds. Coloration: Skin is not pale. Findings: No bruising, erythema, lesion or rash. Neurological:      Mental Status: She is alert and oriented to person, place, and time. Cranial Nerves: No cranial nerve deficit. Sensory: No sensory deficit. Motor: No weakness or abnormal muscle tone. Coordination: Coordination normal.      Gait: Gait normal.      Deep Tendon Reflexes: Reflexes normal.   Psychiatric:         Behavior: Behavior normal.         Thought Content:  Thought content normal.          MDM       Procedures    Chief Complaint   Patient presents with    Irregular Heart Beat       8:47 PM  The patients presenting problems have been discussed, and they are in agreement with the care plan formulated and outlined with them. I have encouraged them to ask questions as they arise throughout their visit. MEDICATIONS GIVEN:  Medications - No data to display    LABS REVIEWED:  Labs Reviewed - No data to display    RADIOLOGY RESULTS:  The following have been ordered and reviewed:  _____________________________________________________________________  _____________________________________________________________________    EKG interpretation:   Rhythm: normal sinus rhythm; and regular . Rate (approx.): 82; Axis: normal; P wave: normal; QRS interval: normal ; ST/T wave: normal; Negative acute significant segmental elevations/ compared to study dated 02/11/2020 noted resolution of PVC's;       PROCEDURES:        CONSULTATIONS:       PROGRESS NOTES:      DIAGNOSIS:    1. Palpitations        PLAN:  1-Palp  / neg ed evaluation - will have pt follow-up with Cardiology;       ED COURSE: The patients hospital course has been uncomplicated. 9:59 PM  Brian Watson's  results have been reviewed with her. She has been counseled regarding her diagnosis. She verbally conveys understanding and agreement of the signs, symptoms, diagnosis, treatment and prognosis and additionally agrees to Call/ Arrange follow up as recommended with Dr. Александр Pham MD in 24 - 48 hours. She also agrees with the care-plan and conveys that all of her questions have been answered. I have also put together some discharge instructions for her that include: 1) educational information regarding their diagnosis, 2) how to care for their diagnosis at home, as well a 3) list of reasons why they would want to return to the ED prior to their follow-up appointment, should their condition change or for concerns.

## 2022-04-01 ENCOUNTER — OFFICE VISIT (OUTPATIENT)
Dept: FAMILY MEDICINE CLINIC | Age: 72
End: 2022-04-01
Payer: MEDICARE

## 2022-04-01 VITALS
BODY MASS INDEX: 32.95 KG/M2 | DIASTOLIC BLOOD PRESSURE: 72 MMHG | HEIGHT: 64 IN | OXYGEN SATURATION: 96 % | HEART RATE: 79 BPM | RESPIRATION RATE: 16 BRPM | WEIGHT: 193 LBS | SYSTOLIC BLOOD PRESSURE: 120 MMHG | TEMPERATURE: 98 F

## 2022-04-01 DIAGNOSIS — Z86.59 PERSONAL HISTORY OF ANXIETY DISORDER: ICD-10-CM

## 2022-04-01 DIAGNOSIS — R00.2 PALPITATION: Primary | ICD-10-CM

## 2022-04-01 DIAGNOSIS — F51.01 PRIMARY INSOMNIA: ICD-10-CM

## 2022-04-01 DIAGNOSIS — E66.09 CLASS 2 OBESITY DUE TO EXCESS CALORIES WITHOUT SERIOUS COMORBIDITY WITH BODY MASS INDEX (BMI) OF 36.0 TO 36.9 IN ADULT: ICD-10-CM

## 2022-04-01 DIAGNOSIS — R00.2 INTERMITTENT PALPITATIONS: ICD-10-CM

## 2022-04-01 PROCEDURE — 1090F PRES/ABSN URINE INCON ASSESS: CPT | Performed by: FAMILY MEDICINE

## 2022-04-01 PROCEDURE — G0463 HOSPITAL OUTPT CLINIC VISIT: HCPCS | Performed by: FAMILY MEDICINE

## 2022-04-01 PROCEDURE — 99214 OFFICE O/P EST MOD 30 MIN: CPT | Performed by: FAMILY MEDICINE

## 2022-04-01 PROCEDURE — G8399 PT W/DXA RESULTS DOCUMENT: HCPCS | Performed by: FAMILY MEDICINE

## 2022-04-01 PROCEDURE — G8427 DOCREV CUR MEDS BY ELIG CLIN: HCPCS | Performed by: FAMILY MEDICINE

## 2022-04-01 PROCEDURE — G9899 SCRN MAM PERF RSLTS DOC: HCPCS | Performed by: FAMILY MEDICINE

## 2022-04-01 PROCEDURE — 1101F PT FALLS ASSESS-DOCD LE1/YR: CPT | Performed by: FAMILY MEDICINE

## 2022-04-01 PROCEDURE — G8417 CALC BMI ABV UP PARAM F/U: HCPCS | Performed by: FAMILY MEDICINE

## 2022-04-01 PROCEDURE — G9717 DOC PT DX DEP/BP F/U NT REQ: HCPCS | Performed by: FAMILY MEDICINE

## 2022-04-01 PROCEDURE — 3017F COLORECTAL CA SCREEN DOC REV: CPT | Performed by: FAMILY MEDICINE

## 2022-04-01 PROCEDURE — G8536 NO DOC ELDER MAL SCRN: HCPCS | Performed by: FAMILY MEDICINE

## 2022-04-01 NOTE — PROGRESS NOTES
HISTORY OF PRESENT ILLNESS  HPI  Kriss Watson is a 72 y. o. female with a history of left knee DJD, osteoarthritis of right knee, thoracolumbar idiopathic scoliosis, adjustment disorder with anxiety and depression, kidney stones and hyperlipidemia with LDL goal <130, who presents to the office today for f/u from ER and for f/u of these health problems. She states that for 2-2.5 days her pulse was in the 90's. She first noticed this when her face got flushed and felt heart was racing. Her  checked her pulse and found her pulse to be in the 90's. Pt was seen at [facility] ER 03/30 for this. Pt's heart rate went from 94 to 102 and then to dropped to 55. She also had BP readings of 138/96 and then 138/72. Pt was discharged that day and was told to follow up with cardiologist. Her pulse since then has been in the mid-80s. Pt states her baseline pulse is in the 70's. Pt is not sure what caused this but notes that she has been busy lately. Pt denies unusual SOB, chest pain, and any other ER visits or hospitalizations since 03/30.            Past Medical History:   Diagnosis Date    Actinic keratosis     Left arm    Adjustment disorder with mixed anxiety and depressed mood     Adverse effect of anesthesia     HARD TO WAKE UP SLOW BREATHING ,Pt stated doesn't take much anesthesia    Arthritis     OSTEO    Chronic pain     Diverticulitis     DJD (degenerative joint disease), lumbar     seen on 2012 MRI- DDD and DJD    Kidney stones 95,96,98    left    Left knee DJD     Lumbar nerve root impingement     RLQ pain    Menopause     LMP-53years old    Nausea & vomiting     Other and unspecified hyperlipidemia     Other idiopathic scoliosis, thoracolumbar region 2/27/2018    Postmenopausal HRT (hormone replacement therapy)     Right knee DJD     Urine culture positive 2/14/2020     Past Surgical History:   Procedure Laterality Date    COLONOSCOPY N/A 9/8/2016    COLONOSCOPY performed by Basil Drew Luis Clifton MD at [facility] HX APPENDECTOMY  1966    HX CATARACT REMOVAL Right 2019    HX CATARACT REMOVAL Left 2019    HX  SECTION  8060,0441    X2    HX GI      COLONOSCOPY    HX HIP FRACTURE TX Right 2019    Total R hip- Dr. Hesham Solis HX HYSTERECTOMY  2009    LMP-53years old    HX KNEE ARTHROSCOPY Bilateral 1966    SCOPE of knees bilateral    HX KNEE REPLACEMENT Left     HX LITHOTRIPSY      X 2    HX LUMBAR LAMINECTOMY  2000    X2    HX MENISCECTOMY      left knee    HX ORTHOPAEDIC  12    left shoulder repair; 14 screws and 2 plates    HX ORTHOPAEDIC Right     BROKEN ARM SURGERY X2    HX ORTHOPAEDIC Left      REPAIR OF Left MENISCUS    HX ORTHOPAEDIC Left     BROKEN ARM    HX ORTHOPAEDIC Left 2019    hip replacement    HX POLYPECTOMY      HX TONSILLECTOMY      HX TUBAL LIGATION      BSPO adhesion conization abn pap    HX UROLOGICAL  2020    kidney stone removed Dr Aarti Ludwig Left      Current Outpatient Medications on File Prior to Visit   Medication Sig Dispense Refill    estradioL (ESTRACE) 1 mg tablet Take 1 Tablet by mouth daily. 90 Tablet 2    furosemide (LASIX) 20 mg tablet Take 1 Tablet by mouth two (2) times a day. Ok to fill today 60 Tablet 4    zolpidem (AMBIEN) 5 mg tablet TAKE 1 TABLET BY MOUTH NIGHTLEY AS NEEDED FOR SLEEP. MAX DAILY AMOUNT IS 1 TABLET 30 Tablet 5    [DISCONTINUED] phentermine (ADIPEX-P) 37.5 mg tablet TAKE ONE TABLET BY MOUTH EVERY MORNING (Patient not taking: Reported on 2022) 30 Tablet 0     No current facility-administered medications on file prior to visit.      Allergies   Allergen Reactions    Codeine Rash     Rash and swelling on arm    Adhesive Tape-Silicones Rash    Wellbutrin [Bupropion Hcl] Other (comments)     fatigue     Family History   Problem Relation Age of Onset    Cancer Mother         colon    OSTEOARTHRITIS Mother     Anesth Problems Mother         delayed awakening    Cancer Maternal Grandmother         stomach    Heart Disease Father 80    Heart Attack Maternal Grandfather [de-identified]    Heart Attack Paternal Grandmother 80    Diabetes Cousin         cervical cancer     Social History     Socioeconomic History    Marital status:    Tobacco Use    Smoking status: Never Smoker    Smokeless tobacco: Never Used   Vaping Use    Vaping Use: Never used   Substance and Sexual Activity    Alcohol use: Yes     Alcohol/week: 0.8 standard drinks     Types: 1 Standard drinks or equivalent per week     Comment: RARE    Drug use: No   Other Topics Concern     Service No    Caffeine Concern No    Hobby Hazards No    Sleep Concern No    Stress Concern No    Weight Concern No    Special Diet No    Exercise Yes    Bike Helmet Yes    Seat Belt Yes    Self-Exams Yes               Review of Systems   Constitutional: Negative for chills, diaphoresis, fever, malaise/fatigue and weight loss. Eyes: Negative for blurred vision, double vision, pain and redness. Respiratory: Negative for cough, shortness of breath and wheezing. Cardiovascular: Negative for chest pain, palpitations, orthopnea, claudication, leg swelling and PND. Skin: Negative for itching and rash. Neurological: Negative for dizziness, tingling, tremors, sensory change, speech change, focal weakness, seizures, loss of consciousness, weakness and headaches.      Results for orders placed or performed during the hospital encounter of 03/30/22   CBC WITH AUTOMATED DIFF   Result Value Ref Range    WBC 7.8 3.6 - 11.0 K/uL    RBC 5.08 3.80 - 5.20 M/uL    HGB 15.3 11.5 - 16.0 g/dL    HCT 47.5 (H) 35.0 - 47.0 %    MCV 93.5 80.0 - 99.0 FL    MCH 30.1 26.0 - 34.0 PG    MCHC 32.2 30.0 - 36.5 g/dL    RDW 12.9 11.5 - 14.5 %    PLATELET 702 423 - 539 K/uL    MPV 9.3 8.9 - 12.9 FL    NRBC 0.0 0  WBC    ABSOLUTE NRBC 0.00 0.00 - 0.01 K/uL NEUTROPHILS 58 32 - 75 %    LYMPHOCYTES 26 12 - 49 %    MONOCYTES 13 5 - 13 %    EOSINOPHILS 2 0 - 7 %    BASOPHILS 1 0 - 1 %    IMMATURE GRANULOCYTES 0 0.0 - 0.5 %    ABS. NEUTROPHILS 4.5 1.8 - 8.0 K/UL    ABS. LYMPHOCYTES 2.0 0.8 - 3.5 K/UL    ABS. MONOCYTES 1.0 0.0 - 1.0 K/UL    ABS. EOSINOPHILS 0.1 0.0 - 0.4 K/UL    ABS. BASOPHILS 0.1 0.0 - 0.1 K/UL    ABS. IMM. GRANS. 0.0 0.00 - 0.04 K/UL    DF AUTOMATED     METABOLIC PANEL, COMPREHENSIVE   Result Value Ref Range    Sodium 143 136 - 145 mmol/L    Potassium 3.9 3.5 - 5.1 mmol/L    Chloride 106 97 - 108 mmol/L    CO2 28 21 - 32 mmol/L    Anion gap 9 5 - 15 mmol/L    Glucose 89 65 - 100 mg/dL    BUN 16 6 - 20 MG/DL    Creatinine 0.68 0.55 - 1.02 MG/DL    BUN/Creatinine ratio 24 (H) 12 - 20      GFR est AA >60 >60 ml/min/1.73m2    GFR est non-AA >60 >60 ml/min/1.73m2    Calcium 9.1 8.5 - 10.1 MG/DL    Bilirubin, total 0.2 0.2 - 1.0 MG/DL    ALT (SGPT) 20 12 - 78 U/L    AST (SGOT) 7 (L) 15 - 37 U/L    Alk. phosphatase 97 45 - 117 U/L    Protein, total 7.1 6.4 - 8.2 g/dL    Albumin 3.2 (L) 3.5 - 5.0 g/dL    Globulin 3.9 2.0 - 4.0 g/dL    A-G Ratio 0.8 (L) 1.1 - 2.2     SAMPLES BEING HELD   Result Value Ref Range    SAMPLES BEING HELD 1 RED, 1 BLUE     COMMENT        Add-on orders for these samples will be processed based on acceptable specimen integrity and analyte stability, which may vary by analyte.    TROPONIN-HIGH SENSITIVITY   Result Value Ref Range    Troponin-High Sensitivity 9 0 - 51 ng/L   LIPASE   Result Value Ref Range    Lipase 138 73 - 393 U/L   MAGNESIUM   Result Value Ref Range    Magnesium 2.2 1.6 - 2.4 mg/dL   NT-PRO BNP   Result Value Ref Range    NT pro-BNP 81 0 - 125 PG/ML   EKG, 12 LEAD, INITIAL   Result Value Ref Range    Ventricular Rate 83 BPM    Atrial Rate 83 BPM    P-R Interval 140 ms    QRS Duration 72 ms    Q-T Interval 386 ms    QTC Calculation (Bezet) 453 ms    Calculated P Axis 41 degrees    Calculated R Axis -28 degrees    Calculated T Axis 7 degrees    Diagnosis       Normal sinus rhythm  When compared with ECG of 11-FEB-2020 12:27,  premature ventricular complexes are no longer present  premature atrial complexes are no longer present  Inverted T waves have replaced nonspecific T wave abnormality in Inferior   leads  Confirmed by Keeley Prasad M.D., Neel Angelina ([id number]) on 3/31/2022 7:53:50 PM               Physical Exam  Visit Vitals  /72 (BP 1 Location: Left arm, BP Patient Position: Sitting, BP Cuff Size: Large adult)   Pulse 79   Temp 98 °F (36.7 °C)   Resp 16   Ht 5' 4\" (1.626 m)   Wt 193 lb (87.5 kg)   LMP 08/24/1995   SpO2 96%   BMI 33.13 kg/m²       When listening to the pt's heart and pulse, it did not varying significantly    Head: Normocephalic, without obvious abnormality, atraumatic  Eyes: conjunctivae/corneas clear. PERRL, EOM's intact. Neck: supple, symmetrical, trachea midline, no adenopathy, thyroid: not enlarged, symmetric, no tenderness/mass/nodules, no carotid bruit and no JVD  Lungs: clear to auscultation bilaterally  Heart: regular rate and rhythm, S1, S2 normal, no murmur, click, rub or gallop  Extremities: extremities normal, atraumatic, no cyanosis or edema  Pulses: 2+ and symmetric  Lymph nodes: Cervical, supraclavicular, and axillary nodes normal.  Neurologic: Grossly normal        ASSESSMENT and PLAN    ICD-10-CM ICD-9-CM    1. Palpitation  R00.2 785.1 CARDIAC HOLTER MONITOR   2. Class 2 obesity due to excess calories without serious comorbidity with body mass index (BMI) of 36.0 to 36.9 in adult  E66.09 278.00     Z68.36 V85.36    3. Primary insomnia  F51.01 307.42    4. Intermittent palpitations  R00.2 785.1    5. Personal history of anxiety disorder  Z86.59 V11.8      Diagnoses and all orders for this visit:    1. Palpitation  -     CARDIAC HOLTER MONITOR; Future    2. Class 2 obesity due to excess calories without serious comorbidity with body mass index (BMI) of 36.0 to 36.9 in adult    3. Primary insomnia    4. Intermittent palpitations    5. Personal history of anxiety disorder          reviewed medications and side effects in detail  Please call my office if there are any questions- [phone]. Discussed expected course/resolution/complications of diagnosis in detail with patient. Medication risks/benefits/costs/interactions/alternatives discussed with patient. Pt was given an after visit summary which includes diagnoses, current medications & vitals. Pt expressed understanding with the diagnosis and plan. Patient to call if no better in 3 -4 days and prn new problems. BMI is significantly elevated- in the obese range. I reviewed diet, exercise and weight control. Discussed weight control in detail, the importance of mainly decreased carbs, and for weight maintenance, exercise; discussed different diets and that it isn't as important to watch the type of foods as it is to decrease calorie intake no matter what type of diet you do, etc.       Total 30 minutes  re: Reviewed symptoms, or lack thereof, of hypertension and elevated cholesterol. Regular exercise is very important to your health; it helps mentally, physically, socially; it prevents injuries if done properly. Exercise, even as simple as walking 20-30 minutes daily has major benefits to your health even though your \"numbers\" are the same in the lab. See if you can add this into your daily regimen and after a few months it will become a regular habit-\"just something you do,\" like brushing your teeth. A combination of aerobic exercise and strengthening and stretching is felt to be the best for you, so this should be your ultimate goal.   This can be done in the privacy of your home or in a group setting as at the gym  Some prefer having a , others prefer to do exercise in groups or individually. Do what \"works\" for you. You need to make it simple and \"fun,\" or you most likely will not continue it. Recommended a weekly \"heart check. \" I went into detail how to do this. Also, discussed symptoms of concern that were noted today in the note above, treatment options( including doing nothing), when to follow up before recommended time frame. Also, answered all questions. Pt gives a hx of varying pulse of 55 to 100 even though she is not doing any physical activity, so we will set her up for a Holter monitor. I told her if she has constant symptoms of palpitations and she is either feeling lightheaded or having an actual faint, then she should definitely head to the ER at that point. When she has her Holter monitor, she will note any symptoms she is having related to palpitations in general. She should try to avoid stimulants that would cause palpitations and I reviewed those with her. Lab work was done recently here in the office and at [facility], and those were unrevealing. I did encouraged her to get a BP cuff so she could monitor her BP as well as her pulse, but she needs to at the same time check for regularity of pulse. She should let me know if it is regular or not during tachycardia or bradycardia, which is very important. We talked briefly about treatment for irregular pulse and it might involve medication, a procedure like ablation, or putting in a pacemaker. It all depends on what we find. This document was written by Niki Ramires, as dictated by Roseline Maravilla MD.   I have reviewed and agree with the above note and have made corrections where appropriate Jim Tipton M.D.

## 2022-04-11 DIAGNOSIS — F51.01 PRIMARY INSOMNIA: ICD-10-CM

## 2022-04-12 RX ORDER — ZOLPIDEM TARTRATE 5 MG/1
TABLET ORAL
Qty: 30 TABLET | Refills: 5 | Status: SHIPPED | OUTPATIENT
Start: 2022-04-12 | End: 2022-09-06 | Stop reason: SDUPTHER

## 2022-04-12 NOTE — TELEPHONE ENCOUNTER
We transferred the Ambien from [facility] to [facility] and they would not transfer the refill.  So the % R are not available

## 2022-04-12 NOTE — TELEPHONE ENCOUNTER
----- Message from Kathy Schmitz. Avelina Lefort sent at 4/12/2022  7:03 AM EDT -----  Regarding: Medication  Pat,    We transferred the Ambien from [facility] to [facility] and they would not transfer the refill. Could you please call them call them at [phone] for the refill today ?   thank you

## 2022-04-14 ENCOUNTER — HOSPITAL ENCOUNTER (OUTPATIENT)
Dept: NON INVASIVE DIAGNOSTICS | Age: 72
Discharge: HOME | End: 2022-04-14
Attending: FAMILY MEDICINE
Payer: MEDICARE

## 2022-04-14 DIAGNOSIS — R00.2 PALPITATION: ICD-10-CM

## 2022-04-14 PROCEDURE — 93225 XTRNL ECG REC<48 HRS REC: CPT

## 2022-04-27 PROCEDURE — 93227 XTRNL ECG REC<48 HR R&I: CPT | Performed by: SPECIALIST

## 2022-06-03 ENCOUNTER — PATIENT OUTREACH (OUTPATIENT)
Dept: FAMILY MEDICINE CLINIC | Age: 72
End: 2022-06-03

## 2022-06-03 NOTE — PROGRESS NOTES
Please call her- has extra beats but noting to worry about- they will increase with stimulants( caffeine, sudafed, etc), stress and age but are completely harmless

## 2022-06-14 ENCOUNTER — OFFICE VISIT (OUTPATIENT)
Dept: ORTHOPEDIC SURGERY | Age: 72
End: 2022-06-14

## 2022-06-14 VITALS — HEIGHT: 64 IN | WEIGHT: 193 LBS | BODY MASS INDEX: 32.95 KG/M2

## 2022-06-14 DIAGNOSIS — M67.441 MUCOUS CYST OF DIGIT OF RIGHT HAND: ICD-10-CM

## 2022-06-14 DIAGNOSIS — M79.644 CHRONIC PAIN OF RIGHT THUMB: Primary | ICD-10-CM

## 2022-06-14 DIAGNOSIS — G89.29 CHRONIC PAIN OF RIGHT THUMB: Primary | ICD-10-CM

## 2022-06-14 PROCEDURE — 1123F ACP DISCUSS/DSCN MKR DOCD: CPT | Performed by: ORTHOPAEDIC SURGERY

## 2022-06-14 PROCEDURE — 99203 OFFICE O/P NEW LOW 30 MIN: CPT | Performed by: ORTHOPAEDIC SURGERY

## 2022-06-14 NOTE — PROGRESS NOTES
Mary Escalante (: 1950) is a 74 y.o. female patient here for evaluation of the following chief complaint(s):  Thumb Pain (right thumb)       ASSESSMENT/PLAN:  Below is the assessment and plan developed based on review of pertinent history, physical exam, labs, studies, and medications. 1. Chronic pain of right thumb  -     XR THUMB RT MIN 2 V; Future  2. Mucous cyst of digit of left hand      33-year-old female with a right thumb mucous cyst.  We discussed treatment options including observation. Right now is really small and not really bothersome to her. We will just observe this for now. If it gets larger again could consider excision. If she notes any changes she can call for follow-up. She can also use over-the-counter meds if needed for pain control if it worsens. Patient verbalized understanding and elected to proceed. All questions were answered to the patient's apparent satisfaction. SUBJECTIVE/OBJECTIVE:  HPI    33-year-old female was referred to me by her dermatologist for a right thumb mass. Patient states that it comes and goes in size and when it gets larger gets painful but right now has gotten smaller and is not really painful at this time. Patient reports a gradual onset of symptoms. Duration of problem 4 months. Symptom Severity 5/10  Symptom Frequency intermittent        Allergies   Allergen Reactions    Codeine Rash     Rash and swelling on arm    Adhesive Tape-Silicones Rash    Wellbutrin [Bupropion Hcl] Other (comments)     fatigue       Current Outpatient Medications   Medication Sig    zolpidem (AMBIEN) 5 mg tablet TAKE 1 TABLET BY MOUTH EVERY DAY IN THE EVENING AS NEEDED SLEEP    estradioL (ESTRACE) 1 mg tablet Take 1 Tablet by mouth daily.  furosemide (LASIX) 20 mg tablet Take 1 Tablet by mouth two (2) times a day. Ok to fill today     No current facility-administered medications for this visit.        Social History     Socioeconomic History  Marital status:      Spouse name: Not on file    Number of children: Not on file    Years of education: Not on file    Highest education level: Not on file   Occupational History    Not on file   Tobacco Use    Smoking status: Never Smoker    Smokeless tobacco: Never Used   Vaping Use    Vaping Use: Never used   Substance and Sexual Activity    Alcohol use: Yes     Alcohol/week: 0.8 standard drinks     Types: 1 Standard drinks or equivalent per week     Comment: RARE    Drug use: No    Sexual activity: Not on file   Other Topics Concern     Service No    Blood Transfusions Not Asked    Caffeine Concern No    Occupational Exposure Not Asked    Hobby Hazards No    Sleep Concern No    Stress Concern No    Weight Concern No    Special Diet No    Back Care Not Asked    Exercise Yes    Bike Helmet Yes    Seat Belt Yes    Self-Exams Yes   Social History Narrative    Not on file     Social Determinants of Health     Financial Resource Strain:     Difficulty of Paying Living Expenses: Not on file   Food Insecurity:     Worried About Running Out of Food in the Last Year: Not on file    Cintia of Food in the Last Year: Not on file   Transportation Needs:     Lack of Transportation (Medical): Not on file    Lack of Transportation (Non-Medical):  Not on file   Physical Activity:     Days of Exercise per Week: Not on file    Minutes of Exercise per Session: Not on file   Stress:     Feeling of Stress : Not on file   Social Connections:     Frequency of Communication with Friends and Family: Not on file    Frequency of Social Gatherings with Friends and Family: Not on file    Attends Congregation Services: Not on file    Active Member of Clubs or Organizations: Not on file    Attends Club or Organization Meetings: Not on file    Marital Status: Not on file   Intimate Partner Violence:     Fear of Current or Ex-Partner: Not on file    Emotionally Abused: Not on file   Deidra Manual Physically Abused: Not on file    Sexually Abused: Not on file   Housing Stability:     Unable to Pay for Housing in the Last Year: Not on file    Number of Places Lived in the Last Year: Not on file    Unstable Housing in the Last Year: Not on file       Past Surgical History:   Procedure Laterality Date    COLONOSCOPY N/A 2016    COLONOSCOPY performed by Jojo Mariee MD at [address] [facility] Right 2019    HX CATARACT REMOVAL Left 2019    HX  SECTION  0141,3094    X2    HX GI      COLONOSCOPY    HX HIP FRACTURE TX Right 2019    Total R hip- Dr. Singh Mares HX HYSTERECTOMY  2009    LMP-53years old    HX KNEE ARTHROSCOPY Bilateral 1966    SCOPE of knees bilateral    HX KNEE REPLACEMENT Left     HX LITHOTRIPSY      X 2    HX LUMBAR LAMINECTOMY  2000    X2    HX MENISCECTOMY      left knee    HX ORTHOPAEDIC  12    left shoulder repair; 14 screws and 2 plates    HX ORTHOPAEDIC Right     BROKEN ARM SURGERY X2    HX ORTHOPAEDIC Left      REPAIR OF Left MENISCUS    HX ORTHOPAEDIC Left     BROKEN ARM    HX ORTHOPAEDIC Left 2019    hip replacement    HX POLYPECTOMY      HX TONSILLECTOMY      HX TUBAL LIGATION      BSPO adhesion conization abn pap    HX UROLOGICAL  2020    kidney stone removed Dr Roby Humphreys Left 2016       Family History   Problem Relation Age of Onset    Cancer Mother         colon    OSTEOARTHRITIS Mother     Anesth Problems Mother         delayed awakening    Cancer Maternal Grandmother         stomach    Heart Disease Father 80    Heart Attack Maternal Grandfather [de-identified]    Heart Attack Paternal Grandmother 80    Diabetes Cousin         cervical cancer            Review of Systems    No flowsheet data found.         Vitals:  Ht 5' 4\" (1.626 m)   Wt 193 lb (87.5 kg)   LMP 1995   BMI 33.13 kg/m²    Estimated body surface area is 1.99 meters squared as calculated from the following:    Height as of this encounter: 5' 4\" (1.626 m). Weight as of this encounter: 193 lb (87.5 kg). Body mass index is 33.13 kg/m². Physical Exam    Musculoskeletal Exam:    Right Upper Extremity EXAMINATION    No tenderness to palpation about the upper extremity. No crepitus with range of motion. No redness, warmth or erythema. No palpable masses. Patient fires AIN, PIN and ulnar nerves. Sensation is grossly intact in the median, radial and ulnar distribution. Hand is pink and appears well-perfused. Hand is warm. Skin is intact. Compartments are soft and compressible. Patient has small area of fullness on the dorsal and radial aspect of the thumb consistent with mucous cyst.  This is very small and not tender to palpation today. No ridging of the nail is noted. Consitutional: Healthy  Skin:   - Edema - none  - Cellulitis - No    Neuro: Numbness or tingling in R/L arm: No    Psych: Affect normal    Cardiovascular: Capillary Refill < 2 seconds in upper extremities    Respiratory: Non-Labored Breathing    ROS:    Constitutional: Denies fever/chills    Respiratory: Denies SOB        Imaging:    XR Results (most recent):  Results from Appointment encounter on 06/14/22    XR THUMB RT MIN 2 V    Narrative  Right Thumb Xray  Indication: pain  Views: 3 views, AP/LAT/OBL    Interpretation: 3 views of the right thumb show evidence of mild arthritic changes at the [facility] joint. Additionally there is mild subluxation of the [facility] joint. There are no other acute changes noted. There is no evidence of osteophyte formation. Patient does have a mild OA at the IP joint. With mild osteophyte formation. No evidence of mass lesion.           Orders Placed This Encounter    XR THUMB RT MIN 2 V     Standing Status:   Future     Number of Occurrences:   1     Standing Expiration Date:   6/15/2023            An electronic signature was used to authenticate this note.   -- Shasta Rhodes MD

## 2022-06-27 ENCOUNTER — TRANSCRIBE ORDER (OUTPATIENT)
Dept: SCHEDULING | Age: 72
End: 2022-06-27

## 2022-06-27 DIAGNOSIS — Z12.31 SCREENING MAMMOGRAM FOR HIGH-RISK PATIENT: Primary | ICD-10-CM

## 2022-07-19 ENCOUNTER — TELEPHONE (OUTPATIENT)
Dept: FAMILY MEDICINE CLINIC | Age: 72
End: 2022-07-19

## 2022-07-19 NOTE — TELEPHONE ENCOUNTER
Spoke with pt offered her an In Office Appt with  on 11/7/22 for a follow-up for her weight. Pt state's that will not work for her they are going out of town in August and need's to do a weight check before then. Can we schedule a weight check as a nurse visit on one of the nurse day's?

## 2022-07-19 NOTE — TELEPHONE ENCOUNTER
----- Message from Waleska Elizondo sent at 7/19/2022 10:35 AM EDT -----  Subject: Appointment Request    Reason for Call: Established Patient Appointment needed: Urgent (Patient   Request) No Script    QUESTIONS    Reason for appointment request? Available appointments did not meet   patient need     Additional Information for Provider? Patient would like to schedule an   appointment for her weight check.  Please return the call.  ---------------------------------------------------------------------------  --------------  [id number] [employer]  [phone]; OK to leave message on voicemail  ---------------------------------------------------------------------------  --------------  SCRIPT ANSWERS  BABSID Screen: Dago Ruelas

## 2022-07-22 NOTE — TELEPHONE ENCOUNTER
Patient is scheduled for 11/7/2022 with Dr. Lon Carrillo. Patient last seen 4/2022 , follow up visit for 6 months ( 10/2022 ). Patient will need to be seen by Dr. Lon Carrillo.   Wm Whiting LPN

## 2022-08-04 ENCOUNTER — HOSPITAL ENCOUNTER (OUTPATIENT)
Dept: MAMMOGRAPHY | Age: 72
Discharge: HOME | End: 2022-08-04
Attending: FAMILY MEDICINE
Payer: MEDICARE

## 2022-08-04 DIAGNOSIS — Z12.31 SCREENING MAMMOGRAM FOR HIGH-RISK PATIENT: ICD-10-CM

## 2022-08-04 PROCEDURE — 77063 BREAST TOMOSYNTHESIS BI: CPT

## 2022-08-08 DIAGNOSIS — R60.0 LOCALIZED EDEMA: ICD-10-CM

## 2022-08-08 RX ORDER — FUROSEMIDE 20 MG/1
TABLET ORAL
Qty: 60 TABLET | Refills: 0 | Status: SHIPPED | OUTPATIENT
Start: 2022-08-08 | End: 2022-09-05

## 2022-09-02 ENCOUNTER — PATIENT MESSAGE (OUTPATIENT)
Dept: FAMILY MEDICINE CLINIC | Age: 72
End: 2022-09-02

## 2022-09-02 DIAGNOSIS — Z12.11 SCREEN FOR COLON CANCER: Primary | ICD-10-CM

## 2022-09-05 DIAGNOSIS — R60.0 LOCALIZED EDEMA: ICD-10-CM

## 2022-09-05 RX ORDER — FUROSEMIDE 20 MG/1
TABLET ORAL
Qty: 60 TABLET | Refills: 0 | Status: SHIPPED | OUTPATIENT
Start: 2022-09-05 | End: 2022-10-03

## 2022-09-06 DIAGNOSIS — F51.01 PRIMARY INSOMNIA: ICD-10-CM

## 2022-09-06 NOTE — TELEPHONE ENCOUNTER
----- Message from Carmela Tomlinson. Catie Wells sent at 9/2/2022  4:35 PM EDT -----  Regarding: Colonoscopy   I received a letter from [address] [place] [address]. ,Dr Jitendra Lee. The letter is about my appointment on Sept 27th. They say I must have a referral prior to my appointment to be faxed to [phone]. Can you send the referral?  Thank you.

## 2022-09-06 NOTE — TELEPHONE ENCOUNTER
----- Message from Quan Medina sent at 9/6/2022  2:26 PM EDT -----  Regarding: Colonoscopy   Sorry to bother you. [place] are leaving for[place] end of this month and my Ambian isnt due until Oct 5. If I transfer the prescription to [facility] then I have to come back to see Dr Bharat Dykes to have it sent back here. Can I  a prescription from you to take with me?   Thanks

## 2022-09-07 RX ORDER — ZOLPIDEM TARTRATE 5 MG/1
5 TABLET ORAL
Qty: 30 TABLET | Refills: 0 | Status: SHIPPED | OUTPATIENT
Start: 2022-09-07

## 2022-09-19 LAB — COLONOSCOPY, EXTERNAL: NORMAL

## 2022-10-03 DIAGNOSIS — R60.0 LOCALIZED EDEMA: ICD-10-CM

## 2022-10-03 RX ORDER — FUROSEMIDE 20 MG/1
TABLET ORAL
Qty: 60 TABLET | Refills: 0 | Status: SHIPPED | OUTPATIENT
Start: 2022-10-03

## 2022-11-07 ENCOUNTER — OFFICE VISIT (OUTPATIENT)
Dept: FAMILY MEDICINE CLINIC | Age: 72
End: 2022-11-07
Payer: MEDICARE

## 2022-11-07 VITALS
HEIGHT: 64 IN | SYSTOLIC BLOOD PRESSURE: 118 MMHG | OXYGEN SATURATION: 96 % | WEIGHT: 193 LBS | HEART RATE: 115 BPM | BODY MASS INDEX: 32.95 KG/M2 | DIASTOLIC BLOOD PRESSURE: 80 MMHG | TEMPERATURE: 97.7 F | RESPIRATION RATE: 17 BRPM

## 2022-11-07 DIAGNOSIS — F51.05 INSOMNIA DUE TO ANXIETY AND FEAR: ICD-10-CM

## 2022-11-07 DIAGNOSIS — Z79.890 POSTMENOPAUSAL HRT (HORMONE REPLACEMENT THERAPY): ICD-10-CM

## 2022-11-07 DIAGNOSIS — Z91.89: ICD-10-CM

## 2022-11-07 DIAGNOSIS — E66.09 CLASS 2 OBESITY DUE TO EXCESS CALORIES WITHOUT SERIOUS COMORBIDITY WITH BODY MASS INDEX (BMI) OF 36.0 TO 36.9 IN ADULT: Primary | ICD-10-CM

## 2022-11-07 DIAGNOSIS — F40.9 INSOMNIA DUE TO ANXIETY AND FEAR: ICD-10-CM

## 2022-11-07 DIAGNOSIS — F51.01 PRIMARY INSOMNIA: ICD-10-CM

## 2022-11-07 DIAGNOSIS — Z71.3 WEIGHT LOSS COUNSELING, ENCOUNTER FOR: ICD-10-CM

## 2022-11-07 DIAGNOSIS — E78.5 HYPERLIPIDEMIA WITH TARGET LOW DENSITY LIPOPROTEIN (LDL) CHOLESTEROL LESS THAN 130 MG/DL: ICD-10-CM

## 2022-11-07 PROCEDURE — 1123F ACP DISCUSS/DSCN MKR DOCD: CPT | Performed by: FAMILY MEDICINE

## 2022-11-07 PROCEDURE — 99214 OFFICE O/P EST MOD 30 MIN: CPT | Performed by: FAMILY MEDICINE

## 2022-11-07 PROCEDURE — G9899 SCRN MAM PERF RSLTS DOC: HCPCS | Performed by: FAMILY MEDICINE

## 2022-11-07 PROCEDURE — G0463 HOSPITAL OUTPT CLINIC VISIT: HCPCS | Performed by: FAMILY MEDICINE

## 2022-11-07 PROCEDURE — G8536 NO DOC ELDER MAL SCRN: HCPCS | Performed by: FAMILY MEDICINE

## 2022-11-07 PROCEDURE — G8417 CALC BMI ABV UP PARAM F/U: HCPCS | Performed by: FAMILY MEDICINE

## 2022-11-07 PROCEDURE — G9717 DOC PT DX DEP/BP F/U NT REQ: HCPCS | Performed by: FAMILY MEDICINE

## 2022-11-07 PROCEDURE — 1101F PT FALLS ASSESS-DOCD LE1/YR: CPT | Performed by: FAMILY MEDICINE

## 2022-11-07 PROCEDURE — G8427 DOCREV CUR MEDS BY ELIG CLIN: HCPCS | Performed by: FAMILY MEDICINE

## 2022-11-07 PROCEDURE — G8399 PT W/DXA RESULTS DOCUMENT: HCPCS | Performed by: FAMILY MEDICINE

## 2022-11-07 PROCEDURE — 3017F COLORECTAL CA SCREEN DOC REV: CPT | Performed by: FAMILY MEDICINE

## 2022-11-07 PROCEDURE — 1090F PRES/ABSN URINE INCON ASSESS: CPT | Performed by: FAMILY MEDICINE

## 2022-11-07 RX ORDER — PHENTERMINE HYDROCHLORIDE 37.5 MG/1
TABLET ORAL
Qty: 30 TABLET | Refills: 0 | Status: SHIPPED | OUTPATIENT
Start: 2022-11-07

## 2022-11-07 RX ORDER — ZOLPIDEM TARTRATE 5 MG/1
5 TABLET ORAL
Qty: 30 TABLET | Refills: 3 | Status: SHIPPED | OUTPATIENT
Start: 2022-11-07

## 2022-11-07 NOTE — PROGRESS NOTES
No chief complaint on file. 1. \"Have you been to the ER, urgent care clinic since your last visit? Hospitalized since your last visit? \" No    2. \"Have you seen or consulted any other health care providers outside of the [address] [place] [address] since your last visit? \" No     3. For patients aged 39-70: Has the patient had a colonoscopy / FIT/ Cologuard? Yes - no Care Gap present      If the patient is female:    4. For patients aged 41-77: Has the patient had a mammogram within the past 2 years? Yes - no Care Gap present      5. For patients aged 21-65: Has the patient had a pap smear?  NA - based on age or sex         3 most recent PHQ Screens 11/7/2022   Little interest or pleasure in doing things Not at all   Feeling down, depressed, irritable, or hopeless Not at all   Total Score PHQ 2 0       Health Maintenance Due   Topic Date Due    Shingrix Vaccine Age 49> (1 of 2) Never done    COVID-19 Vaccine (3 - Booster for Liquid State series) 01/26/2022    Medicare Yearly Exam  07/13/2022    Flu Vaccine (1) 08/01/2022

## 2022-11-07 NOTE — PROGRESS NOTES
HISTORY OF PRESENT ILLNESS  HPI  Genoveva Harris is a 74 y.o. female with a history of left knee DJD, osteoarthritis of right knee, thoracolumbar idiopathic scoliosis, adjustment disorder with anxiety and depression, kidney stones and hyperlipidemia with LDL goal <130, who presents to the office today for f/u of these health problems. Pt comes in to follow up on her phentermine that she uses intermittently. She keeps her weight down with dieting as well. Pt notes she was seen by orthopedist for her hip fracture and was recommended to walk in the water of a pool. She seems to have done well with that. Pt states she stopped her estradiol for a time, but a month after stopping that she had return of her hot flashes. She had hot flashes about once a week. Her flashes have not come back since getting back on the estradiol but she asks how long she has to do this since she has been on estrogen since she was 37 y.o. Pt takes Ambien 5 mg daily and asks for refill of that. She states because she had that Rx transferred to [place] while she was staying there, the pharmacy cannot transfer the Rx to her local pharmacy without permission from her prescribing provider. Pt denies unusual SOB, chest pain, and any recent ER visits or hospitalizations.        Past Medical History:   Diagnosis Date    Actinic keratosis     Left arm    Adjustment disorder with mixed anxiety and depressed mood     Adverse effect of anesthesia     HARD TO WAKE UP SLOW BREATHING ,Pt stated doesn't take much anesthesia    Arthritis     OSTEO    Chronic pain     Delayed emergence from anesthesia     Diverticulitis     DJD (degenerative joint disease), lumbar     seen on 2012 MRI- DDD and DJD    Kidney stones 95,96,98    left    Left knee DJD     Lumbar nerve root impingement     RLQ pain    Menopause     LMP-53years old    Nausea & vomiting     Other and unspecified hyperlipidemia     Other idiopathic scoliosis, thoracolumbar region 2018    Postmenopausal HRT (hormone replacement therapy)     Right knee DJD     Urine culture positive 2020     Past Surgical History:   Procedure Laterality Date    COLONOSCOPY N/A 2016    COLONOSCOPY performed by Ale Torres MD at [address] [place] [address]    HX CATARACT REMOVAL Right 2019    HX CATARACT REMOVAL Left 2019    HX  SECTION  9700,5969    X2    HX GI      COLONOSCOPY    HX HIP FRACTURE TX Right 2019    Total R hip- Dr. Mac Whittington HYSTERECTOMY  2009    LMP-53years old    HX KNEE ARTHROSCOPY Bilateral 1966    SCOPE of knees bilateral    HX KNEE REPLACEMENT Left     HX LITHOTRIPSY      X 2    HX LUMBAR LAMINECTOMY  2000    X2    HX MENISCECTOMY      left knee    HX ORTHOPAEDIC  12    left shoulder repair; 14 screws and 2 plates    HX ORTHOPAEDIC Right     BROKEN ARM SURGERY X2    HX ORTHOPAEDIC Left      REPAIR OF Left MENISCUS    HX ORTHOPAEDIC Left     BROKEN ARM    HX ORTHOPAEDIC Left 2019    hip replacement    HX POLYPECTOMY  2013    HX TONSILLECTOMY  1956    HX TUBAL LIGATION  1981    BSPO adhesion conization abn pap    HX UROLOGICAL  2020    kidney stone removed Dr Job Peterson Left      Current Outpatient Medications on File Prior to Visit   Medication Sig Dispense Refill    furosemide (LASIX) 20 mg tablet TAKE 1 TABLET BY MOUTH TWICE A DAY 60 Tablet 0    estradioL (ESTRACE) 1 mg tablet Take 1 Tablet by mouth daily. 90 Tablet 2     No current facility-administered medications on file prior to visit.      Allergies   Allergen Reactions    Codeine Rash     Rash and swelling on arm    Adhesive Tape-Silicones Rash    Wellbutrin [Bupropion Hcl] Other (comments)     fatigue     Family History   Problem Relation Age of Onset    Cancer Mother         colon    OSTEOARTHRITIS Mother     Anesth Problems Mother         delayed awakening    Cancer Maternal Grandmother stomach    Heart Disease Father 80    Heart Attack Maternal Grandfather [de-identified]    Heart Attack Paternal Grandmother 80    Diabetes Cousin         cervical cancer     Social History     Socioeconomic History    Marital status:    Tobacco Use    Smoking status: Never    Smokeless tobacco: Never   Vaping Use    Vaping Use: Never used   Substance and Sexual Activity    Alcohol use: Yes     Alcohol/week: 0.8 standard drinks     Types: 1 Standard drinks or equivalent per week     Comment: RARE    Drug use: No   Other Topics Concern     Service No    Caffeine Concern No    Hobby Hazards No    Sleep Concern No    Stress Concern No    Weight Concern No    Special Diet No    Exercise Yes    Bike Helmet Yes    Seat Belt Yes    Self-Exams Yes     Social Determinants of Health     Financial Resource Strain: Low Risk     Difficulty of Paying Living Expenses: Not hard at all   Food Insecurity: No Food Insecurity    Worried About Running Out of Food in the Last Year: Never true    Ran Out of Food in the Last Year: Never true                 Review of Systems   Constitutional:  Negative for chills, diaphoresis, fever, malaise/fatigue and weight loss. Eyes:  Negative for blurred vision, double vision, pain and redness. Respiratory:  Negative for cough, shortness of breath and wheezing. Cardiovascular:  Negative for chest pain, palpitations, orthopnea, claudication, leg swelling and PND. Skin:  Negative for itching and rash. Neurological:  Negative for dizziness, tingling, tremors, sensory change, speech change, focal weakness, seizures, loss of consciousness, weakness and headaches.    Results for orders placed or performed during the hospital encounter of 03/30/22   CBC WITH AUTOMATED DIFF   Result Value Ref Range    WBC 7.8 3.6 - 11.0 K/uL    RBC 5.08 3.80 - 5.20 M/uL    HGB 15.3 11.5 - 16.0 g/dL    HCT 47.5 (H) 35.0 - 47.0 %    MCV 93.5 80.0 - 99.0 FL    MCH 30.1 26.0 - 34.0 PG    MCHC 32.2 30.0 - 36.5 g/dL RDW 12.9 11.5 - 14.5 %    PLATELET 697 498 - 277 K/uL    MPV 9.3 8.9 - 12.9 FL    NRBC 0.0 0  WBC    ABSOLUTE NRBC 0.00 0.00 - 0.01 K/uL    NEUTROPHILS 58 32 - 75 %    LYMPHOCYTES 26 12 - 49 %    MONOCYTES 13 5 - 13 %    EOSINOPHILS 2 0 - 7 %    BASOPHILS 1 0 - 1 %    IMMATURE GRANULOCYTES 0 0.0 - 0.5 %    ABS. NEUTROPHILS 4.5 1.8 - 8.0 K/UL    ABS. LYMPHOCYTES 2.0 0.8 - 3.5 K/UL    ABS. MONOCYTES 1.0 0.0 - 1.0 K/UL    ABS. EOSINOPHILS 0.1 0.0 - 0.4 K/UL    ABS. BASOPHILS 0.1 0.0 - 0.1 K/UL    ABS. IMM. GRANS. 0.0 0.00 - 0.04 K/UL    DF AUTOMATED     METABOLIC PANEL, COMPREHENSIVE   Result Value Ref Range    Sodium 143 136 - 145 mmol/L    Potassium 3.9 3.5 - 5.1 mmol/L    Chloride 106 97 - 108 mmol/L    CO2 28 21 - 32 mmol/L    Anion gap 9 5 - 15 mmol/L    Glucose 89 65 - 100 mg/dL    BUN 16 6 - 20 MG/DL    Creatinine 0.68 0.55 - 1.02 MG/DL    BUN/Creatinine ratio 24 (H) 12 - 20      GFR est AA >60 >60 ml/min/1.73m2    GFR est non-AA >60 >60 ml/min/1.73m2    Calcium 9.1 8.5 - 10.1 MG/DL    Bilirubin, total 0.2 0.2 - 1.0 MG/DL    ALT (SGPT) 20 12 - 78 U/L    AST (SGOT) 7 (L) 15 - 37 U/L    Alk. phosphatase 97 45 - 117 U/L    Protein, total 7.1 6.4 - 8.2 g/dL    Albumin 3.2 (L) 3.5 - 5.0 g/dL    Globulin 3.9 2.0 - 4.0 g/dL    A-G Ratio 0.8 (L) 1.1 - 2.2     SAMPLES BEING HELD   Result Value Ref Range    SAMPLES BEING HELD 1 RED, 1 BLUE     COMMENT        Add-on orders for these samples will be processed based on acceptable specimen integrity and analyte stability, which may vary by analyte.    TROPONIN-HIGH SENSITIVITY   Result Value Ref Range    Troponin-High Sensitivity 9 0 - 51 ng/L   LIPASE   Result Value Ref Range    Lipase 138 73 - 393 U/L   MAGNESIUM   Result Value Ref Range    Magnesium 2.2 1.6 - 2.4 mg/dL   NT-PRO BNP   Result Value Ref Range    NT pro-BNP 81 0 - 125 PG/ML   EKG, 12 LEAD, INITIAL   Result Value Ref Range    Ventricular Rate 83 BPM    Atrial Rate 83 BPM    P-R Interval 140 ms    QRS Duration 72 ms    Q-T Interval 386 ms    QTC Calculation (Bezet) 453 ms    Calculated P Axis 41 degrees    Calculated R Axis -28 degrees    Calculated T Axis 7 degrees    Diagnosis       Normal sinus rhythm  When compared with ECG of 11-FEB-2020 12:27,  premature ventricular complexes are no longer present  premature atrial complexes are no longer present  Inverted T waves have replaced nonspecific T wave abnormality in Inferior   leads  Confirmed by [facility], M.D., Sadiq Ramirez ([id number]) on 3/31/2022 7:53:50 PM             Physical Exam  Visit Vitals  /80 (BP 1 Location: Right upper arm, BP Patient Position: Sitting, BP Cuff Size: Adult)   Pulse (!) 115   Temp 97.7 °F (36.5 °C) (Temporal)   Resp 17   Ht 5' 4\" (1.626 m)   Wt 193 lb (87.5 kg)   LMP 08/24/1995   SpO2 96%   BMI 33.13 kg/m²         Head: Normocephalic, without obvious abnormality, atraumatic  Eyes: conjunctivae/corneas clear. PERRL, EOM's intact. Neck: supple, symmetrical, trachea midline, no adenopathy, thyroid: not enlarged, symmetric, no tenderness/mass/nodules, no carotid bruit and no JVD  Lungs: clear to auscultation bilaterally  Heart: regular rate and rhythm, S1, S2 normal, no murmur, click, rub or gallop  Extremities: extremities normal, atraumatic, no cyanosis or edema  Pulses: 2+ and symmetric  Lymph nodes: Cervical, supraclavicular, and axillary nodes normal.  Neurologic: Grossly normal        ASSESSMENT and PLAN    ICD-10-CM ICD-9-CM    1. Class 2 obesity due to excess calories without serious comorbidity with body mass index (BMI) of 36.0 to 36.9 in adult  E66.09 278.00 phentermine (ADIPEX-P) 37.5 mg tablet    Z68.36 V85.36       2. Primary insomnia  F51.01 307.42 zolpidem (AMBIEN) 5 mg tablet      3. Hyperlipidemia with target low density lipoprotein (LDL) cholesterol less than 130 mg/dL  E78.5 272.4       4. Postmenopausal HRT (hormone replacement therapy)  Z79.890 V07.4       5. Weight loss counseling, encounter for  Z71.3 V65.3       6. Insomnia due to anxiety and fear  F51.05 300.20     F40.9 327.02       7. History of complications due to general anesthesia- slow to wake up from anesthesia  Z91.89 V15.89         Diagnoses and all orders for this visit:    1. Class 2 obesity due to excess calories without serious comorbidity with body mass index (BMI) of 36.0 to 36.9 in adult  -     phentermine (ADIPEX-P) 37.5 mg tablet; TAKE ONE TABLET BY MOUTH EVERY MORNING    2. Primary insomnia  -     zolpidem (AMBIEN) 5 mg tablet; Take 1 Tablet by mouth nightly as needed for Sleep. Max Daily Amount: 5 mg. 3. Hyperlipidemia with target low density lipoprotein (LDL) cholesterol less than 130 mg/dL    4. Postmenopausal HRT (hormone replacement therapy)    5. Weight loss counseling, encounter for    6. Insomnia due to anxiety and fear    7. History of complications due to general anesthesia- slow to wake up from anesthesia      reviewed medications and side effects in detail  Please call my office if there are any questions- [phone]. Discussed expected course/resolution/complications of diagnosis in detail with patient. Medication risks/benefits/costs/interactions/alternatives discussed with patient. Pt was given an after visit summary which includes diagnoses, current medications & vitals. Pt expressed understanding with the diagnosis and plan. Patient to call if no better in 3 -4 days and prn new problems. BMI is significantly elevated- in the obese range. I reviewed diet, exercise and weight control. Discussed weight control in detail, the importance of mainly decreased carbs, and for weight maintenance, exercise; discussed different diets and that it isn't as important to watch the type of foods as it is to decrease calorie intake no matter what type of diet you do, etc.  Mindful eating also discussed- Naturally Slim( now Wond'r) or Noom are 2 options.    Total 30 minutes  re: Reviewed symptoms, or lack thereof, of hypertension and elevated cholesterol. Regular exercise is very important to your health; it helps mentally, physically, socially; it prevents injuries if done properly. Exercise, even as simple as walking 20-30 minutes daily has major benefits to your health even though your \"numbers\" are the same in the lab. See if you can add this into your daily regimen and after a few months it will become a regular habit-\"just something you do,\" like brushing your teeth. A combination of aerobic exercise and strengthening and stretching is felt to be the best for you, so this should be your ultimate goal.   This can be done in the privacy of your home or in a group setting as at the gym  Some prefer having a , others prefer to do exercise in groups or individually. Do what \"works\" for you. You need to make it simple and \"fun,\" or you most likely will not continue it. Recommended a weekly \"heart check. \" I went into detail how to do this. Also, discussed symptoms of concern that were noted today in the note above, treatment options( including doing nothing), when to follow up before recommended time frame. Also, answered all questions. We had a long discussing about estrogen use and the risk. She is aware of the risk of several infrequent but sometimes life threatening risks including uterus CA, breast CA, DVT, pulmonary embolism, and heart attack. She has decided the miserable hot flashes are worth the risk of these risks that are infrequent, so she is going to start on her estradiol that she has at home. She also needs refill of her Ambien that was transferred to [place] and for that reason cannot be transferred back to [place]. She has been taking that for years on a regular basis. As far as the phentermine, she uses that intermittently and has been able to maintain her weight loss even off of it, but feels that it does assist with her weight loss and would like to get back on that.  She had no problems with arrhthymias/ palpitations while on that. She also mentions that she is very sensitive to anesthesia but has been that way her whole life such that when she received the usual dose, she will take 3 or 4 hours to wake up compared to most patients that take an hour to wake up. She has had reduced dose of anesthesia and most recently for her orthopedic Sx and her colonoscopy (anesthesiologist stated that it was a child's dose) and she had had no problems with lingering sedation like she was having with the usual doses. We will document in the chart this problem with anesthesia. This document was written by Florencio Jauregui, as dictated by Mary Varela MD.   I have reviewed and agree with the above note and have made corrections where appropriate Jim Garcia M.D.

## 2022-12-03 DIAGNOSIS — R60.0 LOCALIZED EDEMA: ICD-10-CM

## 2022-12-04 RX ORDER — FUROSEMIDE 20 MG/1
TABLET ORAL
Qty: 60 TABLET | Refills: 0 | Status: SHIPPED | OUTPATIENT
Start: 2022-12-04

## 2022-12-08 NOTE — PROGRESS NOTES
Primary Nurse Valeria Bui and Alden Meléndez RN performed a dual skin assessment on this patient No impairment noted  Jed score is 21 English

## 2022-12-30 ENCOUNTER — TELEPHONE (OUTPATIENT)
Dept: FAMILY MEDICINE CLINIC | Age: 72
End: 2022-12-30

## 2023-01-02 DIAGNOSIS — R60.0 LOCALIZED EDEMA: ICD-10-CM

## 2023-01-03 RX ORDER — FUROSEMIDE 20 MG/1
TABLET ORAL
Qty: 60 TABLET | Refills: 0 | Status: SHIPPED | OUTPATIENT
Start: 2023-01-03

## 2023-01-04 ENCOUNTER — HOSPITAL ENCOUNTER (EMERGENCY)
Age: 73
Discharge: HOME | End: 2023-01-04
Attending: EMERGENCY MEDICINE
Payer: MEDICARE

## 2023-01-04 ENCOUNTER — TELEPHONE (OUTPATIENT)
Dept: FAMILY MEDICINE CLINIC | Age: 73
End: 2023-01-04

## 2023-01-04 ENCOUNTER — APPOINTMENT (OUTPATIENT)
Dept: GENERAL RADIOLOGY | Age: 73
End: 2023-01-04
Attending: EMERGENCY MEDICINE
Payer: MEDICARE

## 2023-01-04 VITALS
SYSTOLIC BLOOD PRESSURE: 120 MMHG | TEMPERATURE: 98.3 F | HEIGHT: 64 IN | WEIGHT: 197.31 LBS | BODY MASS INDEX: 33.69 KG/M2 | RESPIRATION RATE: 18 BRPM | HEART RATE: 65 BPM | OXYGEN SATURATION: 97 % | DIASTOLIC BLOOD PRESSURE: 78 MMHG

## 2023-01-04 DIAGNOSIS — J10.1 INFLUENZA A: Primary | ICD-10-CM

## 2023-01-04 LAB
FLUAV AG NPH QL IA: POSITIVE
FLUBV AG NOSE QL IA: NEGATIVE

## 2023-01-04 PROCEDURE — 74011250637 HC RX REV CODE- 250/637: Performed by: EMERGENCY MEDICINE

## 2023-01-04 PROCEDURE — 99284 EMERGENCY DEPT VISIT MOD MDM: CPT

## 2023-01-04 PROCEDURE — 87804 INFLUENZA ASSAY W/OPTIC: CPT

## 2023-01-04 PROCEDURE — 71046 X-RAY EXAM CHEST 2 VIEWS: CPT

## 2023-01-04 RX ORDER — BENZONATATE 100 MG/1
100 CAPSULE ORAL
Qty: 30 CAPSULE | Refills: 0 | Status: SHIPPED | OUTPATIENT
Start: 2023-01-04 | End: 2023-01-11

## 2023-01-04 RX ORDER — BENZONATATE 100 MG/1
200 CAPSULE ORAL
Status: COMPLETED | OUTPATIENT
Start: 2023-01-04 | End: 2023-01-04

## 2023-01-04 RX ADMIN — BENZONATATE 200 MG: 100 CAPSULE ORAL at 11:05

## 2023-01-04 NOTE — ED TRIAGE NOTES
Pt reports cough since Sunday and  then reports fever that started on Friday. Pt reports taking 500 mg tylenol this am prior to coming to ED. Pt reports she did an at home COVID test and it was negative. Pt denies SOB but reports some pain in her lower chest when she coughs.

## 2023-01-04 NOTE — ED PROVIDER NOTES
39-year-old female presents with a chief complaint of cough. Patient started having symptoms on Friday. She has had an intermittent low-grade fever. She took a home COVID test which was negative. She denies feeling short of breath currently. She endorses chest pain with cough.        Past Medical History:   Diagnosis Date    Actinic keratosis     Left arm    Adjustment disorder with mixed anxiety and depressed mood     Adverse effect of anesthesia     HARD TO WAKE UP SLOW BREATHING ,Pt stated doesn't take much anesthesia    Arthritis     OSTEO    Chronic pain     Delayed emergence from anesthesia     Diverticulitis     DJD (degenerative joint disease), lumbar     seen on  MRI- DDD and DJD    Kidney stones 95,96,98    left    Left knee DJD     Lumbar nerve root impingement     RLQ pain    Menopause     LMP-53years old    Nausea & vomiting     Other and unspecified hyperlipidemia     Other idiopathic scoliosis, thoracolumbar region 2018    Postmenopausal HRT (hormone replacement therapy)     Right knee DJD     Urine culture positive 2020       Past Surgical History:   Procedure Laterality Date    COLONOSCOPY N/A 2016    COLONOSCOPY performed by Wyatt Mckinney MD at [address] Right 2019    HX CATARACT REMOVAL Left 2019    HX  SECTION  4209,8945    X2    HX GI      COLONOSCOPY    HX HIP FRACTURE TX Right 2019    Total R hip- Dr. Jamie Knight HYSTERECTOMY  2009    LMP-53years old    HX KNEE ARTHROSCOPY Bilateral 1966    SCOPE of knees bilateral    HX KNEE REPLACEMENT Left     HX LITHOTRIPSY      X 2    HX LUMBAR LAMINECTOMY  2000    X2    HX MENISCECTOMY      left knee    HX ORTHOPAEDIC  12    left shoulder repair; 14 screws and 2 plates    HX ORTHOPAEDIC Right     BROKEN ARM SURGERY X2    HX ORTHOPAEDIC Left      REPAIR OF Left MENISCUS    HX ORTHOPAEDIC Left     BROKEN ARM HX ORTHOPAEDIC Left 08/24/2019    hip replacement    HX POLYPECTOMY  2013    HX TONSILLECTOMY  1956    HX TUBAL LIGATION  1981    BSPO adhesion conization abn pap    HX UROLOGICAL  12/2020    kidney stone removed Dr Karen Martinez CONDYLE&PLATU MEDIAL&LAT COMPARTMENTS Left 2016         Family History:   Problem Relation Age of Onset    Cancer Mother         colon    OSTEOARTHRITIS Mother     Anesth Problems Mother         delayed awakening    Cancer Maternal Grandmother         stomach    Heart Disease Father 80    Heart Attack Maternal Grandfather [de-identified]    Heart Attack Paternal Grandmother 80    Diabetes Cousin         cervical cancer       Social History     Socioeconomic History    Marital status:      Spouse name: Not on file    Number of children: Not on file    Years of education: Not on file    Highest education level: Not on file   Occupational History    Not on file   Tobacco Use    Smoking status: Never    Smokeless tobacco: Never   Vaping Use    Vaping Use: Never used   Substance and Sexual Activity    Alcohol use:  Yes     Alcohol/week: 0.8 standard drinks     Types: 1 Standard drinks or equivalent per week     Comment: RARE    Drug use: No    Sexual activity: Not on file   Other Topics Concern     Service No    Blood Transfusions Not Asked    Caffeine Concern No    Occupational Exposure Not Asked    Hobby Hazards No    Sleep Concern No    Stress Concern No    Weight Concern No    Special Diet No    Back Care Not Asked    Exercise Yes    Bike Helmet Yes    Seat Belt Yes    Self-Exams Yes   Social History Narrative    Not on file     Social Determinants of Health     Financial Resource Strain: Low Risk     Difficulty of Paying Living Expenses: Not hard at all   Food Insecurity: No Food Insecurity    Worried About Running Out of Food in the Last Year: Never true    Ran Out of Food in the Last Year: Never true   Transportation Needs: Not on file   Physical Activity: Not on file   Stress: Not on file   Social Connections: Not on file   Intimate Partner Violence: Not on file   Housing Stability: Not on file         ALLERGIES: Codeine, Adhesive tape-silicones, and Wellbutrin [bupropion hcl]    Review of Systems   Constitutional:  Positive for fever. Respiratory:  Positive for cough. Vitals:    01/04/23 1018   BP: 124/73   Pulse: 65   Resp: 18   Temp: 98.3 °F (36.8 °C)   SpO2: 96%   Weight: 89.5 kg (197 lb 5 oz)   Height: 5' 4\" (1.626 m)            Physical Exam  Vitals and nursing note reviewed. Constitutional:       General: She is not in acute distress. Appearance: Normal appearance. She is not ill-appearing, toxic-appearing or diaphoretic. HENT:      Head: Normocephalic and atraumatic. Eyes:      Extraocular Movements: Extraocular movements intact. Cardiovascular:      Rate and Rhythm: Normal rate. Pulses: Normal pulses. Heart sounds: Normal heart sounds. Pulmonary:      Effort: Pulmonary effort is normal. No respiratory distress. Breath sounds: Normal breath sounds. Abdominal:      General: There is no distension. Musculoskeletal:         General: Normal range of motion. Cervical back: Normal range of motion. Skin:     General: Skin is dry. Neurological:      Mental Status: She is alert and oriented to person, place, and time. Psychiatric:         Mood and Affect: Mood normal.        Medical Decision Making  This with cough. Chest x-ray shows no evidence of pneumonia. Influenza testing positive. Discussed my clinical impression(s), any labs and/or radiology results with the patient. I answered any questions and addressed any concerns. Discussed the importance of following up with their primary care physician and/or specialist(s). Discussed signs or symptoms that would warrant return back to the ER for further evaluation. The patient is agreeable with discharge.     Amount and/or Complexity of Data Reviewed  Radiology: ordered. Risk  Prescription drug management.            Procedures

## 2023-01-04 NOTE — DISCHARGE INSTRUCTIONS
Thank you for allowing us to provide you with medical care today. We realize that you have many choices for your emergency care needs. We thank you for choosing [facility]. Please choose us in the future for any continued health care needs. The exam and treatment you received in the Emergency Department were for an emergent problem and are not intended as complete care. It is important that you follow up with a doctor, nurse practitioner, or physician's assistant for ongoing care. If your symptoms worsen or you do not improve as expected and you are unable to reach your usual health care provider, you should return to the Emergency Department. We are available 24 hours a day. Please make an appointment with your health care provider(s) for follow up of your Emergency Department visit. Take this sheet with you when you go to your follow-up visit.

## 2023-01-04 NOTE — TELEPHONE ENCOUNTER
Giulia Kidney,  I think Anjana Cintron has Covid. We have been back from a trip over [place] and she is exhibiting all the signs of COVID-19. Persistent cough, low-grade fever, bodyaches, fatigue, and pain in her chest.     The coughing is now too persistent, not allowing sleep and lots of chest pain. I have some of these symptoms, but not as bad. I am worried about her. I administered a home test that was negative 4 days ago. We plan to try to go get another test tomorrow if I can get her out of the house. We have been using mussinex Dm and Tylenol. She is not getting better and we are in the 6th day of this infection. Is there anything that she can be given to reduce the cough, it seems to be the major issue. Fever runs between 98.9 and 100. Lack of taste, and fatigue. The cough is the major issue and its wearing her out with no sleep. Need your assistance.   [place], [place]  [phone]

## 2023-01-27 DIAGNOSIS — Z79.890 POSTMENOPAUSAL HRT (HORMONE REPLACEMENT THERAPY): ICD-10-CM

## 2023-01-27 RX ORDER — ESTRADIOL 1 MG/1
TABLET ORAL
Qty: 90 TABLET | Refills: 2 | Status: SHIPPED | OUTPATIENT
Start: 2023-01-27

## 2023-01-31 ENCOUNTER — OFFICE VISIT (OUTPATIENT)
Dept: FAMILY MEDICINE CLINIC | Age: 73
End: 2023-01-31
Payer: MEDICARE

## 2023-01-31 VITALS
SYSTOLIC BLOOD PRESSURE: 126 MMHG | DIASTOLIC BLOOD PRESSURE: 78 MMHG | TEMPERATURE: 97.8 F | WEIGHT: 189 LBS | HEART RATE: 111 BPM | OXYGEN SATURATION: 98 % | BODY MASS INDEX: 32.27 KG/M2 | RESPIRATION RATE: 16 BRPM | HEIGHT: 64 IN

## 2023-01-31 DIAGNOSIS — E66.09 CLASS 1 OBESITY DUE TO EXCESS CALORIES WITHOUT SERIOUS COMORBIDITY WITH BODY MASS INDEX (BMI) OF 32.0 TO 32.9 IN ADULT: ICD-10-CM

## 2023-01-31 DIAGNOSIS — F43.23 ADJUSTMENT DISORDER WITH MIXED ANXIETY AND DEPRESSED MOOD: ICD-10-CM

## 2023-01-31 DIAGNOSIS — R73.09 ELEVATED HEMOGLOBIN A1C: ICD-10-CM

## 2023-01-31 DIAGNOSIS — F51.01 PRIMARY INSOMNIA: ICD-10-CM

## 2023-01-31 DIAGNOSIS — Z00.00 ENCOUNTER FOR ANNUAL WELLNESS VISIT (AWV) IN MEDICARE PATIENT: ICD-10-CM

## 2023-01-31 DIAGNOSIS — Z91.89: ICD-10-CM

## 2023-01-31 DIAGNOSIS — R00.2 INTERMITTENT PALPITATIONS: ICD-10-CM

## 2023-01-31 DIAGNOSIS — E78.5 HYPERLIPIDEMIA WITH TARGET LOW DENSITY LIPOPROTEIN (LDL) CHOLESTEROL LESS THAN 130 MG/DL: Primary | ICD-10-CM

## 2023-01-31 DIAGNOSIS — R60.0 LOCALIZED EDEMA: ICD-10-CM

## 2023-01-31 DIAGNOSIS — M25.511 ACUTE PAIN OF BOTH SHOULDERS: ICD-10-CM

## 2023-01-31 DIAGNOSIS — Z79.890 POSTMENOPAUSAL HRT (HORMONE REPLACEMENT THERAPY): ICD-10-CM

## 2023-01-31 DIAGNOSIS — M25.512 ACUTE PAIN OF BOTH SHOULDERS: ICD-10-CM

## 2023-01-31 DIAGNOSIS — Z71.89 ACP (ADVANCE CARE PLANNING): ICD-10-CM

## 2023-01-31 PROBLEM — E66.811 CLASS 1 OBESITY DUE TO EXCESS CALORIES WITH BODY MASS INDEX (BMI) OF 32.0 TO 32.9 IN ADULT: Status: ACTIVE | Noted: 2020-05-19

## 2023-01-31 PROCEDURE — G0463 HOSPITAL OUTPT CLINIC VISIT: HCPCS | Performed by: FAMILY MEDICINE

## 2023-01-31 RX ORDER — FUROSEMIDE 20 MG/1
TABLET ORAL
Qty: 60 TABLET | Refills: 0 | Status: SHIPPED | OUTPATIENT
Start: 2023-01-31

## 2023-01-31 RX ORDER — PHENTERMINE HYDROCHLORIDE 37.5 MG/1
TABLET ORAL
Qty: 30 TABLET | Refills: 0 | Status: SHIPPED | OUTPATIENT
Start: 2023-01-31

## 2023-01-31 RX ORDER — ZOLPIDEM TARTRATE 5 MG/1
5 TABLET ORAL
Qty: 30 TABLET | Refills: 5 | Status: SHIPPED | OUTPATIENT
Start: 2023-01-31

## 2023-01-31 NOTE — PROGRESS NOTES
HISTORY OF PRESENT ILLNESS  HPI  Mindi Elam is a 74 y.o. female with a history of left knee DJD, osteoarthritis of right knee, thoracolumbar idiopathic scoliosis, adjustment disorder with anxiety and depression, kidney stones and hyperlipidemia with LDL goal <130, who presents to the office today for f/u of these health problems. Pt is fasting. Pt notes she was seen at [facility] [place] [facility] on  presenting with chest pain, cough, and fever. Workup was negative for pneumonia and COVID, but positive for flu. She also had some aching. She notes she was on a plane ride where many people were coughing. Pt reports she has residual cough and hoarseness. Pt denies any breathing troubles. Pt denies unusual SOB, chest pain, and any other ER visits or hospitalizations since .        Past Medical History:   Diagnosis Date    Actinic keratosis     Left arm    Adjustment disorder with mixed anxiety and depressed mood     Adverse effect of anesthesia     HARD TO WAKE UP SLOW BREATHING ,Pt stated doesn't take much anesthesia    Arthritis     OSTEO    Chronic pain     Delayed emergence from anesthesia     Diverticulitis     DJD (degenerative joint disease), lumbar     seen on  MRI- DDD and DJD    Kidney stones 95,96,98    left    Left knee DJD     Lumbar nerve root impingement     RLQ pain    Menopause     LMP-53years old    Nausea & vomiting     Other and unspecified hyperlipidemia     Other idiopathic scoliosis, thoracolumbar region 2018    Postmenopausal HRT (hormone replacement therapy)     Right knee DJD     Urine culture positive 2020     Past Surgical History:   Procedure Laterality Date    COLONOSCOPY N/A 2016    COLONOSCOPY performed by Ashlee Romero MD at [address] Right 2019    HX CATARACT REMOVAL Left 2019    HX  SECTION  [id number]    X2    HX GI      COLONOSCOPY    HX HIP FRACTURE TX Right 08/23/2019    Total R hip- Dr. Jason Cherry HYSTERECTOMY  2009    LMP-53years old    HX KNEE ARTHROSCOPY Bilateral 1966    SCOPE of knees bilateral    HX KNEE REPLACEMENT Left 2015    HX LITHOTRIPSY      X 2    HX LUMBAR LAMINECTOMY  2000    X2    HX MENISCECTOMY  1990    left knee    HX ORTHOPAEDIC  12/24/12    left shoulder repair; 14 screws and 2 plates    HX ORTHOPAEDIC Right     BROKEN ARM SURGERY X2    HX ORTHOPAEDIC Left      REPAIR OF Left MENISCUS    HX ORTHOPAEDIC Left     BROKEN ARM    HX ORTHOPAEDIC Left 08/24/2019    hip replacement    HX POLYPECTOMY  2013    HX TONSILLECTOMY  1956    HX TUBAL LIGATION  1981    BSPO adhesion conization abn pap    HX UROLOGICAL  12/2020    kidney stone removed Dr Rojelio Waddell CONDYLE&PLATU MEDIAL&LAT COMPARTMENTS Left 2016     Current Outpatient Medications on File Prior to Visit   Medication Sig Dispense Refill    estradioL (ESTRACE) 1 mg tablet TAKE 1 TABLET BY MOUTH EVERY DAY 90 Tablet 2     No current facility-administered medications on file prior to visit. Allergies   Allergen Reactions    Codeine Rash     Rash and swelling on arm    Adhesive Tape-Silicones Rash    Wellbutrin [Bupropion Hcl] Other (comments)     fatigue     Family History   Problem Relation Age of Onset    Cancer Mother         colon    OSTEOARTHRITIS Mother     Anesth Problems Mother         delayed awakening    Cancer Maternal Grandmother         stomach    Heart Disease Father 80    Heart Attack Maternal Grandfather [de-identified]    Heart Attack Paternal Grandmother 80    Diabetes Cousin         cervical cancer     Social History     Socioeconomic History    Marital status:    Tobacco Use    Smoking status: Never    Smokeless tobacco: Never   Vaping Use    Vaping Use: Never used   Substance and Sexual Activity    Alcohol use:  Yes     Alcohol/week: 0.8 standard drinks     Types: 1 Standard drinks or equivalent per week     Comment: RARE    Drug use: No   Other Topics Concern  Service No    Caffeine Concern No    Hobby Hazards No    Sleep Concern No    Stress Concern No    Weight Concern No    Special Diet No    Exercise Yes    Bike Helmet Yes    Seat Belt Yes    Self-Exams Yes     Social Determinants of Health     Financial Resource Strain: Low Risk     Difficulty of Paying Living Expenses: Not hard at all   Food Insecurity: No Food Insecurity    Worried About Running Out of Food in the Last Year: Never true    Ran Out of Food in the Last Year: Never true                 Review of Systems   Constitutional:  Negative for chills, diaphoresis, fever, malaise/fatigue and weight loss. HENT:  Negative for congestion, ear discharge, ear pain, hearing loss, nosebleeds, sore throat and tinnitus. Eyes:  Negative for blurred vision, double vision, photophobia, pain, discharge and redness. Respiratory:  Negative for cough, hemoptysis, sputum production, shortness of breath, wheezing and stridor. Cardiovascular:  Negative for chest pain, palpitations, orthopnea, claudication, leg swelling and PND. Gastrointestinal:  Negative for abdominal pain, blood in stool, constipation, diarrhea, heartburn, melena, nausea and vomiting. Genitourinary:  Negative for dysuria, flank pain, frequency, hematuria and urgency. Musculoskeletal:  Negative for back pain, falls, joint pain, myalgias and neck pain. Skin:  Negative for itching and rash. Neurological:  Negative for dizziness, tingling, tremors, sensory change, speech change, focal weakness, seizures, loss of consciousness, weakness and headaches. Endo/Heme/Allergies:  Negative for environmental allergies and polydipsia. Does not bruise/bleed easily. Psychiatric/Behavioral:  Negative for depression, hallucinations, memory loss, substance abuse and suicidal ideas. The patient is not nervous/anxious and does not have insomnia.     Results for orders placed or performed in visit on 01/31/23   URINALYSIS W/MICROSCOPIC   Result Value Ref Range    Color YELLOW/STRAW      Appearance CLEAR CLEAR      Specific gravity 1.017 1.003 - 1.030      pH (UA) 5.5 5.0 - 8.0      Protein Negative Negative mg/dL    Glucose Negative Negative mg/dL    Ketone Negative Negative mg/dL    Bilirubin Negative Negative      Blood Negative Negative      Urobilinogen 0.2 0.2 - 1.0 EU/dL    Nitrites Negative Negative      Leukocyte Esterase Negative Negative      WBC 0-4 0 - 4 /hpf    RBC 5-10 0 - 5 /hpf    Epithelial cells MODERATE (A) FEW /lpf    Bacteria Negative Negative /hpf    CA Oxalate crystals 4+ (A) Negative   CBC W/O DIFF   Result Value Ref Range    WBC 10.4 3.6 - 11.0 K/uL    RBC 5.89 (H) 3.80 - 5.20 M/uL    HGB 17.4 (H) 11.5 - 16.0 g/dL    HCT 55.9 (H) 35.0 - 47.0 %    MCV 94.9 80.0 - 99.0 FL    MCH 29.5 26.0 - 34.0 PG    MCHC 31.1 30.0 - 36.5 g/dL    RDW 12.9 11.5 - 14.5 %    PLATELET 942 618 - 071 K/uL    MPV 9.6 8.9 - 12.9 FL    NRBC 0.0 0  WBC    ABSOLUTE NRBC 0.00 0.00 - 7.58 K/uL   METABOLIC PANEL, COMPREHENSIVE   Result Value Ref Range    Sodium 138 136 - 145 mmol/L    Potassium 4.3 3.5 - 5.1 mmol/L    Chloride 104 97 - 108 mmol/L    CO2 27 21 - 32 mmol/L    Anion gap 7 5 - 15 mmol/L    Glucose 101 (H) 65 - 100 mg/dL    BUN 18 6 - 20 MG/DL    Creatinine 0.83 0.55 - 1.02 MG/DL    BUN/Creatinine ratio 22 (H) 12 - 20      eGFR >60 >60 ml/min/1.73m2    Calcium 9.5 8.5 - 10.1 MG/DL    Bilirubin, total 0.6 0.2 - 1.0 MG/DL    ALT (SGPT) 18 12 - 78 U/L    AST (SGOT) <3 (L) 15 - 37 U/L    Alk.  phosphatase 104 45 - 117 U/L    Protein, total 8.0 6.4 - 8.2 g/dL    Albumin 4.0 3.5 - 5.0 g/dL    Globulin 4.0 2.0 - 4.0 g/dL    A-G Ratio 1.0 (L) 1.1 - 2.2     LIPID PANEL   Result Value Ref Range    Cholesterol, total 294 (H) <200 MG/DL    Triglyceride 207 (H) <150 MG/DL    HDL Cholesterol 70 MG/DL    LDL, calculated 182.6 (H) 0 - 100 MG/DL    VLDL, calculated 41.4 MG/DL    CHOL/HDL Ratio 4.2 0.0 - 5.0             Physical Exam  Visit Vitals  /78 (BP 1 Location: Left upper arm, BP Patient Position: Sitting, BP Cuff Size: Large adult)   Pulse (!) 111   Temp 97.8 °F (36.6 °C) (Temporal)   Resp 16   Ht 5' 4\" (1.626 m)   Wt 189 lb (85.7 kg)   LMP 08/24/1995   SpO2 98%   BMI 32.44 kg/m²       General:  Alert, cooperative, no distress, appears stated age. Head:  Normocephalic, without obvious abnormality, atraumatic. Eyes:  Conjunctivae/corneas clear. PERRL, EOMs intact. Fundi benign. Ears:  Normal TMs and external ear canals both ears. Nose: Nares normal. Septum midline. Mucosa normal. No drainage or sinus tenderness. Throat: Lips, mucosa, and tongue normal. Teeth and gums normal.   Neck: Supple, symmetrical, trachea midline, no adenopathy, thyroid: no enlargement/tenderness/nodules, no carotid bruit and no JVD. Back:   Symmetric, no curvature. ROM normal. No CVA tenderness. Lungs:   Clear to auscultation bilaterally. Chest wall:  No tenderness or deformity. Heart:  Regular rate and rhythm, S1, S2 normal, no murmur, click, rub or gallop. Abdomen:   Soft, non-tender. Bowel sounds normal. No masses,  No organomegaly. Extremities: Extremities normal, atraumatic, no cyanosis. Trace edema bilaterally   Pulses: 2+ and symmetric all extremities. Skin: Skin color, texture, turgor normal. No rashes or lesions. She has recent superficial wound to the right side of her face that is healing but still has small scab and some hyperpigmentation. Lymph nodes: Cervical, supraclavicular, and axillary nodes normal.   Neurologic: CNII-XII intact. Normal strength, sensation and reflexes throughout. ASSESSMENT and PLAN    ICD-10-CM ICD-9-CM    1. Hyperlipidemia with target low density lipoprotein (LDL) cholesterol less than 130 mg/dL  E78.5 272.4       2.  Encounter for annual wellness visit (AWV) in Medicare patient  Z00.00 V70.0 LIPID PANEL      METABOLIC PANEL, COMPREHENSIVE      CBC W/O DIFF      URINALYSIS W/MICROSCOPIC      URINALYSIS W/MICROSCOPIC CBC W/O DIFF      METABOLIC PANEL, COMPREHENSIVE      LIPID PANEL      3. Primary insomnia  F51.01 307.42 zolpidem (AMBIEN) 5 mg tablet      4. Class 1 obesity due to excess calories without serious comorbidity with body mass index (BMI) of 32.0 to 32.9 in adult  E66.09 278.00 phentermine (ADIPEX-P) 37.5 mg tablet    Z68.32 V85.32       5. ACP (advance care planning)  Z71.89 V65.49       6. Adjustment disorder with mixed anxiety and depressed mood  F43.23 309.28       7. Localized edema  R60.0 782.3       8. Postmenopausal HRT (hormone replacement therapy)  Z79.890 V07.4       9. History of complications due to general anesthesia- slow to wake up from anesthesia  Z91.89 V15.89       10. Intermittent palpitations  R00.2 785.1       11. Elevated hemoglobin A1c  R73.09 790.29       12. Acute pain of both shoulders  M25.511 719.41     M25.512          Diagnoses and all orders for this visit:    1. Hyperlipidemia with target low density lipoprotein (LDL) cholesterol less than 130 mg/dL    2. Encounter for annual wellness visit (AWV) in Medicare patient  -     LIPID PANEL; Future  -     METABOLIC PANEL, COMPREHENSIVE; Future  -     CBC W/O DIFF; Future  -     URINALYSIS W/MICROSCOPIC; Future    3. Primary insomnia  -     zolpidem (AMBIEN) 5 mg tablet; Take 1 Tablet by mouth nightly as needed for Sleep. Max Daily Amount: 5 mg. 4. Class 1 obesity due to excess calories without serious comorbidity with body mass index (BMI) of 32.0 to 32.9 in adult  -     phentermine (ADIPEX-P) 37.5 mg tablet; TAKE ONE TABLET BY MOUTH EVERY MORNING    5. ACP (advance care planning)    6. Adjustment disorder with mixed anxiety and depressed mood    7. Localized edema    8. Postmenopausal HRT (hormone replacement therapy)    9. History of complications due to general anesthesia- slow to wake up from anesthesia    10. Intermittent palpitations    11. Elevated hemoglobin A1c    12.  Acute pain of both shoulders    Follow-up and Dispositions    Return in about 6 months (around 7/31/2023) for F/U cholesterol, anxiety/depression, insomnia.       lab results and schedule of future lab studies reviewed with patient  reviewed diet, exercise and weight control  cardiovascular risk and specific lipid/LDL goals reviewed  reviewed medications and side effects in detail  Please call my office if there are any questions- [phone]. I will arrange for follow up after the lab tests done from today return  Recommended a weekly \"heart check. \" I went into detail how to do this. Call for refills on medications as needed. Discussed expected course/resolution/complications of diagnosis in detail with patient. Medication risks/benefits/costs/interactions/alternatives discussed with patient. Pt was given an after visit summary which includes diagnoses, current medications & vitals. Pt expressed understanding with the diagnosis and plan    BMI is significantly elevated- in the obese range. I reviewed diet, exercise and weight control. Discussed weight control in detail, the importance of mainly decreased carbs, and for weight maintenance, exercise; discussed different diets and that it isn't as important to watch the type of foods as it is to decrease calorie intake no matter what type of diet you do, etc.  Mindful eating also discussed- Naturally Slim( now Wond[employer]r) or Noom are 2 options. Total 30 minutes( in addition to the time doing the Annual Wellness Visit)  re: Recommended a weekly \"heart check. \" I went into detail how to do this. Regular exercise is very important to your health; it helps mentally, physically, socially; it prevents injuries if done properly. Exercise, even as simple as walking 20-30 minutes daily has major benefits to your health even though your \"numbers\" are the same in the lab.  See if you can add this into your daily regimen and after a few months it will become a regular habit-\"just something you do,\" like brushing your teeth.     A combination of aerobic exercise and strengthening and stretching is felt to be the best for you, so this should be your ultimate goal.   This can be done in the privacy of your home or in a group setting as at the gym  Some prefer having a , others prefer to do exercise in groups or individually. Do what \"works\" for you. You need to make it simple and \"fun,\" or you most likely will not continue it. Reviewed symptoms, or lack thereof, of hypertension and elevated cholesterol. Also, discussed symptoms of concern that were noted today in the note above, treatment options( including doing nothing), when to follow up before recommended time frame. Also, answered all questions. We talked about how to use her Lasix, taking one BID versus two once a day. They are both acceptable, but generally, more fluid is taken off if you take the two at one time. Informed patient that the leg swelling is due to venous insufficiency. There are many aggravating factors that, if corrected, will improve the swelling: low sodium diet, weight loss, not standing in one spot but walking will help, leg elevation, support hose, and avoiding NSAIDs( Extra Strength Tylenol 500 mg 1-2 every 6 hours as needed for pain is OK). Fluid pills do help, but do not work directly on the swelling, but remove fluid from the body in general through their effect on the kidney which can cause potassium and sodium problems and potential low blood pressure and fainting. To monitor the BP sitting and standing. I suggested regular use of sun block on her face to prevent tattooing of the wound area. I congratulated her on her weight control and encouraged her to continue the same; this will reduce health risk across the board. This document was written by Shakira Maurer, as dictated by Ernie Meza MD.   I have reviewed and agree with the above note and have made corrections where appropriate Jim Carmona M.D.

## 2023-01-31 NOTE — PROGRESS NOTES
Chief Complaint   Patient presents with    Annual Wellness Visit    1. \"Have you been to the ER, urgent care clinic since your last visit? Hospitalized since your last visit? \" Yes ER for flu    2. \"Have you seen or consulted any other health care providers outside of the [address] [place] [address] since your last visit? \" No     3. For patients over 45: Has the patient had a colonoscopy? Yes - no Care Gap present     If the patient is female:    4. For patients over 40: Has the patient had a mammogram? Yes - no Care Gap present    5. For patients over 21: Has the patient had a pap smear?  Yes - no Care Gap present

## 2023-02-01 LAB
ALBUMIN SERPL-MCNC: 4 G/DL (ref 3.5–5)
ALBUMIN/GLOB SERPL: 1 (ref 1.1–2.2)
ALP SERPL-CCNC: 104 U/L (ref 45–117)
ALT SERPL-CCNC: 18 U/L (ref 12–78)
ANION GAP SERPL CALC-SCNC: 7 MMOL/L (ref 5–15)
APPEARANCE UR: CLEAR
AST SERPL-CCNC: <3 U/L (ref 15–37)
BACTERIA URNS QL MICRO: NEGATIVE /HPF
BILIRUB SERPL-MCNC: 0.6 MG/DL (ref 0.2–1)
BILIRUB UR QL: NEGATIVE
BUN SERPL-MCNC: 18 MG/DL (ref 6–20)
BUN/CREAT SERPL: 22 (ref 12–20)
CALCIUM SERPL-MCNC: 9.5 MG/DL (ref 8.5–10.1)
CAOX CRY URNS QL MICRO: ABNORMAL
CHLORIDE SERPL-SCNC: 104 MMOL/L (ref 97–108)
CHOLEST SERPL-MCNC: 294 MG/DL
CO2 SERPL-SCNC: 27 MMOL/L (ref 21–32)
COLOR UR: ABNORMAL
CREAT SERPL-MCNC: 0.83 MG/DL (ref 0.55–1.02)
EPITH CASTS URNS QL MICRO: ABNORMAL /LPF
ERYTHROCYTE [DISTWIDTH] IN BLOOD BY AUTOMATED COUNT: 12.9 % (ref 11.5–14.5)
GLOBULIN SER CALC-MCNC: 4 G/DL (ref 2–4)
GLUCOSE SERPL-MCNC: 101 MG/DL (ref 65–100)
GLUCOSE UR STRIP.AUTO-MCNC: NEGATIVE MG/DL
HCT VFR BLD AUTO: 55.9 % (ref 35–47)
HDLC SERPL-MCNC: 70 MG/DL
HDLC SERPL: 4.2 (ref 0–5)
HGB BLD-MCNC: 17.4 G/DL (ref 11.5–16)
HGB UR QL STRIP: NEGATIVE
KETONES UR QL STRIP.AUTO: NEGATIVE MG/DL
LDLC SERPL CALC-MCNC: 182.6 MG/DL (ref 0–100)
LEUKOCYTE ESTERASE UR QL STRIP.AUTO: NEGATIVE
MCH RBC QN AUTO: 29.5 PG (ref 26–34)
MCHC RBC AUTO-ENTMCNC: 31.1 G/DL (ref 30–36.5)
MCV RBC AUTO: 94.9 FL (ref 80–99)
NITRITE UR QL STRIP.AUTO: NEGATIVE
NRBC # BLD: 0 K/UL (ref 0–0.01)
NRBC BLD-RTO: 0 PER 100 WBC
PH UR STRIP: 5.5 (ref 5–8)
PLATELET # BLD AUTO: 365 K/UL (ref 150–400)
PMV BLD AUTO: 9.6 FL (ref 8.9–12.9)
POTASSIUM SERPL-SCNC: 4.3 MMOL/L (ref 3.5–5.1)
PROT SERPL-MCNC: 8 G/DL (ref 6.4–8.2)
PROT UR STRIP-MCNC: NEGATIVE MG/DL
RBC # BLD AUTO: 5.89 M/UL (ref 3.8–5.2)
RBC #/AREA URNS HPF: ABNORMAL /HPF (ref 0–5)
SODIUM SERPL-SCNC: 138 MMOL/L (ref 136–145)
SP GR UR REFRACTOMETRY: 1.02 (ref 1–1.03)
TRIGL SERPL-MCNC: 207 MG/DL (ref ?–150)
UROBILINOGEN UR QL STRIP.AUTO: 0.2 EU/DL (ref 0.2–1)
VLDLC SERPL CALC-MCNC: 41.4 MG/DL
WBC # BLD AUTO: 10.4 K/UL (ref 3.6–11)
WBC URNS QL MICRO: ABNORMAL /HPF (ref 0–4)

## 2023-02-28 DIAGNOSIS — R60.0 LOCALIZED EDEMA: ICD-10-CM

## 2023-02-28 RX ORDER — FUROSEMIDE 20 MG/1
TABLET ORAL
Qty: 60 TABLET | Refills: 3 | Status: SHIPPED | OUTPATIENT
Start: 2023-02-28

## 2023-03-03 ENCOUNTER — OFFICE VISIT (OUTPATIENT)
Dept: FAMILY MEDICINE CLINIC | Age: 73
End: 2023-03-03

## 2023-03-03 VITALS
WEIGHT: 194 LBS | OXYGEN SATURATION: 99 % | HEIGHT: 64 IN | SYSTOLIC BLOOD PRESSURE: 122 MMHG | DIASTOLIC BLOOD PRESSURE: 76 MMHG | RESPIRATION RATE: 16 BRPM | TEMPERATURE: 98.1 F | BODY MASS INDEX: 33.12 KG/M2 | HEART RATE: 101 BPM

## 2023-03-03 DIAGNOSIS — E78.5 DYSLIPIDEMIA, GOAL LDL BELOW 130: Primary | ICD-10-CM

## 2023-03-03 DIAGNOSIS — Z91.89 CANDIDATE FOR STATIN THERAPY DUE TO RISK OF FUTURE CARDIOVASCULAR EVENT: ICD-10-CM

## 2023-03-03 DIAGNOSIS — R10.13 EPIGASTRIC DISCOMFORT: ICD-10-CM

## 2023-03-03 RX ORDER — ROSUVASTATIN CALCIUM 10 MG/1
10 TABLET, COATED ORAL
Qty: 90 TABLET | Refills: 1 | Status: SHIPPED | OUTPATIENT
Start: 2023-03-03

## 2023-03-03 NOTE — PROGRESS NOTES
Chief Complaint   Patient presents with    Cholesterol Problem    Abdominal Pain     After eating    1. \"Have you been to the ER, urgent care clinic since your last visit? Hospitalized since your last visit? \" No    2. \"Have you seen or consulted any other health care providers outside of the [address] [place] [address] since your last visit? \" No     3. For patients over 45: Has the patient had a colonoscopy? Yes - no Care Gap present     If the patient is female:    4. For patients over 40: Has the patient had a mammogram? Yes - no Care Gap present    5. For patients over 21: Has the patient had a pap smear?  NA - based on age

## 2023-03-03 NOTE — PROGRESS NOTES
HISTORY OF PRESENT ILLNESS  HPI  Eliza Fowler is a 74 y.o. female with a history of lumbar nerve root impingement, left knee DJD, osteoarthritis of right knee, thoracolumbar idiopathic scoliosis, adjustment disorder with anxiety and depression, kidney stones and hyperlipidemia with LDL goal <130, who presents to the office today c/o upper abd pain and for f/u of these health problems. Pt mainly comes in to discuss her cholesterol. Pt reports she has upper abd pain that comes only after she eats. She denies any diarrhea or other symptoms. She has not tried any treatment for this. Pt notes she was on vacation 2 weeks before this came one. She had used some Advil during that time. Pt denies unusual SOB, chest pain, and any recent ER visits or hospitalizations.        Past Medical History:   Diagnosis Date    Actinic keratosis     Left arm    Adjustment disorder with mixed anxiety and depressed mood     Adverse effect of anesthesia     HARD TO WAKE UP SLOW BREATHING ,Pt stated doesn't take much anesthesia    Arthritis     OSTEO    Chronic pain     Delayed emergence from anesthesia     Diverticulitis     DJD (degenerative joint disease), lumbar     seen on  MRI- DDD and DJD    Kidney stones 95,96,98    left    Left knee DJD     Lumbar nerve root impingement     RLQ pain    Menopause     LMP-53years old    Nausea & vomiting     Other and unspecified hyperlipidemia     Other idiopathic scoliosis, thoracolumbar region 2018    Postmenopausal HRT (hormone replacement therapy)     Right knee DJD     Urine culture positive 2020     Past Surgical History:   Procedure Laterality Date    COLONOSCOPY N/A 2016    COLONOSCOPY performed by Rodrigo Selby MD at [address] Right 2019    HX CATARACT REMOVAL Left 2019    HX  SECTION  6675,2759    X2    HX GI      COLONOSCOPY    HX HIP FRACTURE TX Right 08/23/2019    Total R hip- Dr. Chávez Fass HYSTERECTOMY  2009    LMP-53years old    HX KNEE ARTHROSCOPY Bilateral 1966    SCOPE of knees bilateral    HX KNEE REPLACEMENT Left 2015    HX LITHOTRIPSY      X 2    HX LUMBAR LAMINECTOMY  2000    X2    HX MENISCECTOMY  1990    left knee    HX ORTHOPAEDIC  12/24/12    left shoulder repair; 14 screws and 2 plates    HX ORTHOPAEDIC Right     BROKEN ARM SURGERY X2    HX ORTHOPAEDIC Left      REPAIR OF Left MENISCUS    HX ORTHOPAEDIC Left     BROKEN ARM    HX ORTHOPAEDIC Left 08/24/2019    hip replacement    HX POLYPECTOMY  2013    HX TONSILLECTOMY  1956    HX TUBAL LIGATION  1981    BSPO adhesion conization abn pap    HX UROLOGICAL  12/2020    kidney stone removed Dr Barak Maza CONDYLE&PLATU MEDIAL&LAT COMPARTMENTS Left 2016     Current Outpatient Medications on File Prior to Visit   Medication Sig Dispense Refill    furosemide (LASIX) 20 mg tablet TAKE 1 TABLET BY MOUTH TWICE A DAY 60 Tablet 3    zolpidem (AMBIEN) 5 mg tablet Take 1 Tablet by mouth nightly as needed for Sleep. Max Daily Amount: 5 mg. 30 Tablet 5    estradioL (ESTRACE) 1 mg tablet TAKE 1 TABLET BY MOUTH EVERY DAY 90 Tablet 2    [DISCONTINUED] phentermine (ADIPEX-P) 37.5 mg tablet TAKE ONE TABLET BY MOUTH EVERY MORNING (Patient not taking: Reported on 3/3/2023) 30 Tablet 0     No current facility-administered medications on file prior to visit.      Allergies   Allergen Reactions    Codeine Rash     Rash and swelling on arm    Adhesive Tape-Silicones Rash    Wellbutrin [Bupropion Hcl] Other (comments)     fatigue     Family History   Problem Relation Age of Onset    Cancer Mother         colon    OSTEOARTHRITIS Mother     Anesth Problems Mother         delayed awakening    Cancer Maternal Grandmother         stomach    Heart Disease Father 80    Heart Attack Maternal Grandfather [de-identified]    Heart Attack Paternal Grandmother 80    Diabetes Cousin         cervical cancer     Social History Socioeconomic History    Marital status:    Tobacco Use    Smoking status: Never    Smokeless tobacco: Never   Vaping Use    Vaping Use: Never used   Substance and Sexual Activity    Alcohol use: Yes     Alcohol/week: 0.8 standard drinks     Types: 1 Standard drinks or equivalent per week     Comment: RARE    Drug use: No   Other Topics Concern     Service No    Caffeine Concern No    Hobby Hazards No    Sleep Concern No    Stress Concern No    Weight Concern No    Special Diet No    Exercise Yes    Bike Helmet Yes    Seat Belt Yes    Self-Exams Yes     Social Determinants of Health     Financial Resource Strain: Low Risk     Difficulty of Paying Living Expenses: Not hard at all   Food Insecurity: No Food Insecurity    Worried About Running Out of Food in the Last Year: Never true    Ran Out of Food in the Last Year: Never true             Review of Systems   Constitutional:  Negative for chills, diaphoresis, fever, malaise/fatigue and weight loss. Eyes:  Negative for blurred vision, double vision, pain and redness. Respiratory:  Negative for cough, shortness of breath and wheezing. Cardiovascular:  Negative for chest pain, palpitations, orthopnea, claudication, leg swelling and PND. Skin:  Negative for itching and rash. Neurological:  Negative for dizziness, tingling, tremors, sensory change, speech change, focal weakness, seizures, loss of consciousness, weakness and headaches.    Results for orders placed or performed in visit on 01/31/23   URINALYSIS W/MICROSCOPIC   Result Value Ref Range    Color YELLOW/STRAW      Appearance CLEAR CLEAR      Specific gravity 1.017 1.003 - 1.030      pH (UA) 5.5 5.0 - 8.0      Protein Negative Negative mg/dL    Glucose Negative Negative mg/dL    Ketone Negative Negative mg/dL    Bilirubin Negative Negative      Blood Negative Negative      Urobilinogen 0.2 0.2 - 1.0 EU/dL    Nitrites Negative Negative      Leukocyte Esterase Negative Negative WBC 0-4 0 - 4 /hpf    RBC 5-10 0 - 5 /hpf    Epithelial cells MODERATE (A) FEW /lpf    Bacteria Negative Negative /hpf    CA Oxalate crystals 4+ (A) Negative   CBC W/O DIFF   Result Value Ref Range    WBC 10.4 3.6 - 11.0 K/uL    RBC 5.89 (H) 3.80 - 5.20 M/uL    HGB 17.4 (H) 11.5 - 16.0 g/dL    HCT 55.9 (H) 35.0 - 47.0 %    MCV 94.9 80.0 - 99.0 FL    MCH 29.5 26.0 - 34.0 PG    MCHC 31.1 30.0 - 36.5 g/dL    RDW 12.9 11.5 - 14.5 %    PLATELET 872 752 - 203 K/uL    MPV 9.6 8.9 - 12.9 FL    NRBC 0.0 0  WBC    ABSOLUTE NRBC 0.00 0.00 - 1.96 K/uL   METABOLIC PANEL, COMPREHENSIVE   Result Value Ref Range    Sodium 138 136 - 145 mmol/L    Potassium 4.3 3.5 - 5.1 mmol/L    Chloride 104 97 - 108 mmol/L    CO2 27 21 - 32 mmol/L    Anion gap 7 5 - 15 mmol/L    Glucose 101 (H) 65 - 100 mg/dL    BUN 18 6 - 20 MG/DL    Creatinine 0.83 0.55 - 1.02 MG/DL    BUN/Creatinine ratio 22 (H) 12 - 20      eGFR >60 >60 ml/min/1.73m2    Calcium 9.5 8.5 - 10.1 MG/DL    Bilirubin, total 0.6 0.2 - 1.0 MG/DL    ALT (SGPT) 18 12 - 78 U/L    AST (SGOT) <3 (L) 15 - 37 U/L    Alk. phosphatase 104 45 - 117 U/L    Protein, total 8.0 6.4 - 8.2 g/dL    Albumin 4.0 3.5 - 5.0 g/dL    Globulin 4.0 2.0 - 4.0 g/dL    A-G Ratio 1.0 (L) 1.1 - 2.2     LIPID PANEL   Result Value Ref Range    Cholesterol, total 294 (H) <200 MG/DL    Triglyceride 207 (H) <150 MG/DL    HDL Cholesterol 70 MG/DL    LDL, calculated 182.6 (H) 0 - 100 MG/DL    VLDL, calculated 41.4 MG/DL    CHOL/HDL Ratio 4.2 0.0 - 5.0             Physical Exam  Visit Vitals  /76 (BP 1 Location: Left upper arm, BP Patient Position: Sitting, BP Cuff Size: Large adult)   Pulse (!) 101   Temp 98.1 °F (36.7 °C) (Temporal)   Resp 16   Ht 5' 4\" (1.626 m)   Wt 194 lb (88 kg)   LMP 08/24/1995   SpO2 99%   BMI 33.30 kg/m²       The rest of the exam is deferred. ASSESSMENT and PLAN    ICD-10-CM ICD-9-CM    1.  Dyslipidemia, goal LDL below 130  E78.5 272.4 rosuvastatin (CRESTOR) 10 mg tablet 2. Candidate for statin therapy due to risk of future cardiovascular event  Z91.89 V49.89 rosuvastatin (CRESTOR) 10 mg tablet      3. Epigastric discomfort  R10.13 789.06     probable gastritis        Diagnoses and all orders for this visit:    1. Dyslipidemia, goal LDL below 130  -     rosuvastatin (CRESTOR) 10 mg tablet; Take 1 Tablet by mouth nightly. Indications: primary prevention of heart attack    2. Candidate for statin therapy due to risk of future cardiovascular event  -     rosuvastatin (CRESTOR) 10 mg tablet; Take 1 Tablet by mouth nightly. Indications: primary prevention of heart attack    3. Epigastric discomfort  Comments:  probable gastritis    Follow-up and Dispositions    Return in about 3 months (around 6/3/2023), or if epigastric discomfort/symptoms worsen or fail to improve, for F/U cholesterol. lab results and schedule of future lab studies reviewed with patient  reviewed diet, exercise and weight control  cardiovascular risk and specific lipid/LDL goals reviewed  reviewed medications and side effects in detail  Please call my office if there are any questions- [phone]. I will arrange for follow up after the lab tests done from today return  Recommended a weekly \"heart check. \" I went into detail how to do this. Call for refills on medications as needed. Discussed expected course/resolution/complications of diagnosis in detail with patient. Medication risks/benefits/costs/interactions/alternatives discussed with patient. Pt was given an after visit summary which includes diagnoses, current medications & vitals. Pt expressed understanding with the diagnosis and plan  BMI is significantly elevated- in the obese range. I reviewed diet, exercise and weight control.  Discussed weight control in detail, the importance of mainly decreased carbs, and for weight maintenance, exercise; discussed different diets and that it isn't as important to watch the type of foods as it is to decrease calorie intake no matter what type of diet you do, etc.  Mindful eating also discussed- Naturally Slim( now Wond'r) or Noom are 2 options. Total 30 minutes  re: Reviewed symptoms, or lack thereof, of hypertension and elevated cholesterol. Regular exercise is very important to your health; it helps mentally, physically, socially; it prevents injuries if done properly. Exercise, even as simple as walking 20-30 minutes daily has major benefits to your health even though your \"numbers\" are the same in the lab. See if you can add this into your daily regimen and after a few months it will become a regular habit-\"just something you do,\" like brushing your teeth. A combination of aerobic exercise and strengthening and stretching is felt to be the best for you, so this should be your ultimate goal.   This can be done in the privacy of your home or in a group setting as at the gym  Some prefer having a , others prefer to do exercise in groups or individually. Do what \"works\" for you. You need to make it simple and \"fun,\" or you most likely will not continue it. Recommended a weekly \"heart check. \" I went into detail how to do this. Also, discussed symptoms of concern that were noted today in the note above, treatment options( including doing nothing), when to follow up before recommended time frame. Also, answered all questions. Reviewed her most recent cholesterol numbers and the cardiac risk calculation is 16%. Because of that and FHx of cardiovascular disease, we decided to start her on rosuvastatin. We talked about potential side effects and she will call us if she has problem; otherwise, we will have her recheck cholesterol and LFT's in 3 months. Because she is having some abd discomfort recently in the epigastric area that comes on after eating, she should stop any NSAID's, use Tylenol in place of it and start OTC Pepcid BID for the next month.  If her discomfort does not resolve or returns, then she should let us know. She should try to avoid acid type foods as much as possible until pain is better. This document was written by Derrek Young, as dictated by George Cartagena MD.   I have reviewed and agree with the above note and have made corrections where appropriate Jim Moyer M.D.

## 2023-04-05 RX ORDER — PHENTERMINE HYDROCHLORIDE 37.5 MG/1
TABLET ORAL
Qty: 30 TABLET | Refills: 0 | Status: SHIPPED
Start: 2023-04-05

## 2023-06-05 DIAGNOSIS — E78.5 HYPERLIPIDEMIA, UNSPECIFIED: ICD-10-CM

## 2023-06-05 DIAGNOSIS — Z91.89 OTHER SPECIFIED PERSONAL RISK FACTORS, NOT ELSEWHERE CLASSIFIED: ICD-10-CM

## 2023-06-05 RX ORDER — ROSUVASTATIN CALCIUM 10 MG/1
TABLET, COATED ORAL
Qty: 90 TABLET | Refills: 1 | Status: SHIPPED | OUTPATIENT
Start: 2023-06-05

## 2023-06-25 DIAGNOSIS — E66.09 OTHER OBESITY DUE TO EXCESS CALORIES: ICD-10-CM

## 2023-06-26 RX ORDER — PHENTERMINE HYDROCHLORIDE 37.5 MG/1
TABLET ORAL
Qty: 30 TABLET | Refills: 0 | Status: SHIPPED | OUTPATIENT
Start: 2023-06-26 | End: 2023-07-26

## 2023-07-02 DIAGNOSIS — R60.0 LOCALIZED EDEMA: ICD-10-CM

## 2023-07-03 RX ORDER — FUROSEMIDE 20 MG/1
TABLET ORAL
Qty: 60 TABLET | Refills: 2 | Status: SHIPPED | OUTPATIENT
Start: 2023-07-03

## 2023-07-24 ENCOUNTER — HOSPITAL ENCOUNTER (OUTPATIENT)
Facility: HOSPITAL | Age: 73
Discharge: HOME | End: 2023-07-27
Payer: MEDICARE

## 2023-07-24 DIAGNOSIS — M54.50 LUMBAR BACK PAIN: ICD-10-CM

## 2023-07-24 DIAGNOSIS — M16.11 PRIMARY OSTEOARTHRITIS OF RIGHT HIP: ICD-10-CM

## 2023-07-24 DIAGNOSIS — M51.36 DDD (DEGENERATIVE DISC DISEASE), LUMBAR: ICD-10-CM

## 2023-07-24 DIAGNOSIS — M48.062 SPINAL STENOSIS OF LUMBAR REGION WITH NEUROGENIC CLAUDICATION: ICD-10-CM

## 2023-07-24 PROCEDURE — 72158 MRI LUMBAR SPINE W/O & W/DYE: CPT

## 2023-07-24 PROCEDURE — 6360000004 HC RX CONTRAST MEDICATION: Performed by: RADIOLOGY

## 2023-07-24 PROCEDURE — A9579 GAD-BASE MR CONTRAST NOS,1ML: HCPCS | Performed by: RADIOLOGY

## 2023-07-24 RX ADMIN — GADOTERIDOL 17 ML: 279.3 INJECTION, SOLUTION INTRAVENOUS at 16:03

## 2023-07-27 ENCOUNTER — PATIENT MESSAGE (OUTPATIENT)
Age: 73
End: 2023-07-27

## 2023-07-27 NOTE — TELEPHONE ENCOUNTER
From: Rd Bales  To: Dr. Eduardo Getachew: 7/27/2023 7:09 AM EDT  Subject: Pain in my ear    I have a pain that starts in my ear and runs down my neck. Feels like I have water in my ear. Is there anything I can do?   Remyk you

## 2023-08-05 DIAGNOSIS — F51.01 PRIMARY INSOMNIA: ICD-10-CM

## 2023-08-07 ENCOUNTER — OFFICE VISIT (OUTPATIENT)
Age: 73
End: 2023-08-07
Payer: MEDICARE

## 2023-08-07 VITALS
DIASTOLIC BLOOD PRESSURE: 70 MMHG | HEIGHT: 64 IN | WEIGHT: 193 LBS | RESPIRATION RATE: 16 BRPM | TEMPERATURE: 97 F | OXYGEN SATURATION: 99 % | BODY MASS INDEX: 32.95 KG/M2 | SYSTOLIC BLOOD PRESSURE: 122 MMHG | HEART RATE: 76 BPM

## 2023-08-07 DIAGNOSIS — F45.8 BRUXISM (TEETH GRINDING): ICD-10-CM

## 2023-08-07 DIAGNOSIS — H93.8X1 EAR FULLNESS, RIGHT: ICD-10-CM

## 2023-08-07 DIAGNOSIS — M26.621 RIGHT-SIDED TEMPOROMANDIBULAR JOINT PAIN-DYSFUNCTION SYNDROME: Primary | ICD-10-CM

## 2023-08-07 DIAGNOSIS — E78.5 HYPERLIPIDEMIA WITH TARGET LOW DENSITY LIPOPROTEIN (LDL) CHOLESTEROL LESS THAN 130 MG/DL: ICD-10-CM

## 2023-08-07 DIAGNOSIS — E78.5 HYPERLIPIDEMIA LDL GOAL <130: ICD-10-CM

## 2023-08-07 DIAGNOSIS — G47.01 SLEEP DISORDER DUE TO A GENERAL MEDICAL CONDITION, INSOMNIA TYPE: ICD-10-CM

## 2023-08-07 PROCEDURE — 1123F ACP DISCUSS/DSCN MKR DOCD: CPT | Performed by: FAMILY MEDICINE

## 2023-08-07 PROCEDURE — 1090F PRES/ABSN URINE INCON ASSESS: CPT | Performed by: FAMILY MEDICINE

## 2023-08-07 PROCEDURE — G8417 CALC BMI ABV UP PARAM F/U: HCPCS | Performed by: FAMILY MEDICINE

## 2023-08-07 PROCEDURE — 99214 OFFICE O/P EST MOD 30 MIN: CPT | Performed by: FAMILY MEDICINE

## 2023-08-07 PROCEDURE — G8427 DOCREV CUR MEDS BY ELIG CLIN: HCPCS | Performed by: FAMILY MEDICINE

## 2023-08-07 PROCEDURE — G8399 PT W/DXA RESULTS DOCUMENT: HCPCS | Performed by: FAMILY MEDICINE

## 2023-08-07 PROCEDURE — 3017F COLORECTAL CA SCREEN DOC REV: CPT | Performed by: FAMILY MEDICINE

## 2023-08-07 PROCEDURE — 1036F TOBACCO NON-USER: CPT | Performed by: FAMILY MEDICINE

## 2023-08-07 RX ORDER — ZOLPIDEM TARTRATE 5 MG/1
TABLET ORAL
Qty: 30 TABLET | Refills: 5 | Status: SHIPPED | OUTPATIENT
Start: 2023-08-07 | End: 2023-09-06

## 2023-08-07 SDOH — ECONOMIC STABILITY: HOUSING INSECURITY
IN THE LAST 12 MONTHS, WAS THERE A TIME WHEN YOU DID NOT HAVE A STEADY PLACE TO SLEEP OR SLEPT IN A SHELTER (INCLUDING NOW)?: NO

## 2023-08-07 SDOH — ECONOMIC STABILITY: FOOD INSECURITY: WITHIN THE PAST 12 MONTHS, YOU WORRIED THAT YOUR FOOD WOULD RUN OUT BEFORE YOU GOT MONEY TO BUY MORE.: NEVER TRUE

## 2023-08-07 SDOH — ECONOMIC STABILITY: FOOD INSECURITY: WITHIN THE PAST 12 MONTHS, THE FOOD YOU BOUGHT JUST DIDN'T LAST AND YOU DIDN'T HAVE MONEY TO GET MORE.: NEVER TRUE

## 2023-08-07 SDOH — ECONOMIC STABILITY: INCOME INSECURITY: HOW HARD IS IT FOR YOU TO PAY FOR THE VERY BASICS LIKE FOOD, HOUSING, MEDICAL CARE, AND HEATING?: NOT HARD AT ALL

## 2023-08-07 ASSESSMENT — PATIENT HEALTH QUESTIONNAIRE - PHQ9
SUM OF ALL RESPONSES TO PHQ QUESTIONS 1-9: 0
2. FEELING DOWN, DEPRESSED OR HOPELESS: 0
1. LITTLE INTEREST OR PLEASURE IN DOING THINGS: 0
SUM OF ALL RESPONSES TO PHQ9 QUESTIONS 1 & 2: 0

## 2023-08-07 ASSESSMENT — ENCOUNTER SYMPTOMS
EYE PAIN: 0
WHEEZING: 0
EYE REDNESS: 0
COUGH: 0
SHORTNESS OF BREATH: 0

## 2023-08-07 NOTE — PROGRESS NOTES
Subjective     HPI    Maxim Pierre is a 74 y.o. female with a history of lumbar nerve root impingement, left knee DJD, osteoarthritis of right knee, thoracolumbar idiopathic scoliosis, adjustment disorder with anxiety and depression, kidney stones and hyperlipidemia with LDL goal <130, who presents to the office today c/o ear fullness and for f/u of these health problems. Pt notes having R ear fullness for the past 2-3 weeks. She states it feels like a \"knot\" under her chin when she opens/closes her mouth. Pt denies unusual SOB, chest pain, and any recent ER visits or hospitalizations.      Past Medical History:   Diagnosis Date    Actinic keratosis     Left arm    Adjustment disorder with mixed anxiety and depressed mood     Adverse effect of anesthesia     HARD TO WAKE UP SLOW BREATHING ,Pt stated doesn't take much anesthesia    Arthritis     OSTEO    Chronic pain     Delayed emergence from anesthesia     Diverticulitis     DJD (degenerative joint disease), lumbar     seen on  MRI- DDD and DJD    Kidney stones 95,96,98    left    Left knee DJD     Lumbar nerve root impingement     RLQ pain    Menopause     LMP-53years old    Nausea & vomiting     Other and unspecified hyperlipidemia     Other idiopathic scoliosis, thoracolumbar region 2018    Postmenopausal HRT (hormone replacement therapy)     Right knee DJD     Urine culture positive 2020     Past Surgical History:   Procedure Laterality Date    ADENOIDECTOMY  1956    APPENDECTOMY  1966    CATARACT REMOVAL Right 2019    CATARACT REMOVAL Left 2019     SECTION  2966,8682    X2    GI      COLONOSCOPY    HIP FRACTURE SURGERY Right 2019    Total R hip- Dr. Daniel Moran (CERVIX STATUS UNKNOWN)      LMP-53years old    KNEE ARTHROSCOPY Bilateral 1966    SCOPE of knees bilateral    LITHOTRIPSY      X 2    LUMBAR LAMINECTOMY  2000    X2    MENISCECTOMY      left knee    ORTHOPEDIC SURGERY Left

## 2023-08-07 NOTE — PROGRESS NOTES
Chief Complaint   Patient presents with    Ear Fullness     Rt ear knot under chin- feels like water in ear    1. \"Have you been to the ER, urgent care clinic since your last visit? Hospitalized since your last visit? \" no  2. \"Have you seen or consulted any other health care providers outside of the [address] [place] [address] since your last visit? \" no  3. For patients over 45: Has the patient had a colonoscopy? Yes     If the patient is female:    4. For patients over 40: Has the patient had a mammogram? Yes scheduled    5. For patients over 21: Has the patient had a pap smear?  N/A

## 2023-08-25 ENCOUNTER — HOSPITAL ENCOUNTER (OUTPATIENT)
Facility: HOSPITAL | Age: 73
End: 2023-08-25
Attending: FAMILY MEDICINE
Payer: MEDICARE

## 2023-08-25 DIAGNOSIS — Z12.31 VISIT FOR SCREENING MAMMOGRAM: ICD-10-CM

## 2023-08-25 PROCEDURE — 77063 BREAST TOMOSYNTHESIS BI: CPT

## 2023-09-07 ENCOUNTER — PATIENT MESSAGE (OUTPATIENT)
Age: 73
End: 2023-09-07

## 2023-09-07 NOTE — TELEPHONE ENCOUNTER
From: Sammy Ahumada  To: Dr. Génesis Cárdenas: 2023 3:39 PM EDT  Subject: Follow up on last visit    I still have pain from my ear and down my neck. The swollen area seams a little larger.

## 2023-09-13 ENCOUNTER — NURSE ONLY (OUTPATIENT)
Age: 73
End: 2023-09-13

## 2023-09-13 DIAGNOSIS — E78.5 HYPERLIPIDEMIA LDL GOAL <130: ICD-10-CM

## 2023-09-13 LAB
ALBUMIN SERPL-MCNC: 3.5 G/DL (ref 3.5–5)
ALBUMIN/GLOB SERPL: 1 (ref 1.1–2.2)
ALP SERPL-CCNC: 92 U/L (ref 45–117)
ALT SERPL-CCNC: 27 U/L (ref 12–78)
ANION GAP SERPL CALC-SCNC: 4 MMOL/L (ref 5–15)
AST SERPL-CCNC: 8 U/L (ref 15–37)
BILIRUB SERPL-MCNC: 0.5 MG/DL (ref 0.2–1)
BUN SERPL-MCNC: 21 MG/DL (ref 6–20)
BUN/CREAT SERPL: 30 (ref 12–20)
CALCIUM SERPL-MCNC: 9.3 MG/DL (ref 8.5–10.1)
CHLORIDE SERPL-SCNC: 108 MMOL/L (ref 97–108)
CHOLEST SERPL-MCNC: 154 MG/DL
CO2 SERPL-SCNC: 28 MMOL/L (ref 21–32)
CREAT SERPL-MCNC: 0.71 MG/DL (ref 0.55–1.02)
GLOBULIN SER CALC-MCNC: 3.6 G/DL (ref 2–4)
GLUCOSE SERPL-MCNC: 105 MG/DL (ref 65–100)
HDLC SERPL-MCNC: 68 MG/DL
HDLC SERPL: 2.3 (ref 0–5)
LDLC SERPL CALC-MCNC: 64.2 MG/DL (ref 0–100)
POTASSIUM SERPL-SCNC: 3.7 MMOL/L (ref 3.5–5.1)
PROT SERPL-MCNC: 7.1 G/DL (ref 6.4–8.2)
SODIUM SERPL-SCNC: 140 MMOL/L (ref 136–145)
TRIGL SERPL-MCNC: 109 MG/DL
VLDLC SERPL CALC-MCNC: 21.8 MG/DL

## 2023-09-18 RX ORDER — ESTRADIOL 1 MG/1
1 TABLET ORAL DAILY
Qty: 90 TABLET | Refills: 1 | Status: SHIPPED | OUTPATIENT
Start: 2023-09-18

## 2023-09-19 ENCOUNTER — OFFICE VISIT (OUTPATIENT)
Age: 73
End: 2023-09-19
Payer: MEDICARE

## 2023-09-19 VITALS
HEART RATE: 78 BPM | RESPIRATION RATE: 16 BRPM | BODY MASS INDEX: 33.12 KG/M2 | TEMPERATURE: 98.6 F | WEIGHT: 194 LBS | SYSTOLIC BLOOD PRESSURE: 128 MMHG | HEIGHT: 64 IN | DIASTOLIC BLOOD PRESSURE: 72 MMHG | OXYGEN SATURATION: 96 %

## 2023-09-19 DIAGNOSIS — M26.621 RIGHT-SIDED TEMPOROMANDIBULAR JOINT PAIN-DYSFUNCTION SYNDROME: Primary | ICD-10-CM

## 2023-09-19 DIAGNOSIS — F51.01 PRIMARY INSOMNIA: ICD-10-CM

## 2023-09-19 DIAGNOSIS — R22.1 MASS OF LATERAL NECK: ICD-10-CM

## 2023-09-19 DIAGNOSIS — F45.8 BRUXISM (TEETH GRINDING): ICD-10-CM

## 2023-09-19 DIAGNOSIS — E78.5 HYPERLIPIDEMIA WITH TARGET LOW DENSITY LIPOPROTEIN (LDL) CHOLESTEROL LESS THAN 130 MG/DL: ICD-10-CM

## 2023-09-19 PROCEDURE — 99214 OFFICE O/P EST MOD 30 MIN: CPT | Performed by: FAMILY MEDICINE

## 2023-09-19 PROCEDURE — 1090F PRES/ABSN URINE INCON ASSESS: CPT | Performed by: FAMILY MEDICINE

## 2023-09-19 PROCEDURE — 1123F ACP DISCUSS/DSCN MKR DOCD: CPT | Performed by: FAMILY MEDICINE

## 2023-09-19 PROCEDURE — G8427 DOCREV CUR MEDS BY ELIG CLIN: HCPCS | Performed by: FAMILY MEDICINE

## 2023-09-19 PROCEDURE — G8399 PT W/DXA RESULTS DOCUMENT: HCPCS | Performed by: FAMILY MEDICINE

## 2023-09-19 PROCEDURE — 3017F COLORECTAL CA SCREEN DOC REV: CPT | Performed by: FAMILY MEDICINE

## 2023-09-19 PROCEDURE — G8417 CALC BMI ABV UP PARAM F/U: HCPCS | Performed by: FAMILY MEDICINE

## 2023-09-19 PROCEDURE — 1036F TOBACCO NON-USER: CPT | Performed by: FAMILY MEDICINE

## 2023-09-19 RX ORDER — ZOLPIDEM TARTRATE 10 MG/1
TABLET ORAL NIGHTLY PRN
COMMUNITY

## 2023-09-19 RX ORDER — ZOLPIDEM TARTRATE 10 MG/1
10 TABLET ORAL NIGHTLY PRN
Qty: 30 TABLET | Refills: 1 | Status: CANCELLED | OUTPATIENT
Start: 2023-09-19 | End: 2023-11-18

## 2023-09-19 ASSESSMENT — PATIENT HEALTH QUESTIONNAIRE - PHQ9
SUM OF ALL RESPONSES TO PHQ QUESTIONS 1-9: 0
1. LITTLE INTEREST OR PLEASURE IN DOING THINGS: 0
SUM OF ALL RESPONSES TO PHQ QUESTIONS 1-9: 0
SUM OF ALL RESPONSES TO PHQ QUESTIONS 1-9: 0
2. FEELING DOWN, DEPRESSED OR HOPELESS: 0
SUM OF ALL RESPONSES TO PHQ9 QUESTIONS 1 & 2: 0
SUM OF ALL RESPONSES TO PHQ QUESTIONS 1-9: 0

## 2023-09-19 ASSESSMENT — ENCOUNTER SYMPTOMS
EYE PAIN: 0
EYE REDNESS: 0
WHEEZING: 0
SHORTNESS OF BREATH: 0
COUGH: 0

## 2023-09-19 NOTE — PROGRESS NOTES
Subjective     HPI    Thermon Amairani is a 74 y.o. female with a history of lumbar nerve root impingement, left knee DJD, osteoarthritis of right knee, thoracolumbar idiopathic scoliosis, adjustment disorder with anxiety and depression, kidney stones and hyperlipidemia with LDL goal <130, who presents to the office today c/o ear fullness and for f/u of these health problems. Pt notes feeling a gland that feels swollen on the L side of her neck that runs down to her throat causing a sore throat that occurs mostly in the morning, or when she is very tired. She states that she wears a mouthguard for her symptoms as well. Pt is taking 5 mg of Ambien. Pt denies unusual SOB, chest pain, and any recent ER visits or hospitalizations.      Past Medical History:   Diagnosis Date    Actinic keratosis     Left arm    Adjustment disorder with mixed anxiety and depressed mood     Adverse effect of anesthesia     HARD TO WAKE UP SLOW BREATHING ,Pt stated doesn't take much anesthesia    Arthritis     OSTEO    Chronic pain     Delayed emergence from anesthesia     Diverticulitis     DJD (degenerative joint disease), lumbar     seen on  MRI- DDD and DJD    Kidney stones 95,96,98    left    Left knee DJD     Lumbar nerve root impingement     RLQ pain    Menopause     LMP-53years old    Nausea & vomiting     Other and unspecified hyperlipidemia     Other idiopathic scoliosis, thoracolumbar region 2018    Postmenopausal HRT (hormone replacement therapy)     Right knee DJD     Urine culture positive 2020     Past Surgical History:   Procedure Laterality Date    ADENOIDECTOMY      APPENDECTOMY  1966    CATARACT REMOVAL Right 2019    CATARACT REMOVAL Left 2019     SECTION  0828,8609    X2    GI      COLONOSCOPY    HIP FRACTURE SURGERY Right 2019    Total R hip- Dr. Gisell Barraza (CERVIX STATUS UNKNOWN)      LMP-53years old    KNEE ARTHROSCOPY Bilateral 1966    SCOPE of

## 2023-09-21 DIAGNOSIS — R60.0 LOCALIZED EDEMA: ICD-10-CM

## 2023-09-21 RX ORDER — FUROSEMIDE 20 MG/1
TABLET ORAL
Qty: 60 TABLET | Refills: 5 | Status: SHIPPED | OUTPATIENT
Start: 2023-09-21

## 2023-11-03 NOTE — TELEPHONE ENCOUNTER
MD Linda Qiu,    Patient call stating needs a new rx for zolpidem. Stated that it was filled last in [place] and now cannot obtain here. Last Rx on file Zolpidem 5mg 8/7/23 30 + 5 to this [facility]. Contacted [facility] to check status of rx:  Rx was transferred to [place]- Patient needs new rx. (Med list shows zolpidem 10mg- Patient takes 5mg. Entered the zolpidem 5mg if appropriate. Thanks, Yeimy Walton    Last appointment: 9/19/23 MD Linda Qiu  Next appointment: None  Previous refill encounter(s): 8/7/23 30 + 5 (transferred to [place])    Requested Prescriptions     Pending Prescriptions Disp Refills    zolpidem (AMBIEN) 5 MG tablet 30 tablet 5     Sig: Take 1 tablet by mouth nightly as needed for Sleep. Max Daily Amount: 5 mg     For Pharmacy Admin Tracking Only    Program: Medication Refill  CPA in place:    Recommendation Provided To:    Intervention Detail: New Rx: 1, reason: Patient Preference  Intervention Accepted By:   Raul Fuller Closed?:    Time Spent (min): 10

## 2023-11-06 RX ORDER — ZOLPIDEM TARTRATE 5 MG/1
5 TABLET ORAL NIGHTLY PRN
Qty: 30 TABLET | Refills: 5 | Status: SHIPPED | OUTPATIENT
Start: 2023-11-06 | End: 2024-05-04

## 2023-11-06 NOTE — TELEPHONE ENCOUNTER
----- Message from Kayla Raghu sent at 11/6/2023  8:13 AM EST -----  Subject: Refill Request    QUESTIONS  Name of Medication? zolpidem (AMBIEN) 5 MG tablet  Patient-reported dosage and instructions? 1x nightly  How many days do you have left? 0  Preferred Pharmacy? [id number]  Pharmacy phone number (if available)? [phone]  Additional Information for Provider? Patient had medication sent to a   pharmacy in [facility] when she was on vacation however did not pick it up and   pharmacy will not send back. Patient needs refill of medication sent to   normal pharmacy.  ---------------------------------------------------------------------------  --------------  [address]  What is the best way for the office to contact you? OK to leave message on   voicemail  Preferred Call Back Phone Number? [phone]  ---------------------------------------------------------------------------  --------------  SCRIPT ANSWERS  Relationship to Patient?  Self

## 2023-11-30 DIAGNOSIS — E66.09 OTHER OBESITY DUE TO EXCESS CALORIES: ICD-10-CM

## 2023-11-30 RX ORDER — PHENTERMINE HYDROCHLORIDE 37.5 MG/1
37.5 TABLET ORAL EVERY MORNING
Qty: 30 TABLET | Refills: 0 | Status: SHIPPED | OUTPATIENT
Start: 2023-12-06 | End: 2024-01-05

## 2023-12-11 ENCOUNTER — APPOINTMENT (OUTPATIENT)
Facility: HOSPITAL | Age: 73
End: 2023-12-11
Payer: MEDICARE

## 2023-12-11 ENCOUNTER — HOSPITAL ENCOUNTER (EMERGENCY)
Facility: HOSPITAL | Age: 73
Discharge: HOME | End: 2023-12-11
Attending: EMERGENCY MEDICINE
Payer: MEDICARE

## 2023-12-11 VITALS
OXYGEN SATURATION: 97 % | TEMPERATURE: 98.1 F | RESPIRATION RATE: 16 BRPM | HEART RATE: 82 BPM | DIASTOLIC BLOOD PRESSURE: 90 MMHG | SYSTOLIC BLOOD PRESSURE: 128 MMHG

## 2023-12-11 DIAGNOSIS — S20.212A RIB CONTUSION, LEFT, INITIAL ENCOUNTER: ICD-10-CM
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
## 2023-12-11 DIAGNOSIS — S52.022A OLECRANON FRACTURE, LEFT, CLOSED, INITIAL ENCOUNTER: ICD-10-CM

## 2023-12-11 PROCEDURE — 73080 X-RAY EXAM OF ELBOW: CPT

## 2023-12-11 PROCEDURE — 96372 THER/PROPH/DIAG INJ SC/IM: CPT

## 2023-12-11 PROCEDURE — 6370000000 HC RX 637 (ALT 250 FOR IP): Performed by: EMERGENCY MEDICINE

## 2023-12-11 PROCEDURE — 71101 X-RAY EXAM UNILAT RIBS/CHEST: CPT

## 2023-12-11 PROCEDURE — 29105 APPLICATION LONG ARM SPLINT: CPT

## 2023-12-11 PROCEDURE — 6360000002 HC RX W HCPCS: Performed by: EMERGENCY MEDICINE

## 2023-12-11 PROCEDURE — 99284 EMERGENCY DEPT VISIT MOD MDM: CPT

## 2023-12-11 RX ORDER — LIDOCAINE 4 G/G
2 PATCH TOPICAL
Status: DISCONTINUED | OUTPATIENT
Start: 2023-12-11 | End: 2023-12-11 | Stop reason: HOSPADM

## 2023-12-11 RX ORDER — ACETAMINOPHEN 500 MG
1000 TABLET ORAL
Status: COMPLETED | OUTPATIENT
Start: 2023-12-11 | End: 2023-12-11

## 2023-12-11 RX ORDER — KETOROLAC TROMETHAMINE 30 MG/ML
15 INJECTION, SOLUTION INTRAMUSCULAR; INTRAVENOUS
Status: COMPLETED | OUTPATIENT
Start: 2023-12-11 | End: 2023-12-11

## 2023-12-11 RX ORDER — ACETAMINOPHEN 500 MG
1000 TABLET ORAL 3 TIMES DAILY
Qty: 30 TABLET | Refills: 0 | Status: SHIPPED | OUTPATIENT
Start: 2023-12-11

## 2023-12-11 RX ORDER — IBUPROFEN 600 MG/1
600 TABLET ORAL EVERY 8 HOURS PRN
Qty: 30 TABLET | Refills: 0 | Status: SHIPPED | OUTPATIENT
Start: 2023-12-11

## 2023-12-11 RX ADMIN — KETOROLAC TROMETHAMINE 15 MG: 30 INJECTION, SOLUTION INTRAMUSCULAR; INTRAVENOUS at 02:09

## 2023-12-11 RX ADMIN — ACETAMINOPHEN 1000 MG: 500 TABLET ORAL at 02:08

## 2023-12-11 ASSESSMENT — ENCOUNTER SYMPTOMS
COUGH: 0
ABDOMINAL PAIN: 0
ABDOMINAL DISTENTION: 0
SHORTNESS OF BREATH: 0
BLOOD IN STOOL: 0
NAUSEA: 0
ANAL BLEEDING: 0
DIARRHEA: 0
VOMITING: 0

## 2023-12-11 ASSESSMENT — PAIN SCALES - GENERAL: PAINLEVEL_OUTOF10: 8

## 2023-12-11 ASSESSMENT — PAIN DESCRIPTION - LOCATION: LOCATION: RIB CAGE

## 2023-12-11 ASSESSMENT — PAIN - FUNCTIONAL ASSESSMENT: PAIN_FUNCTIONAL_ASSESSMENT: 0-10

## 2023-12-11 ASSESSMENT — PAIN DESCRIPTION - ORIENTATION: ORIENTATION: LEFT

## 2023-12-11 NOTE — ED TRIAGE NOTES
Patient arrives via EMS after a [place]F at the airport. Patient denies any head hit, LOC, or blood thinner use. CC is left side rib and shoulder pain.

## 2023-12-11 NOTE — ED NOTES
Patient given discharge instructions. No questions or concerns at this time. Patient vital signs stable and in no acute distress. Patient ambulatory at discharge.       Blanca Perkins RN  12/11/23 [id number]

## 2023-12-11 NOTE — ED PROVIDER NOTES
[facility] [place] [facility]  EMERGENCY DEPARTMENT ENCOUNTER      Pt Name: Tatiana Lau  MRN: [id number]  [address] [facility] [place] 1950  Date of evaluation: 12/11/2023  Provider: Elina Lassiter MD    [address]       Chief Complaint   Patient presents with    Fall         HISTORY OF PRESENT ILLNESS   (Location/Symptom, Timing/Onset, Context/Setting, Quality, Duration, Modifying Factors, Severity)  Note limiting factors. 73F w/ hx OA and DJD p/w GLF. Pt was at airport returning home when she slipped on a puddle in the terminal and fell onto her left side hitting her left chest wall and elbow where now has pain, swelling and bruising. No syncope or LOC. No shoulder or wrist pain and right arm unaffected. Denies hitting her head and no head/neck or abd pain. No dyspnea, N/V or ext weakness/numbness. No anticoagulation. Was feeling well prior to fall. Review of External Medical Records:     Nursing Notes were reviewed. REVIEW OF SYSTEMS    (2-9 systems for level 4, 10 or more for level 5)     Review of Systems   Constitutional:  Negative for diaphoresis and fever. HENT:  Negative for nosebleeds. Eyes:  Negative for visual disturbance. Respiratory:  Negative for cough and shortness of breath. Cardiovascular:  Positive for chest pain. Negative for palpitations and leg swelling. Gastrointestinal:  Negative for abdominal distention, abdominal pain, anal bleeding, blood in stool, diarrhea, nausea and vomiting. Endocrine: Negative for polyuria. Genitourinary:  Negative for difficulty urinating, dysuria, frequency and hematuria. Musculoskeletal:  Positive for arthralgias and joint swelling. Skin:  Negative for wound. Allergic/Immunologic: Negative for immunocompromised state. Neurological:  Negative for seizures and syncope. Hematological:  Does not bruise/bleed easily. Psychiatric/Behavioral:  Negative for confusion.         Except as noted above the remainder of the review of systems was

## 2023-12-12 ENCOUNTER — TELEPHONE (OUTPATIENT)
Age: 73
End: 2023-12-12

## 2023-12-12 NOTE — TELEPHONE ENCOUNTER
Pt called said she broke her elbow and is scheduled to have surgery 12/18/23. Pt needs to have EKG done prior to surgery. Pt requested callback.     BCB# [phone]

## 2023-12-13 NOTE — TELEPHONE ENCOUNTER
Pt's scheduled to see  on 12.14.23 @ 9:20 AM for an EKG for surgery on 12.18.23,pt's only scheduled with  due to  out of the office.

## 2024-02-25 DIAGNOSIS — Z91.89 OTHER SPECIFIED PERSONAL RISK FACTORS, NOT ELSEWHERE CLASSIFIED: ICD-10-CM

## 2024-02-25 DIAGNOSIS — E78.5 HYPERLIPIDEMIA, UNSPECIFIED: ICD-10-CM

## 2024-02-27 NOTE — TELEPHONE ENCOUNTER
Left detailed message on 2/27/24 informing her that we received a request for her Crestor,and that  would not approve it until she made an IN Office Appt.

## 2024-03-03 DIAGNOSIS — E66.09 OTHER OBESITY DUE TO EXCESS CALORIES: ICD-10-CM

## 2024-03-04 RX ORDER — PHENTERMINE HYDROCHLORIDE 37.5 MG/1
37.5 TABLET ORAL EVERY MORNING
Qty: 30 TABLET | Refills: 0 | Status: SHIPPED | OUTPATIENT
Start: 2024-03-04 | End: 2024-04-03

## 2024-03-07 DIAGNOSIS — F51.01 PRIMARY INSOMNIA: Primary | ICD-10-CM

## 2024-03-07 NOTE — TELEPHONE ENCOUNTER
Patient is needing a refill on zolpidem. She was told by PCP to let him know the dosage she's taking. Patient stated she's taking 2. She wants to know if the dosage it going to change when refill is put in, or remain the say and for her to keep taking 2.

## 2024-03-08 RX ORDER — ZOLPIDEM TARTRATE 10 MG/1
20 TABLET ORAL NIGHTLY PRN
Qty: 60 TABLET | Refills: 2 | Status: SHIPPED | OUTPATIENT
Start: 2024-03-08 | End: 2024-06-06

## 2024-03-20 NOTE — TELEPHONE ENCOUNTER
Left detailed message on 3/20/24 informing the pt that we had received a request for her Estrace.And that she need's to make an In Office Appt with  to get refill's.Once pt make's her appt please send back date and time of appt so that  may consider a temporary refill.

## 2024-04-01 ENCOUNTER — PATIENT MESSAGE (OUTPATIENT)
Age: 74
End: 2024-04-01

## 2024-04-01 NOTE — TELEPHONE ENCOUNTER
From: Anel Leal  To: Dr. Ashwin Lundberg  Sent: 4/1/2024 11:08 AM EDT  Subject: Cholesterol medication     I was told I need an appointment to have this medication refilled. Do I still need to take this or do I need to have bloodwork done? Message wasn’t clear as to my next step.  Thank you

## 2024-04-02 RX ORDER — ROSUVASTATIN CALCIUM 10 MG/1
TABLET, COATED ORAL
Qty: 90 TABLET | Refills: 1 | Status: SHIPPED | OUTPATIENT
Start: 2024-04-02

## 2024-04-02 RX ORDER — ESTRADIOL 1 MG/1
1 TABLET ORAL DAILY
Qty: 90 TABLET | Refills: 1 | Status: SHIPPED | OUTPATIENT
Start: 2024-04-02

## 2024-04-15 DIAGNOSIS — Z91.89 OTHER SPECIFIED PERSONAL RISK FACTORS, NOT ELSEWHERE CLASSIFIED: ICD-10-CM

## 2024-04-15 DIAGNOSIS — E78.5 HYPERLIPIDEMIA, UNSPECIFIED: ICD-10-CM

## 2024-04-16 RX ORDER — ROSUVASTATIN CALCIUM 10 MG/1
TABLET, COATED ORAL
Qty: 90 TABLET | Refills: 1 | Status: SHIPPED | OUTPATIENT
Start: 2024-04-16

## 2024-04-23 ENCOUNTER — OFFICE VISIT (OUTPATIENT)
Age: 74
End: 2024-04-23
Payer: MEDICARE

## 2024-04-23 VITALS
HEIGHT: 64 IN | OXYGEN SATURATION: 97 % | DIASTOLIC BLOOD PRESSURE: 78 MMHG | HEART RATE: 110 BPM | BODY MASS INDEX: 32.1 KG/M2 | TEMPERATURE: 97.8 F | WEIGHT: 188 LBS | RESPIRATION RATE: 16 BRPM | SYSTOLIC BLOOD PRESSURE: 126 MMHG

## 2024-04-23 DIAGNOSIS — Z91.81 AT HIGH RISK FOR FALLS: ICD-10-CM

## 2024-04-23 DIAGNOSIS — E78.5 HYPERLIPIDEMIA WITH TARGET LOW DENSITY LIPOPROTEIN (LDL) CHOLESTEROL LESS THAN 130 MG/DL: Primary | ICD-10-CM

## 2024-04-23 DIAGNOSIS — E66.09 OTHER OBESITY DUE TO EXCESS CALORIES: ICD-10-CM

## 2024-04-23 DIAGNOSIS — E78.5 HYPERLIPIDEMIA LDL GOAL <130: ICD-10-CM

## 2024-04-23 DIAGNOSIS — R10.31 DISCOMFORT OF RIGHT GROIN: ICD-10-CM

## 2024-04-23 PROCEDURE — 99214 OFFICE O/P EST MOD 30 MIN: CPT | Performed by: FAMILY MEDICINE

## 2024-04-23 PROCEDURE — G8427 DOCREV CUR MEDS BY ELIG CLIN: HCPCS | Performed by: FAMILY MEDICINE

## 2024-04-23 PROCEDURE — 1036F TOBACCO NON-USER: CPT | Performed by: FAMILY MEDICINE

## 2024-04-23 PROCEDURE — G8399 PT W/DXA RESULTS DOCUMENT: HCPCS | Performed by: FAMILY MEDICINE

## 2024-04-23 PROCEDURE — 1090F PRES/ABSN URINE INCON ASSESS: CPT | Performed by: FAMILY MEDICINE

## 2024-04-23 PROCEDURE — G8417 CALC BMI ABV UP PARAM F/U: HCPCS | Performed by: FAMILY MEDICINE

## 2024-04-23 PROCEDURE — 1123F ACP DISCUSS/DSCN MKR DOCD: CPT | Performed by: FAMILY MEDICINE

## 2024-04-23 RX ORDER — PHENTERMINE HYDROCHLORIDE 37.5 MG/1
37.5 TABLET ORAL EVERY MORNING
Qty: 30 TABLET | Refills: 0 | Status: SHIPPED | OUTPATIENT
Start: 2024-04-23 | End: 2024-05-23

## 2024-04-23 ASSESSMENT — PATIENT HEALTH QUESTIONNAIRE - PHQ9
SUM OF ALL RESPONSES TO PHQ QUESTIONS 1-9: 0
2. FEELING DOWN, DEPRESSED OR HOPELESS: NOT AT ALL
SUM OF ALL RESPONSES TO PHQ QUESTIONS 1-9: 0
1. LITTLE INTEREST OR PLEASURE IN DOING THINGS: NOT AT ALL
SUM OF ALL RESPONSES TO PHQ9 QUESTIONS 1 & 2: 0

## 2024-04-23 ASSESSMENT — ENCOUNTER SYMPTOMS
EYE PAIN: 0
COUGH: 0
WHEEZING: 0
SHORTNESS OF BREATH: 0
EYE REDNESS: 0

## 2024-04-23 NOTE — PROGRESS NOTES
Chief Complaint   Patient presents with    Cholesterol Problem    Groin Pain     Rt side for a month      \"Have you been to the ER, urgent care clinic since your last visit?  Hospitalized since your last visit?\"    [facility] for broken left elbow    “Have you seen or consulted any other health care providers outside of [facility] since your last visit?”    Dr Serrano            Click Here for Release of Records Request   
On the basis of positive falls risk screening, assessment and plan is as follows: in-office gait and balance testing performed using The Timed Up and Go Test was negative for increased falls risk- no further intervention is currently indicated.  
healthy habits of not smoking, working on her weight, and to do a weekly heart check for MI detection, which she is doing. The chance for having heart disease that is suddenly lethal is remote with all of these positive health habits.     Encouraged patient to get the latest shingles vaccine.Informed the shingles vaccine is covered by Medicare and most commercial insurances.    Discussion re: Covid 19 infection, prevention, treatment limitations, importance of masks and distancing, and the available vaccines and now boosters recommended for Pfizex and Moderna at 5-6 months and then again at additional 4 months for those at high risk. The new recommendation Sept '22 to get the bivalent vaccine booster for the Omicron variant and in summer 2023 to get this vaccine again in the fall for \"high risk patients and optional for others due to anticipated flu and Covid 19 increase in the fall and winter season. New update end of Sept 2023- new booster available for everyone but highly recommended for those over 65 and anyone with significant health  problems that put them at risk of complications from a Covid infection.    I, Doc Rojas, am scribing for and in the presence of Ashwin Lundberg MD. 4/23/24/12:21 PM EDT  I have reviewed and agree with the above note and have made corrections where appropriate Ashwin Potts M.D., MD, personally performed the services described in this documentation as scribed, in my presence, and it is both accurate and complete.

## 2024-04-24 LAB
ALBUMIN SERPL-MCNC: 3.5 G/DL (ref 3.5–5)
ALBUMIN/GLOB SERPL: 0.9 (ref 1.1–2.2)
ALP SERPL-CCNC: 105 U/L (ref 45–117)
ALT SERPL-CCNC: 22 U/L (ref 12–78)
ANION GAP SERPL CALC-SCNC: 7 MMOL/L (ref 5–15)
AST SERPL-CCNC: 8 U/L (ref 15–37)
BILIRUB SERPL-MCNC: 0.6 MG/DL (ref 0.2–1)
BUN SERPL-MCNC: 14 MG/DL (ref 6–20)
BUN/CREAT SERPL: 17 (ref 12–20)
CALCIUM SERPL-MCNC: 9.5 MG/DL (ref 8.5–10.1)
CHLORIDE SERPL-SCNC: 108 MMOL/L (ref 97–108)
CHOLEST SERPL-MCNC: 160 MG/DL
CO2 SERPL-SCNC: 25 MMOL/L (ref 21–32)
CREAT SERPL-MCNC: 0.81 MG/DL (ref 0.55–1.02)
GLOBULIN SER CALC-MCNC: 3.8 G/DL (ref 2–4)
GLUCOSE SERPL-MCNC: 100 MG/DL (ref 65–100)
HDLC SERPL-MCNC: 62 MG/DL
HDLC SERPL: 2.6 (ref 0–5)
LDLC SERPL CALC-MCNC: 75.6 MG/DL (ref 0–100)
POTASSIUM SERPL-SCNC: 3.9 MMOL/L (ref 3.5–5.1)
PROT SERPL-MCNC: 7.3 G/DL (ref 6.4–8.2)
SODIUM SERPL-SCNC: 140 MMOL/L (ref 136–145)
TRIGL SERPL-MCNC: 112 MG/DL
VLDLC SERPL CALC-MCNC: 22.4 MG/DL

## 2024-05-23 DIAGNOSIS — R60.0 LOCALIZED EDEMA: ICD-10-CM

## 2024-05-25 DIAGNOSIS — E66.09 OTHER OBESITY DUE TO EXCESS CALORIES: ICD-10-CM

## 2024-05-25 RX ORDER — FUROSEMIDE 20 MG/1
TABLET ORAL
Qty: 180 TABLET | Refills: 1 | Status: SHIPPED | OUTPATIENT
Start: 2024-05-25

## 2024-05-28 RX ORDER — PHENTERMINE HYDROCHLORIDE 37.5 MG/1
37.5 TABLET ORAL EVERY MORNING
Qty: 30 TABLET | Refills: 0 | Status: SHIPPED | OUTPATIENT
Start: 2024-05-28 | End: 2024-06-27

## 2024-07-02 DIAGNOSIS — E66.09 OTHER OBESITY DUE TO EXCESS CALORIES: ICD-10-CM

## 2024-07-02 RX ORDER — PHENTERMINE HYDROCHLORIDE 37.5 MG/1
37.5 TABLET ORAL EVERY MORNING
Qty: 30 TABLET | Refills: 0 | Status: SHIPPED | OUTPATIENT
Start: 2024-07-02 | End: 2024-08-01

## 2024-07-29 ENCOUNTER — TRANSCRIBE ORDERS (OUTPATIENT)
Facility: HOSPITAL | Age: 74
End: 2024-07-29

## 2024-07-29 DIAGNOSIS — Z12.31 VISIT FOR SCREENING MAMMOGRAM: Primary | ICD-10-CM

## 2024-08-21 ENCOUNTER — HOSPITAL ENCOUNTER (OUTPATIENT)
Facility: HOSPITAL | Age: 74
Discharge: HOME | End: 2024-08-24
Payer: MEDICARE

## 2024-08-21 DIAGNOSIS — M54.16 LUMBAR RADICULOPATHY: ICD-10-CM

## 2024-08-21 PROCEDURE — 72148 MRI LUMBAR SPINE W/O DYE: CPT

## 2024-08-26 ENCOUNTER — PATIENT MESSAGE (OUTPATIENT)
Age: 74
End: 2024-08-26

## 2024-08-26 ENCOUNTER — HOSPITAL ENCOUNTER (OUTPATIENT)
Facility: HOSPITAL | Age: 74
Discharge: HOME | End: 2024-08-29
Attending: FAMILY MEDICINE
Payer: MEDICARE

## 2024-08-26 DIAGNOSIS — R60.0 LOCALIZED EDEMA: ICD-10-CM

## 2024-08-26 DIAGNOSIS — Z12.31 VISIT FOR SCREENING MAMMOGRAM: ICD-10-CM

## 2024-08-26 PROCEDURE — 77063 BREAST TOMOSYNTHESIS BI: CPT

## 2024-08-26 RX ORDER — FUROSEMIDE 20 MG
TABLET ORAL
Qty: 180 TABLET | Refills: 1 | Status: SHIPPED | OUTPATIENT
Start: 2024-08-26

## 2024-08-26 NOTE — TELEPHONE ENCOUNTER
PCP: Ashwin Lundberg MD      Future Appointments   Date Time Provider Department Center   8/29/2024  1:40 PM Gonzales Yun MD [facility]   1/28/2025  2:00 PM Ashwin Lundberg MD [facility] DEP       Requested Prescriptions     Pending Prescriptions Disp Refills    furosemide (LASIX) 20 MG tablet [Pharmacy Med Name: FUROSEMIDE 20 MG TABLET] 180 tablet 1     Sig: TAKE 1 TABLET BY MOUTH TWICE A DAY       Prior labs and Blood pressures:  BP Readings from Last 3 Encounters:   04/23/24 126/78   12/11/23 (!) 128/90   09/19/23 128/72     Lab Results   Component Value Date/Time     04/23/2024 12:27 PM    K 3.9 04/23/2024 12:27 PM     04/23/2024 12:27 PM    CO2 25 04/23/2024 12:27 PM    BUN 14 04/23/2024 12:27 PM    GFRAA >60 03/30/2022 08:56 PM     No results found for: \"HBA1C\", \"SGQ6TJTD\"  Lab Results   Component Value Date/Time    CHOL 160 04/23/2024 12:27 PM    HDL 62 04/23/2024 12:27 PM    LDL 75.6 04/23/2024 12:27 PM    VLDL 22.4 04/23/2024 12:27 PM     No results found for: \"VITD3\"    No results found for: \"TSH\", \"TSH2\", \"TSH3\"

## 2024-09-02 DIAGNOSIS — F51.01 PRIMARY INSOMNIA: ICD-10-CM

## 2024-09-04 RX ORDER — ZOLPIDEM TARTRATE 10 MG/1
TABLET ORAL
Qty: 60 TABLET | Refills: 5 | Status: SHIPPED | OUTPATIENT
Start: 2024-09-04 | End: 2024-10-04

## 2024-10-10 DIAGNOSIS — E66.09 OTHER OBESITY DUE TO EXCESS CALORIES: ICD-10-CM

## 2024-10-11 NOTE — TELEPHONE ENCOUNTER
Phentermine refill request.  Check .  Deena Merino    Last appointment: 4/23/24 Toño  Next appointment: 1/28/25 Toño  Previous refill encounter(s): 7/2/24 30    Requested Prescriptions     Pending Prescriptions Disp Refills    phentermine (ADIPEX-P) 37.5 MG tablet [Pharmacy Med Name: PHENTERMINE 37.5 MG TABLET] 30 tablet 0     Sig: Take 1 tablet by mouth every morning for 30 days. Max Daily Amount: 37.5 mg     For Pharmacy Admin Tracking Only    Program: Medication Refill  CPA in place:    Recommendation Provided To:   Intervention Detail: New Rx: 1, reason: Patient Preference  Intervention Accepted By:   Gap Closed?:    Time Spent (min): 5

## 2024-10-12 DIAGNOSIS — Z91.89 OTHER SPECIFIED PERSONAL RISK FACTORS, NOT ELSEWHERE CLASSIFIED: ICD-10-CM

## 2024-10-12 DIAGNOSIS — E78.5 HYPERLIPIDEMIA, UNSPECIFIED: ICD-10-CM

## 2024-10-13 RX ORDER — PHENTERMINE HYDROCHLORIDE 37.5 MG/1
37.5 TABLET ORAL EVERY MORNING
Qty: 30 TABLET | Refills: 0 | Status: SHIPPED | OUTPATIENT
Start: 2024-10-13 | End: 2024-11-12

## 2024-10-14 RX ORDER — ROSUVASTATIN CALCIUM 10 MG/1
TABLET, COATED ORAL
Qty: 90 TABLET | Refills: 1 | Status: SHIPPED | OUTPATIENT
Start: 2024-10-14

## 2024-10-14 NOTE — TELEPHONE ENCOUNTER
PCP: Ashwin Lundberg MD    Last appt: 4/23/2024   Future Appointments   Date Time Provider Department Center   12/31/2024 11:20 AM Lizbet Baeza PA-C [facility] BS AMB   [facility]28[facility]2025  2[facility]00 [facility]   1/28/2025  4:00 PM Gonzales Yun MD [facility] BS AMB       Requested Prescriptions     Pending Prescriptions Disp Refills    rosuvastatin (CRESTOR) 10 MG tablet [Pharmacy Med Name: ROSUVASTATIN CALCIUM 10 MG TAB] 90 tablet 1     Sig: TAKE 1 TABLET BY MOUTH NIGHTLY. INDICATIONS: PRIMARY PREVENTION OF HEART ATTACK         Prior labs and Blood pressures:  BP Readings from Last 3 Encounters:   04/23/24 126/78   12/11/23 (!) 128/90   09/19/23 128/72     Lab Results   Component Value Date/Time     04/23/2024 12:27 PM    K 3.9 04/23/2024 12:27 PM     04/23/2024 12:27 PM    CO2 25 04/23/2024 12:27 PM    BUN 14 04/23/2024 12:27 PM    GFRAA >60 03/30/2022 08:56 PM     No results found for: \"HBA1C\", \"[id number]\"  Lab Results   Component Value Date/Time    CHOL 160 04/23/2024 12:27 PM    HDL 62 04/23/2024 12:27 PM    LDL 75.6 04/23/2024 12:27 PM    VLDL 22.4 04/23/2024 12:27 PM     No results found for: \"VITD3\"    No results found for: \"TSH\", \"TSH2\", \"TSH3\"

## 2024-10-23 ENCOUNTER — TELEPHONE (OUTPATIENT)
Age: 74
End: 2024-10-23

## 2024-10-23 NOTE — TELEPHONE ENCOUNTER
Left VM for pt to reschedule appt she has 1/28/25 at 2 pm due to Dr. Lundberg retiring in mid December 2024.-TM 10/23/24.

## 2025-01-25 SDOH — ECONOMIC STABILITY: FOOD INSECURITY: WITHIN THE PAST 12 MONTHS, YOU WORRIED THAT YOUR FOOD WOULD RUN OUT BEFORE YOU GOT MONEY TO BUY MORE.: NEVER TRUE

## 2025-01-25 SDOH — ECONOMIC STABILITY: FOOD INSECURITY: WITHIN THE PAST 12 MONTHS, THE FOOD YOU BOUGHT JUST DIDN'T LAST AND YOU DIDN'T HAVE MONEY TO GET MORE.: NEVER TRUE

## 2025-01-25 SDOH — HEALTH STABILITY: PHYSICAL HEALTH: ON AVERAGE, HOW MANY DAYS PER WEEK DO YOU ENGAGE IN MODERATE TO STRENUOUS EXERCISE (LIKE A BRISK WALK)?: 0 DAYS

## 2025-01-25 SDOH — ECONOMIC STABILITY: TRANSPORTATION INSECURITY
IN THE PAST 12 MONTHS, HAS LACK OF TRANSPORTATION KEPT YOU FROM MEETINGS, WORK, OR FROM GETTING THINGS NEEDED FOR DAILY LIVING?: NO

## 2025-01-25 SDOH — ECONOMIC STABILITY: INCOME INSECURITY: IN THE LAST 12 MONTHS, WAS THERE A TIME WHEN YOU WERE NOT ABLE TO PAY THE MORTGAGE OR RENT ON TIME?: NO

## 2025-01-25 SDOH — ECONOMIC STABILITY: TRANSPORTATION INSECURITY
IN THE PAST 12 MONTHS, HAS THE LACK OF TRANSPORTATION KEPT YOU FROM MEDICAL APPOINTMENTS OR FROM GETTING MEDICATIONS?: NO

## 2025-01-25 SDOH — HEALTH STABILITY: PHYSICAL HEALTH: ON AVERAGE, HOW MANY MINUTES DO YOU ENGAGE IN EXERCISE AT THIS LEVEL?: 30 MIN

## 2025-01-25 ASSESSMENT — PATIENT HEALTH QUESTIONNAIRE - PHQ9
SUM OF ALL RESPONSES TO PHQ QUESTIONS 1-9: 1
SUM OF ALL RESPONSES TO PHQ QUESTIONS 1-9: 1
SUM OF ALL RESPONSES TO PHQ9 QUESTIONS 1 & 2: 1
2. FEELING DOWN, DEPRESSED OR HOPELESS: SEVERAL DAYS
1. LITTLE INTEREST OR PLEASURE IN DOING THINGS: NOT AT ALL
SUM OF ALL RESPONSES TO PHQ QUESTIONS 1-9: 1
SUM OF ALL RESPONSES TO PHQ QUESTIONS 1-9: 1

## 2025-01-25 ASSESSMENT — LIFESTYLE VARIABLES
HOW MANY STANDARD DRINKS CONTAINING ALCOHOL DO YOU HAVE ON A TYPICAL DAY: 0
HOW OFTEN DO YOU HAVE SIX OR MORE DRINKS ON ONE OCCASION: 1
HOW OFTEN DO YOU HAVE A DRINK CONTAINING ALCOHOL: MONTHLY OR LESS
HOW MANY STANDARD DRINKS CONTAINING ALCOHOL DO YOU HAVE ON A TYPICAL DAY: PATIENT DOES NOT DRINK
HOW OFTEN DO YOU HAVE A DRINK CONTAINING ALCOHOL: 2

## 2025-01-28 ENCOUNTER — OFFICE VISIT (OUTPATIENT)
Age: 75
End: 2025-01-28
Payer: MEDICARE

## 2025-01-28 VITALS
WEIGHT: 198.6 LBS | HEIGHT: 64 IN | DIASTOLIC BLOOD PRESSURE: 78 MMHG | RESPIRATION RATE: 16 BRPM | OXYGEN SATURATION: 96 % | TEMPERATURE: 98 F | HEART RATE: 108 BPM | BODY MASS INDEX: 33.9 KG/M2 | SYSTOLIC BLOOD PRESSURE: 115 MMHG

## 2025-01-28 DIAGNOSIS — Z00.00 MEDICARE ANNUAL WELLNESS VISIT, SUBSEQUENT: Primary | ICD-10-CM

## 2025-01-28 DIAGNOSIS — R60.0 LOCALIZED EDEMA: ICD-10-CM

## 2025-01-28 DIAGNOSIS — D75.1 ERYTHROCYTOSIS: ICD-10-CM

## 2025-01-28 DIAGNOSIS — F51.01 PRIMARY INSOMNIA: ICD-10-CM

## 2025-01-28 DIAGNOSIS — E78.5 HYPERLIPIDEMIA WITH TARGET LOW DENSITY LIPOPROTEIN (LDL) CHOLESTEROL LESS THAN 130 MG/DL: ICD-10-CM

## 2025-01-28 PROBLEM — S72.90XA FEMORAL FRACTURE (HCC): Status: RESOLVED | Noted: 2019-08-23 | Resolved: 2025-01-28

## 2025-01-28 PROBLEM — R82.79 URINE CULTURE POSITIVE: Status: RESOLVED | Noted: 2020-02-14 | Resolved: 2025-01-28

## 2025-01-28 PROBLEM — S72.002A LEFT DISPLACED FEMORAL NECK FRACTURE (HCC): Status: RESOLVED | Noted: 2019-08-23 | Resolved: 2025-01-28

## 2025-01-28 PROCEDURE — 99214 OFFICE O/P EST MOD 30 MIN: CPT | Performed by: STUDENT IN AN ORGANIZED HEALTH CARE EDUCATION/TRAINING PROGRAM

## 2025-01-28 RX ORDER — FUROSEMIDE 20 MG/1
20 TABLET ORAL 2 TIMES DAILY
Qty: 180 TABLET | Refills: 1 | Status: SHIPPED | OUTPATIENT
Start: 2025-01-28

## 2025-01-28 RX ORDER — TRAZODONE HYDROCHLORIDE 50 MG/1
50 TABLET, FILM COATED ORAL NIGHTLY
Qty: 30 TABLET | Refills: 1 | Status: SHIPPED | OUTPATIENT
Start: 2025-01-28

## 2025-01-28 RX ORDER — ZOLPIDEM TARTRATE 10 MG/1
10 TABLET ORAL NIGHTLY PRN
COMMUNITY

## 2025-01-28 RX ORDER — MELOXICAM 15 MG/1
15 TABLET ORAL DAILY
COMMUNITY
Start: 2025-01-27

## 2025-01-28 ASSESSMENT — PATIENT HEALTH QUESTIONNAIRE - PHQ9
SUM OF ALL RESPONSES TO PHQ QUESTIONS 1-9: 1
SUM OF ALL RESPONSES TO PHQ9 QUESTIONS 1 & 2: 1
2. FEELING DOWN, DEPRESSED OR HOPELESS: SEVERAL DAYS
1. LITTLE INTEREST OR PLEASURE IN DOING THINGS: NOT AT ALL

## 2025-01-28 ASSESSMENT — LIFESTYLE VARIABLES
HOW OFTEN DO YOU HAVE A DRINK CONTAINING ALCOHOL: MONTHLY OR LESS
HOW MANY STANDARD DRINKS CONTAINING ALCOHOL DO YOU HAVE ON A TYPICAL DAY: 1 OR 2

## 2025-01-28 NOTE — PROGRESS NOTES
Medicare Annual Wellness Visit    Anel Leal is here for Established New Doctor (Was Dr. Lundberg's patient/) and Medicare AWV    Assessment & Plan   Medicare annual wellness visit, subsequent  Hyperlipidemia with target low density lipoprotein (LDL) cholesterol less than 130 mg/dL  -     Lipid Panel; Future  Localized edema  -     Comprehensive Metabolic Panel; Future  -     furosemide (LASIX) 20 MG tablet; Take 1 tablet by mouth 2 times daily, Disp-180 tablet, R-1Normal  Erythrocytosis  -     CBC; Future  Primary insomnia  -     traZODone (DESYREL) 50 MG tablet; Take 1 tablet by mouth nightly, Disp-30 tablet, R-1Normal       Return in about 1 week (around 2/4/2025) for Insomnia f/u.     Subjective   The following acute and/or chronic problems were also addressed today:  Assessment & Plan  Primary insomnia    Chronic, symptoms stable on Ambien but considering risks/side effects of chronic Ambien use as well as patient's age, will try trazodone nightly as needed and follow-up in 1 week.    Orders:    traZODone (DESYREL) 50 MG tablet; Take 1 tablet by mouth nightly    Erythrocytosis    Chronic, uncertain stability.  Most recent CBC from 1/31/2023 showed hemoglobin of 17.4.  Previous CBCs over the years extending to 2019 shows intermittent erythrocytosis but not as high as most recent CBC.  Will check CBC and determine further workup pending lab results.    Orders:    CBC; Future    Hyperlipidemia with target low density lipoprotein (LDL) cholesterol less than 130 mg/dL   Chronic, at goal (stable), continue current treatment plan    Orders:    Lipid Panel; Future    Localized edema   Chronic, at goal (stable), continue current treatment plan    Orders:    Comprehensive Metabolic Panel; Future    furosemide (LASIX) 20 MG tablet; Take 1 tablet by mouth 2 times daily         Patient's complete Health Risk Assessment and screening values have been reviewed and are found in Flowsheets. The following problems were

## 2025-01-28 NOTE — PROGRESS NOTES
Chief Complaint   Patient presents with    Established New Doctor     Was Dr. Lundberg's patient      Medicare AWV     \"Have you been to the ER, urgent care clinic since your last visit?  Hospitalized since your last visit?\"    NO    “Have you seen or consulted any other health care providers outside of [facility] since your last visit?”    NO            Click Here for Release of Records Request             1/28/2025     2:42 PM   PHQ-9    Little interest or pleasure in doing things 0   Feeling down, depressed, or hopeless 1   PHQ-2 Score 1   PHQ-9 Total Score 1           Financial Resource Strain: Low Risk  (8/7/2023)    Overall Financial Resource Strain (CARDIA)     Difficulty of Paying Living Expenses: Not hard at all      Food Insecurity: No Food Insecurity (1/25/2025)    Hunger Vital Sign     Worried About Running Out of Food in the Last Year: Never true     Ran Out of Food in the Last Year: Never true          Health Maintenance Due   Topic Date Due    Shingles vaccine (1 of 2) Never done    Pneumococcal 65+ years Vaccine (2 of 2 - PCV) 01/25/2023    Respiratory Syncytial Virus (RSV) Pregnant or age 60 yrs+ (1 - 1-dose 75+ series) Never done    DTaP/Tdap/Td vaccine (3 - Tdap) 04/24/2023    Annual Wellness Visit (Medicare)  02/01/2024    Flu vaccine (1) 08/01/2024    COVID-19 Vaccine (3 - 2023-24 season) 09/01/2024

## 2025-02-03 ENCOUNTER — TELEMEDICINE (OUTPATIENT)
Age: 75
End: 2025-02-03
Payer: MEDICARE

## 2025-02-03 DIAGNOSIS — E66.811 CLASS 1 OBESITY DUE TO EXCESS CALORIES WITH SERIOUS COMORBIDITY AND BODY MASS INDEX (BMI) OF 34.0 TO 34.9 IN ADULT: ICD-10-CM

## 2025-02-03 DIAGNOSIS — E66.09 CLASS 1 OBESITY DUE TO EXCESS CALORIES WITH SERIOUS COMORBIDITY AND BODY MASS INDEX (BMI) OF 34.0 TO 34.9 IN ADULT: ICD-10-CM

## 2025-02-03 DIAGNOSIS — F51.01 PRIMARY INSOMNIA: Primary | ICD-10-CM

## 2025-02-03 PROCEDURE — G8417 CALC BMI ABV UP PARAM F/U: HCPCS | Performed by: STUDENT IN AN ORGANIZED HEALTH CARE EDUCATION/TRAINING PROGRAM

## 2025-02-03 PROCEDURE — 1090F PRES/ABSN URINE INCON ASSESS: CPT | Performed by: STUDENT IN AN ORGANIZED HEALTH CARE EDUCATION/TRAINING PROGRAM

## 2025-02-03 PROCEDURE — G8428 CUR MEDS NOT DOCUMENT: HCPCS | Performed by: STUDENT IN AN ORGANIZED HEALTH CARE EDUCATION/TRAINING PROGRAM

## 2025-02-03 PROCEDURE — 1123F ACP DISCUSS/DSCN MKR DOCD: CPT | Performed by: STUDENT IN AN ORGANIZED HEALTH CARE EDUCATION/TRAINING PROGRAM

## 2025-02-03 PROCEDURE — 1036F TOBACCO NON-USER: CPT | Performed by: STUDENT IN AN ORGANIZED HEALTH CARE EDUCATION/TRAINING PROGRAM

## 2025-02-03 PROCEDURE — 99214 OFFICE O/P EST MOD 30 MIN: CPT | Performed by: STUDENT IN AN ORGANIZED HEALTH CARE EDUCATION/TRAINING PROGRAM

## 2025-02-03 PROCEDURE — G8399 PT W/DXA RESULTS DOCUMENT: HCPCS | Performed by: STUDENT IN AN ORGANIZED HEALTH CARE EDUCATION/TRAINING PROGRAM

## 2025-02-03 RX ORDER — TOPIRAMATE 25 MG/1
25 TABLET, FILM COATED ORAL NIGHTLY
Qty: 60 TABLET | Refills: 3 | Status: SHIPPED | OUTPATIENT
Start: 2025-02-03

## 2025-02-03 NOTE — PROGRESS NOTES
Telemedicine note    Encounter Date and Time: 02/03/25 at 9:34 AM EST    Anel Leal, was evaluated through a synchronous (real-time) audio-video encounter. The patient (or guardian if applicable) is aware that this is a billable service, which includes applicable co-pays. This Virtual Visit was conducted with patient's (and/or legal guardian's) consent. Patient identification was verified, and a caregiver was present when appropriate.   This visit was virtual due to scheduling/transportation issues.  The patient was located at Home: [address] [facility] [address]  Provider was located at Home (Appt Dept State): [place]  Confirm you are appropriately licensed, registered, or certified to deliver care in the state where the patient is located as indicated above. If you are not or unsure, please re-schedule the visit: Yes, I confirm.     --Marciano Ly MD on 8/20/2024 at 11:37 AM    Subjective:     No chief complaint on file.      HPI:  Anel Leal is a 75 y.o. female who was evaluated by synchronous (real-time) audio-video technology from home, through a secure patient portal.    Patient is presenting today for insomnia follow-up.  Patient tried using trazodone since last visit but it was not effective at all and it seemed to make sleeping even more difficult.  Patient has also been trying to use less of the Ambien, currently only using 10 mg nightly, compared to 15 mg nightly previously.  She is doing well otherwise but would like to discuss a medication that might help her with her weight gain.  The problem is a lot of time she overeats because she skips breakfast and lunch and overeats at dinner.  She also snacks a lot while she is distracted and just looking for something to do.  She denies any chest pain, shortness of breath or any other complaints at this time.    ROS:   Review of Systems      Health Maintenance:  Health Maintenance Due   Topic Date Due    Shingles vaccine (1 of 2)

## 2025-02-06 ENCOUNTER — LAB (OUTPATIENT)
Age: 75
End: 2025-02-06

## 2025-02-06 DIAGNOSIS — D75.1 ERYTHROCYTOSIS: ICD-10-CM

## 2025-02-06 DIAGNOSIS — E78.5 HYPERLIPIDEMIA WITH TARGET LOW DENSITY LIPOPROTEIN (LDL) CHOLESTEROL LESS THAN 130 MG/DL: ICD-10-CM

## 2025-02-06 DIAGNOSIS — R60.0 LOCALIZED EDEMA: ICD-10-CM

## 2025-02-06 LAB
ALBUMIN SERPL-MCNC: 3.7 G/DL (ref 3.5–5)
ALBUMIN/GLOB SERPL: 1.1 (ref 1.1–2.2)
ALP SERPL-CCNC: 98 U/L (ref 45–117)
ALT SERPL-CCNC: 19 U/L (ref 12–78)
ANION GAP SERPL CALC-SCNC: 7 MMOL/L (ref 2–12)
AST SERPL-CCNC: 8 U/L (ref 15–37)
BILIRUB SERPL-MCNC: 0.8 MG/DL (ref 0.2–1)
BUN SERPL-MCNC: 23 MG/DL (ref 6–20)
BUN/CREAT SERPL: 32 (ref 12–20)
CALCIUM SERPL-MCNC: 9.5 MG/DL (ref 8.5–10.1)
CHLORIDE SERPL-SCNC: 107 MMOL/L (ref 97–108)
CHOLEST SERPL-MCNC: 239 MG/DL
CO2 SERPL-SCNC: 26 MMOL/L (ref 21–32)
CREAT SERPL-MCNC: 0.71 MG/DL (ref 0.55–1.02)
ERYTHROCYTE [DISTWIDTH] IN BLOOD BY AUTOMATED COUNT: 13.3 % (ref 11.5–14.5)
GLOBULIN SER CALC-MCNC: 3.3 G/DL (ref 2–4)
GLUCOSE SERPL-MCNC: 99 MG/DL (ref 65–100)
HCT VFR BLD AUTO: 48.7 % (ref 35–47)
HDLC SERPL-MCNC: 71 MG/DL
HDLC SERPL: 3.4 (ref 0–5)
HGB BLD-MCNC: 15.5 G/DL (ref 11.5–16)
LDLC SERPL CALC-MCNC: 146.8 MG/DL (ref 0–100)
MCH RBC QN AUTO: 30.2 PG (ref 26–34)
MCHC RBC AUTO-ENTMCNC: 31.8 G/DL (ref 30–36.5)
MCV RBC AUTO: 94.7 FL (ref 80–99)
NRBC # BLD: 0 K/UL (ref 0–0.01)
NRBC BLD-RTO: 0 PER 100 WBC
PLATELET # BLD AUTO: 366 K/UL (ref 150–400)
PMV BLD AUTO: 10 FL (ref 8.9–12.9)
POTASSIUM SERPL-SCNC: 4.2 MMOL/L (ref 3.5–5.1)
PROT SERPL-MCNC: 7 G/DL (ref 6.4–8.2)
RBC # BLD AUTO: 5.14 M/UL (ref 3.8–5.2)
SODIUM SERPL-SCNC: 140 MMOL/L (ref 136–145)
TRIGL SERPL-MCNC: 106 MG/DL
VLDLC SERPL CALC-MCNC: 21.2 MG/DL
WBC # BLD AUTO: 8 K/UL (ref 3.6–11)

## 2025-02-07 NOTE — RESULT ENCOUNTER NOTE
CBC/CMP normal  LDL elevated, will advise patient to continue work on lifestyle modifications and offer statins if patient is willing to restart.

## 2025-03-03 ENCOUNTER — TELEMEDICINE (OUTPATIENT)
Age: 75
End: 2025-03-03
Payer: MEDICARE

## 2025-03-03 DIAGNOSIS — E66.811 CLASS 1 OBESITY DUE TO EXCESS CALORIES WITH SERIOUS COMORBIDITY AND BODY MASS INDEX (BMI) OF 34.0 TO 34.9 IN ADULT: Primary | ICD-10-CM

## 2025-03-03 DIAGNOSIS — E78.5 HYPERLIPIDEMIA WITH TARGET LOW DENSITY LIPOPROTEIN (LDL) CHOLESTEROL LESS THAN 130 MG/DL: ICD-10-CM

## 2025-03-03 DIAGNOSIS — E66.09 CLASS 1 OBESITY DUE TO EXCESS CALORIES WITH SERIOUS COMORBIDITY AND BODY MASS INDEX (BMI) OF 34.0 TO 34.9 IN ADULT: Primary | ICD-10-CM

## 2025-03-03 PROCEDURE — 1090F PRES/ABSN URINE INCON ASSESS: CPT | Performed by: STUDENT IN AN ORGANIZED HEALTH CARE EDUCATION/TRAINING PROGRAM

## 2025-03-03 PROCEDURE — 1123F ACP DISCUSS/DSCN MKR DOCD: CPT | Performed by: STUDENT IN AN ORGANIZED HEALTH CARE EDUCATION/TRAINING PROGRAM

## 2025-03-03 PROCEDURE — G8399 PT W/DXA RESULTS DOCUMENT: HCPCS | Performed by: STUDENT IN AN ORGANIZED HEALTH CARE EDUCATION/TRAINING PROGRAM

## 2025-03-03 PROCEDURE — G8417 CALC BMI ABV UP PARAM F/U: HCPCS | Performed by: STUDENT IN AN ORGANIZED HEALTH CARE EDUCATION/TRAINING PROGRAM

## 2025-03-03 PROCEDURE — 99214 OFFICE O/P EST MOD 30 MIN: CPT | Performed by: STUDENT IN AN ORGANIZED HEALTH CARE EDUCATION/TRAINING PROGRAM

## 2025-03-03 PROCEDURE — 1036F TOBACCO NON-USER: CPT | Performed by: STUDENT IN AN ORGANIZED HEALTH CARE EDUCATION/TRAINING PROGRAM

## 2025-03-03 PROCEDURE — G8428 CUR MEDS NOT DOCUMENT: HCPCS | Performed by: STUDENT IN AN ORGANIZED HEALTH CARE EDUCATION/TRAINING PROGRAM

## 2025-03-03 PROCEDURE — G2211 COMPLEX E/M VISIT ADD ON: HCPCS | Performed by: STUDENT IN AN ORGANIZED HEALTH CARE EDUCATION/TRAINING PROGRAM

## 2025-03-03 NOTE — PROGRESS NOTES
Telemedicine note    Encounter Date and Time: 03/03/25 at 8:56 AM EST    Anel Leal, was evaluated through a synchronous (real-time) audio-video encounter. The patient (or guardian if applicable) is aware that this is a billable service, which includes applicable co-pays. This Virtual Visit was conducted with patient's (and/or legal guardian's) consent. Patient identification was verified, and a caregiver was present when appropriate.   This visit was virtual due to scheduling/transportation issues.  The patient was located at Home: [place] [facility] [id number]  Provider was located at Home (Appt Dept State[place]  Confirm you are appropriately licensed, registered, or certified to deliver care in the state where the patient is located as indicated above. If you are not or unsure, please re-schedule the visit: Yes, I confirm.     --Marciano Ly MD on 8/20/2024 at 11:37 AM    Anel Leal is a 75 y.o. female who was evaluated by synchronous (real-time) audio-video technology from home, through a secure patient portal, presenting today for   Chief Complaint   Patient presents with    Follow-up   .    Assessment/Plan:     Assessment & Plan  1. Weight management: Stable. She has experienced a slight weight reduction, but the environment was not conducive to weight loss. The potential benefits of GLP-1 analogs for weight loss were discussed, and she does not have any personal or family history of medullary thyroid cancer. It was explained that Medicare typically does not cover weight loss medications, and securing coverage for medications like Wegovy can be challenging.  - Maintain dietary and exercise efforts.  - Prescription for Wegovy will be sent to a specialty pharmacy for prior authorization.  - If approved, commence Wegovy treatment.  - If Wegovy is not covered or is unaffordable, increase Topamax dosage to 50 mg until the next visit.  - Continue Topamax if beneficial.  - Monitor

## 2025-03-04 ENCOUNTER — CLINICAL DOCUMENTATION (OUTPATIENT)
Facility: HOSPITAL | Age: 75
End: 2025-03-04

## 2025-03-04 NOTE — PROGRESS NOTES
[facility] at [facility]  Specialty Pharmacy Update    Date: 03/04/25    Anel Leal 1950    Medication: Wegovy 0.25 mg/0.5 ml     Prior authorization was denied by insurance.  Medicare Part D will only possibly cover Wegovy for patients with an established cardiovascular history such as prior MI, stroke, or PAD.    Since Mrs. Leal does not have this cardiovascular history, Wegovy was denied.    Patient aware.     Corinne McEwen, [facility] at [address], [address]   [place] [address]  phone: [phone]   fax: [phone]

## 2025-03-17 DIAGNOSIS — E66.811 CLASS 1 OBESITY DUE TO EXCESS CALORIES WITH SERIOUS COMORBIDITY AND BODY MASS INDEX (BMI) OF 34.0 TO 34.9 IN ADULT: ICD-10-CM

## 2025-03-17 DIAGNOSIS — E66.09 CLASS 1 OBESITY DUE TO EXCESS CALORIES WITH SERIOUS COMORBIDITY AND BODY MASS INDEX (BMI) OF 34.0 TO 34.9 IN ADULT: ICD-10-CM

## 2025-03-17 RX ORDER — PHENTERMINE HYDROCHLORIDE 37.5 MG/1
37.5 TABLET ORAL
Qty: 30 TABLET | Refills: 0 | Status: CANCELLED | OUTPATIENT
Start: 2025-03-17 | End: 2025-04-16

## 2025-03-17 RX ORDER — TOPIRAMATE 25 MG/1
50 TABLET, FILM COATED ORAL NIGHTLY
Qty: 60 TABLET | Refills: 3 | Status: SHIPPED | OUTPATIENT
Start: 2025-03-17 | End: 2025-03-20 | Stop reason: SDUPTHER

## 2025-03-17 NOTE — TELEPHONE ENCOUNTER
MD Ly,    Receiving refill request for Phentermine 37.5mg tablets.  RE-entered if appropriate.  Check .      Also Noted message per mychart from 3/12/25:  Patient needs updated RX for the topiramate to take #2 tablets nightly.  (Rx on 2/3/25 states 1 tablet nightly) Deena Merino    Last appointment: VV 3/3/25 Malachi  Next appointment: 3/31/25 Malachi  Previous refill encounter(s):   Topiramate 2/3/25 60 + 3 (1 tablet nightly)  Phentermine 10/13/24 #30    Requested Prescriptions     Pending Prescriptions Disp Refills    topiramate (TOPAMAX) 25 MG tablet 60 tablet 3     Sig: Take 2 tablets by mouth nightly    phentermine (ADIPEX-P) 37.5 MG tablet 30 tablet 0     Sig: Take 1 tablet by mouth every morning (before breakfast) for 30 days. Max Daily Amount: 37.5 mg         For Pharmacy Admin Tracking Only    Program: Medication Refill  CPA in place:    Recommendation Provided To:   Intervention Detail: New Rx: 2, reason: Patient Preference  Intervention Accepted By:   Gap Closed?:    Time Spent (min): 5

## 2025-03-20 DIAGNOSIS — E66.811 CLASS 1 OBESITY DUE TO EXCESS CALORIES WITH SERIOUS COMORBIDITY AND BODY MASS INDEX (BMI) OF 34.0 TO 34.9 IN ADULT: ICD-10-CM

## 2025-03-20 DIAGNOSIS — E66.09 CLASS 1 OBESITY DUE TO EXCESS CALORIES WITH SERIOUS COMORBIDITY AND BODY MASS INDEX (BMI) OF 34.0 TO 34.9 IN ADULT: ICD-10-CM

## 2025-03-20 RX ORDER — TOPIRAMATE 25 MG/1
50 TABLET, FILM COATED ORAL NIGHTLY
Qty: 60 TABLET | Refills: 3 | Status: SHIPPED | OUTPATIENT
Start: 2025-03-20

## 2025-03-20 NOTE — TELEPHONE ENCOUNTER
[facility] sending fax stating patient \"accidentally inactivated\" the Topiramate RX that was sent on 3/17/25 for 60 + 3- she meant to inactivate trazodone.    (Contacted [facility] and confirmed it was inactivated)-    Asking us to resend.  ThanksDeena    Previous refill encounter(s): 3/17/25 60 + 3 (accidentally inactivated)    Requested Prescriptions     Pending Prescriptions Disp Refills    topiramate (TOPAMAX) 25 MG tablet 60 tablet 3     Sig: Take 2 tablets by mouth nightly     For Pharmacy Admin Tracking Only    Program: Medication Refill  CPA in place:    Recommendation Provided To:   Intervention Detail: New Rx: 1, reason: Patient Preference  Intervention Accepted By:   Gap Closed?:    Time Spent (min): 10

## 2025-03-31 ENCOUNTER — TELEMEDICINE (OUTPATIENT)
Age: 75
End: 2025-03-31

## 2025-03-31 DIAGNOSIS — R94.31 ABNORMAL ELECTROCARDIOGRAM (ECG) (EKG): ICD-10-CM

## 2025-03-31 DIAGNOSIS — Z13.6 SCREENING FOR HEART DISEASE: ICD-10-CM

## 2025-03-31 DIAGNOSIS — E66.811 CLASS 1 OBESITY DUE TO EXCESS CALORIES WITH SERIOUS COMORBIDITY AND BODY MASS INDEX (BMI) OF 34.0 TO 34.9 IN ADULT: Primary | ICD-10-CM

## 2025-03-31 DIAGNOSIS — R13.14 PHARYNGOESOPHAGEAL DYSPHAGIA: ICD-10-CM

## 2025-03-31 DIAGNOSIS — E66.09 CLASS 1 OBESITY DUE TO EXCESS CALORIES WITH SERIOUS COMORBIDITY AND BODY MASS INDEX (BMI) OF 34.0 TO 34.9 IN ADULT: Primary | ICD-10-CM

## 2025-03-31 RX ORDER — ESTRADIOL 0.5 MG/1
0.5 TABLET ORAL DAILY
COMMUNITY

## 2025-03-31 NOTE — PROGRESS NOTES
Telemedicine note    Encounter Date and Time: 03/31/25 at 8:52 AM EDT    Anel Leal, was evaluated through a synchronous (real-time) audio-video encounter. The patient (or guardian if applicable) is aware that this is a billable service, which includes applicable co-pays. This Virtual Visit was conducted with patient's (and/or legal guardian's) consent. Patient identification was verified, and a caregiver was present when appropriate.   This visit was virtual due to scheduling/transportation issues.  The patient was located at Home: [address]   [place] [facility] [address]  Provider was located at Home [place]t [place]  Confirm you are appropriately licensed, registered, or certified to deliver care in the state where the patient is located as indicated above. If you are not or unsure, please re-schedule the visit: Yes, I confirm.     --Marciano Ly MD on 8/20/2024 at 11:37 AM    Anel Leal is a 75 y.o. female who was evaluated by synchronous (real-time) audio-video technology from home, through a secure patient portal, presenting today for   Chief Complaint   Patient presents with    Weight Management    Other     Difficulty swallowing   .    Assessment/Plan:     Assessment & Plan  1. Weight management: Unstable.  - Her weight has remained stable at 198 pounds since her last clinic visit.  - Previous imaging studies did not reveal any indications of coronary artery disease.  - A CT calcium score will be ordered to assess for the presence of plaques in her coronary arteries.  - Upon receipt of the results, Wegovy will be re-prescribed with the updated information.  - If Wegovy proves ineffective/too expensive, consideration will be given to initiating phentermine treatment or a combination of phentermine and Topamax.    2. Dysphagia.  - She reports difficulty swallowing solid foods following the accidental ingestion of a piece of shell approximately two weeks ago.  - Referral to

## 2025-04-01 DIAGNOSIS — R13.14 PHARYNGOESOPHAGEAL DYSPHAGIA: Primary | ICD-10-CM

## 2025-04-07 ENCOUNTER — HOSPITAL ENCOUNTER (OUTPATIENT)
Facility: HOSPITAL | Age: 75
Discharge: HOME | End: 2025-04-10
Attending: STUDENT IN AN ORGANIZED HEALTH CARE EDUCATION/TRAINING PROGRAM

## 2025-04-07 DIAGNOSIS — R94.31 ABNORMAL ELECTROCARDIOGRAM (ECG) (EKG): ICD-10-CM

## 2025-04-07 DIAGNOSIS — Z13.6 SCREENING FOR HEART DISEASE: ICD-10-CM

## 2025-04-07 PROCEDURE — 75571 CT HRT W/O DYE W/CA TEST: CPT

## 2025-04-10 ENCOUNTER — RESULTS FOLLOW-UP (OUTPATIENT)
Age: 75
End: 2025-04-10

## 2025-04-10 ENCOUNTER — HOSPITAL ENCOUNTER (OUTPATIENT)
Facility: HOSPITAL | Age: 75
Discharge: HOME | End: 2025-04-13
Attending: STUDENT IN AN ORGANIZED HEALTH CARE EDUCATION/TRAINING PROGRAM
Payer: MEDICARE

## 2025-04-10 DIAGNOSIS — K22.4 DIFFUSE ESOPHAGEAL SPASM: ICD-10-CM

## 2025-04-10 DIAGNOSIS — R13.14 PHARYNGOESOPHAGEAL DYSPHAGIA: ICD-10-CM

## 2025-04-10 DIAGNOSIS — K21.9 GASTROESOPHAGEAL REFLUX DISEASE WITHOUT ESOPHAGITIS: Primary | ICD-10-CM

## 2025-04-10 DIAGNOSIS — K44.9 HIATAL HERNIA: ICD-10-CM

## 2025-04-10 PROCEDURE — 74220 X-RAY XM ESOPHAGUS 1CNTRST: CPT

## 2025-04-10 RX ORDER — PANTOPRAZOLE SODIUM 40 MG/1
40 TABLET, DELAYED RELEASE ORAL
Qty: 30 TABLET | Refills: 1 | Status: SHIPPED | OUTPATIENT
Start: 2025-04-10 | End: 2025-04-21

## 2025-04-10 NOTE — RESULT ENCOUNTER NOTE
Barium swallow shows small to moderate hiatal hernia with significant spontaneous gastroesophageal reflux but no esophagitis or stricture.  Also noted diffuse esophageal intermittent spasms during swallowing, which is likely causing the symptoms.    Will initiate therapy with Protonix 40 mg daily and follow-up in 1 to 2 weeks.  Call patient reviewed results/plan.  All questions were answered

## 2025-04-21 ENCOUNTER — TELEMEDICINE (OUTPATIENT)
Age: 75
End: 2025-04-21
Payer: MEDICARE

## 2025-04-21 DIAGNOSIS — E66.09 CLASS 1 OBESITY DUE TO EXCESS CALORIES WITH SERIOUS COMORBIDITY AND BODY MASS INDEX (BMI) OF 34.0 TO 34.9 IN ADULT: ICD-10-CM

## 2025-04-21 DIAGNOSIS — K22.4 DIFFUSE ESOPHAGEAL SPASM: Primary | ICD-10-CM

## 2025-04-21 DIAGNOSIS — R73.03 PREDIABETES: ICD-10-CM

## 2025-04-21 DIAGNOSIS — K44.9 HIATAL HERNIA: ICD-10-CM

## 2025-04-21 DIAGNOSIS — E66.811 CLASS 1 OBESITY DUE TO EXCESS CALORIES WITH SERIOUS COMORBIDITY AND BODY MASS INDEX (BMI) OF 34.0 TO 34.9 IN ADULT: ICD-10-CM

## 2025-04-21 DIAGNOSIS — K21.9 GASTROESOPHAGEAL REFLUX DISEASE WITHOUT ESOPHAGITIS: ICD-10-CM

## 2025-04-21 DIAGNOSIS — F51.01 PRIMARY INSOMNIA: Primary | ICD-10-CM

## 2025-04-21 PROCEDURE — 1123F ACP DISCUSS/DSCN MKR DOCD: CPT | Performed by: STUDENT IN AN ORGANIZED HEALTH CARE EDUCATION/TRAINING PROGRAM

## 2025-04-21 PROCEDURE — 1090F PRES/ABSN URINE INCON ASSESS: CPT | Performed by: STUDENT IN AN ORGANIZED HEALTH CARE EDUCATION/TRAINING PROGRAM

## 2025-04-21 PROCEDURE — G2211 COMPLEX E/M VISIT ADD ON: HCPCS | Performed by: STUDENT IN AN ORGANIZED HEALTH CARE EDUCATION/TRAINING PROGRAM

## 2025-04-21 PROCEDURE — G8399 PT W/DXA RESULTS DOCUMENT: HCPCS | Performed by: STUDENT IN AN ORGANIZED HEALTH CARE EDUCATION/TRAINING PROGRAM

## 2025-04-21 PROCEDURE — 1036F TOBACCO NON-USER: CPT | Performed by: STUDENT IN AN ORGANIZED HEALTH CARE EDUCATION/TRAINING PROGRAM

## 2025-04-21 PROCEDURE — 99215 OFFICE O/P EST HI 40 MIN: CPT | Performed by: STUDENT IN AN ORGANIZED HEALTH CARE EDUCATION/TRAINING PROGRAM

## 2025-04-21 PROCEDURE — G8417 CALC BMI ABV UP PARAM F/U: HCPCS | Performed by: STUDENT IN AN ORGANIZED HEALTH CARE EDUCATION/TRAINING PROGRAM

## 2025-04-21 PROCEDURE — G8428 CUR MEDS NOT DOCUMENT: HCPCS | Performed by: STUDENT IN AN ORGANIZED HEALTH CARE EDUCATION/TRAINING PROGRAM

## 2025-04-21 RX ORDER — PANTOPRAZOLE SODIUM 40 MG/1
40 TABLET, DELAYED RELEASE ORAL 2 TIMES DAILY
Qty: 60 TABLET | Refills: 1 | Status: SHIPPED | OUTPATIENT
Start: 2025-04-21

## 2025-04-21 RX ORDER — ZOLPIDEM TARTRATE 10 MG/1
10 TABLET ORAL NIGHTLY PRN
Qty: 30 TABLET | Refills: 5 | Status: SHIPPED | OUTPATIENT
Start: 2025-04-21 | End: 2025-10-18

## 2025-04-21 RX ORDER — HYOSCYAMINE SULFATE 0.12 MG/1
TABLET ORAL
Qty: 60 TABLET | Refills: 3 | Status: SHIPPED | OUTPATIENT
Start: 2025-04-21

## 2025-04-21 NOTE — TELEPHONE ENCOUNTER
Refill request for zolpidem.      [facility] notes patient takes 2 tablets nightly as needed- which was last RX provided by MD Lundberg- 9/4/24 60 + 5.    Dose decreased on med list shows 1 tablet. Check .  Deena Merino    Last appointment: VV 4/21/25 Malachi- (Today)  Next appointment: None  Previous refill encounter(s): 9/4/24 60 + 5    Requested Prescriptions     Pending Prescriptions Disp Refills    zolpidem (AMBIEN) 10 MG tablet 30 tablet 5     Sig: Take 1 tablet by mouth nightly as needed for Sleep. Max Daily Amount: 10 mg     For Pharmacy Admin Tracking Only    Program: Medication Refill  CPA in place:    Recommendation Provided To:   Intervention Detail: New Rx: 1, reason: Patient Preference  Intervention Accepted By:   Gap Closed?:    Time Spent (min): 5

## 2025-04-23 ENCOUNTER — LAB (OUTPATIENT)
Age: 75
End: 2025-04-23

## 2025-04-23 DIAGNOSIS — R73.03 PREDIABETES: ICD-10-CM

## 2025-04-23 DIAGNOSIS — E66.811 CLASS 1 OBESITY DUE TO EXCESS CALORIES WITH SERIOUS COMORBIDITY AND BODY MASS INDEX (BMI) OF 34.0 TO 34.9 IN ADULT: ICD-10-CM

## 2025-04-23 DIAGNOSIS — E66.09 CLASS 1 OBESITY DUE TO EXCESS CALORIES WITH SERIOUS COMORBIDITY AND BODY MASS INDEX (BMI) OF 34.0 TO 34.9 IN ADULT: ICD-10-CM

## 2025-04-24 ENCOUNTER — RESULTS FOLLOW-UP (OUTPATIENT)
Age: 75
End: 2025-04-24

## 2025-04-24 LAB
EST. AVERAGE GLUCOSE BLD GHB EST-MCNC: 117 MG/DL
HBA1C MFR BLD: 5.7 % (ref 4–5.6)
T4 FREE SERPL-MCNC: 1 NG/DL (ref 0.8–1.5)
TSH SERPL DL<=0.05 MIU/L-ACNC: 1.32 UIU/ML (ref 0.36–3.74)

## 2025-05-05 ENCOUNTER — TELEMEDICINE (OUTPATIENT)
Age: 75
End: 2025-05-05
Payer: MEDICARE

## 2025-05-05 DIAGNOSIS — K22.4 DIFFUSE ESOPHAGEAL SPASM: Primary | ICD-10-CM

## 2025-05-05 DIAGNOSIS — K59.01 SLOW TRANSIT CONSTIPATION: ICD-10-CM

## 2025-05-05 DIAGNOSIS — K21.9 GASTROESOPHAGEAL REFLUX DISEASE WITHOUT ESOPHAGITIS: ICD-10-CM

## 2025-05-05 DIAGNOSIS — K44.9 HIATAL HERNIA: ICD-10-CM

## 2025-05-05 PROCEDURE — G8428 CUR MEDS NOT DOCUMENT: HCPCS | Performed by: STUDENT IN AN ORGANIZED HEALTH CARE EDUCATION/TRAINING PROGRAM

## 2025-05-05 PROCEDURE — G2211 COMPLEX E/M VISIT ADD ON: HCPCS | Performed by: STUDENT IN AN ORGANIZED HEALTH CARE EDUCATION/TRAINING PROGRAM

## 2025-05-05 PROCEDURE — 1123F ACP DISCUSS/DSCN MKR DOCD: CPT | Performed by: STUDENT IN AN ORGANIZED HEALTH CARE EDUCATION/TRAINING PROGRAM

## 2025-05-05 PROCEDURE — 1036F TOBACCO NON-USER: CPT | Performed by: STUDENT IN AN ORGANIZED HEALTH CARE EDUCATION/TRAINING PROGRAM

## 2025-05-05 PROCEDURE — G8417 CALC BMI ABV UP PARAM F/U: HCPCS | Performed by: STUDENT IN AN ORGANIZED HEALTH CARE EDUCATION/TRAINING PROGRAM

## 2025-05-05 PROCEDURE — G8399 PT W/DXA RESULTS DOCUMENT: HCPCS | Performed by: STUDENT IN AN ORGANIZED HEALTH CARE EDUCATION/TRAINING PROGRAM

## 2025-05-05 PROCEDURE — 99214 OFFICE O/P EST MOD 30 MIN: CPT | Performed by: STUDENT IN AN ORGANIZED HEALTH CARE EDUCATION/TRAINING PROGRAM

## 2025-05-05 PROCEDURE — 1090F PRES/ABSN URINE INCON ASSESS: CPT | Performed by: STUDENT IN AN ORGANIZED HEALTH CARE EDUCATION/TRAINING PROGRAM

## 2025-05-05 RX ORDER — PANTOPRAZOLE SODIUM 40 MG/1
40 TABLET, DELAYED RELEASE ORAL DAILY PRN
Qty: 60 TABLET | Refills: 1
Start: 2025-05-05

## 2025-05-05 NOTE — PROGRESS NOTES
Telemedicine note    Encounter Date and Time: 05/05/25 at 11:22 AM EDT    Anel Leal, was evaluated through a synchronous (real-time) audio-video encounter. The patient (or guardian if applicable) is aware that this is a billable service, which includes applicable co-pays. This Virtual Visit was conducted with patient's (and/or legal guardian's) consent. Patient identification was verified, and a caregiver was present when appropriate.   This visit was virtual due to scheduling/transportation issues.  The patient was located at Home[place] [facility] [place] [address]  Provider was located at Home (Appt Dept [place]  Confirm you are appropriately licensed, registered, or certified to deliver care in the state where the patient is located as indicated above. If you are not or unsure, please re-schedule the visit: Yes, I confirm.     --Marciano Ly MD on 8/20/2024 at 11:37 AM    Anel Leal is a 75 y.o. female who was evaluated by synchronous (real-time) audio-video technology from home, through a secure patient portal, presenting today for   Chief Complaint   Patient presents with    Aphasia    Follow-up   .    Assessment/Plan:     Assessment & Plan  1. Gastroesophageal reflux disease (GERD): Significant spontaneous gastroesophageal reflux evidenced by barium swallow study on 04/10/2025.  - Protonix 40 mg once daily as needed for heartburn, preferably before bedtime.  - Monitor for any changes in symptoms with the adjusted medication regimen.    2. Esophageal spasms: Diffuse esophageal intermittent spasms during swallowing evidenced by barium swallow study on 04/10/2025.  - Hyoscyamine discontinued due to significant drowsiness.  - Address constipation first to see if it alleviates the spasms.  - Consider calcium channel blockers versus nitrates discussed at follow-up visit    3. Constipation: Sporadic bowel movements and tendency towards constipation.  - Ex-Lax once or twice daily

## 2025-05-13 ENCOUNTER — TELEMEDICINE (OUTPATIENT)
Age: 75
End: 2025-05-13
Payer: MEDICARE

## 2025-05-13 DIAGNOSIS — K44.9 HIATAL HERNIA: ICD-10-CM

## 2025-05-13 DIAGNOSIS — K59.01 SLOW TRANSIT CONSTIPATION: Primary | ICD-10-CM

## 2025-05-13 DIAGNOSIS — K21.9 GASTROESOPHAGEAL REFLUX DISEASE WITHOUT ESOPHAGITIS: ICD-10-CM

## 2025-05-13 DIAGNOSIS — K22.4 DIFFUSE ESOPHAGEAL SPASM: ICD-10-CM

## 2025-05-13 PROCEDURE — 99214 OFFICE O/P EST MOD 30 MIN: CPT | Performed by: STUDENT IN AN ORGANIZED HEALTH CARE EDUCATION/TRAINING PROGRAM

## 2025-05-13 PROCEDURE — G2211 COMPLEX E/M VISIT ADD ON: HCPCS | Performed by: STUDENT IN AN ORGANIZED HEALTH CARE EDUCATION/TRAINING PROGRAM

## 2025-05-13 PROCEDURE — G8428 CUR MEDS NOT DOCUMENT: HCPCS | Performed by: STUDENT IN AN ORGANIZED HEALTH CARE EDUCATION/TRAINING PROGRAM

## 2025-05-13 PROCEDURE — G8399 PT W/DXA RESULTS DOCUMENT: HCPCS | Performed by: STUDENT IN AN ORGANIZED HEALTH CARE EDUCATION/TRAINING PROGRAM

## 2025-05-13 PROCEDURE — 1090F PRES/ABSN URINE INCON ASSESS: CPT | Performed by: STUDENT IN AN ORGANIZED HEALTH CARE EDUCATION/TRAINING PROGRAM

## 2025-05-13 PROCEDURE — 1123F ACP DISCUSS/DSCN MKR DOCD: CPT | Performed by: STUDENT IN AN ORGANIZED HEALTH CARE EDUCATION/TRAINING PROGRAM

## 2025-05-13 PROCEDURE — G8417 CALC BMI ABV UP PARAM F/U: HCPCS | Performed by: STUDENT IN AN ORGANIZED HEALTH CARE EDUCATION/TRAINING PROGRAM

## 2025-05-13 PROCEDURE — 1036F TOBACCO NON-USER: CPT | Performed by: STUDENT IN AN ORGANIZED HEALTH CARE EDUCATION/TRAINING PROGRAM

## 2025-05-13 RX ORDER — POLYETHYLENE GLYCOL 3350 17 G/17G
17 POWDER, FOR SOLUTION ORAL DAILY PRN
Qty: 510 G | Refills: 1 | Status: SHIPPED | OUTPATIENT
Start: 2025-05-13 | End: 2025-07-12

## 2025-05-13 RX ORDER — PANTOPRAZOLE SODIUM 40 MG/1
40 TABLET, DELAYED RELEASE ORAL 2 TIMES DAILY
Qty: 60 TABLET | Refills: 1
Start: 2025-05-13

## 2025-05-13 RX ORDER — FAMOTIDINE 40 MG/1
40 TABLET, FILM COATED ORAL NIGHTLY
Qty: 90 TABLET | Refills: 3 | Status: SHIPPED | OUTPATIENT
Start: 2025-05-13

## 2025-05-13 NOTE — PROGRESS NOTES
Telemedicine note    Encounter Date and Time: 05/13/25 at 1:22 PM EDT    Anel Leal, was evaluated through a synchronous (real-time) audio-video encounter. The patient (or guardian if applicable) is aware that this is a billable service, which includes applicable co-pays. This Virtual Visit was conducted with patient's (and/or legal guardian's) consent. Patient identification was verified, and a caregiver was present when appropriate.   This visit was virtual due to scheduling/transportation issues.  The patient was located at Home: [address] [place] [id number]  Provider was located at Facility (Appt Dept): [address] [place] [id number]  Confirm you are appropriately licensed, registered, or certified to deliver care in the state where the patient is located as indicated above. If you are not or unsure, please re-schedule the visit: Yes, I confirm.     --Marciano Ly MD on 8/20/2024 at 11:37 AM    Anel Leal is a 75 y.o. female who was evaluated by synchronous (real-time) audio-video technology from home, through a secure patient portal, presenting today for   Chief Complaint   Patient presents with    Follow-up   .    Assessment/Plan:     Assessment & Plan  1. Gastroesophageal reflux disease.  Chronic, unstable.  - Pantoprazole 40 mg twice daily has been more effective in managing heartburn and spasms but not completely.  - Famotidine 40 mg will be added once in the evening to further manage heartburn.  - Patient is advised to discuss any additional recommendations with Dr. Hoskins during her upcoming appointment.    2. Constipation.  Chronic, unstable.  - Fiber gummies and Ex-Lax have provided some improvement but not complete relief.  - A prescription for MiraLAX will be provided, to be taken daily as needed.  - Patient is encouraged to maintain good hydration, continue fiber supplements, and engage in physical activity such as walking to stimulate bowel

## 2025-05-27 ENCOUNTER — TELEMEDICINE (OUTPATIENT)
Age: 75
End: 2025-05-27
Payer: MEDICARE

## 2025-05-27 DIAGNOSIS — E66.811 CLASS 1 OBESITY DUE TO EXCESS CALORIES WITH SERIOUS COMORBIDITY AND BODY MASS INDEX (BMI) OF 34.0 TO 34.9 IN ADULT: ICD-10-CM

## 2025-05-27 DIAGNOSIS — E66.09 CLASS 1 OBESITY DUE TO EXCESS CALORIES WITH SERIOUS COMORBIDITY AND BODY MASS INDEX (BMI) OF 34.0 TO 34.9 IN ADULT: ICD-10-CM

## 2025-05-27 DIAGNOSIS — K21.9 GASTROESOPHAGEAL REFLUX DISEASE WITHOUT ESOPHAGITIS: Primary | ICD-10-CM

## 2025-05-27 DIAGNOSIS — F51.01 PRIMARY INSOMNIA: ICD-10-CM

## 2025-05-27 PROCEDURE — 1036F TOBACCO NON-USER: CPT | Performed by: STUDENT IN AN ORGANIZED HEALTH CARE EDUCATION/TRAINING PROGRAM

## 2025-05-27 PROCEDURE — 1123F ACP DISCUSS/DSCN MKR DOCD: CPT | Performed by: STUDENT IN AN ORGANIZED HEALTH CARE EDUCATION/TRAINING PROGRAM

## 2025-05-27 PROCEDURE — G8417 CALC BMI ABV UP PARAM F/U: HCPCS | Performed by: STUDENT IN AN ORGANIZED HEALTH CARE EDUCATION/TRAINING PROGRAM

## 2025-05-27 PROCEDURE — G8399 PT W/DXA RESULTS DOCUMENT: HCPCS | Performed by: STUDENT IN AN ORGANIZED HEALTH CARE EDUCATION/TRAINING PROGRAM

## 2025-05-27 PROCEDURE — 99214 OFFICE O/P EST MOD 30 MIN: CPT | Performed by: STUDENT IN AN ORGANIZED HEALTH CARE EDUCATION/TRAINING PROGRAM

## 2025-05-27 PROCEDURE — G2211 COMPLEX E/M VISIT ADD ON: HCPCS | Performed by: STUDENT IN AN ORGANIZED HEALTH CARE EDUCATION/TRAINING PROGRAM

## 2025-05-27 PROCEDURE — 1090F PRES/ABSN URINE INCON ASSESS: CPT | Performed by: STUDENT IN AN ORGANIZED HEALTH CARE EDUCATION/TRAINING PROGRAM

## 2025-05-27 PROCEDURE — G8428 CUR MEDS NOT DOCUMENT: HCPCS | Performed by: STUDENT IN AN ORGANIZED HEALTH CARE EDUCATION/TRAINING PROGRAM

## 2025-05-27 RX ORDER — PHENTERMINE HYDROCHLORIDE 15 MG/1
15 CAPSULE ORAL EVERY MORNING
Qty: 30 CAPSULE | Refills: 0 | Status: SHIPPED | OUTPATIENT
Start: 2025-05-27 | End: 2025-06-26

## 2025-05-27 NOTE — PROGRESS NOTES
Telemedicine note    Encounter Date and Time: 05/27/25 at 12:43 PM EDT    Anel Leal, was evaluated through a synchronous (real-time) audio-video encounter. The patient (or guardian if applicable) is aware that this is a billable service, which includes applicable co-pays. This Virtual Visit was conducted with patient's (and/or legal guardian's) consent. Patient identification was verified, and a caregiver was present when appropriate.   This visit was virtual due to scheduling/transportation issues.  The patient was located at Home: [address] [place] [id number]  Provider was located at Facility (Appt Dept): [address] [place] [id number]  Confirm you are appropriately licensed, registered, or certified to deliver care in the state where the patient is located as indicated above. If you are not or unsure, please re-schedule the visit: Yes, I confirm.     --Marciano Ly MD on 8/20/2024 at 11:37 AM    Anel Leal is a 75 y.o. female who was evaluated by synchronous (real-time) audio-video technology from home, through a secure patient portal, presenting today for   Chief Complaint   Patient presents with    Medication Refill    Weight Management   .    Assessment/Plan:     Assessment & Plan  1. Obesity/weight management: Chronic, unstable.  BMI 34.  Previously tried Topamax with no benefit  - Hypertension is well-controlled, eliminating it as a contraindication for phentermine use.  - Prescription for phentermine 15 mg will be provided, with the understanding that if it proves beneficial and does not induce any side effects, the dosage may be increased to 30 mg.  - Monitor blood pressure to ensure it remains stable.  - A controlled substance agreement will be signed upon the next visit if continuing the medication.    2. Heartburn: Stable.  - Famotidine is effectively managing heartburn symptoms.  - Discontinue pantoprazole as it was not effective.    3. Back pain:

## 2025-06-12 RX ORDER — ESTRADIOL 0.5 MG/1
0.5 TABLET ORAL DAILY
Qty: 90 TABLET | Refills: 1 | Status: SHIPPED | OUTPATIENT
Start: 2025-06-12

## 2025-06-12 NOTE — TELEPHONE ENCOUNTER
Last appointment: VV 5/27/25 Malachi  Next appointment: None  Previous refill encounter(s): 4/2/24 90 + 1 (1mg tablet)- date last filled 3/16/25 for 90    Requested Prescriptions     Pending Prescriptions Disp Refills    estradiol (ESTRACE) 0.5 MG tablet 90 tablet 1     Sig: Take 1 tablet by mouth daily     For Pharmacy Admin Tracking Only    Program: Medication Refill  CPA in place:  Recommendation Provided To:   Intervention Detail: New Rx: 1, reason: Patient Preference  Intervention Accepted By:   Gap Closed?:    Time Spent (min): 5

## 2025-06-19 ENCOUNTER — HOSPITAL ENCOUNTER (OUTPATIENT)
Facility: HOSPITAL | Age: 75
Setting detail: OUTPATIENT SURGERY
Discharge: HOME | End: 2025-06-19
Attending: INTERNAL MEDICINE | Admitting: INTERNAL MEDICINE
Payer: MEDICARE

## 2025-06-19 ENCOUNTER — ANESTHESIA EVENT (OUTPATIENT)
Facility: HOSPITAL | Age: 75
End: 2025-06-19
Payer: MEDICARE

## 2025-06-19 ENCOUNTER — ANESTHESIA (OUTPATIENT)
Facility: HOSPITAL | Age: 75
End: 2025-06-19
Payer: MEDICARE

## 2025-06-19 VITALS
RESPIRATION RATE: 19 BRPM | DIASTOLIC BLOOD PRESSURE: 67 MMHG | OXYGEN SATURATION: 94 % | SYSTOLIC BLOOD PRESSURE: 108 MMHG | BODY MASS INDEX: 32.27 KG/M2 | WEIGHT: 189 LBS | HEART RATE: 72 BPM | TEMPERATURE: 97.7 F | HEIGHT: 64 IN

## 2025-06-19 PROCEDURE — 3600007502: Performed by: INTERNAL MEDICINE

## 2025-06-19 PROCEDURE — 2709999900 HC NON-CHARGEABLE SUPPLY: Performed by: INTERNAL MEDICINE

## 2025-06-19 PROCEDURE — 7100000010 HC PHASE II RECOVERY - FIRST 15 MIN: Performed by: INTERNAL MEDICINE

## 2025-06-19 PROCEDURE — 88305 TISSUE EXAM BY PATHOLOGIST: CPT

## 2025-06-19 PROCEDURE — C1726 CATH, BAL DIL, NON-VASCULAR: HCPCS | Performed by: INTERNAL MEDICINE

## 2025-06-19 PROCEDURE — 2720000010 HC SURG SUPPLY STERILE: Performed by: INTERNAL MEDICINE

## 2025-06-19 PROCEDURE — 7100000011 HC PHASE II RECOVERY - ADDTL 15 MIN: Performed by: INTERNAL MEDICINE

## 2025-06-19 PROCEDURE — 2580000003 HC RX 258: Performed by: INTERNAL MEDICINE

## 2025-06-19 PROCEDURE — 6360000002 HC RX W HCPCS: Performed by: NURSE ANESTHETIST, CERTIFIED REGISTERED

## 2025-06-19 PROCEDURE — 3700000000 HC ANESTHESIA ATTENDED CARE: Performed by: INTERNAL MEDICINE

## 2025-06-19 RX ORDER — SODIUM CHLORIDE 9 MG/ML
INJECTION, SOLUTION INTRAVENOUS CONTINUOUS
Status: DISCONTINUED | OUTPATIENT
Start: 2025-06-19 | End: 2025-06-19 | Stop reason: HOSPADM

## 2025-06-19 RX ORDER — SODIUM CHLORIDE 0.9 % (FLUSH) 0.9 %
5-40 SYRINGE (ML) INJECTION PRN
Status: DISCONTINUED | OUTPATIENT
Start: 2025-06-19 | End: 2025-06-19 | Stop reason: HOSPADM

## 2025-06-19 RX ORDER — SODIUM CHLORIDE 0.9 % (FLUSH) 0.9 %
5-40 SYRINGE (ML) INJECTION EVERY 12 HOURS SCHEDULED
Status: DISCONTINUED | OUTPATIENT
Start: 2025-06-19 | End: 2025-06-19 | Stop reason: HOSPADM

## 2025-06-19 RX ORDER — SODIUM CHLORIDE 9 MG/ML
INJECTION, SOLUTION INTRAVENOUS PRN
Status: DISCONTINUED | OUTPATIENT
Start: 2025-06-19 | End: 2025-06-19 | Stop reason: HOSPADM

## 2025-06-19 RX ORDER — LIDOCAINE HYDROCHLORIDE 20 MG/ML
INJECTION, SOLUTION EPIDURAL; INFILTRATION; INTRACAUDAL; PERINEURAL
Status: DISCONTINUED | OUTPATIENT
Start: 2025-06-19 | End: 2025-06-19 | Stop reason: SDUPTHER

## 2025-06-19 RX ADMIN — PROPOFOL 50 MG: 10 INJECTION, EMULSION INTRAVENOUS at 10:40

## 2025-06-19 RX ADMIN — SODIUM CHLORIDE: 9 INJECTION, SOLUTION INTRAVENOUS at 10:07

## 2025-06-19 RX ADMIN — PROPOFOL 50 MG: 10 INJECTION, EMULSION INTRAVENOUS at 10:38

## 2025-06-19 RX ADMIN — LIDOCAINE HYDROCHLORIDE 60 MG: 20 INJECTION, SOLUTION EPIDURAL; INFILTRATION; INTRACAUDAL; PERINEURAL at 10:38

## 2025-06-19 RX ADMIN — PROPOFOL 25 MG: 10 INJECTION, EMULSION INTRAVENOUS at 10:43

## 2025-06-19 ASSESSMENT — PAIN - FUNCTIONAL ASSESSMENT: PAIN_FUNCTIONAL_ASSESSMENT: NONE - DENIES PAIN

## 2025-06-19 NOTE — ANESTHESIA POSTPROCEDURE EVALUATION
Department of Anesthesiology  Postprocedure Note    Patient: Anel Leal  MRN: [id number]  YOB: 1950  Date of evaluation: 6/19/2025    Procedure Summary       Date: 06/19/25 Room / Location: [facility] ENDO 04 / [facility] ENDOSCOPY    Anesthesia Start: 1036 Anesthesia Stop: 1047    Procedure: ESOPHAGOGASTRODUODENOSCOPY (Upper GI Region) Diagnosis:       Esophageal dysphagia      Hiatal hernia      Constipation, unspecified constipation type      (Esophageal dysphagia [R13.19])      (Hiatal hernia [K44.9])      (Constipation, unspecified constipation type [K59.00])    Surgeons: Genevieve Sullivan MD Responsible Provider: Ari Almaraz MD    Anesthesia Type: MAC ASA Status: 2            Anesthesia Type: MAC    Jesse Phase I: Jesse Score: 10    Jesse Phase II: Jesse Score: 10    Anesthesia Post Evaluation    Patient location during evaluation: bedside  Nausea & Vomiting: no nausea  Cardiovascular status: blood pressure returned to baseline  Respiratory status: acceptable  Hydration status: euvolemic    No notable events documented.

## 2025-06-19 NOTE — ANESTHESIA PRE PROCEDURE
Allergen Reactions   • Codeine Rash     Rash and swelling on arm   • Bupropion Other (See Comments)     fatigue   • Adhesive Tape Rash       Problem List:    Patient Active Problem List   Diagnosis Code   • Personal history of anxiety disorder Z86.59   • Hyperlipidemia with target low density lipoprotein (LDL) cholesterol less than 130 mg/dL E78.5   • Lumbar nerve root impingement M54.16   • Adjustment disorder with mixed anxiety and depressed mood F43.23   • Other idiopathic scoliosis, thoracolumbar region M41.25   • Left knee DJD M17.12   • History of kidney stones Z87.442   • Postmenopausal HRT (hormone replacement therapy) Z79.890   • ACP (advance care planning) Z71.89   • History of depression Z86.59   • Osteoarthritis of right knee M17.11   • DJD (degenerative joint disease), lumbar M47.816   • History of complications due to general anesthesia Z91.89   • Severe obesity ([facility]) E66.01   • Class 1 obesity due to excess calories with body mass index (BMI) of 32.0 to 32.9 in adult E66.811, E66.09, Z68.32   • Diffuse esophageal spasm K22.4   • Gastroesophageal reflux disease without esophagitis K21.9   • Hiatal hernia K44.9   • Slow transit constipation K59.01       Past Medical History:        Diagnosis Date   • Actinic keratosis     Left arm   • Anxiety [id number]   • Chronic back pain 723066   • Delayed emergence from anesthesia (pt. anxious about this)    • Depression [id number]    Worried about weight and family   • Femoral fracture ([facility]) 08/23/2019   • h/o Diverticulitis    • HRT (hormone replacement therapy)    • Hyperlipidemia    • Left displaced femoral neck fracture ([facility]) 08/23/2019   • Left knee DJD    • Lumbar nerve root impingement     RLQ pain   • Nephrolithiasis 95,96,98    left   • Obesity [id number]   • Osteoarthritis    • Other idiopathic scoliosis, thoracolumbar region 02/27/2018   • Right knee DJD    • Type 2 diabetes mellitus        Past Surgical History:        Procedure Laterality Date   •

## 2025-06-19 NOTE — OP NOTE
[facility]  [address]  [place] [address]  [phone]                              Esophagogastroduodenoscopy (EGD) Procedure Note    NAME:  Anel Leal     :   1950     MRN:   [id number]     Date/Time:  2025 10:46 AM    :  Genevieve Sullivan MD    Staff: Circulator: Marylin Jacobs RN; Aditi Gonsales RN    Referring Provider:  Marciano Ly MD    Anethesia/Sedation:  MAC    Procedure Details   After infomed consent was obtained for the procedure, with all risks and benefits of procedure explained the patient was taken to the endoscopy suite and placed in the left lateral decubitus position.  Following sequential administration of sedation as per above, the gastroscope was inserted into the mouth and advanced under direct vision to duodenum.  A careful inspection was made as the gastroscope was withdrawn, including a retroflexed view of the proximal stomach; findings and interventions are described below.      Findings:  Esophagus: Normal esophageal mucosa though though are subtle nonobstructing rings in the distal few centimeters of the esophagus.  Mild Schatzki's ring at GE junction at 37 cm from incisors - dilated with an 18 mm balloon dilator.  2 cm sliding hiatal hernia (grade 2 gastroesophageal flap valve). Biopsies obtained from proximal and distal esophagus  Stomach:Normal   Duodenum/jejunum:Normal     Interventions:  Bx and dilation            Specimens Removed:  * No specimens in log *    Complications: None.     EBL:  minimal     Impression:    See Postoperative diagnosis above    Recommendations:   - Await pathology. You should receive a letter within 2 weeks.   - Resume normal medications.    Discharge disposition:  Home in the company of  when able to ambulate    Genevieve Sullivan MD

## 2025-06-19 NOTE — H&P
[facility]  [address]  [place] [address] [phone]                                History and Physical     NAME: Anel Leal   :  1950   MRN:  [id number]     HPI:  The patient was seen and examined.    Past Surgical History:   Procedure Laterality Date    ADENOIDECTOMY      APPENDECTOMY  1966    CATARACT REMOVAL Right 2019    CATARACT REMOVAL Left 2019     SECTION  ,1981    X2    COLONOSCOPY      GI      COLONOSCOPY    HIP FRACTURE SURGERY Right 2019    Total R hip- Dr. Nicole    HIP SURGERY      Replacement    HYSTERECTOMY (CERVIX STATUS UNKNOWN)      LMP-44 years old    HYSTERECTOMY, TOTAL ABDOMINAL (CERVIX REMOVED)      KNEE ARTHROSCOPY Bilateral 1966    SCOPE of knees bilateral    LITHOTRIPSY      X 2    LUMBAR LAMINECTOMY  2000    X2    MENISCECTOMY      left knee    ORTHOPEDIC SURGERY Left     BROKEN ARM    ORTHOPEDIC SURGERY Right     BROKEN ARM SURGERY X2    ORTHOPEDIC SURGERY  12    left shoulder repair; 14 screws and 2 plates    ORTHOPEDIC SURGERY Left      REPAIR OF Left MENISCUS    ORTHOPEDIC SURGERY Left 2019    hip replacement    POLYPECTOMY  2013    SHOULDER SURGERY      SPINE SURGERY      TONSILLECTOMY      TOTAL KNEE ARTHROPLASTY Left 2016    TOTAL KNEE ARTHROPLASTY Left 2015    TUBAL LIGATION      BSPO adhesion conization abn pap    UROLOGICAL SURGERY  2020    kidney stone removed Dr Chiu     Past Medical History:   Diagnosis Date    Actinic keratosis     Left arm    Adjustment disorder with mixed anxiety and depressed mood     Adverse effect of anesthesia     HARD TO WAKE UP SLOW BREATHING ,Pt stated doesn't take much anesthesia    Anxiety [id number]    Stress of activities    Arthritis     OSTEO    Chronic back pain [id number]    Chronic pain     Delayed emergence from anesthesia     Depression [id number]    Worried about weight and family    Diverticulitis     DJD (degenerative

## 2025-06-19 NOTE — PERIOP NOTE
Initial RN admission and assessment performed and documented in Endoscopy navigator.     Patient evaluated by anesthesia in pre-procedure holding.     All procedural vital signs, airway assessment, and level of consciousness information monitored and recorded by anesthesia staff on the anesthesia record.     Report received from CRNA post procedure.  Patient transported to recovery area by RN.    Endoscopy post procedure time out was performed and specimens were verified with physician.    Endoscope was pre-cleaned at bedside immediately following procedure by Munira.

## 2025-06-19 NOTE — DISCHARGE INSTRUCTIONS
[facility]  [phone]                                  Anel Leal  [id number]  1950    It was my pleasure seeing you for your procedure.  You will also receive a summary report with the findings from this procedure and any further recommendations.  If you had polyps removed or biopsies taken during your procedure, you will receive a separate letter from me within approximately the next 2 weeks.  If you don't receive this letter or if you have any questions, please call my office [phone].     Please take note of the post procedure instructions listed below.    Dr. Laurie Mcallister      CARE FOLLOWING YOUR PROCEDURE    These instructions give you information on caring for yourself after your procedure. Call your doctor if you have any problems or questions after your procedure.    HOME CARE  Walk if you have belly cramping or gas.  Walking will help get rid of the air and reduce the bloated feeling in your belly (abdomen).  Your IV site (where you received drugs) may be tender to touch.  Place warm towels on the site; keep your arm up on two pillows if you have any swelling or soreness in the area.  You may shower.    ACTIVITY:  Take frequent rest periods and move at a slower pace for the next 24 hours..  You may resume your regular activity tomorrow if you are feeling back to normal.  Do not drive or ride a bicycle for at least 24 hours (because of the medicine (anesthesia) used during the test).  Do not sign any important legal documents or use or operate any machinery for 24 hours  Do not take sleeping medicines/nerve drugs for 24 hours unless the doctor tells you.  You can return to work/school tomorrow unless otherwise instructed.    NUTRITION:  Drink plenty of fluids to keep your pee (urine) clear or pale yellow  Begin with a light meal and progress to your normal diet. Heavy or fried foods are harder to digest and may make you feel sick to your stomach

## 2025-06-30 ENCOUNTER — TELEMEDICINE (OUTPATIENT)
Age: 75
End: 2025-06-30
Payer: MEDICARE

## 2025-06-30 DIAGNOSIS — K44.9 HIATAL HERNIA: ICD-10-CM

## 2025-06-30 DIAGNOSIS — E66.811 CLASS 1 OBESITY DUE TO EXCESS CALORIES WITH SERIOUS COMORBIDITY AND BODY MASS INDEX (BMI) OF 32.0 TO 32.9 IN ADULT: Primary | ICD-10-CM

## 2025-06-30 DIAGNOSIS — K22.2 SCHATZKI'S RING: ICD-10-CM

## 2025-06-30 DIAGNOSIS — E78.5 HYPERLIPIDEMIA WITH TARGET LOW DENSITY LIPOPROTEIN (LDL) CHOLESTEROL LESS THAN 130 MG/DL: ICD-10-CM

## 2025-06-30 DIAGNOSIS — K21.9 GASTROESOPHAGEAL REFLUX DISEASE WITHOUT ESOPHAGITIS: ICD-10-CM

## 2025-06-30 DIAGNOSIS — R73.03 PREDIABETES: ICD-10-CM

## 2025-06-30 DIAGNOSIS — E66.09 CLASS 1 OBESITY DUE TO EXCESS CALORIES WITH SERIOUS COMORBIDITY AND BODY MASS INDEX (BMI) OF 32.0 TO 32.9 IN ADULT: Primary | ICD-10-CM

## 2025-06-30 PROCEDURE — G8417 CALC BMI ABV UP PARAM F/U: HCPCS | Performed by: STUDENT IN AN ORGANIZED HEALTH CARE EDUCATION/TRAINING PROGRAM

## 2025-06-30 PROCEDURE — G8428 CUR MEDS NOT DOCUMENT: HCPCS | Performed by: STUDENT IN AN ORGANIZED HEALTH CARE EDUCATION/TRAINING PROGRAM

## 2025-06-30 PROCEDURE — 1123F ACP DISCUSS/DSCN MKR DOCD: CPT | Performed by: STUDENT IN AN ORGANIZED HEALTH CARE EDUCATION/TRAINING PROGRAM

## 2025-06-30 PROCEDURE — G8399 PT W/DXA RESULTS DOCUMENT: HCPCS | Performed by: STUDENT IN AN ORGANIZED HEALTH CARE EDUCATION/TRAINING PROGRAM

## 2025-06-30 PROCEDURE — G0447 BEHAVIOR COUNSEL OBESITY 15M: HCPCS | Performed by: STUDENT IN AN ORGANIZED HEALTH CARE EDUCATION/TRAINING PROGRAM

## 2025-06-30 PROCEDURE — 1036F TOBACCO NON-USER: CPT | Performed by: STUDENT IN AN ORGANIZED HEALTH CARE EDUCATION/TRAINING PROGRAM

## 2025-06-30 PROCEDURE — G2211 COMPLEX E/M VISIT ADD ON: HCPCS | Performed by: STUDENT IN AN ORGANIZED HEALTH CARE EDUCATION/TRAINING PROGRAM

## 2025-06-30 PROCEDURE — 1090F PRES/ABSN URINE INCON ASSESS: CPT | Performed by: STUDENT IN AN ORGANIZED HEALTH CARE EDUCATION/TRAINING PROGRAM

## 2025-06-30 PROCEDURE — 99214 OFFICE O/P EST MOD 30 MIN: CPT | Performed by: STUDENT IN AN ORGANIZED HEALTH CARE EDUCATION/TRAINING PROGRAM

## 2025-06-30 RX ORDER — PHENTERMINE HYDROCHLORIDE 15 MG/1
15 CAPSULE ORAL EVERY MORNING
Qty: 90 CAPSULE | Refills: 0 | Status: SHIPPED | OUTPATIENT
Start: 2025-06-30 | End: 2025-09-28

## 2025-06-30 NOTE — PROGRESS NOTES
Telemedicine note    Encounter Date and Time: 06/30/25 at 8:48 AM EDT    Anel Leal, was evaluated through a synchronous (real-time) audio-video encounter. The patient (or guardian if applicable) is aware that this is a billable service, which includes applicable co-pays. This Virtual Visit was conducted with patient's (and/or legal guardian's) consent. Patient identification was verified, and a caregiver was present when appropriate.   This visit was virtual due to scheduling/transportation issues.  The patient was located [place] [address] [facility] [place] [facility] [place] [address]  Provider was located at Home (Appt Dept State): [place]  Confirm you are appropriately licensed, registered, or certified to deliver care in the state where the patient is located as indicated above. If you are not or unsure, please re-schedule the visit: Yes, I confirm.     --Marciano Ly MD on 8/20/2024 at 11:37 AM    Anel Leal is a 75 y.o. female who was evaluated by synchronous (real-time) audio-video technology from home, through a secure patient portal, presenting today for   Chief Complaint   Patient presents with    Weight Management    Follow-up   .    Assessment/Plan:     Assessment & Plan  1. GERD/dysphagia/Post-EGD follow-up: Stable.  - EGD results reviewed: normal esophagus with subtle nonobstructing rings in the distal esophagus and a mild Schatzki's ring at the GE junction, dilated with an 18 mm balloon. Biopsies from the proximal and distal esophagus were benign. Reports no significant change in swallowing difficulties post-procedure.  - Continue using famotidine daily for 2 to 3 weeks to see if it improves symptoms.  - Follow-up with Dr. Sullivan if symptoms persist or worsen.    2. Weight management: Stable.  - Lost 4 pounds on phentermine 15 mg daily without any side effects. Happy with the current dosage; reports significant appetite suppression.  - Continue with phentermine 15 mg daily. Prescription for a

## 2025-08-15 ENCOUNTER — TRANSCRIBE ORDERS (OUTPATIENT)
Facility: HOSPITAL | Age: 75
End: 2025-08-15

## 2025-08-15 DIAGNOSIS — Z12.31 ENCOUNTER FOR SCREENING MAMMOGRAM FOR HIGH-RISK PATIENT: Primary | ICD-10-CM

## 2025-09-03 DIAGNOSIS — R60.0 LOCALIZED EDEMA: ICD-10-CM

## 2025-09-03 RX ORDER — FUROSEMIDE 20 MG/1
20 TABLET ORAL 2 TIMES DAILY
Qty: 180 TABLET | Refills: 1 | Status: SHIPPED | OUTPATIENT
Start: 2025-09-03

## (undated) DEVICE — BOWL BNE CEM MIX SPAT CURET SMARTMIX CTS

## (undated) DEVICE — SYR 3ML LL TIP 1/10ML GRAD --

## (undated) DEVICE — REM POLYHESIVE ADULT PATIENT RETURN ELECTRODE: Brand: VALLEYLAB

## (undated) DEVICE — GOWN,AURORA,NON-REINFORCED,2XL: Brand: MEDLINE

## (undated) DEVICE — SUTURE VCRL SZ 2-0 L36IN ABSRB UD L40MM CT 1/2 CIR J957H

## (undated) DEVICE — SUTURE VCRL SZ 3-0 L27IN ABSRB UD FS-2 L19MM 1/2 CIR J423H

## (undated) DEVICE — SLIM BODY SKIN STAPLER: Brand: APPOSE ULC

## (undated) DEVICE — SUTURE MCRYL SZ 4 0 L18IN ABSRB VLT PS 1 L24MM 3 8 CIR REV Y682H

## (undated) DEVICE — INTENDED FOR TISSUE SEPARATION, AND OTHER PROCEDURES THAT REQUIRE A SHARP SURGICAL BLADE TO PUNCTURE OR CUT.: Brand: BARD-PARKER ® CARBON RIB-BACK BLADES

## (undated) DEVICE — SCRUB DRY SURG EZ SCRUB BRUSH PREOPERATIVE GRN

## (undated) DEVICE — PACKING 400141 50PK POPE EAR WICK: Brand: MEROCEL®

## (undated) DEVICE — SUTURE STRATAFIX SYMMETRIC PDS + SZ 1 L18IN ABSRB VLT L48MM SXPP1A400

## (undated) DEVICE — TOWEL SURG W17XL27IN STD BLU COT NONFENESTRATED PREWASHED

## (undated) DEVICE — SUTURE ABSORBABLE BRAIDED 2-0 CT-1 27 IN UD VICRYL J259H

## (undated) DEVICE — SUTURE VCRL SZ 6-0 L18IN ABSRB UD L11MM P-1 3/8 CIR PRIM J489G

## (undated) DEVICE — SUT ETHLN 3-0 18IN PS1 BLK --

## (undated) DEVICE — HANDPIECE SET WITH BONE CLEANING TIP AND SUCTION TUBE: Brand: INTERPULSE

## (undated) DEVICE — FORCEPS BX L240CM JAW DIA2.8MM L CAP W/ NDL MIC MESH TOOTH

## (undated) DEVICE — SUTURE NONABSORBABLE MONOFILAMENT 4-0 P-3 18 IN ETHILON 699H

## (undated) DEVICE — SUT PLN 5-0 18IN P3 YEL --

## (undated) DEVICE — SOLUTION IV 50ML 0.9% SOD CHL

## (undated) DEVICE — STERILE POLYISOPRENE POWDER-FREE SURGICAL GLOVES: Brand: PROTEXIS

## (undated) DEVICE — PACK,EENT,TURBAN DRAPE,PK II: Brand: MEDLINE

## (undated) DEVICE — SYR 20ML LL STRL LF --

## (undated) DEVICE — GARMENT,MEDLINE,DVT,INT,CALF,MED, GEN2: Brand: MEDLINE

## (undated) DEVICE — TOTAL TRAY, 16FR 10ML SIL FOLEY, URN: Brand: MEDLINE

## (undated) DEVICE — SYRINGE MED 20ML STD CLR PLAS LUERLOCK TIP N CTRL DISP

## (undated) DEVICE — SPONGE GZ W4XL4IN COT RADPQ HIGHLY ABSRB

## (undated) DEVICE — 3M™ IOBAN™ 2 ANTIMICROBIAL INCISE DRAPE 6651EZ: Brand: IOBAN™ 2

## (undated) DEVICE — ZIMMER® STERILE DISPOSABLE TOURNIQUET CUFF WITH PLC, DUAL PORT, SINGLE BLADDER, 34 IN. (86 CM)

## (undated) DEVICE — SUTURE PLN GUT SZ 4-0 P-3 L18IN ABSRB YELLOWISH TAN L13MM 1644G

## (undated) DEVICE — ORISE PROKNIFE 3.0 MM ELECTRODE: Brand: ORISE™ PROKNIFE

## (undated) DEVICE — MAGNETIC INSTRUMENT PAD 10" X 16"; MEDIUM; DISPOSABLE: Brand: CARDINAL HEALTH

## (undated) DEVICE — DRAPE,U/ SHT,SPLIT,PLAS,STERIL: Brand: MEDLINE

## (undated) DEVICE — STERILE POLYISOPRENE POWDER-FREE SURGICAL GLOVES WITH EMOLLIENT COATING: Brand: PROTEXIS

## (undated) DEVICE — STRAP,POSITIONING,KNEE/BODY,FOAM,4X60": Brand: MEDLINE

## (undated) DEVICE — INSULATED NEEDLE ELECTRODE: Brand: EDGE

## (undated) DEVICE — SUTURE MCRYL SZ 2-0 L36IN ABSRB UD L36MM CT-1 1/2 CIR Y945H

## (undated) DEVICE — PREP SKN PREVAIL 40ML APPL --

## (undated) DEVICE — CROUCH CORNEAL PROTECTOR: Brand: BAUSCH + LOMB

## (undated) DEVICE — Device

## (undated) DEVICE — NEEDLE HYPO 18GA L1.5IN PNK S STL HUB POLYPR SHLD REG BVL

## (undated) DEVICE — DRAPE XR C ARM 41X74IN LF --

## (undated) DEVICE — SUTURE VCRL SZ 3-0 L18IN ABSRB UD PS-2 L19MM 3/8 CRV PRIM J497H

## (undated) DEVICE — SYRINGE INSULIN 1ML LUERSLIP TIP W/O SFTY U100 BLISTER PK

## (undated) DEVICE — CONTAINER,SPECIMEN,3OZ,OR STRL: Brand: MEDLINE

## (undated) DEVICE — SOLUTION IRRIG 1000ML H2O STRL BLT

## (undated) DEVICE — ELECTRO LUBE IS A SINGLE PATIENT USE DEVICE THAT IS INTENDED TO BE USED ON ELECTROSURGICAL ELECTRODES TO REDUCE STICKING.: Brand: KEY SURGICAL ELECTRO LUBE

## (undated) DEVICE — ROCKER SWITCH PENCIL BLADE ELECTRODE, HOLSTER: Brand: EDGE

## (undated) DEVICE — SUTURE ABSORBABLE BRAIDED 4-0 P-3 18 IN UD VICRYL J494G

## (undated) DEVICE — SURGICAL PROCEDURE PACK BASIN MAJ SET CUST NO CAUT

## (undated) DEVICE — BANDAGE COMPR SELF ADH 5 YDX4 IN TAN STRL PREMIERPRO LF

## (undated) DEVICE — SUTURE VCRL SZ 2 L54IN ABSRB UD L65MM TP-1 1/2 CIR J880T

## (undated) DEVICE — BANDAGE COMPR M W6INXL10YD WHT BGE VELC E MTRX HK AND LOOP

## (undated) DEVICE — CLOSED WOUND DRAIN, FLAT, SILICONE, WITH CLOTSTOP®: Brand: AXIOM® CWV FACIAL ATRAUM™ WITH CLOTSTOP®

## (undated) DEVICE — (D)SUT PLN FST 6-0 18IN PC1 -- DISC BY MFR

## (undated) DEVICE — BIT DRL L30MM DIA3.2MM DISP FOR G7 2 MOBILITY CONSTRUCT

## (undated) DEVICE — SYRINGE INFL 60ML DISP ALLIANCE II

## (undated) DEVICE — PADDING CST 6IN STERILE --

## (undated) DEVICE — 4-PORT MANIFOLD: Brand: NEPTUNE 2

## (undated) DEVICE — SUTURE PDS II SZ 3-0 L27IN ABSRB VLT RB-1 L17MM 1/2 CIR Z305H

## (undated) DEVICE — INFECTION CONTROL KIT SYS

## (undated) DEVICE — ADHESIVE SKIN CLOSURE 4X22 CM PREMIERPRO EXOFINFUSION DISP

## (undated) DEVICE — SUPER SPONGES,LARGE: Brand: KERLIX

## (undated) DEVICE — BLADE SAW W073XL276IN THK0031IN CUT THK0036IN REPL SAG

## (undated) DEVICE — TUBING IRRIG COMPATIBLE W ERBE MEDIVATOR PMP HYDR

## (undated) DEVICE — SOLUTION SCRB 2OZ 10% POVIDONE IOD ANTIMIC BTL

## (undated) DEVICE — ORISE PROKNIFE 1.5 MM ELECTRODE: Brand: ORISE™ PROKNIFE

## (undated) DEVICE — PADDING CAST SPEC 6INX4YD COT --

## (undated) DEVICE — DRAPE,EXTREMITY,89X128,STERILE: Brand: MEDLINE

## (undated) DEVICE — T4 HOOD

## (undated) DEVICE — SUTURE GORTX SZ 2-0 L36IN NONABSORBABLE L26MM TH-26 1/2 CIR 3N07A

## (undated) DEVICE — SYR 10ML CTRL LR LCK NSAF LF --

## (undated) DEVICE — SUPPORT FACE M FOR 25-26 7/8IN EAR CHEEK CHIN E FOR

## (undated) DEVICE — SUTURE VCRL SZ 2-0 L27IN ABSRB UD L26MM SH 1/2 CIR J417H

## (undated) DEVICE — YANKAUER,TAPERED BULBOUS TIP,W/O VENT: Brand: MEDLINE

## (undated) DEVICE — SOLUTION IRRIG 3000ML 0.9% SOD CHL FLX CONT 0797208] ICU MEDICAL INC]

## (undated) DEVICE — BANDAGE,GAUZE,BULKEE II,4.5"X4.1YD,STRL: Brand: MEDLINE

## (undated) DEVICE — STRIP SKIN CLSR W0.25XL4IN WHT SPUNBOUND FBR NYL HI ADH

## (undated) DEVICE — BIPOLAR FORCEPS CORD: Brand: VALLEYLAB

## (undated) DEVICE — SOLUTION IV 1000ML 0.9% SOD CHL

## (undated) DEVICE — NEEDLE HYPO 27GA L0.5IN GRY POLYPR HUB S STL REG BVL STR

## (undated) DEVICE — BIT DRL L20MM DIA3.2MM DISP FOR G7 2 MOBILITY CONSTRUCT

## (undated) DEVICE — TAPE,CLOTH/SILK,CURAD,3"X10YD,LF,40/CS: Brand: CURAD

## (undated) DEVICE — SUTURE VCRL SZ 0 L36IN ABSRB VLT L40MM CT 1/2 CIR J358H

## (undated) DEVICE — APPLICATOR FBR TIP L6IN COT TIP WOOD SHFT SWAB 2000 PER CA

## (undated) DEVICE — 40418 TRENDELENBURG ONE-STEP ARM PROTECTORS LARGE (1 PAIR): Brand: 40418 TRENDELENBURG ONE-STEP ARM PROTECTORS LARGE (1 PAIR)

## (undated) DEVICE — BANDAGE COMPR ELASTIC 5 YDX6 IN

## (undated) DEVICE — ESOPHAGEAL BALLOON DILATATION CATHETER: Brand: CRE FIXED WIRE

## (undated) DEVICE — TRAY PREP DRY W/ PREM GLV 2 APPL 6 SPNG 2 UNDPD 1 OVERWRAP

## (undated) DEVICE — SYR 10ML LUER LOK 1/5ML GRAD --

## (undated) DEVICE — SWAB SURG L76CM COT ROUNDED PT TIP VISISWAB

## (undated) DEVICE — 6619 2 PTNT ISO SYS INCISE AREA&LT;(&GT;&&LT;)&GT;P: Brand: STERI-DRAPE™ IOBAN™ 2